# Patient Record
Sex: FEMALE | Race: BLACK OR AFRICAN AMERICAN | NOT HISPANIC OR LATINO
[De-identification: names, ages, dates, MRNs, and addresses within clinical notes are randomized per-mention and may not be internally consistent; named-entity substitution may affect disease eponyms.]

---

## 2017-03-24 ENCOUNTER — APPOINTMENT (OUTPATIENT)
Dept: CARDIOLOGY | Facility: CLINIC | Age: 67
End: 2017-03-24

## 2017-04-07 ENCOUNTER — APPOINTMENT (OUTPATIENT)
Dept: CARDIOLOGY | Facility: CLINIC | Age: 67
End: 2017-04-07

## 2017-04-07 ENCOUNTER — NON-APPOINTMENT (OUTPATIENT)
Age: 67
End: 2017-04-07

## 2017-04-07 VITALS
OXYGEN SATURATION: 98 % | HEIGHT: 64 IN | HEART RATE: 76 BPM | WEIGHT: 151 LBS | DIASTOLIC BLOOD PRESSURE: 60 MMHG | SYSTOLIC BLOOD PRESSURE: 110 MMHG | BODY MASS INDEX: 25.78 KG/M2

## 2017-04-10 ENCOUNTER — RESULT REVIEW (OUTPATIENT)
Age: 67
End: 2017-04-10

## 2017-05-18 ENCOUNTER — RX RENEWAL (OUTPATIENT)
Age: 67
End: 2017-05-18

## 2017-06-05 ENCOUNTER — APPOINTMENT (OUTPATIENT)
Dept: CARDIOLOGY | Facility: CLINIC | Age: 67
End: 2017-06-05

## 2017-06-30 ENCOUNTER — APPOINTMENT (OUTPATIENT)
Dept: CARDIOLOGY | Facility: CLINIC | Age: 67
End: 2017-06-30

## 2017-07-28 ENCOUNTER — MEDICATION RENEWAL (OUTPATIENT)
Age: 67
End: 2017-07-28

## 2017-07-28 ENCOUNTER — RX RENEWAL (OUTPATIENT)
Age: 67
End: 2017-07-28

## 2017-08-10 ENCOUNTER — NON-APPOINTMENT (OUTPATIENT)
Age: 67
End: 2017-08-10

## 2017-08-10 ENCOUNTER — APPOINTMENT (OUTPATIENT)
Dept: CARDIOLOGY | Facility: CLINIC | Age: 67
End: 2017-08-10
Payer: MEDICARE

## 2017-08-10 VITALS
HEART RATE: 73 BPM | WEIGHT: 156 LBS | OXYGEN SATURATION: 98 % | SYSTOLIC BLOOD PRESSURE: 90 MMHG | BODY MASS INDEX: 25.99 KG/M2 | HEIGHT: 65 IN | DIASTOLIC BLOOD PRESSURE: 60 MMHG

## 2017-08-10 DIAGNOSIS — K59.09 OTHER CONSTIPATION: ICD-10-CM

## 2017-08-10 DIAGNOSIS — R14.0 ABDOMINAL DISTENSION (GASEOUS): ICD-10-CM

## 2017-08-10 PROCEDURE — 93306 TTE W/DOPPLER COMPLETE: CPT

## 2017-08-10 PROCEDURE — 93978 VASCULAR STUDY: CPT | Mod: 59

## 2017-08-10 PROCEDURE — 93000 ELECTROCARDIOGRAM COMPLETE: CPT

## 2017-08-10 PROCEDURE — 99215 OFFICE O/P EST HI 40 MIN: CPT | Mod: 25

## 2017-08-30 ENCOUNTER — APPOINTMENT (OUTPATIENT)
Dept: GASTROENTEROLOGY | Facility: CLINIC | Age: 67
End: 2017-08-30

## 2017-09-05 ENCOUNTER — RX RENEWAL (OUTPATIENT)
Age: 67
End: 2017-09-05

## 2017-09-19 ENCOUNTER — MEDICATION RENEWAL (OUTPATIENT)
Age: 67
End: 2017-09-19

## 2017-10-02 ENCOUNTER — APPOINTMENT (OUTPATIENT)
Dept: CARDIOLOGY | Facility: CLINIC | Age: 67
End: 2017-10-02

## 2017-10-13 ENCOUNTER — APPOINTMENT (OUTPATIENT)
Dept: CARDIOLOGY | Facility: CLINIC | Age: 67
End: 2017-10-13
Payer: MEDICARE

## 2017-10-13 PROCEDURE — 93283 PRGRMG EVAL IMPLANTABLE DFB: CPT

## 2017-11-09 ENCOUNTER — MEDICATION RENEWAL (OUTPATIENT)
Age: 67
End: 2017-11-09

## 2017-11-27 ENCOUNTER — MEDICATION RENEWAL (OUTPATIENT)
Age: 67
End: 2017-11-27

## 2018-01-19 ENCOUNTER — APPOINTMENT (OUTPATIENT)
Dept: CARDIOLOGY | Facility: CLINIC | Age: 68
End: 2018-01-19
Payer: MEDICARE

## 2018-01-19 PROCEDURE — 93283 PRGRMG EVAL IMPLANTABLE DFB: CPT

## 2018-02-06 ENCOUNTER — APPOINTMENT (OUTPATIENT)
Dept: CARDIOLOGY | Facility: CLINIC | Age: 68
End: 2018-02-06
Payer: MEDICARE

## 2018-02-06 ENCOUNTER — NON-APPOINTMENT (OUTPATIENT)
Age: 68
End: 2018-02-06

## 2018-02-06 VITALS
BODY MASS INDEX: 24.32 KG/M2 | WEIGHT: 146 LBS | HEART RATE: 74 BPM | OXYGEN SATURATION: 98 % | SYSTOLIC BLOOD PRESSURE: 110 MMHG | DIASTOLIC BLOOD PRESSURE: 60 MMHG | HEIGHT: 65 IN

## 2018-02-06 PROCEDURE — 99215 OFFICE O/P EST HI 40 MIN: CPT

## 2018-02-06 PROCEDURE — 93000 ELECTROCARDIOGRAM COMPLETE: CPT

## 2018-02-06 RX ORDER — ASPIRIN 81 MG/1
81 TABLET ORAL
Qty: 90 | Refills: 3 | Status: DISCONTINUED | COMMUNITY
Start: 2017-11-27 | End: 2018-02-06

## 2018-02-06 NOTE — DISCUSSION/SUMMARY
[Systolic Heart Failure] : systolic heart failure [Non-Cardiac] : non-cardiac chest pain [Coronary Artery Disease] : coronary artery disease [Stable] : stable [None] : none [Chronic Combined Systolic And Diastolic Heart Failure] : chronic combined systolic and diastolic congestive heart failure [Compensated] : compensated [Responding to Treatment] : responding to treatment [Patient] : the patient [___ Month(s)] : [unfilled] month(s) [FreeTextEntry1] : She appears stable from cardiovascular standpoint at this time. Does not appear to be fluid overloaded and most of her symptoms remain pulmonary. \par Continue current medical regimen. Weight improving, patient warned of the need to comply with dietary restrictions.\par Abdominal aortic aneurysm last screened on CT of abdomen in October, 2017, grossly unchanged.\par Labs not available for review from PCP.\par ICD check every 2 months until EOL.

## 2018-02-06 NOTE — PHYSICAL EXAM
[General Appearance - Well Developed] : well developed [Normal Appearance] : normal appearance [Well Groomed] : well groomed [General Appearance - Well Nourished] : well nourished [No Deformities] : no deformities [General Appearance - In No Acute Distress] : no acute distress [Normal Conjunctiva] : the conjunctiva exhibited no abnormalities [Eyelids - No Xanthelasma] : the eyelids demonstrated no xanthelasmas [Normal Oral Mucosa] : normal oral mucosa [No Oral Pallor] : no oral pallor [No Oral Cyanosis] : no oral cyanosis [Normal Jugular Venous A Waves Present] : normal jugular venous A waves present [Normal Jugular Venous V Waves Present] : normal jugular venous V waves present [No Jugular Venous Chinchilla A Waves] : no jugular venous chinchilla A waves [Respiration, Rhythm And Depth] : normal respiratory rhythm and effort [Exaggerated Use Of Accessory Muscles For Inspiration] : no accessory muscle use [Heart Rate And Rhythm] : heart rate and rhythm were normal [Heart Sounds] : normal S1 and S2 [FreeTextEntry1] : 2/6 soft ejection murmur heard best at left upper sternal border [Abdomen Soft] : soft [Abdomen Tenderness] : non-tender [Abdomen Mass (___ Cm)] : no abdominal mass palpated [Abnormal Walk] : normal gait [Gait - Sufficient For Exercise Testing] : the gait was sufficient for exercise testing [Nail Clubbing] : no clubbing of the fingernails [Cyanosis, Localized] : no localized cyanosis [Petechial Hemorrhages (___cm)] : no petechial hemorrhages [Skin Color & Pigmentation] : normal skin color and pigmentation [] : no rash [No Venous Stasis] : no venous stasis [Skin Lesions] : no skin lesions [No Skin Ulcers] : no skin ulcer [No Xanthoma] : no  xanthoma was observed [Oriented To Time, Place, And Person] : oriented to person, place, and time [Affect] : the affect was normal [Mood] : the mood was normal [No Anxiety] : not feeling anxious

## 2018-02-06 NOTE — HISTORY OF PRESENT ILLNESS
[FreeTextEntry1] : 67-year-old female with history of ischemic cardiomyopathy (Ejection fraction 30-35% on last check in 2017), s/p AICD and placement, h/o mild AAA , GERD, COPD. h/o spontaneous pneumothoraces, s/p multiple thoracenteses.\par She states her medications have been unchanged. ICD last checked 2 weeks ago, 6-8 months to EOL.\par Patient denies any overt cardiac chest pain, no exertional symptoms. The EKG appeared unchanged.

## 2018-02-15 ENCOUNTER — RX RENEWAL (OUTPATIENT)
Age: 68
End: 2018-02-15

## 2018-03-01 ENCOUNTER — MESSAGE (OUTPATIENT)
Age: 68
End: 2018-03-01

## 2018-03-28 ENCOUNTER — APPOINTMENT (OUTPATIENT)
Dept: CARDIOLOGY | Facility: CLINIC | Age: 68
End: 2018-03-28
Payer: MEDICARE

## 2018-03-28 PROCEDURE — 93283 PRGRMG EVAL IMPLANTABLE DFB: CPT

## 2018-03-30 ENCOUNTER — APPOINTMENT (OUTPATIENT)
Dept: CARDIOLOGY | Facility: CLINIC | Age: 68
End: 2018-03-30
Payer: MEDICARE

## 2018-03-30 PROCEDURE — 93306 TTE W/DOPPLER COMPLETE: CPT

## 2018-03-30 PROCEDURE — 93978 VASCULAR STUDY: CPT

## 2018-06-07 ENCOUNTER — APPOINTMENT (OUTPATIENT)
Dept: CARDIOLOGY | Facility: CLINIC | Age: 68
End: 2018-06-07
Payer: MEDICARE

## 2018-06-07 PROCEDURE — 93283 PRGRMG EVAL IMPLANTABLE DFB: CPT

## 2018-07-03 ENCOUNTER — RX RENEWAL (OUTPATIENT)
Age: 68
End: 2018-07-03

## 2018-07-12 ENCOUNTER — APPOINTMENT (OUTPATIENT)
Dept: CARDIOLOGY | Facility: CLINIC | Age: 68
End: 2018-07-12
Payer: MEDICARE

## 2018-07-12 PROCEDURE — 93283 PRGRMG EVAL IMPLANTABLE DFB: CPT

## 2018-08-15 ENCOUNTER — RX RENEWAL (OUTPATIENT)
Age: 68
End: 2018-08-15

## 2018-08-17 ENCOUNTER — APPOINTMENT (OUTPATIENT)
Dept: CARDIOLOGY | Facility: CLINIC | Age: 68
End: 2018-08-17
Payer: MEDICARE

## 2018-08-17 PROCEDURE — 93283 PRGRMG EVAL IMPLANTABLE DFB: CPT

## 2018-08-24 ENCOUNTER — RX RENEWAL (OUTPATIENT)
Age: 68
End: 2018-08-24

## 2018-08-28 ENCOUNTER — OTHER (OUTPATIENT)
Age: 68
End: 2018-08-28

## 2018-08-28 ENCOUNTER — NON-APPOINTMENT (OUTPATIENT)
Age: 68
End: 2018-08-28

## 2018-08-28 ENCOUNTER — APPOINTMENT (OUTPATIENT)
Dept: CARDIOLOGY | Facility: CLINIC | Age: 68
End: 2018-08-28
Payer: MEDICARE

## 2018-08-28 VITALS
WEIGHT: 141 LBS | BODY MASS INDEX: 23.49 KG/M2 | DIASTOLIC BLOOD PRESSURE: 60 MMHG | HEIGHT: 65 IN | HEART RATE: 80 BPM | OXYGEN SATURATION: 98 % | SYSTOLIC BLOOD PRESSURE: 110 MMHG

## 2018-08-28 PROCEDURE — 93000 ELECTROCARDIOGRAM COMPLETE: CPT

## 2018-08-28 PROCEDURE — 99215 OFFICE O/P EST HI 40 MIN: CPT

## 2018-09-21 ENCOUNTER — APPOINTMENT (OUTPATIENT)
Dept: CARDIOLOGY | Facility: CLINIC | Age: 68
End: 2018-09-21
Payer: MEDICARE

## 2018-09-21 PROCEDURE — 93283 PRGRMG EVAL IMPLANTABLE DFB: CPT

## 2018-10-29 ENCOUNTER — MEDICATION RENEWAL (OUTPATIENT)
Age: 68
End: 2018-10-29

## 2018-10-29 ENCOUNTER — RX RENEWAL (OUTPATIENT)
Age: 68
End: 2018-10-29

## 2018-10-30 ENCOUNTER — APPOINTMENT (OUTPATIENT)
Dept: CARDIOLOGY | Facility: CLINIC | Age: 68
End: 2018-10-30
Payer: MEDICARE

## 2018-10-30 PROCEDURE — 93978 VASCULAR STUDY: CPT

## 2018-10-30 PROCEDURE — 93283 PRGRMG EVAL IMPLANTABLE DFB: CPT

## 2018-11-06 ENCOUNTER — APPOINTMENT (OUTPATIENT)
Dept: CARDIOLOGY | Facility: CLINIC | Age: 68
End: 2018-11-06
Payer: MEDICARE

## 2018-11-06 ENCOUNTER — NON-APPOINTMENT (OUTPATIENT)
Age: 68
End: 2018-11-06

## 2018-11-06 VITALS
DIASTOLIC BLOOD PRESSURE: 76 MMHG | HEART RATE: 73 BPM | BODY MASS INDEX: 23.32 KG/M2 | SYSTOLIC BLOOD PRESSURE: 130 MMHG | WEIGHT: 140 LBS | OXYGEN SATURATION: 96 % | HEIGHT: 65 IN

## 2018-11-06 DIAGNOSIS — Z01.810 ENCOUNTER FOR PREPROCEDURAL CARDIOVASCULAR EXAMINATION: ICD-10-CM

## 2018-11-06 DIAGNOSIS — R07.9 CHEST PAIN, UNSPECIFIED: ICD-10-CM

## 2018-11-06 PROCEDURE — 93000 ELECTROCARDIOGRAM COMPLETE: CPT

## 2018-11-06 PROCEDURE — 99215 OFFICE O/P EST HI 40 MIN: CPT

## 2018-11-07 NOTE — REASON FOR VISIT
[Follow-up Device Check] : follow-up device check visit [NATHALY (Elective Replacement Indication)] : elective replacement indication

## 2018-11-08 NOTE — PROCEDURE
[No] : not [NSR] : normal sinus rhythm [ICD] : Implantable cardioverter-defibrillator [DDD] : DDD [Voltage: ___ volts] : Voltage was [unfilled] volts [Charge Time: ___ sec] : charge time was [unfilled] seconds [Longevity: ___ months] : The estimated remaining battery life is [unfilled] months [Threshold Testing Performed] : Threshold testing was performed [Lead Imp:  ___ohms] : lead impedance was [unfilled] ohms [Sensing Amplitude ___mv] : sensing amplitude was [unfilled] mv [___V @] : [unfilled] V [___ ms] : [unfilled] ms [None] : none [Counters Reset] : the counters were reset [Asense-Vsense ___ %] : Asense-Vsense [unfilled]% [Asense-Vpace ___ %] : Asense-Vpace [unfilled]% [Apace-Vsense ___ %] : Apace-Vsense [unfilled]% [Apace-Vpace ___ %] : Apace-Vpace [unfilled]% [de-identified] : medtronic [de-identified] : D215 [de-identified] : [id number] [de-identified] : 10/15/10 [de-identified] : AAI<=>DDD [de-identified] : 60 [de-identified] : defibrillator lead impedance 87 ohms.

## 2018-11-08 NOTE — DISCUSSION/SUMMARY
[AICD Function Normal] : normal AICD function [Patient] : the patient [de-identified] : f/u in 1 month.

## 2018-11-19 ENCOUNTER — APPOINTMENT (OUTPATIENT)
Dept: CARDIOLOGY | Facility: CLINIC | Age: 68
End: 2018-11-19
Payer: MEDICARE

## 2018-11-19 DIAGNOSIS — Z45.018 ENCOUNTER FOR ADJUSTMENT AND MANAGEMENT OF OTHER PART OF CARDIAC PACEMAKER: ICD-10-CM

## 2018-11-19 PROCEDURE — 93284 PRGRMG EVAL IMPLANTABLE DFB: CPT

## 2018-12-04 ENCOUNTER — APPOINTMENT (OUTPATIENT)
Dept: CARDIOLOGY | Facility: CLINIC | Age: 68
End: 2018-12-04

## 2018-12-06 ENCOUNTER — APPOINTMENT (OUTPATIENT)
Dept: ELECTROPHYSIOLOGY | Facility: CLINIC | Age: 68
End: 2018-12-06
Payer: MEDICARE

## 2018-12-06 ENCOUNTER — NON-APPOINTMENT (OUTPATIENT)
Age: 68
End: 2018-12-06

## 2018-12-06 VITALS
OXYGEN SATURATION: 97 % | SYSTOLIC BLOOD PRESSURE: 107 MMHG | HEIGHT: 65 IN | WEIGHT: 134 LBS | HEART RATE: 84 BPM | DIASTOLIC BLOOD PRESSURE: 70 MMHG | BODY MASS INDEX: 22.33 KG/M2

## 2018-12-06 PROCEDURE — 99215 OFFICE O/P EST HI 40 MIN: CPT

## 2018-12-06 PROCEDURE — 93283 PRGRMG EVAL IMPLANTABLE DFB: CPT

## 2018-12-06 RX ORDER — MULTIVIT-MIN/IRON/FOLIC ACID/K 18-600-40
CAPSULE ORAL
Refills: 0 | Status: ACTIVE | COMMUNITY

## 2018-12-06 RX ORDER — PNV NO.95/FERROUS FUM/FOLIC AC 28MG-0.8MG
TABLET ORAL
Refills: 0 | Status: ACTIVE | COMMUNITY

## 2018-12-06 RX ORDER — UBIDECARENONE/VIT E ACET 100MG-5
CAPSULE ORAL
Refills: 0 | Status: ACTIVE | COMMUNITY

## 2018-12-06 NOTE — REASON FOR VISIT
[New Patient Device Check] : new patient device check visit [NATHALY (Elective Replacement Indication)] : elective replacement indication

## 2018-12-06 NOTE — DISCUSSION/SUMMARY
[FreeTextEntry1] : Normal device function with adequate safety margins.  She has reached NATHALY and we discussed the plan for generator replacement.  I cautioned her that she will likely keloid with this surgical incision as well.  All of her questions were answered.  The procedure will be arranged.

## 2018-12-06 NOTE — PROCEDURE
[NSR] : normal sinus rhythm [ICD] : Implantable cardioverter-defibrillator [Lead Imp:  ___ohms] : lead impedance was [unfilled] ohms [Sensing Amplitude ___mv] : sensing amplitude was [unfilled] mv [___V @] : [unfilled] V [___ ms] : [unfilled] ms [de-identified] : Medtronic [de-identified] : Secura [de-identified] : [id number] [de-identified] : 10/15/2010 [de-identified] : NATHALY on 11/11/2018

## 2018-12-06 NOTE — PHYSICAL EXAM
[General Appearance - Well Developed] : well developed [Normal Appearance] : normal appearance [Well Groomed] : well groomed [General Appearance - Well Nourished] : well nourished [No Deformities] : no deformities [General Appearance - In No Acute Distress] : no acute distress [Heart Rate And Rhythm] : heart rate and rhythm were normal [Heart Sounds] : normal S1 and S2 [Murmurs] : no murmurs present [Respiration, Rhythm And Depth] : normal respiratory rhythm and effort [Exaggerated Use Of Accessory Muscles For Inspiration] : no accessory muscle use [Auscultation Breath Sounds / Voice Sounds] : lungs were clear to auscultation bilaterally [Left Infraclavicular] : left infraclavicular area [Clean] : clean [Dry] : dry [Well-Healed] : well-healed [Abdomen Soft] : soft [Abdomen Tenderness] : non-tender [Abdomen Mass (___ Cm)] : no abdominal mass palpated [Nail Clubbing] : no clubbing of the fingernails [Cyanosis, Localized] : no localized cyanosis [Petechial Hemorrhages (___cm)] : no petechial hemorrhages [] : no ischemic changes [FreeTextEntry2] : there is a keloid over the incsion.  This skin is thin.

## 2018-12-06 NOTE — HISTORY OF PRESENT ILLNESS
[de-identified] : S/p primary prevention ICD in 2010.  No ICD therapy.  LV function has improved.  She exercises on a stationary bike a few times/week (very brief pedals), but her exercise tolerance is not good.

## 2018-12-12 ENCOUNTER — APPOINTMENT (OUTPATIENT)
Dept: PLASTIC SURGERY | Facility: CLINIC | Age: 68
End: 2018-12-12
Payer: MEDICARE

## 2018-12-12 VITALS
DIASTOLIC BLOOD PRESSURE: 77 MMHG | HEART RATE: 79 BPM | BODY MASS INDEX: 22.16 KG/M2 | WEIGHT: 133 LBS | SYSTOLIC BLOOD PRESSURE: 115 MMHG | HEIGHT: 65 IN | OXYGEN SATURATION: 96 % | RESPIRATION RATE: 15 BRPM

## 2018-12-12 PROCEDURE — 99203 OFFICE O/P NEW LOW 30 MIN: CPT

## 2018-12-12 NOTE — PROCEDURE
[No] : not [NSR] : normal sinus rhythm [ICD] : Implantable cardioverter-defibrillator [DDD] : DDD [Voltage: ___ volts] : Voltage was [unfilled] volts [Charge Time: ___ sec] : charge time was [unfilled] seconds [Longevity: ___ months] : The estimated remaining battery life is [unfilled] months [Lead Imp:  ___ohms] : lead impedance was [unfilled] ohms [Sensing Amplitude ___mv] : sensing amplitude was [unfilled] mv [___V @] : [unfilled] V [___ ms] : [unfilled] ms [None] : none [Asense-Vsense ___ %] : Asense-Vsense [unfilled]% [Asense-Vpace ___ %] : Asense-Vpace [unfilled]% [Apace-Vsense ___ %] : Apace-Vsense [unfilled]% [Apace-Vpace ___ %] : Apace-Vpace [unfilled]% [Threshold Testing Performed] : Threshold testing was not performed [Counters Reset] : the counters were not reset [de-identified] : medtronic [de-identified] : D285 [de-identified] : [id number] [de-identified] : 10/15/10 [de-identified] : AAI<=>DDD [de-identified] : 60 [de-identified] : defibrillator lead impedance 85 ohms.

## 2019-01-09 ENCOUNTER — TRANSCRIPTION ENCOUNTER (OUTPATIENT)
Age: 69
End: 2019-01-09

## 2019-01-10 ENCOUNTER — RESULT REVIEW (OUTPATIENT)
Age: 69
End: 2019-01-10

## 2019-01-10 ENCOUNTER — OUTPATIENT (OUTPATIENT)
Dept: OUTPATIENT SERVICES | Facility: HOSPITAL | Age: 69
LOS: 1 days | End: 2019-01-10
Payer: MEDICARE

## 2019-01-10 ENCOUNTER — MOBILE ON CALL (OUTPATIENT)
Age: 69
End: 2019-01-10

## 2019-01-10 VITALS
HEART RATE: 78 BPM | TEMPERATURE: 98 F | SYSTOLIC BLOOD PRESSURE: 124 MMHG | HEIGHT: 64 IN | OXYGEN SATURATION: 94 % | WEIGHT: 130.07 LBS | DIASTOLIC BLOOD PRESSURE: 78 MMHG | RESPIRATION RATE: 18 BRPM

## 2019-01-10 DIAGNOSIS — Z45.02 ENCOUNTER FOR ADJUSTMENT AND MANAGEMENT OF AUTOMATIC IMPLANTABLE CARDIAC DEFIBRILLATOR: ICD-10-CM

## 2019-01-10 LAB
ALBUMIN SERPL ELPH-MCNC: 4.4 G/DL — SIGNIFICANT CHANGE UP (ref 3.3–5)
ALP SERPL-CCNC: 41 U/L — SIGNIFICANT CHANGE UP (ref 40–120)
ALT FLD-CCNC: 17 U/L — SIGNIFICANT CHANGE UP (ref 10–45)
ANION GAP SERPL CALC-SCNC: 14 MMOL/L — SIGNIFICANT CHANGE UP (ref 5–17)
ANION GAP SERPL CALC-SCNC: 5 MMOL/L — SIGNIFICANT CHANGE UP (ref 5–17)
AST SERPL-CCNC: 74 U/L — HIGH (ref 10–40)
BILIRUB SERPL-MCNC: 0.3 MG/DL — SIGNIFICANT CHANGE UP (ref 0.2–1.2)
BUN SERPL-MCNC: 15 MG/DL — SIGNIFICANT CHANGE UP (ref 7–23)
BUN SERPL-MCNC: 15 MG/DL — SIGNIFICANT CHANGE UP (ref 7–23)
CALCIUM SERPL-MCNC: 9.5 MG/DL — SIGNIFICANT CHANGE UP (ref 8.4–10.5)
CALCIUM SERPL-MCNC: 9.5 MG/DL — SIGNIFICANT CHANGE UP (ref 8.4–10.5)
CHLORIDE SERPL-SCNC: 102 MMOL/L — SIGNIFICANT CHANGE UP (ref 96–108)
CHLORIDE SERPL-SCNC: 98 MMOL/L — SIGNIFICANT CHANGE UP (ref 96–108)
CO2 SERPL-SCNC: 20 MMOL/L — LOW (ref 22–31)
CO2 SERPL-SCNC: 25 MMOL/L — SIGNIFICANT CHANGE UP (ref 22–31)
CREAT SERPL-MCNC: 0.86 MG/DL — SIGNIFICANT CHANGE UP (ref 0.5–1.3)
CREAT SERPL-MCNC: 0.92 MG/DL — SIGNIFICANT CHANGE UP (ref 0.5–1.3)
GLUCOSE SERPL-MCNC: 85 MG/DL — SIGNIFICANT CHANGE UP (ref 70–99)
GLUCOSE SERPL-MCNC: 97 MG/DL — SIGNIFICANT CHANGE UP (ref 70–99)
HCT VFR BLD CALC: 40.8 % — SIGNIFICANT CHANGE UP (ref 34.5–45)
HGB BLD-MCNC: 13.9 G/DL — SIGNIFICANT CHANGE UP (ref 11.5–15.5)
MCHC RBC-ENTMCNC: 33.8 PG — SIGNIFICANT CHANGE UP (ref 27–34)
MCHC RBC-ENTMCNC: 34.1 GM/DL — SIGNIFICANT CHANGE UP (ref 32–36)
MCV RBC AUTO: 99.1 FL — SIGNIFICANT CHANGE UP (ref 80–100)
PLATELET # BLD AUTO: 273 K/UL — SIGNIFICANT CHANGE UP (ref 150–400)
POTASSIUM SERPL-MCNC: 4.6 MMOL/L — SIGNIFICANT CHANGE UP (ref 3.5–5.3)
POTASSIUM SERPL-MCNC: >9 MMOL/L — CRITICAL HIGH (ref 3.5–5.3)
POTASSIUM SERPL-SCNC: 4.6 MMOL/L — SIGNIFICANT CHANGE UP (ref 3.5–5.3)
POTASSIUM SERPL-SCNC: >9 MMOL/L — CRITICAL HIGH (ref 3.5–5.3)
PROT SERPL-MCNC: 9.5 G/DL — HIGH (ref 6–8.3)
RBC # BLD: 4.12 M/UL — SIGNIFICANT CHANGE UP (ref 3.8–5.2)
RBC # FLD: 12.6 % — SIGNIFICANT CHANGE UP (ref 10.3–14.5)
SODIUM SERPL-SCNC: 128 MMOL/L — LOW (ref 135–145)
SODIUM SERPL-SCNC: 136 MMOL/L — SIGNIFICANT CHANGE UP (ref 135–145)
WBC # BLD: 5.6 K/UL — SIGNIFICANT CHANGE UP (ref 3.8–10.5)
WBC # FLD AUTO: 5.6 K/UL — SIGNIFICANT CHANGE UP (ref 3.8–10.5)

## 2019-01-10 PROCEDURE — C1721: CPT

## 2019-01-10 PROCEDURE — 93005 ELECTROCARDIOGRAM TRACING: CPT

## 2019-01-10 PROCEDURE — 88304 TISSUE EXAM BY PATHOLOGIST: CPT

## 2019-01-10 PROCEDURE — 85027 COMPLETE CBC AUTOMATED: CPT

## 2019-01-10 PROCEDURE — 11404 EXC TR-EXT B9+MARG 3.1-4 CM: CPT

## 2019-01-10 PROCEDURE — 80053 COMPREHEN METABOLIC PANEL: CPT

## 2019-01-10 PROCEDURE — 80048 BASIC METABOLIC PNL TOTAL CA: CPT

## 2019-01-10 PROCEDURE — 93010 ELECTROCARDIOGRAM REPORT: CPT

## 2019-01-10 PROCEDURE — 88304 TISSUE EXAM BY PATHOLOGIST: CPT | Mod: 26

## 2019-01-10 PROCEDURE — 33263 RMVL & RPLCMT DFB GEN 2 LEAD: CPT

## 2019-01-10 PROCEDURE — 12032 INTMD RPR S/A/T/EXT 2.6-7.5: CPT

## 2019-01-10 PROCEDURE — 99223 1ST HOSP IP/OBS HIGH 75: CPT

## 2019-01-10 RX ORDER — CEPHALEXIN 500 MG
1 CAPSULE ORAL
Qty: 9 | Refills: 0 | OUTPATIENT
Start: 2019-01-10 | End: 2019-01-12

## 2019-01-10 RX ORDER — CEPHALEXIN 500 MG
1 CAPSULE ORAL
Qty: 9 | Refills: 0
Start: 2019-01-10 | End: 2019-01-12

## 2019-01-10 NOTE — H&P CARDIOLOGY - PMH
Anxiety    CAD (coronary artery disease)    Congestive heart failure    COPD (chronic obstructive pulmonary disease)    CVA (cerebral vascular accident) X2, right sided weakness    Hypercholesteremia    Osteoporosis    Pacemaker    Ventricular arrhythmia

## 2019-01-10 NOTE — H&P CARDIOLOGY - REVIEW OF SYSTEMS
+ chest pain  + shortness of breath  +nausea  +diarrhea  +dentures  +glasses  +walks with cane  + falls   + aphasia see HPI

## 2019-01-10 NOTE — H&P CARDIOLOGY - HISTORY OF PRESENT ILLNESS
68 year old  female with PMHX: HTN, CVA X 2 right sided weakness and aphasia, PPM/AICD, Anxiety, Osteoporosis, MI, CAD   Ischemic cardiomyopathy (Ejection fraction 30-35% on last check in 2017), s/p AICD and placement, h/o mild AAA , GERD, COPD. h/o Spontaneous pneumothoraces, s/p Multiple thoracenteses - significant dyspnea due to residual lung disease. No chest pain.    ICD last checked 1 week ago, 0-5 months to EOL.  Pt presents today for PPM generator change. 68 year old  female with PMHX: HTN, CVA X 2 right sided weakness and aphasia, PPM/AICD, Anxiety, Osteoporosis, MI, CAD   Ischemic cardiomyopathy (Ejection fraction 30-35% on last check in 2017), s/p AICD and placement, h/o mild AAA , GERD, COPD. h/o Spontaneous pneumothoraces, s/p Multiple thoracenteses - significant dyspnea due to residual lung disease. No chest pain.    ICD last checked 1 week ago, 0-5 months to EOL. Pt woke this morning with laryngitis, afebrile.   Pt presents today for PPM generator change. 68 year old  female with PMHX: HTN, CVA X 2 right sided weakness and aphasia, Anxiety, Osteoporosis, MI, CAD/Prior stents in 2009, Ischemic cardiomyopathy (Ejection fraction 30-35% on last check in 2017), s/p AICD and placement, h/o mild AAA , GERD, COPD. h/o Spontaneous pneumothorax, s/p Multiple thoracenteses - significant dyspnea due to residual lung disease. No chest pain.    ICD last checked 1 week ago, 0-5 months to EOL. Pt woke this morning with laryngitis, afebrile.   Pt presents today for PPM generator change.

## 2019-01-10 NOTE — PROCEDURE NOTE - PROCEDURE
<<-----Click on this checkbox to enter Procedure Capsulorrhaphy, breast  01/10/2019  AICD implant capsulorrhaphy  Active  DWHITEHEAD  Excision of benign skin lesion of chest  01/10/2019  excision of keloid scar left chest  Active  DWHITEHEAD

## 2019-01-16 ENCOUNTER — APPOINTMENT (OUTPATIENT)
Dept: PLASTIC SURGERY | Facility: CLINIC | Age: 69
End: 2019-01-16
Payer: MEDICARE

## 2019-01-16 VITALS
HEIGHT: 65 IN | DIASTOLIC BLOOD PRESSURE: 75 MMHG | WEIGHT: 134 LBS | BODY MASS INDEX: 22.33 KG/M2 | HEART RATE: 72 BPM | OXYGEN SATURATION: 97 % | SYSTOLIC BLOOD PRESSURE: 112 MMHG

## 2019-01-16 PROCEDURE — 99024 POSTOP FOLLOW-UP VISIT: CPT

## 2019-01-16 NOTE — HISTORY OF PRESENT ILLNESS
[FreeTextEntry1] : Pt is s/p AICD exchange and scar excision on 1/10/2019. Continues to c/o discomfort from keloids in infra-axillary region. Minimal complaints regarding excision/exchange site. No F/C/S.\par \par Pathology continues to review the keloid excision specimen.

## 2019-01-16 NOTE — PHYSICAL EXAM
[de-identified] : AICD in position, steri-strips in place, no tenderness, no drainage, no erythema.

## 2019-01-19 LAB — SURGICAL PATHOLOGY STUDY: SIGNIFICANT CHANGE UP

## 2019-01-24 ENCOUNTER — APPOINTMENT (OUTPATIENT)
Dept: ELECTROPHYSIOLOGY | Facility: CLINIC | Age: 69
End: 2019-01-24

## 2019-01-24 ENCOUNTER — APPOINTMENT (OUTPATIENT)
Dept: ELECTROPHYSIOLOGY | Facility: CLINIC | Age: 69
End: 2019-01-24
Payer: MEDICARE

## 2019-01-24 VITALS
HEIGHT: 65 IN | SYSTOLIC BLOOD PRESSURE: 97 MMHG | OXYGEN SATURATION: 97 % | WEIGHT: 125 LBS | BODY MASS INDEX: 20.83 KG/M2 | DIASTOLIC BLOOD PRESSURE: 65 MMHG | HEART RATE: 84 BPM

## 2019-01-24 PROCEDURE — 93283 PRGRMG EVAL IMPLANTABLE DFB: CPT

## 2019-02-06 ENCOUNTER — APPOINTMENT (OUTPATIENT)
Dept: PLASTIC SURGERY | Facility: CLINIC | Age: 69
End: 2019-02-06
Payer: MEDICARE

## 2019-02-06 VITALS
OXYGEN SATURATION: 98 % | HEART RATE: 79 BPM | SYSTOLIC BLOOD PRESSURE: 96 MMHG | WEIGHT: 125 LBS | HEIGHT: 65 IN | BODY MASS INDEX: 20.83 KG/M2 | DIASTOLIC BLOOD PRESSURE: 64 MMHG

## 2019-02-06 PROCEDURE — 99024 POSTOP FOLLOW-UP VISIT: CPT

## 2019-02-06 NOTE — ASSESSMENT
[FreeTextEntry1] : Healing well following AICD exchange and keloid scar excision from left anterior chest well. The patient continues to have discomfort from the keloid scars in the infra-axillary regions. We will plan to perform cortisone injection to her most uncomfortable sites at 8 weeks from AICD exchange to allow for sufficient healing time to lower risk of exposure of the device.

## 2019-02-06 NOTE — HISTORY OF PRESENT ILLNESS
[FreeTextEntry1] : Pt is s/p AICD exchange and scar excision on 1/10/2019. Continues to c/o discomfort from keloids in infra-axillary region, mostly on the left side. No discomfort from excision/exchange site. No F/C/S.\par \par Pathology demonstrated dermal keloid.

## 2019-02-06 NOTE — PHYSICAL EXAM
[de-identified] : AICD in position, incision closed, healing well, no tenderness or edema. No new keloid formation at this time. Left infraxillary region with 5 discreet, uncomfortable keloid scars. One is 8 mm x 3 cm, one 1 cm x 2 cm, and three a little less than 1 cm x 1 cm. On right side, there is one 1 cm x 2 cm keloid at the anterior axillary line just below the breast. She has some discomfort from the proximal portion of her old deltoid scar, as well.

## 2019-02-19 ENCOUNTER — NON-APPOINTMENT (OUTPATIENT)
Age: 69
End: 2019-02-19

## 2019-02-19 ENCOUNTER — APPOINTMENT (OUTPATIENT)
Dept: CARDIOLOGY | Facility: CLINIC | Age: 69
End: 2019-02-19
Payer: MEDICARE

## 2019-02-19 VITALS
OXYGEN SATURATION: 99 % | BODY MASS INDEX: 22.49 KG/M2 | HEIGHT: 65 IN | DIASTOLIC BLOOD PRESSURE: 73 MMHG | WEIGHT: 135 LBS | HEART RATE: 84 BPM | SYSTOLIC BLOOD PRESSURE: 113 MMHG

## 2019-02-19 PROCEDURE — 93000 ELECTROCARDIOGRAM COMPLETE: CPT

## 2019-02-19 PROCEDURE — 99215 OFFICE O/P EST HI 40 MIN: CPT

## 2019-02-19 RX ORDER — NEPAFENAC 3 MG/ML
0.3 SUSPENSION/ DROPS OPHTHALMIC
Qty: 2 | Refills: 0 | Status: DISCONTINUED | COMMUNITY
Start: 2018-11-20 | End: 2019-02-19

## 2019-02-19 RX ORDER — DIFLUPREDNATE 0.5 MG/ML
0.05 EMULSION OPHTHALMIC
Qty: 5 | Refills: 0 | Status: DISCONTINUED | COMMUNITY
Start: 2018-11-20 | End: 2019-02-19

## 2019-02-19 RX ORDER — BESIFLOXACIN 6 MG/ML
0.6 SUSPENSION OPHTHALMIC
Qty: 5 | Refills: 0 | Status: DISCONTINUED | COMMUNITY
Start: 2018-11-20 | End: 2019-02-19

## 2019-02-19 NOTE — PHYSICAL EXAM
[General Appearance - Well Developed] : well developed [Normal Appearance] : normal appearance [Well Groomed] : well groomed [General Appearance - Well Nourished] : well nourished [No Deformities] : no deformities [General Appearance - In No Acute Distress] : no acute distress [Normal Conjunctiva] : the conjunctiva exhibited no abnormalities [Eyelids - No Xanthelasma] : the eyelids demonstrated no xanthelasmas [Normal Oral Mucosa] : normal oral mucosa [No Oral Pallor] : no oral pallor [No Oral Cyanosis] : no oral cyanosis [Normal Jugular Venous A Waves Present] : normal jugular venous A waves present [Normal Jugular Venous V Waves Present] : normal jugular venous V waves present [No Jugular Venous Chinchilla A Waves] : no jugular venous chinchilla A waves [Heart Rate And Rhythm] : heart rate and rhythm were normal [Heart Sounds] : normal S1 and S2 [Murmurs] : no murmurs present [Respiration, Rhythm And Depth] : normal respiratory rhythm and effort [Exaggerated Use Of Accessory Muscles For Inspiration] : no accessory muscle use [Auscultation Breath Sounds / Voice Sounds] : lungs were clear to auscultation bilaterally [Abdomen Soft] : soft [Abdomen Tenderness] : non-tender [Abdomen Mass (___ Cm)] : no abdominal mass palpated [Abnormal Walk] : normal gait [Gait - Sufficient For Exercise Testing] : the gait was sufficient for exercise testing [Nail Clubbing] : no clubbing of the fingernails [Cyanosis, Localized] : no localized cyanosis [Petechial Hemorrhages (___cm)] : no petechial hemorrhages [Skin Color & Pigmentation] : normal skin color and pigmentation [] : no rash [No Venous Stasis] : no venous stasis [Skin Lesions] : no skin lesions [No Skin Ulcers] : no skin ulcer [No Xanthoma] : no  xanthoma was observed [Oriented To Time, Place, And Person] : oriented to person, place, and time [Affect] : the affect was normal [Mood] : the mood was normal [No Anxiety] : not feeling anxious

## 2019-02-19 NOTE — DISCUSSION/SUMMARY
[FreeTextEntry1] : The patient is a 68-year-old female COPD, CAD, AAA, ICD, who presents to establish care. \par #1 COPD- on advair, spiriva and ventolin\par #2 CAD- stent 2009 and 2011 [place] and [place], no angina, on plavix\par #3 CMP - Medtronic ICD generator change last month, [place]\par #4 Htn- controlled on losartan, coreg, spironolactone\par #5 Lipids- on atorvastatin\par #6 AAA- 3.5cm, f/u sono, appt with Dr. Lofton, also having claudication?\par #7 Derm- keloids, gets injections

## 2019-02-19 NOTE — DISCUSSION/SUMMARY
[FreeTextEntry1] : The patient is a 68-year-old female COPD, CAD, AAA, ICD, who presents to establish care. \par #1 COPD- on advair, spiriva and ventolin\par #2 CAD- stent 2009 and 2011 [place] and [place], no angina, on plavix\par #3 CMP - Medtronic ICD generator change last month, [place]\par #4 Htn- controlled on losartan, coreg, spironolactone\par #5 Lipids- on atorvastatin\par #6 AAA- 3.5cm, f/u sono, appt with Dr. Lofton, also having claudication?\par #7 Derm- keloids, gets injections

## 2019-02-19 NOTE — HISTORY OF PRESENT ILLNESS
[FreeTextEntry1] : Mary is a 68-year-old female COPD, CAD, ICD, DM, Htn who presents to establish care. She denies any chest pain, palpitations, dizziness or shortness of breath out of normal.

## 2019-02-26 ENCOUNTER — FORM ENCOUNTER (OUTPATIENT)
Age: 69
End: 2019-02-26

## 2019-02-27 ENCOUNTER — APPOINTMENT (OUTPATIENT)
Dept: ULTRASOUND IMAGING | Facility: HOSPITAL | Age: 69
End: 2019-02-27
Payer: MEDICARE

## 2019-02-27 ENCOUNTER — OUTPATIENT (OUTPATIENT)
Dept: OUTPATIENT SERVICES | Facility: HOSPITAL | Age: 69
LOS: 1 days | End: 2019-02-27
Payer: MEDICARE

## 2019-02-27 DIAGNOSIS — Z00.00 ENCOUNTER FOR GENERAL ADULT MEDICAL EXAMINATION WITHOUT ABNORMAL FINDINGS: ICD-10-CM

## 2019-02-27 DIAGNOSIS — I71.4 ABDOMINAL AORTIC ANEURYSM, WITHOUT RUPTURE: ICD-10-CM

## 2019-02-27 PROCEDURE — 76775 US EXAM ABDO BACK WALL LIM: CPT

## 2019-02-27 PROCEDURE — 76770 US EXAM ABDO BACK WALL COMP: CPT | Mod: 26

## 2019-03-26 ENCOUNTER — APPOINTMENT (OUTPATIENT)
Dept: CARDIOLOGY | Facility: CLINIC | Age: 69
End: 2019-03-26
Payer: MEDICARE

## 2019-03-26 VITALS — SYSTOLIC BLOOD PRESSURE: 120 MMHG | HEART RATE: 86 BPM | DIASTOLIC BLOOD PRESSURE: 70 MMHG | OXYGEN SATURATION: 93 %

## 2019-03-26 PROCEDURE — 99214 OFFICE O/P EST MOD 30 MIN: CPT

## 2019-03-26 NOTE — ASSESSMENT
[FreeTextEntry1] : Assessment:\par 1.  AAA\par Feb 2019 4.1cm \par Oct 2018:  3.65\par March 2018 3.93\par Aug 2017 - 3.61\par Oct 2017 - CT scan  AAA 3.8cm, R VIRIDIANA 1.6cm\par 2.  CHF\par 3.  ICD\par \par Plan:\par 1.  Although there is a measurement discrepancy between duplexes performed from August 2017 - Feb 2019.  CT from 2017 measured 3.8cm, and most recent duplex US aorta measures 4.1cm.  This is a slow, stable growth over  1 year and 4 months. \par 2.  Repeat US aorta in 6 months \par 3.  In the meantime - CIRSTIN/PVR and bilateral arterial duplex to evaluate/rule out popliteal artery aneurysm (there is an associate between AAA and pop aneurysm) \par 4.  Return in 6 months after US aorta\par 5.  No heavy lifting.   Continue BP and HR control\par \par Thanks\par \par Randall Lofton \par Vascular Medicine

## 2019-03-26 NOTE — PHYSICAL EXAM
[General Appearance - Well Developed] : well developed [Normal Appearance] : normal appearance [Well Groomed] : well groomed [General Appearance - Well Nourished] : well nourished [No Deformities] : no deformities [General Appearance - In No Acute Distress] : no acute distress [Normal Conjunctiva] : the conjunctiva exhibited no abnormalities [Eyelids - No Xanthelasma] : the eyelids demonstrated no xanthelasmas [Normal Oral Mucosa] : normal oral mucosa [No Oral Pallor] : no oral pallor [No Oral Cyanosis] : no oral cyanosis [Normal Jugular Venous A Waves Present] : normal jugular venous A waves present [Normal Jugular Venous V Waves Present] : normal jugular venous V waves present [No Jugular Venous Chinchilla A Waves] : no jugular venous chinchilla A waves [Heart Rate And Rhythm] : heart rate and rhythm were normal [Heart Sounds] : normal S1 and S2 [Murmurs] : no murmurs present [Respiration, Rhythm And Depth] : normal respiratory rhythm and effort [Exaggerated Use Of Accessory Muscles For Inspiration] : no accessory muscle use [Auscultation Breath Sounds / Voice Sounds] : lungs were clear to auscultation bilaterally [Abdomen Soft] : soft [Abdomen Tenderness] : non-tender [Abdomen Mass (___ Cm)] : no abdominal mass palpated [Abnormal Walk] : normal gait [Gait - Sufficient For Exercise Testing] : the gait was sufficient for exercise testing [Skin Color & Pigmentation] : normal skin color and pigmentation [] : no rash [No Venous Stasis] : no venous stasis [Skin Lesions] : no skin lesions [No Skin Ulcers] : no skin ulcer [No Xanthoma] : no  xanthoma was observed [Oriented To Time, Place, And Person] : oriented to person, place, and time [Affect] : the affect was normal [Mood] : the mood was normal [No Anxiety] : not feeling anxious [FreeTextEntry1] : No edema. Palpable PT bilaterally.  L pop prominent on exam palpable

## 2019-03-26 NOTE — REASON FOR VISIT
[Consultation] : a consultation regarding [FreeTextEntry2] : AAA [FreeTextEntry1] : 68F CHF, ICD (Dr. Parker), followed by Dr. Sanchez. History of coronary stents.   Here for evaluation of AAA and PAD.  She is able to to walk from entrance 1 to the cardiology waiting room without stopping.  She complains of right sided leg pains.  Leg pains at times.  Depends on weather.  No true claudication.  \par No abdominal pain or pulsatility.\par \par \par Has a known AAA that has been followed over the years:  \par Ultrasound:\par Feb 2019 4.1cm \par Oct 2018:  3.65\par March 2018 3.93\par Aug 2017 - 3.61\par \par CT:   Oct 2017 - CT scan  AAA 3.8cm, R VIRIDIANA 1.6cm\par \par  \par \par \par Jan 2019 - creatinine 0.86

## 2019-04-01 ENCOUNTER — APPOINTMENT (OUTPATIENT)
Dept: PLASTIC SURGERY | Facility: CLINIC | Age: 69
End: 2019-04-01

## 2019-04-02 ENCOUNTER — APPOINTMENT (OUTPATIENT)
Dept: ULTRASOUND IMAGING | Facility: HOSPITAL | Age: 69
End: 2019-04-02

## 2019-04-02 ENCOUNTER — OUTPATIENT (OUTPATIENT)
Dept: OUTPATIENT SERVICES | Facility: HOSPITAL | Age: 69
LOS: 1 days | End: 2019-04-02
Payer: MEDICARE

## 2019-04-02 DIAGNOSIS — I71.4 ABDOMINAL AORTIC ANEURYSM, WITHOUT RUPTURE: ICD-10-CM

## 2019-04-02 DIAGNOSIS — Z00.00 ENCOUNTER FOR GENERAL ADULT MEDICAL EXAMINATION WITHOUT ABNORMAL FINDINGS: ICD-10-CM

## 2019-04-02 PROCEDURE — 93923 UPR/LXTR ART STDY 3+ LVLS: CPT

## 2019-04-02 PROCEDURE — 93923 UPR/LXTR ART STDY 3+ LVLS: CPT | Mod: 26

## 2019-04-15 ENCOUNTER — APPOINTMENT (OUTPATIENT)
Dept: PLASTIC SURGERY | Facility: CLINIC | Age: 69
End: 2019-04-15
Payer: MEDICARE

## 2019-04-15 PROCEDURE — 11900 INJECT SKIN LESIONS </W 7: CPT

## 2019-04-15 NOTE — ASSESSMENT
[FreeTextEntry1] : Healing well, no evidence of infection. Continue silicone scar gel treatment. RTO 3 weeks for re-evaluation.
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
## 2019-04-15 NOTE — PROCEDURE
[Nl] : None [FreeTextEntry1] : multiple keloid scars [FreeTextEntry2] : intralesional injection 7 lesions, left lateral chest and shoulder [FreeTextEntry6] : Consent obtained reviewing R/B/A of intrelesional kenalog injection, including but not limited to infection, discoloration, pain, bleeding, recurrence, among other risks.\par \par Areas were anesthestized initially with EMLA cream. After 10 minutes and prior to each injection, lesions were carefully cleansed with alcohol swabs. Intralesional injection of a 1:4 mix of lidocaine 1% to kenalog 1mg/ml was performed with sterile technique into 7 lesions in the left lateral chest and shoulder with a 30 gauge needle. Minimal bleeding. The patient tolerated the procedure well. At total of 24mg of Kenalog was used. [FreeTextEntry3] : EMLA [FreeTextEntry7] : None

## 2019-04-15 NOTE — HISTORY OF PRESENT ILLNESS
[FreeTextEntry1] : She states she is doing well. She has minimal complaints from the AICD incision site. She is very concerned about discomfort from the keloid scars along the left chest as well as on top of the left shoulder. She also complains of an isolated keloid along the right chest. As previously discussed, plan today was for intralesional injection to the AICD incision site. However, the patient wishes to defer treatment to dislocation, and instead wishes to proceed with intralesional injection of the left lateral chest and shoulder keloids.\par \par She has been using silicone scar sheeting and gel to limited success.

## 2019-04-15 NOTE — REVIEW OF SYSTEMS
[Fever] : no fever [Chills] : no chills [Chest Pain] : no chest pain [Shortness Of Breath] : no shortness of breath [As Noted in HPI] : as noted in HPI

## 2019-04-18 ENCOUNTER — RX RENEWAL (OUTPATIENT)
Age: 69
End: 2019-04-18

## 2019-04-23 ENCOUNTER — APPOINTMENT (OUTPATIENT)
Dept: ULTRASOUND IMAGING | Facility: HOSPITAL | Age: 69
End: 2019-04-23
Payer: MEDICARE

## 2019-04-23 ENCOUNTER — APPOINTMENT (OUTPATIENT)
Dept: CARDIOLOGY | Facility: CLINIC | Age: 69
End: 2019-04-23
Payer: MEDICARE

## 2019-04-23 ENCOUNTER — OUTPATIENT (OUTPATIENT)
Dept: OUTPATIENT SERVICES | Facility: HOSPITAL | Age: 69
LOS: 1 days | End: 2019-04-23
Payer: MEDICARE

## 2019-04-23 ENCOUNTER — NON-APPOINTMENT (OUTPATIENT)
Age: 69
End: 2019-04-23

## 2019-04-23 VITALS
BODY MASS INDEX: 22.66 KG/M2 | SYSTOLIC BLOOD PRESSURE: 107 MMHG | HEART RATE: 78 BPM | WEIGHT: 136 LBS | DIASTOLIC BLOOD PRESSURE: 71 MMHG | OXYGEN SATURATION: 100 % | HEIGHT: 65 IN

## 2019-04-23 DIAGNOSIS — I71.4 ABDOMINAL AORTIC ANEURYSM, WITHOUT RUPTURE: ICD-10-CM

## 2019-04-23 PROCEDURE — 93000 ELECTROCARDIOGRAM COMPLETE: CPT

## 2019-04-23 PROCEDURE — 93925 LOWER EXTREMITY STUDY: CPT | Mod: 26

## 2019-04-23 PROCEDURE — 93925 LOWER EXTREMITY STUDY: CPT

## 2019-04-23 PROCEDURE — 99214 OFFICE O/P EST MOD 30 MIN: CPT

## 2019-04-23 RX ORDER — AZITHROMYCIN 250 MG/1
250 TABLET, FILM COATED ORAL
Qty: 6 | Refills: 0 | Status: DISCONTINUED | COMMUNITY
Start: 2018-11-08

## 2019-04-23 RX ORDER — ALPRAZOLAM 1 MG/1
1 TABLET ORAL
Qty: 90 | Refills: 0 | Status: DISCONTINUED | COMMUNITY
Start: 2018-11-08

## 2019-04-23 RX ORDER — CEFUROXIME AXETIL 500 MG/1
500 TABLET ORAL
Qty: 20 | Refills: 0 | Status: DISCONTINUED | COMMUNITY
Start: 2018-10-29

## 2019-04-23 NOTE — HISTORY OF PRESENT ILLNESS
[FreeTextEntry1] : Mary is a 69-year-old female COPD, CAD, ICD, DM, Htn who is in great spirits. Her birthday was yesterday.  She denies any chest pain, palpitations, dizziness or shortness of breath out of normal.

## 2019-04-23 NOTE — REVIEW OF SYSTEMS
[Negative] : Endocrine [Shortness Of Breath] : no shortness of breath [Chest Pain] : no chest pain [Dyspnea on exertion] : not dyspnea during exertion [Lower Ext Edema] : no extremity edema [Palpitations] : no palpitations

## 2019-04-23 NOTE — PHYSICAL EXAM
[General Appearance - Well Developed] : well developed [Well Groomed] : well groomed [Normal Appearance] : normal appearance [General Appearance - Well Nourished] : well nourished [No Deformities] : no deformities [General Appearance - In No Acute Distress] : no acute distress [Eyelids - No Xanthelasma] : the eyelids demonstrated no xanthelasmas [Normal Conjunctiva] : the conjunctiva exhibited no abnormalities [No Oral Pallor] : no oral pallor [Normal Oral Mucosa] : normal oral mucosa [No Oral Cyanosis] : no oral cyanosis [Normal Jugular Venous V Waves Present] : normal jugular venous V waves present [Normal Jugular Venous A Waves Present] : normal jugular venous A waves present [No Jugular Venous Chinchilla A Waves] : no jugular venous chinchilla A waves [Exaggerated Use Of Accessory Muscles For Inspiration] : no accessory muscle use [Auscultation Breath Sounds / Voice Sounds] : lungs were clear to auscultation bilaterally [Respiration, Rhythm And Depth] : normal respiratory rhythm and effort [Heart Sounds] : normal S1 and S2 [Heart Rate And Rhythm] : heart rate and rhythm were normal [Abdomen Soft] : soft [Murmurs] : no murmurs present [Abdomen Tenderness] : non-tender [Abdomen Mass (___ Cm)] : no abdominal mass palpated [Abnormal Walk] : normal gait [Gait - Sufficient For Exercise Testing] : the gait was sufficient for exercise testing [Nail Clubbing] : no clubbing of the fingernails [Cyanosis, Localized] : no localized cyanosis [Petechial Hemorrhages (___cm)] : no petechial hemorrhages [Skin Color & Pigmentation] : normal skin color and pigmentation [] : no rash [No Venous Stasis] : no venous stasis [Skin Lesions] : no skin lesions [No Skin Ulcers] : no skin ulcer [No Xanthoma] : no  xanthoma was observed [Oriented To Time, Place, And Person] : oriented to person, place, and time [Affect] : the affect was normal [Mood] : the mood was normal [No Anxiety] : not feeling anxious

## 2019-04-23 NOTE — DISCUSSION/SUMMARY
[___ Month(s)] : [unfilled] month(s) [FreeTextEntry1] : The patient is a 69-year-old female COPD, CAD, AAA, ICD, who is doing well.. \par #1 COPD- on advair, spiriva and ventolin\par #2 CAD- stent 2009 and 2011 [place] and [place], no angina, on plavix\par #3 CMP - Med[employer] ICD, [employer]\par #4 Htn- controlled on losartan, coreg, spironolactone\par #5 Lipids- on atorvastatin\par #6 AAA- 3.5cm, f/u mjo, appt with Dr. Lofton\par #7 Derm- keloids, gets injections

## 2019-05-06 ENCOUNTER — APPOINTMENT (OUTPATIENT)
Dept: PLASTIC SURGERY | Facility: CLINIC | Age: 69
End: 2019-05-06
Payer: MEDICARE

## 2019-05-06 PROCEDURE — 11900 INJECT SKIN LESIONS </W 7: CPT

## 2019-05-06 RX ORDER — GEL BASE NO.167
GEL (GRAM) TOPICAL
Qty: 30 | Refills: 3 | Status: ACTIVE | COMMUNITY
Start: 2019-05-06 | End: 1900-01-01

## 2019-05-06 NOTE — PHYSICAL EXAM
[de-identified] : well-appearing [de-identified] : as above [de-identified] : Left chest wall keloids stable in appearance but decreased tenderness. Small ~1mm eschar within this group of keloids. No surrounding erythema or tenderness. The AICD access site on left anterior chest appears well healed, minimal tenderness, with residual keloids present. The left shoulder keloids appear less prominent but are . The right lateral chest has a tender isolated keloid at the anterior axillary line at approximate the level of the inframammary fold. [de-identified] : as above [de-identified] : normal respiratory effort

## 2019-05-06 NOTE — ASSESSMENT
[FreeTextEntry1] : Continue scar gel/scar sheeting. Routine wound care to left chest wall keloid eschar. RTO 4 weeks. Contact office if worsened appearance or symptoms at any injection site or left chest wall keloid.

## 2019-05-06 NOTE — HISTORY OF PRESENT ILLNESS
[FreeTextEntry1] : She states she is doing well. She has had significantly improved discomfort from the left chest wall keloid sites. She requests injection of the AICD access scar as well as the left shoulder and right chest wall keloids. She has stopped using the scar gel because she ran out. No other complaints.

## 2019-05-06 NOTE — PROCEDURE
[Nl] : Local Anesthesia: (1% Lidocaine with 1:100,000 epinephrine) [FreeTextEntry1] : multiple keloids [FreeTextEntry4] : minimal [FreeTextEntry5] : none [FreeTextEntry2] : intralesional injection of corticosteroid [FreeTextEntry3] : topical anesthestic cream [FreeTextEntry6] : Procedure and consent reviewed with patient. Anesthetic cream placed at each site to be injected. Prior to injection, each site was cleansed with alcohol. The AICD access keloid and scar, left shoulder keloid, and right chest wall keloid were injected utilizing sterile technique. No significant bleeding noted.

## 2019-05-13 ENCOUNTER — RX RENEWAL (OUTPATIENT)
Age: 69
End: 2019-05-13

## 2019-05-14 ENCOUNTER — RX RENEWAL (OUTPATIENT)
Age: 69
End: 2019-05-14

## 2019-05-17 ENCOUNTER — MEDICATION RENEWAL (OUTPATIENT)
Age: 69
End: 2019-05-17

## 2019-06-10 ENCOUNTER — APPOINTMENT (OUTPATIENT)
Dept: PLASTIC SURGERY | Facility: CLINIC | Age: 69
End: 2019-06-10
Payer: MEDICARE

## 2019-06-10 PROCEDURE — 11900 INJECT SKIN LESIONS </W 7: CPT

## 2019-06-10 NOTE — ASSESSMENT
[FreeTextEntry1] : There has been limited improvement of the left chest wall keloid scars with intralesional cortical steroid injection, however, the left anterior chest scar and nearby keloids do appear improved. She may ultimately benefit from excision and intralesional injection of the left chest wall keloids, however, she is at elevated risk for any procedure to do her medical history. For now, we will take a break from intralesional injection and reassess in 2-3 months, at which time we can assess risks versus benefits of excision versus continued injections.

## 2019-06-10 NOTE — REASON FOR VISIT
[Follow-Up: _____] : a [unfilled] follow-up visit [FreeTextEntry1] : Keloid excision and wound closure of chest and left shoulder 1/10/2019. Pt is doing well; here for steroid injections. \par  \par

## 2019-06-10 NOTE — PHYSICAL EXAM
[de-identified] : normal respiratory effort [de-identified] : well-appearing [de-identified] : as above [de-identified] : Left chest wall keloids stable in appearance; they do not appear significantly larger or smaller. There are no open wounds. No surrounding erythema. There is tenderness on manipulation of the keloids. The AICD access site on left anterior chest appears well healed, with some elevation and banding of the scar, minimal tenderness, decreased appearance/tenderness of inferior and superior residual keloids. The left shoulder keloids appear a bit smaller.  [de-identified] : as above

## 2019-06-10 NOTE — PROCEDURE
[FreeTextEntry2] : intralesional corticosteroid injection of left anterior chest and left lateral chest keloid scars [FreeTextEntry1] : multiple keloids of thorax [FreeTextEntry3] : 1:4  lidocaine 1% mixed with kenalog 10mg/ml  [FreeTextEntry4] : min [FreeTextEntry5] : none [FreeTextEntry6] : Consent obtained for intralesional injection of corticosteroid. Specific risks of bleeding, infection, pigmentation changes, no improvement, open wound, pain, need for further procedures, among other risks, were discussed and all questions answered. Local anesthetic cream was placed over the lesions and allowed to sit for 15 minutes. The skin was prepped with alcohol. Intralesional injection was performed at 4 sites on the left lateral chest wall as well as the left anterior chest incision and its 2 surrounding keloids. A total of 24 mg of Kenalog was administered. Hemostasis was noted to be good, and a sterile dressing was applied.

## 2019-07-25 ENCOUNTER — APPOINTMENT (OUTPATIENT)
Dept: CARDIOLOGY | Facility: CLINIC | Age: 69
End: 2019-07-25
Payer: MEDICARE

## 2019-07-25 ENCOUNTER — APPOINTMENT (OUTPATIENT)
Dept: ELECTROPHYSIOLOGY | Facility: CLINIC | Age: 69
End: 2019-07-25
Payer: MEDICARE

## 2019-07-25 VITALS
DIASTOLIC BLOOD PRESSURE: 70 MMHG | HEART RATE: 65 BPM | SYSTOLIC BLOOD PRESSURE: 102 MMHG | OXYGEN SATURATION: 98 % | HEIGHT: 65 IN

## 2019-07-25 DIAGNOSIS — E04.1 NONTOXIC SINGLE THYROID NODULE: ICD-10-CM

## 2019-07-25 PROCEDURE — 93283 PRGRMG EVAL IMPLANTABLE DFB: CPT

## 2019-07-25 PROCEDURE — 93000 ELECTROCARDIOGRAM COMPLETE: CPT

## 2019-07-25 PROCEDURE — 99214 OFFICE O/P EST MOD 30 MIN: CPT

## 2019-07-25 NOTE — DISCUSSION/SUMMARY
[___ Month(s)] : [unfilled] month(s) [FreeTextEntry1] : The patient is a 69-year-old female COPD, CAD, AAA, ICD, who is doing well.. \par #1 COPD- on advair, spiriva and ventolin\par #2 CAD- stent 2009 and 2011 [place] and [place], no angina, on plavix\par #3 CMP - [employer] ICD, [employer]\par #4 Htn- controlled on losartan, coreg, spironolactone\par #5 Lipids- on atorvastatin\par #6 AAA- 3.5cm, f/u mjo, appt with Dr. Lofton\par #7 Derm- keloids, gets injections

## 2019-07-25 NOTE — HISTORY OF PRESENT ILLNESS
[FreeTextEntry1] : Mary is a 69-year-old female COPD, CAD, ICD, DM, Htn who is doing well.   She denies any chest pain, palpitations, dizziness or shortness of breath out of normal.

## 2019-07-25 NOTE — PHYSICAL EXAM
[General Appearance - Well Developed] : well developed [Normal Appearance] : normal appearance [Well Groomed] : well groomed [General Appearance - Well Nourished] : well nourished [No Deformities] : no deformities [General Appearance - In No Acute Distress] : no acute distress [Normal Conjunctiva] : the conjunctiva exhibited no abnormalities [Eyelids - No Xanthelasma] : the eyelids demonstrated no xanthelasmas [Normal Oral Mucosa] : normal oral mucosa [No Oral Pallor] : no oral pallor [No Oral Cyanosis] : no oral cyanosis [Normal Jugular Venous A Waves Present] : normal jugular venous A waves present [Normal Jugular Venous V Waves Present] : normal jugular venous V waves present [No Jugular Venous Cihnchilla A Waves] : no jugular venous chinchilla A waves [Respiration, Rhythm And Depth] : normal respiratory rhythm and effort [Exaggerated Use Of Accessory Muscles For Inspiration] : no accessory muscle use [Auscultation Breath Sounds / Voice Sounds] : lungs were clear to auscultation bilaterally [Heart Rate And Rhythm] : heart rate and rhythm were normal [Heart Sounds] : normal S1 and S2 [Murmurs] : no murmurs present [Abdomen Soft] : soft [Abdomen Tenderness] : non-tender [Abdomen Mass (___ Cm)] : no abdominal mass palpated [Abnormal Walk] : normal gait [Gait - Sufficient For Exercise Testing] : the gait was sufficient for exercise testing [Nail Clubbing] : no clubbing of the fingernails [Cyanosis, Localized] : no localized cyanosis [Petechial Hemorrhages (___cm)] : no petechial hemorrhages [Skin Color & Pigmentation] : normal skin color and pigmentation [] : no rash [No Venous Stasis] : no venous stasis [Skin Lesions] : no skin lesions [No Skin Ulcers] : no skin ulcer [No Xanthoma] : no  xanthoma was observed [Oriented To Time, Place, And Person] : oriented to person, place, and time [Affect] : the affect was normal [Mood] : the mood was normal [No Anxiety] : not feeling anxious

## 2019-09-09 ENCOUNTER — RX RENEWAL (OUTPATIENT)
Age: 69
End: 2019-09-09

## 2019-09-23 ENCOUNTER — APPOINTMENT (OUTPATIENT)
Dept: PLASTIC SURGERY | Facility: CLINIC | Age: 69
End: 2019-09-23
Payer: MEDICARE

## 2019-09-23 PROCEDURE — 11900 INJECT SKIN LESIONS </W 7: CPT

## 2019-09-23 NOTE — PROCEDURE
[Nl] : None [FreeTextEntry2] : intralesional corticosteroid injection of left shoulder and chest wall lesions [FreeTextEntry3] : lidocaine 1% mixed 1:4 with kenalog 10mg/ml [FreeTextEntry1] : multiple keloids left shoulder and chest wall [FreeTextEntry6] : Consent obtained for intralesional injection of corticosteroid. Specific risks of bleeding, infection, pigmentation changes, no improvement, open wound, pain, need for further procedures, among other risks, were discussed and all questions answered. Local anesthetic cream was placed over the lesions and allowed to sit for 15 minutes. The skin was prepped with alcohol. Intralesional injection was performed at 4 sites on the left lateral chest wall as well as the left anterior chest incision and left shoulder. A total of 24 mg of Kenalog was administered. Hemostasis was noted to be good, and a sterile dressing was applied.

## 2019-09-23 NOTE — REASON FOR VISIT
[FreeTextEntry1] : Keloid excision and wound closure of chest and left shoulder 1/10/2019. Here for follow up.
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
## 2019-09-23 NOTE — HISTORY OF PRESENT ILLNESS
[FreeTextEntry1] : Continues to have pain and some growth of the keloid scars at her left shoulder, chest, and infra-axillary area. Discussed possible excision and radiation therapy to left infra-axillary keloids, but the patient declines consultation with radiation oncology to review risks/benefits. Requests additional kenalog injections.

## 2019-10-01 ENCOUNTER — APPOINTMENT (OUTPATIENT)
Dept: CARDIOLOGY | Facility: CLINIC | Age: 69
End: 2019-10-01
Payer: MEDICARE

## 2019-10-01 VITALS
SYSTOLIC BLOOD PRESSURE: 109 MMHG | BODY MASS INDEX: 23.32 KG/M2 | WEIGHT: 140 LBS | HEIGHT: 65 IN | HEART RATE: 72 BPM | DIASTOLIC BLOOD PRESSURE: 73 MMHG | OXYGEN SATURATION: 100 %

## 2019-10-01 PROCEDURE — 99214 OFFICE O/P EST MOD 30 MIN: CPT

## 2019-10-01 NOTE — REASON FOR VISIT
[Consultation] : a consultation regarding [FreeTextEntry1] :  Followup visit 10/1/2019\par \par Doing ok.  No abomdinal pain.  Recent fall in the shower, doing ok.  Breathing ok.  No claudication.  \par Last US aorta February 2019 4.1.  Has not had a duplex since.  LE duplex does NOT show pop aneurysm  \par \par \par Has a known AAA that has been followed over the years:  \par Ultrasound:\par Feb 2019 4.1cm \par Oct 2018:  3.65\par March 2018 3.93\par Aug 2017 - 3.61\par \par CT:   Oct 2017 - CT scan  AAA 3.8cm, R VIRIDIANA 1.6cm\par \par  \par \par \par Jan 2019 - creatinine 0.86 [FreeTextEntry2] : AAA

## 2019-10-01 NOTE — PHYSICAL EXAM
[General Appearance - Well Developed] : well developed [Well Groomed] : well groomed [Normal Appearance] : normal appearance [No Deformities] : no deformities [General Appearance - Well Nourished] : well nourished [General Appearance - In No Acute Distress] : no acute distress [Normal Conjunctiva] : the conjunctiva exhibited no abnormalities [Eyelids - No Xanthelasma] : the eyelids demonstrated no xanthelasmas [Normal Oral Mucosa] : normal oral mucosa [No Oral Pallor] : no oral pallor [Normal Jugular Venous A Waves Present] : normal jugular venous A waves present [No Oral Cyanosis] : no oral cyanosis [Normal Jugular Venous V Waves Present] : normal jugular venous V waves present [No Jugular Venous Chinchilla A Waves] : no jugular venous chinchilla A waves [Respiration, Rhythm And Depth] : normal respiratory rhythm and effort [Auscultation Breath Sounds / Voice Sounds] : lungs were clear to auscultation bilaterally [Exaggerated Use Of Accessory Muscles For Inspiration] : no accessory muscle use [Heart Rate And Rhythm] : heart rate and rhythm were normal [Heart Sounds] : normal S1 and S2 [Abdomen Soft] : soft [Murmurs] : no murmurs present [Abdomen Tenderness] : non-tender [Abdomen Mass (___ Cm)] : no abdominal mass palpated [Abnormal Walk] : normal gait [Gait - Sufficient For Exercise Testing] : the gait was sufficient for exercise testing [] : no rash [Skin Color & Pigmentation] : normal skin color and pigmentation [No Venous Stasis] : no venous stasis [Skin Lesions] : no skin lesions [No Skin Ulcers] : no skin ulcer [No Xanthoma] : no  xanthoma was observed [Oriented To Time, Place, And Person] : oriented to person, place, and time [Affect] : the affect was normal [No Anxiety] : not feeling anxious [Mood] : the mood was normal [FreeTextEntry1] : No edema. Palpable PT bilaterally.  L pop prominent on exam palpable

## 2019-10-01 NOTE — ASSESSMENT
[FreeTextEntry1] : Assessment:\par 1.  AAA\par Feb 2019 4.1cm \par Oct 2018:  3.65\par March 2018 3.93\par Aug 2017 - 3.61\par Oct 2017 - CT scan  AAA 3.8cm, R VIRIDIANA 1.6cm\par 2.  CHF\par 3.  ICD\par \par Plan:\par 1.   Repeat US aorta \par 2.   If stable, then return in 6 months \par 3.  No heavy lifting.\par 4   Continue BP and HR control\par \par Thanks\par \par Randall Lofton \par Vascular Medicine

## 2019-10-08 ENCOUNTER — OUTPATIENT (OUTPATIENT)
Dept: OUTPATIENT SERVICES | Facility: HOSPITAL | Age: 69
LOS: 1 days | End: 2019-10-08
Payer: MEDICARE

## 2019-10-08 ENCOUNTER — APPOINTMENT (OUTPATIENT)
Dept: ULTRASOUND IMAGING | Facility: HOSPITAL | Age: 69
End: 2019-10-08
Payer: MEDICARE

## 2019-10-08 DIAGNOSIS — I71.4 ABDOMINAL AORTIC ANEURYSM, WITHOUT RUPTURE: ICD-10-CM

## 2019-10-08 PROCEDURE — 76775 US EXAM ABDO BACK WALL LIM: CPT

## 2019-10-08 PROCEDURE — 76770 US EXAM ABDO BACK WALL COMP: CPT | Mod: 26

## 2019-10-28 ENCOUNTER — APPOINTMENT (OUTPATIENT)
Dept: ELECTROPHYSIOLOGY | Facility: CLINIC | Age: 69
End: 2019-10-28
Payer: MEDICARE

## 2019-10-28 PROCEDURE — 93295 DEV INTERROG REMOTE 1/2/MLT: CPT

## 2019-10-28 PROCEDURE — 93296 REM INTERROG EVL PM/IDS: CPT

## 2019-11-04 ENCOUNTER — APPOINTMENT (OUTPATIENT)
Dept: PLASTIC SURGERY | Facility: CLINIC | Age: 69
End: 2019-11-04
Payer: MEDICARE

## 2019-11-04 PROCEDURE — 99213 OFFICE O/P EST LOW 20 MIN: CPT | Mod: 25

## 2019-11-04 PROCEDURE — 11900 INJECT SKIN LESIONS </W 7: CPT

## 2019-11-04 NOTE — ASSESSMENT
[FreeTextEntry1] : S/p intralesional corticosteroid injection. Continue use of silicone scar sheeting. F/u in 2 months.

## 2019-11-04 NOTE — PHYSICAL EXAM
[de-identified] : multiple chest wall keloids with relatively stable appearance. left anterior chest wall AICD implantation site with some prominence of the scar, decreased tenderness and erythema compared to previously. Left shoulder with superior transverse keloid more discrete than previous. No open areas.

## 2019-11-04 NOTE — HISTORY OF PRESENT ILLNESS
[FreeTextEntry1] : Pt continues to have discomfort from keloids in right chest wall left chest wall, and left shoulder. Has not noticed the keloids get significantly larger. Does not yet want to proceed with excision of the complex left chest wall keloid.

## 2019-11-04 NOTE — REASON FOR VISIT
[Follow-Up: _____] : a [unfilled] follow-up visit [FreeTextEntry1] :  Keloid excision and wound closure of chest and left shoulder 1/10/2019. Here for follow up.

## 2019-11-04 NOTE — PROCEDURE
[Nl] : None [FreeTextEntry1] : multiple chest wall keloid scars [FreeTextEntry2] : intralesional injection multiple chest wall keloids [FreeTextEntry3] : EMLA cream and lidocaine 1% [FreeTextEntry6] : Risks, benefits, and alternatives to intralesional corticosteroid injection was discussed with the patient. Specific risks of bleeding, infection, open wound, depigmentation, recurrence, need for further procedures, among other risks, were discussed and all questions answered. \par \par Keloid scars on the bilateral chest and left axilla were treated with EMLA cream. Areas for injection were then cleansed and prepped with alcohol. A 4:1 mixture of kenalog 10mg/10ml : 1% lidocaine was infiltrated into the lesions. A total of 24 mg of kenalog was used. A total of 5 sites were injected. There were no complications. The patient tolerated the procedure well. Injection sites were dressed with bandages. [FreeTextEntry7] : none

## 2019-12-05 ENCOUNTER — APPOINTMENT (OUTPATIENT)
Dept: ELECTROPHYSIOLOGY | Facility: CLINIC | Age: 69
End: 2019-12-05

## 2019-12-05 ENCOUNTER — NON-APPOINTMENT (OUTPATIENT)
Age: 69
End: 2019-12-05

## 2019-12-05 ENCOUNTER — APPOINTMENT (OUTPATIENT)
Dept: CARDIOLOGY | Facility: CLINIC | Age: 69
End: 2019-12-05
Payer: MEDICARE

## 2019-12-05 VITALS
HEIGHT: 64 IN | HEART RATE: 67 BPM | SYSTOLIC BLOOD PRESSURE: 147 MMHG | DIASTOLIC BLOOD PRESSURE: 89 MMHG | BODY MASS INDEX: 24.07 KG/M2 | OXYGEN SATURATION: 100 % | WEIGHT: 141 LBS

## 2019-12-05 VITALS — DIASTOLIC BLOOD PRESSURE: 84 MMHG | SYSTOLIC BLOOD PRESSURE: 130 MMHG

## 2019-12-05 PROCEDURE — 93000 ELECTROCARDIOGRAM COMPLETE: CPT

## 2019-12-05 PROCEDURE — 99214 OFFICE O/P EST MOD 30 MIN: CPT

## 2019-12-05 NOTE — DISCUSSION/SUMMARY
[___ Month(s)] : [unfilled] month(s) [FreeTextEntry1] : The patient is a 69-year-old female COPD, CAD, AAA, ICD, who is doing well.. \par #1 COPD- on advair, spiriva and ventolin\par #2 CAD- stent 2009 and 2011 [place], no angina, on plavix\par #3 CMP - Med[employer] ICD interrogation negative for arrhythmia, f/u Dr. Parker\par #4 Htn- controlled on losartan, coreg, spironolactone\par #5 Lipids- on atorvastatin\par #6 AAA- 3.5cm, f/u  Dr. Lofton\par #7 Derm- keloids, gets injections

## 2019-12-05 NOTE — PHYSICAL EXAM
[General Appearance - Well Developed] : well developed [Normal Appearance] : normal appearance [Well Groomed] : well groomed [General Appearance - Well Nourished] : well nourished [No Deformities] : no deformities [General Appearance - In No Acute Distress] : no acute distress [Normal Conjunctiva] : the conjunctiva exhibited no abnormalities [Normal Oral Mucosa] : normal oral mucosa [Eyelids - No Xanthelasma] : the eyelids demonstrated no xanthelasmas [No Oral Pallor] : no oral pallor [No Oral Cyanosis] : no oral cyanosis [Normal Jugular Venous A Waves Present] : normal jugular venous A waves present [Normal Jugular Venous V Waves Present] : normal jugular venous V waves present [Respiration, Rhythm And Depth] : normal respiratory rhythm and effort [No Jugular Venous Chinchilla A Waves] : no jugular venous chinchilla A waves [Auscultation Breath Sounds / Voice Sounds] : lungs were clear to auscultation bilaterally [Exaggerated Use Of Accessory Muscles For Inspiration] : no accessory muscle use [Murmurs] : no murmurs present [Heart Sounds] : normal S1 and S2 [Heart Rate And Rhythm] : heart rate and rhythm were normal [Abdomen Soft] : soft [Abdomen Tenderness] : non-tender [Abdomen Mass (___ Cm)] : no abdominal mass palpated [Gait - Sufficient For Exercise Testing] : the gait was sufficient for exercise testing [Abnormal Walk] : normal gait [Nail Clubbing] : no clubbing of the fingernails [Cyanosis, Localized] : no localized cyanosis [Skin Color & Pigmentation] : normal skin color and pigmentation [Petechial Hemorrhages (___cm)] : no petechial hemorrhages [] : no rash [No Venous Stasis] : no venous stasis [No Xanthoma] : no  xanthoma was observed [Skin Lesions] : no skin lesions [No Skin Ulcers] : no skin ulcer [Oriented To Time, Place, And Person] : oriented to person, place, and time [Affect] : the affect was normal [No Anxiety] : not feeling anxious [Mood] : the mood was normal

## 2019-12-06 ENCOUNTER — RX RENEWAL (OUTPATIENT)
Age: 69
End: 2019-12-06

## 2020-02-10 ENCOUNTER — APPOINTMENT (OUTPATIENT)
Dept: PLASTIC SURGERY | Facility: CLINIC | Age: 70
End: 2020-02-10
Payer: MEDICARE

## 2020-02-10 PROCEDURE — 99212 OFFICE O/P EST SF 10 MIN: CPT | Mod: 25

## 2020-02-10 PROCEDURE — 11900 INJECT SKIN LESIONS </W 7: CPT

## 2020-02-10 NOTE — REASON FOR VISIT
[FreeTextEntry1] : Keloid excision and wound closure of chest and left shoulder 1/10/2019. Here for follow up. \par

## 2020-02-10 NOTE — PROCEDURE
[Nl] : None [FreeTextEntry1] : multiple chest wall keloid scars [FreeTextEntry2] : intralesional injection of multiple chest wall keloid scars [FreeTextEntry3] : EMLA [FreeTextEntry6] : Risks, benefits, and alternatives to intralesional corticosteroid injection was discussed with the patient. Specific risks of bleeding, infection, open wound, depigmentation, recurrence, and need for further procedures, among other risks, were discussed, and all questions answered. \par \par Keloid scars on the bilateral lateral chest wall and left shoulder were treated with EMLA cream. Areas for injection were then cleansed and prepped with alcohol. A 4:1 mixture of kenalog 10mg/10ml : 1% lidocaine was infiltrated into the lesions. A total of 24 mg of kenalog was used. A total of 4 sites were injected. There were no complications. The patient tolerated the procedure well. Injection sites were dressed with bandages.

## 2020-02-10 NOTE — PHYSICAL EXAM
[de-identified] : softening scarring of left anterior chest, with some density laterally. Persistent left shoulder, and bilateral lateral chest wall keloids, though somewhat softer in appearance.

## 2020-02-10 NOTE — HISTORY OF PRESENT ILLNESS
[FreeTextEntry1] : Pleased with softening of left anterior chest scar. Wants to address left shoulder and bilateral lateral chest scars today.

## 2020-02-14 ENCOUNTER — RX RENEWAL (OUTPATIENT)
Age: 70
End: 2020-02-14

## 2020-03-05 ENCOUNTER — RX RENEWAL (OUTPATIENT)
Age: 70
End: 2020-03-05

## 2020-03-05 ENCOUNTER — APPOINTMENT (OUTPATIENT)
Dept: ELECTROPHYSIOLOGY | Facility: CLINIC | Age: 70
End: 2020-03-05
Payer: MEDICARE

## 2020-03-05 PROCEDURE — 93295 DEV INTERROG REMOTE 1/2/MLT: CPT

## 2020-03-05 PROCEDURE — 93296 REM INTERROG EVL PM/IDS: CPT

## 2020-06-01 ENCOUNTER — APPOINTMENT (OUTPATIENT)
Dept: PLASTIC SURGERY | Facility: CLINIC | Age: 70
End: 2020-06-01
Payer: MEDICARE

## 2020-06-01 PROCEDURE — 11900 INJECT SKIN LESIONS </W 7: CPT

## 2020-06-02 NOTE — HISTORY OF PRESENT ILLNESS
[FreeTextEntry1] : The patient has been having greater discomfort at the site of her AICD access. She also continues to have discomfort at the keloids in her right and left chest and left shoulder. She has been unable to come for injections due to COVID-19. Her last visit was 2/10/2020. She denies any recent illness. She continues to use scar sheeting.

## 2020-06-02 NOTE — PHYSICAL EXAM
[de-identified] : left chest sccess scar appears a bit more raised and tender than last appointment. Left chest wall scars appear to be flattening. Right chest was scar appearance is stable. Left anterior shoulder keloid with tenderness.

## 2020-06-02 NOTE — PROCEDURE
[Nl] : None [FreeTextEntry6] : Risks, benefits, and alternatives to intralesional corticosteroid injection was discussed with the patient. Specific risks of bleeding, infection, open wound, depigmentation, recurrence, and need for further procedures, among other risks, were discussed, and all questions answered. \par \par Keloid scars on the left anterior shoulder, left anterior and lateral chest and right lateral chest were treated with EMLA cream. Areas for injection were then cleansed and prepped with alcohol. A 4:1 mixture of kenalog 10mg/10ml : 1% lidocaine was infiltrated into the lesions. A total of 24 mg of kenalog was used. A total of 5 sites were injected. There were no complications. The patient tolerated the procedure well. Injection sites were dressed with bandages.\par  [FreeTextEntry2] : intralesional injection 5 sites [FreeTextEntry1] : multiple keloid scars

## 2020-06-05 ENCOUNTER — RX RENEWAL (OUTPATIENT)
Age: 70
End: 2020-06-05

## 2020-06-09 ENCOUNTER — NON-APPOINTMENT (OUTPATIENT)
Age: 70
End: 2020-06-09

## 2020-06-09 ENCOUNTER — APPOINTMENT (OUTPATIENT)
Dept: CARDIOLOGY | Facility: CLINIC | Age: 70
End: 2020-06-09
Payer: MEDICARE

## 2020-06-09 ENCOUNTER — APPOINTMENT (OUTPATIENT)
Dept: ELECTROPHYSIOLOGY | Facility: CLINIC | Age: 70
End: 2020-06-09
Payer: MEDICARE

## 2020-06-09 VITALS
DIASTOLIC BLOOD PRESSURE: 72 MMHG | OXYGEN SATURATION: 95 % | BODY MASS INDEX: 24.92 KG/M2 | WEIGHT: 146 LBS | HEIGHT: 64 IN | SYSTOLIC BLOOD PRESSURE: 105 MMHG

## 2020-06-09 PROCEDURE — 99213 OFFICE O/P EST LOW 20 MIN: CPT

## 2020-06-09 PROCEDURE — 99214 OFFICE O/P EST MOD 30 MIN: CPT

## 2020-06-09 PROCEDURE — 93000 ELECTROCARDIOGRAM COMPLETE: CPT

## 2020-06-09 PROCEDURE — 93283 PRGRMG EVAL IMPLANTABLE DFB: CPT

## 2020-06-09 NOTE — PHYSICAL EXAM
[General Appearance - Well Developed] : well developed [Normal Appearance] : normal appearance [Well Groomed] : well groomed [General Appearance - Well Nourished] : well nourished [No Deformities] : no deformities [General Appearance - In No Acute Distress] : no acute distress [Normal Conjunctiva] : the conjunctiva exhibited no abnormalities [Eyelids - No Xanthelasma] : the eyelids demonstrated no xanthelasmas [Normal Oral Mucosa] : normal oral mucosa [No Oral Pallor] : no oral pallor [No Oral Cyanosis] : no oral cyanosis [Normal Jugular Venous A Waves Present] : normal jugular venous A waves present [Normal Jugular Venous V Waves Present] : normal jugular venous V waves present [No Jugular Venous Chinchilla A Waves] : no jugular venous chinchilla A waves [Respiration, Rhythm And Depth] : normal respiratory rhythm and effort [Exaggerated Use Of Accessory Muscles For Inspiration] : no accessory muscle use [Auscultation Breath Sounds / Voice Sounds] : lungs were clear to auscultation bilaterally [Heart Rate And Rhythm] : heart rate and rhythm were normal [Heart Sounds] : normal S1 and S2 [Murmurs] : no murmurs present [Abdomen Soft] : soft [Abdomen Tenderness] : non-tender [Abdomen Mass (___ Cm)] : no abdominal mass palpated [Abnormal Walk] : normal gait [Gait - Sufficient For Exercise Testing] : the gait was sufficient for exercise testing [Nail Clubbing] : no clubbing of the fingernails [Cyanosis, Localized] : no localized cyanosis [Petechial Hemorrhages (___cm)] : no petechial hemorrhages [Skin Color & Pigmentation] : normal skin color and pigmentation [] : no rash [No Venous Stasis] : no venous stasis [Skin Lesions] : no skin lesions [No Skin Ulcers] : no skin ulcer [No Xanthoma] : no  xanthoma was observed [Oriented To Time, Place, And Person] : oriented to person, place, and time [Affect] : the affect was normal [Mood] : the mood was normal [No Anxiety] : not feeling anxious

## 2020-06-09 NOTE — PHYSICAL EXAM
[General Appearance - Well Developed] : well developed [Normal Appearance] : normal appearance [Well Groomed] : well groomed [General Appearance - Well Nourished] : well nourished [No Deformities] : no deformities [General Appearance - In No Acute Distress] : no acute distress [Normal Conjunctiva] : the conjunctiva exhibited no abnormalities [Eyelids - No Xanthelasma] : the eyelids demonstrated no xanthelasmas [Normal Oral Mucosa] : normal oral mucosa [No Oral Pallor] : no oral pallor [No Oral Cyanosis] : no oral cyanosis [Normal Jugular Venous A Waves Present] : normal jugular venous A waves present [Normal Jugular Venous V Waves Present] : normal jugular venous V waves present [No Jugular Venous Chinchilla A Waves] : no jugular venous chinchilla A waves [Respiration, Rhythm And Depth] : normal respiratory rhythm and effort [Exaggerated Use Of Accessory Muscles For Inspiration] : no accessory muscle use [Auscultation Breath Sounds / Voice Sounds] : lungs were clear to auscultation bilaterally [Heart Rate And Rhythm] : heart rate and rhythm were normal [Heart Sounds] : normal S1 and S2 [Murmurs] : no murmurs present [Abdomen Tenderness] : non-tender [Abdomen Soft] : soft [Abdomen Mass (___ Cm)] : no abdominal mass palpated [Abnormal Walk] : normal gait [Gait - Sufficient For Exercise Testing] : the gait was sufficient for exercise testing [Nail Clubbing] : no clubbing of the fingernails [Cyanosis, Localized] : no localized cyanosis [Petechial Hemorrhages (___cm)] : no petechial hemorrhages [Skin Color & Pigmentation] : normal skin color and pigmentation [] : no rash [No Venous Stasis] : no venous stasis [Skin Lesions] : no skin lesions [No Skin Ulcers] : no skin ulcer [No Xanthoma] : no  xanthoma was observed [Oriented To Time, Place, And Person] : oriented to person, place, and time [Affect] : the affect was normal [Mood] : the mood was normal [No Anxiety] : not feeling anxious

## 2020-06-09 NOTE — HISTORY OF PRESENT ILLNESS
[FreeTextEntry1] : 71 yo woman w/PMHx CAD s/p PCI '09 at [facility] on DAPT, ICM EF 35% s/p ICD, COPD, AAA (last US abdominal aortic aneurysm US 4.1cm done 10/19) here for routine follow up doing well. Denies abdominal fullness/bloating/pulsations, lightheadedness, nausea/vomiting, SOB, claudication, or palpitations. She reports that she had a CT done at an outside facility reportedly stable at 4.1cm.

## 2020-06-09 NOTE — REVIEW OF SYSTEMS
[Negative] : Heme/Lymph [Recent Weight Gain (___ Lbs)] : recent [unfilled] ~Ulb weight gain [Chest Pain] : no chest pain [Dyspnea on exertion] : not dyspnea during exertion [Shortness Of Breath] : no shortness of breath [Palpitations] : no palpitations [Lower Ext Edema] : no extremity edema

## 2020-06-09 NOTE — HISTORY OF PRESENT ILLNESS
[FreeTextEntry1] : Mary is a 69-year-old female COPD, CAD, ICD, DM, Htn who has been quarantined. She denies any chest pain, palpitations, dizziness or shortness of breath out of normal.

## 2020-06-09 NOTE — DISCUSSION/SUMMARY
[___ Month(s)] : [unfilled] month(s) [FreeTextEntry1] : AAA (abdominal aortic aneurysm) (441.4) (I71.4)\par \par Assessment:\par 1. AAA\par Feb 2019 4.1cm \par Oct 2018: 3.65\par March 2018 3.93\par Aug 2017 - 3.61\par Oct 2017 - CT scan AAA 3.8cm, R VIRIDIANA 1.6cm\par 2. CHF\par 3. ICD\par \par Plan:\par 1. Repeat US aorta in 6 months \par 2.  Will obtain recent CT abdomen to document size of AAA. \par 3.  No heavy lifting or straining \par 4  Continue BP and HR control\par 5.  Return in 6 months

## 2020-06-09 NOTE — DISCUSSION/SUMMARY
[___ Month(s)] : [unfilled] month(s) [FreeTextEntry1] : The patient is a 69-year-old female COPD, CAD, AAA, ICD, who is doing well.. \par #1 COPD- on advair, spiriva and ventolin\par #2 CAD- stent 2009 and 2011 [place] and [place], no angina, on plavix, appt with PCP and labs pending\par #3 CMP - Medtronic ICD interrogation negative for arrhythmia, f/u Dr. Parker\par #4 Htn- controlled on losartan, coreg, spironolactone\par #5 Lipids- on atorvastatin\par #6 AAA- 3.5cm, f/u  Dr. Lofton today\par #7 Derm- keloids, gets injections, encouraged to increase walking for exercise

## 2020-06-29 ENCOUNTER — APPOINTMENT (OUTPATIENT)
Dept: PLASTIC SURGERY | Facility: CLINIC | Age: 70
End: 2020-06-29
Payer: MEDICARE

## 2020-06-29 PROCEDURE — 11900 INJECT SKIN LESIONS </W 7: CPT

## 2020-07-01 NOTE — HISTORY OF PRESENT ILLNESS
[FreeTextEntry1] : Pt returns with persistent keloids, especially in the left chest and shoulder. She states the right side feels okay right now.\par \par She denies any recent changes in her health.

## 2020-07-01 NOTE — PROCEDURE
[Nl] : None [FreeTextEntry2] : intralesional corticosteroid injection of multiple keloids [FreeTextEntry1] : multiple keloid scars [FreeTextEntry3] : EMLA [FreeTextEntry6] : Risks, benefits, and alternatives to intralesional corticosteroid injection was discussed with the patient. Specific risks of bleeding, infection, open wound, depigmentation, recurrence, and need for further procedures, among other risks, were discussed, and all questions answered. \par \par Keloid scars on the left chest and shoulder were treated with EMLA cream. Areas for injection were then cleansed and prepped with alcohol. A 4:1 mixture of kenalog 10mg/10ml : 1% lidocaine was infiltrated into the lesions. A total of 24 mg of kenalog was used. A total of 4 sites were injected. There were no complications. The patient tolerated the procedure well. Injection sites were dressed with bandages.\par

## 2020-07-01 NOTE — PHYSICAL EXAM
[de-identified] : NAD [de-identified] : normal respiratory effort [de-identified] : NC/AT [de-identified] : tender active keloids of left shoulder, left chest AICD site, and left chest sidewall

## 2020-07-21 ENCOUNTER — RX RENEWAL (OUTPATIENT)
Age: 70
End: 2020-07-21

## 2020-08-14 ENCOUNTER — RX RENEWAL (OUTPATIENT)
Age: 70
End: 2020-08-14

## 2020-08-17 ENCOUNTER — RX RENEWAL (OUTPATIENT)
Age: 70
End: 2020-08-17

## 2020-09-08 ENCOUNTER — APPOINTMENT (OUTPATIENT)
Dept: ELECTROPHYSIOLOGY | Facility: CLINIC | Age: 70
End: 2020-09-08
Payer: MEDICARE

## 2020-09-08 PROCEDURE — 93295 DEV INTERROG REMOTE 1/2/MLT: CPT

## 2020-09-08 PROCEDURE — 93296 REM INTERROG EVL PM/IDS: CPT

## 2020-09-30 ENCOUNTER — APPOINTMENT (OUTPATIENT)
Dept: PLASTIC SURGERY | Facility: CLINIC | Age: 70
End: 2020-09-30
Payer: MEDICARE

## 2020-09-30 PROCEDURE — 11900 INJECT SKIN LESIONS </W 7: CPT

## 2020-10-03 NOTE — PROCEDURE
[Nl] : None [FreeTextEntry1] : multiple keloid scars [FreeTextEntry2] : intralesional corticosteroid injection [FreeTextEntry3] : as below [FreeTextEntry6] : Risks, benefits, and alternatives to intralesional corticosteroid injection was discussed with the patient. Specific risks of bleeding, infection, open wound, depigmentation, recurrence, and need for further procedures, among other risks, were discussed, and all questions answered. \par \par Keloid scars on the left shoulder and later chest were treated with EMLA cream. Areas for injection were then cleansed and prepped with alcohol. A 4:1 mixture of kenalog 10mg/10ml : 1% lidocaine was infiltrated into the lesions. A total of 8 mg of kenalog was used. A total of 3 sites were injected. There were no complications. The patient tolerated the procedure well. Injection sites were dressed with bandages.\par

## 2020-10-03 NOTE — HISTORY OF PRESENT ILLNESS
[FreeTextEntry1] : Left anterior chest keloid improve. Still has symptoms on left chest and shoulder.

## 2020-10-21 ENCOUNTER — APPOINTMENT (OUTPATIENT)
Dept: PLASTIC SURGERY | Facility: CLINIC | Age: 70
End: 2020-10-21

## 2020-11-05 ENCOUNTER — OUTPATIENT (OUTPATIENT)
Dept: OUTPATIENT SERVICES | Facility: HOSPITAL | Age: 70
LOS: 1 days | End: 2020-11-05
Payer: MEDICARE

## 2020-11-05 ENCOUNTER — APPOINTMENT (OUTPATIENT)
Dept: ULTRASOUND IMAGING | Facility: CLINIC | Age: 70
End: 2020-11-05
Payer: MEDICARE

## 2020-11-05 DIAGNOSIS — Z00.8 ENCOUNTER FOR OTHER GENERAL EXAMINATION: ICD-10-CM

## 2020-11-05 PROCEDURE — 76775 US EXAM ABDO BACK WALL LIM: CPT

## 2020-11-05 PROCEDURE — 76775 US EXAM ABDO BACK WALL LIM: CPT | Mod: 26

## 2020-11-25 ENCOUNTER — APPOINTMENT (OUTPATIENT)
Dept: PLASTIC SURGERY | Facility: CLINIC | Age: 70
End: 2020-11-25
Payer: MEDICARE

## 2020-11-25 PROCEDURE — 11901 INJECT SKIN LESIONS >7: CPT

## 2020-11-27 NOTE — HISTORY OF PRESENT ILLNESS
[FreeTextEntry1] : following up for multiple keloid scars, bilateral chest and left shoulder. Still has some discomfort and itching at scars.

## 2020-11-27 NOTE — PHYSICAL EXAM
[de-identified] : multiple keloid scars left lateral and anterior chest, left shoulder, and right lateral chest.

## 2020-11-27 NOTE — PROCEDURE
[Nl] : None [FreeTextEntry1] : keloid scars [FreeTextEntry2] : corticosteroid injection [FreeTextEntry6] : Risks, benefits, and alternatives to intralesional corticosteroid injection was discussed with the patient. Specific risks of bleeding, infection, open wound, depigmentation, recurrence, and need for further procedures, among other risks, were discussed, and all questions answered. \par \par Keloid scars on the chest and shoulder were treated with EMLA cream. Areas for injection were then cleansed and prepped with alcohol. A 4:1 mixture of kenalog 10mg/10ml : 1% lidocaine was infiltrated into the lesions. A total of 20 mg of kenalog was used. A total of 9 sites were injected. There were no complications. The patient tolerated the procedure well. Injection sites were dressed with bandages.

## 2020-12-08 ENCOUNTER — APPOINTMENT (OUTPATIENT)
Dept: ELECTROPHYSIOLOGY | Facility: CLINIC | Age: 70
End: 2020-12-08
Payer: MEDICARE

## 2020-12-08 PROCEDURE — 93296 REM INTERROG EVL PM/IDS: CPT

## 2020-12-08 PROCEDURE — 93295 DEV INTERROG REMOTE 1/2/MLT: CPT

## 2020-12-09 ENCOUNTER — RX RENEWAL (OUTPATIENT)
Age: 70
End: 2020-12-09

## 2020-12-15 ENCOUNTER — APPOINTMENT (OUTPATIENT)
Dept: CARDIOLOGY | Facility: CLINIC | Age: 70
End: 2020-12-15
Payer: MEDICARE

## 2020-12-15 ENCOUNTER — NON-APPOINTMENT (OUTPATIENT)
Age: 70
End: 2020-12-15

## 2020-12-15 VITALS
OXYGEN SATURATION: 98 % | SYSTOLIC BLOOD PRESSURE: 106 MMHG | HEART RATE: 64 BPM | BODY MASS INDEX: 25.75 KG/M2 | DIASTOLIC BLOOD PRESSURE: 68 MMHG | WEIGHT: 150 LBS

## 2020-12-15 PROCEDURE — 99072 ADDL SUPL MATRL&STAF TM PHE: CPT

## 2020-12-15 PROCEDURE — 99072 ADDL SUPL MATRL&STAF TM PHE: CPT | Mod: NC

## 2020-12-15 PROCEDURE — 99214 OFFICE O/P EST MOD 30 MIN: CPT

## 2020-12-15 PROCEDURE — 99214 OFFICE O/P EST MOD 30 MIN: CPT | Mod: NC

## 2020-12-15 NOTE — PHYSICAL EXAM
[General Appearance - Well Developed] : well developed [Normal Appearance] : normal appearance [General Appearance - Well Nourished] : well nourished [No Deformities] : no deformities [General Appearance - In No Acute Distress] : no acute distress [Normal Conjunctiva] : the conjunctiva exhibited no abnormalities [Eyelids - No Xanthelasma] : the eyelids demonstrated no xanthelasmas [Normal Oral Mucosa] : normal oral mucosa [No Oral Pallor] : no oral pallor [Normal Jugular Venous A Waves Present] : normal jugular venous A waves present [Normal Jugular Venous V Waves Present] : normal jugular venous V waves present [Auscultation Breath Sounds / Voice Sounds] : lungs were clear to auscultation bilaterally [Heart Rate And Rhythm] : heart rate and rhythm were normal [Heart Sounds] : normal S1 and S2 [Murmurs] : no murmurs present [Arterial Pulses Normal] : the arterial pulses were normal [Edema] : no peripheral edema present [Bowel Sounds] : normal bowel sounds [Abdomen Soft] : soft [Abdomen Tenderness] : non-tender [Abnormal Walk] : normal gait [Nail Clubbing] : no clubbing of the fingernails [Cyanosis, Localized] : no localized cyanosis [] : no ischemic changes [Skin Color & Pigmentation] : normal skin color and pigmentation [Skin Turgor] : normal skin turgor [No Venous Stasis] : no venous stasis [Oriented To Time, Place, And Person] : oriented to person, place, and time [Impaired Insight] : insight and judgment were intact [Affect] : the affect was normal

## 2020-12-16 ENCOUNTER — APPOINTMENT (OUTPATIENT)
Dept: PLASTIC SURGERY | Facility: CLINIC | Age: 70
End: 2020-12-16

## 2020-12-16 NOTE — HISTORY OF PRESENT ILLNESS
[FreeTextEntry1] : 71 yo woman w/PMHx CAD s/p PCI '09 at [facility] on DAPT, ICM EF 35% s/p ICD, COPD, AAA (last US abdominal aortic aneurysm US 4.1 cm done 11/5/2020) here for routine follow up doing well. Denies abdominal fullness/bloating/pulsations, lightheadedness, nausea/vomiting, SOB, claudication, or palpitations. LE arterial duplex 4/2019 w/ no discrete popliteal artery aneurysm, left-sided <50% stenosis of superficial femoral artery in mid thigh. \par \par \par

## 2020-12-16 NOTE — HISTORY OF PRESENT ILLNESS
[FreeTextEntry1] : Mary is a 70-year-old female COPD, CAD, ICD, DM, Htn who is very nervous about being here. She denies any chest pain, palpitations, dizziness or shortness of breath out of normal.

## 2020-12-16 NOTE — DISCUSSION/SUMMARY
[___ Month(s)] : [unfilled] month(s) [FreeTextEntry1] : The patient is a 70-year-old female COPD, CAD, AAA, ICD, who is doing well.. \par #1 COPD- on advair, spiriva and ventolin\par #2 CAD- stent 2009 and 2011 [place] and [place], no angina, on plavix\par #3 CMP - Medtronic ICD interrogation next visit, remotes negative, f/u Dr. Parker\par #4 Htn- controlled on losartan, coreg, spironolactone\par #5 Lipids- on atorvastatin\par #6 AAA- 3.5cm, f/u  Dr. Lofton today\par #7 Derm- keloids, gets injections, encouraged to increase walking for exercise

## 2020-12-16 NOTE — DISCUSSION/SUMMARY
[FreeTextEntry1] : Assessment:\par 1. AAA\par Nov 2020 4.1 cm\par Feb 2019 4.1cm \par Oct 2018: 3.65\par March 2018 3.93\par Aug 2017 - 3.61\par Oct 2017 - CT scan AAA 3.8cm, R VIRIDIANA 1.6cm\par 2. CHF\par 3. ICD\par \par Plan:\par 1. Repeat US aorta in 6 months (5/2021)\par 2. No heavy lifting or straining \par 3 Continue BP and HR control\par 4. Return in 6 months. \par

## 2020-12-16 NOTE — HISTORY OF PRESENT ILLNESS
[FreeTextEntry1] : 69 yo woman w/PMHx CAD s/p PCI '09 at [facility] on DAPT, ICM EF 35% s/p ICD, COPD, AAA (last US abdominal aortic aneurysm US 4.1 cm done 11/5/2020) here for routine follow up doing well. Denies abdominal fullness/bloating/pulsations, lightheadedness, nausea/vomiting, SOB, claudication, or palpitations. LE arterial duplex 4/2019 w/ no discrete popliteal artery aneurysm, left-sided <50% stenosis of superficial femoral artery in mid thigh. \par \par \par

## 2021-01-13 ENCOUNTER — APPOINTMENT (OUTPATIENT)
Dept: PLASTIC SURGERY | Facility: CLINIC | Age: 71
End: 2021-01-13
Payer: MEDICARE

## 2021-01-13 PROCEDURE — 11900 INJECT SKIN LESIONS </W 7: CPT

## 2021-01-13 PROCEDURE — 99072 ADDL SUPL MATRL&STAF TM PHE: CPT

## 2021-01-13 NOTE — PROCEDURE
[Nl] : None [FreeTextEntry1] : multiple keloid scars [FreeTextEntry2] : intralesional corticosteroid injection [FreeTextEntry6] : Risks, benefits, and alternatives to intralesional corticosteroid injection was discussed with the patient. Specific risks of bleeding, infection, open wound, depigmentation, recurrence, and need for further procedures, among other risks, were discussed, and all questions answered. \par \par Keloid scars on the left chest were treated with EMLA cream. Area for injection were then cleansed and prepped with alcohol. A 4:1 mixture of kenalog 10mg/10ml : 1% lidocaine was infiltrated into the lesions. A total of 6 mg of kenalog was used. A total of 1 site was injected. There were no complications. The patient tolerated the procedure well.

## 2021-01-13 NOTE — HISTORY OF PRESENT ILLNESS
[FreeTextEntry1] : Pt returns for kenalog injections, only wants to do the left lateral chest at this time.

## 2021-02-03 ENCOUNTER — APPOINTMENT (OUTPATIENT)
Dept: PLASTIC SURGERY | Facility: CLINIC | Age: 71
End: 2021-02-03

## 2021-03-09 ENCOUNTER — NON-APPOINTMENT (OUTPATIENT)
Age: 71
End: 2021-03-09

## 2021-03-09 ENCOUNTER — APPOINTMENT (OUTPATIENT)
Dept: ELECTROPHYSIOLOGY | Facility: CLINIC | Age: 71
End: 2021-03-09
Payer: MEDICARE

## 2021-03-09 PROCEDURE — 93296 REM INTERROG EVL PM/IDS: CPT

## 2021-03-09 PROCEDURE — 93295 DEV INTERROG REMOTE 1/2/MLT: CPT

## 2021-04-28 ENCOUNTER — APPOINTMENT (OUTPATIENT)
Dept: PLASTIC SURGERY | Facility: CLINIC | Age: 71
End: 2021-04-28
Payer: MEDICARE

## 2021-04-28 PROCEDURE — 11901 INJECT SKIN LESIONS >7: CPT

## 2021-04-28 PROCEDURE — 99072 ADDL SUPL MATRL&STAF TM PHE: CPT

## 2021-04-28 NOTE — PROCEDURE
[FreeTextEntry1] : Multiple keloid scars [FreeTextEntry2] : Intralesional corticosteroid injection, multiple sites [FreeTextEntry6] : Risks, benefits, and alternatives to intralesional corticosteroid injection was discussed with the patient. Specific risks of bleeding, infection, open wound, depigmentation, recurrence, and need for further procedures, among other risks, were discussed, and all questions answered. \par \par Keloid scars on the chest were treated with EMLA cream. Areas for injection were then cleansed and prepped with alcohol. A 4:1 mixture of kenalog 10mg/10ml : 1% lidocaine was infiltrated into the lesions. A total of 16 mg of kenalog was used. A total of 10 sites were injected. There were no complications. The patient tolerated the procedure well. Injection sites were dressed with bandages.

## 2021-04-28 NOTE — PHYSICAL EXAM
[de-identified] : Multiple keloid scars in left shoulder, left anterior chest, left lateral chest and right lateral chest.  No open wounds.  No significant areas of hypopigmentation or soft tissue atrophy.

## 2021-04-28 NOTE — HISTORY OF PRESENT ILLNESS
[FreeTextEntry1] : The patient returns for injection of keloid scars.  She continues to complain of discomfort at the left shoulder, left chest, left lateral chest wall and right lateral chest wall.  However, she states that she does note improvement in these areas.

## 2021-05-19 ENCOUNTER — APPOINTMENT (OUTPATIENT)
Dept: PLASTIC SURGERY | Facility: CLINIC | Age: 71
End: 2021-05-19
Payer: MEDICARE

## 2021-05-19 PROCEDURE — 11901 INJECT SKIN LESIONS >7: CPT

## 2021-05-19 PROCEDURE — 99072 ADDL SUPL MATRL&STAF TM PHE: CPT

## 2021-05-20 NOTE — HISTORY OF PRESENT ILLNESS
[FreeTextEntry1] : It has been 2 weeks since the last visit, rather than 3. Pt wishes to proceed with injection; this is reasonable. She only wants the left side addressed.

## 2021-05-20 NOTE — PHYSICAL EXAM
[de-identified] : Decreased scar prominence in left shoulder and lateral and anterior chest. No open areas.

## 2021-06-08 ENCOUNTER — APPOINTMENT (OUTPATIENT)
Dept: CARDIOLOGY | Facility: CLINIC | Age: 71
End: 2021-06-08
Payer: MEDICARE

## 2021-06-08 ENCOUNTER — NON-APPOINTMENT (OUTPATIENT)
Age: 71
End: 2021-06-08

## 2021-06-08 ENCOUNTER — OUTPATIENT (OUTPATIENT)
Dept: OUTPATIENT SERVICES | Facility: HOSPITAL | Age: 71
LOS: 1 days | End: 2021-06-08
Payer: MEDICARE

## 2021-06-08 VITALS
BODY MASS INDEX: 25.61 KG/M2 | OXYGEN SATURATION: 98 % | SYSTOLIC BLOOD PRESSURE: 109 MMHG | WEIGHT: 150 LBS | DIASTOLIC BLOOD PRESSURE: 75 MMHG | HEART RATE: 88 BPM | HEIGHT: 64 IN

## 2021-06-08 DIAGNOSIS — I71.4 ABDOMINAL AORTIC ANEURYSM, WITHOUT RUPTURE: ICD-10-CM

## 2021-06-08 PROCEDURE — 99214 OFFICE O/P EST MOD 30 MIN: CPT

## 2021-06-08 PROCEDURE — 99072 ADDL SUPL MATRL&STAF TM PHE: CPT

## 2021-06-08 PROCEDURE — 99214 OFFICE O/P EST MOD 30 MIN: CPT | Mod: NC

## 2021-06-08 PROCEDURE — 93000 ELECTROCARDIOGRAM COMPLETE: CPT

## 2021-06-08 PROCEDURE — 76770 US EXAM ABDO BACK WALL COMP: CPT | Mod: 26

## 2021-06-08 PROCEDURE — 76775 US EXAM ABDO BACK WALL LIM: CPT

## 2021-06-08 RX ORDER — ASPIRIN 81 MG/1
81 TABLET, COATED ORAL
Qty: 90 | Refills: 3 | Status: DISCONTINUED | COMMUNITY
Start: 2020-06-05 | End: 2021-06-08

## 2021-06-08 NOTE — DISCUSSION/SUMMARY
[FreeTextEntry1] : Assessment:\par 1. AAA\par Nov 2020 4.1 cm\par Feb 2019 4.1cm \par Oct 2018: 3.65\par March 2018 3.93\par Aug 2017 - 3.61\par Oct 2017 - CT scan AAA 3.8cm, R VIRIDIANA 1.6cm\par  November 2020 - 4.1 x 3.6 - fusiform \par 2. CHF\par 3. ICD\par \par Plan:\par 1. Repeat US aorta now - the lab can do the test today.  She is fasting.  \par 2. No heavy lifting or straining \par 3 Continue BP and HR control\par 4. Return in 6 months hope to continue with surveillance of AAA.  If the ultrasound shows growth, then will need to consider CT Aorta with runoff\par

## 2021-06-08 NOTE — HISTORY OF PRESENT ILLNESS
[FreeTextEntry1] : 6/8/2021\par \par She is here for followup\par She had an US aorta November 2020 - 4.1 x 3.6 - fusiform \par no ultrasound since then\par Abdomen feels ok\par No pulsating in her belly. \par Has some abdominal bloating.

## 2021-06-09 ENCOUNTER — APPOINTMENT (OUTPATIENT)
Dept: ELECTROPHYSIOLOGY | Facility: CLINIC | Age: 71
End: 2021-06-09

## 2021-06-13 ENCOUNTER — NON-APPOINTMENT (OUTPATIENT)
Age: 71
End: 2021-06-13

## 2021-06-13 NOTE — PHYSICAL EXAM
[Well Developed] : well developed [Well Nourished] : well nourished [No Acute Distress] : no acute distress [Normal Conjunctiva] : normal conjunctiva [Normal Venous Pressure] : normal venous pressure [No Carotid Bruit] : no carotid bruit [Normal S1, S2] : normal S1, S2 [No Rub] : no rub [No Murmur] : no murmur [No Gallop] : no gallop [Clear Lung Fields] : clear lung fields [Good Air Entry] : good air entry [No Respiratory Distress] : no respiratory distress  [Soft] : abdomen soft [No Masses/organomegaly] : no masses/organomegaly [Non Tender] : non-tender [Normal Bowel Sounds] : normal bowel sounds [Normal Gait] : normal gait [No Edema] : no edema [No Cyanosis] : no cyanosis [No Clubbing] : no clubbing [No Rash] : no rash [No Varicosities] : no varicosities [No Skin Lesions] : no skin lesions [Moves all extremities] : moves all extremities [No Focal Deficits] : no focal deficits [Normal Speech] : normal speech [Alert and Oriented] : alert and oriented [Normal memory] : normal memory

## 2021-06-13 NOTE — PHYSICAL EXAM
[Well Developed] : well developed [Well Nourished] : well nourished [No Acute Distress] : no acute distress [Normal Conjunctiva] : normal conjunctiva [Normal Venous Pressure] : normal venous pressure [No Carotid Bruit] : no carotid bruit [Normal S1, S2] : normal S1, S2 [No Murmur] : no murmur [No Rub] : no rub [No Gallop] : no gallop [Clear Lung Fields] : clear lung fields [Good Air Entry] : good air entry [No Respiratory Distress] : no respiratory distress  [Soft] : abdomen soft [No Masses/organomegaly] : no masses/organomegaly [Non Tender] : non-tender [Normal Bowel Sounds] : normal bowel sounds [Normal Gait] : normal gait [No Edema] : no edema [No Cyanosis] : no cyanosis [No Clubbing] : no clubbing [No Rash] : no rash [No Varicosities] : no varicosities [No Skin Lesions] : no skin lesions [Moves all extremities] : moves all extremities [No Focal Deficits] : no focal deficits [Normal Speech] : normal speech [Alert and Oriented] : alert and oriented [Normal memory] : normal memory

## 2021-06-16 NOTE — HISTORY OF PRESENT ILLNESS
[FreeTextEntry1] : Mary has been feeling well with no chest pain, palpitations or shortness of breath. Interrogration today and seeing Dr. Lofton.

## 2021-06-16 NOTE — DISCUSSION/SUMMARY
[___ Month(s)] : in [unfilled] month(s) [FreeTextEntry1] : The patient is a 71-year-old female COPD, CAD, AAA, ICD, who is doing well.. \par #1 COPD- on advair, spiriva and ventolin\par #2 CAD- stent 2009 and 2011 [place] and [place], no angina, continue plavix\par #3 CMP - Medtronic ICD interrogation negative, f/u Dr. Parker\par #4 Htn- continue losartan, coreg, spironolactone\par #5 Lipids- continue atorvastatin\par #6 AAA- 3.5cm, f/u  Dr. Lofton today\par #7 Derm- keloids, gets injections, encouraged to increase walking for exercise, received Moderna vaccines.

## 2021-06-16 NOTE — REVIEW OF SYSTEMS
[Negative] : Heme/Lymph [SOB] : no shortness of breath [Dyspnea on exertion] : not dyspnea during exertion [Chest Discomfort] : no chest discomfort [Lower Ext Edema] : no extremity edema [Leg Claudication] : no intermittent leg claudication [Palpitations] : no palpitations [Orthopnea] : no orthopnea [PND] : no PND

## 2021-06-16 NOTE — DISCUSSION/SUMMARY
[___ Month(s)] : in [unfilled] month(s) [FreeTextEntry1] : The patient is a 71-year-old female COPD, CAD, AAA, ICD, who is doing well.. \par #1 COPD- on advair, spiriva and ventolin\par #2 CAD- stent 2009 and 2011 [place] and [place], no angina, continue plavix\par #3 CMP - Medtronic ICD interrogation negative, f/u Dr. Parker\par #4 Htn- continue losartan, coreg, spironolactone\par #5 Lipids- continue atorvastatin\par #6 AAA- 3.5cm, f/u  Dr. Lofton today\par #7 Derm- keloids, gets injections, encouraged to increase walking for exercise, received Moderna vaccines.

## 2021-06-16 NOTE — DISCUSSION/SUMMARY
[___ Month(s)] : in [unfilled] month(s) [FreeTextEntry1] : The patient is a 71-year-old female COPD, CAD, AAA, ICD, who is doing well.. \par #1 COPD- on advair, spiriva and ventolin\par #2 CAD- stent 2009 and 2011 [place], no angina, continue plavix\par #3 CMP - Medtronic ICD interrogation negative, f/u Dr. Parker\par #4 Htn- continue losartan, coreg, spironolactone\par #5 Lipids- continue atorvastatin\par #6 AAA- 3.5cm, f/u  Dr. Lofton today\par #7 Derm- keloids, gets injections, encouraged to increase walking for exercise, received Moderna vaccines.

## 2021-06-26 ENCOUNTER — INPATIENT (INPATIENT)
Facility: HOSPITAL | Age: 71
LOS: 3 days | Discharge: HOME | DRG: 392 | End: 2021-06-30
Attending: INTERNAL MEDICINE | Admitting: STUDENT IN AN ORGANIZED HEALTH CARE EDUCATION/TRAINING PROGRAM
Payer: MEDICARE

## 2021-06-26 VITALS
OXYGEN SATURATION: 97 % | DIASTOLIC BLOOD PRESSURE: 67 MMHG | SYSTOLIC BLOOD PRESSURE: 97 MMHG | TEMPERATURE: 98 F | RESPIRATION RATE: 18 BRPM | WEIGHT: 145.06 LBS | HEART RATE: 71 BPM | HEIGHT: 64 IN

## 2021-06-26 LAB
ALBUMIN SERPL ELPH-MCNC: 3.8 G/DL — SIGNIFICANT CHANGE UP (ref 3.3–5)
ALP SERPL-CCNC: 55 U/L — SIGNIFICANT CHANGE UP (ref 40–120)
ALT FLD-CCNC: 17 U/L — SIGNIFICANT CHANGE UP (ref 10–45)
ANION GAP SERPL CALC-SCNC: 13 MMOL/L — SIGNIFICANT CHANGE UP (ref 5–17)
AST SERPL-CCNC: 26 U/L — SIGNIFICANT CHANGE UP (ref 10–40)
BASE EXCESS BLDV CALC-SCNC: 3.2 MMOL/L — HIGH (ref -2–2)
BASOPHILS # BLD AUTO: 0.04 K/UL — SIGNIFICANT CHANGE UP (ref 0–0.2)
BASOPHILS NFR BLD AUTO: 0.6 % — SIGNIFICANT CHANGE UP (ref 0–2)
BILIRUB SERPL-MCNC: 0.5 MG/DL — SIGNIFICANT CHANGE UP (ref 0.2–1.2)
BUN SERPL-MCNC: 14 MG/DL — SIGNIFICANT CHANGE UP (ref 7–23)
CA-I SERPL-SCNC: 1.19 MMOL/L — SIGNIFICANT CHANGE UP (ref 1.12–1.3)
CALCIUM SERPL-MCNC: 9.6 MG/DL — SIGNIFICANT CHANGE UP (ref 8.4–10.5)
CHLORIDE BLDV-SCNC: 91 MMOL/L — LOW (ref 96–108)
CHLORIDE SERPL-SCNC: 88 MMOL/L — LOW (ref 96–108)
CO2 BLDV-SCNC: 30 MMOL/L — SIGNIFICANT CHANGE UP (ref 22–30)
CO2 SERPL-SCNC: 23 MMOL/L — SIGNIFICANT CHANGE UP (ref 22–31)
CREAT SERPL-MCNC: 1.19 MG/DL — SIGNIFICANT CHANGE UP (ref 0.5–1.3)
EOSINOPHIL # BLD AUTO: 0.04 K/UL — SIGNIFICANT CHANGE UP (ref 0–0.5)
EOSINOPHIL NFR BLD AUTO: 0.6 % — SIGNIFICANT CHANGE UP (ref 0–6)
GAS PNL BLDV: 125 MMOL/L — LOW (ref 135–145)
GAS PNL BLDV: SIGNIFICANT CHANGE UP
GAS PNL BLDV: SIGNIFICANT CHANGE UP
GLUCOSE BLDV-MCNC: 96 MG/DL — SIGNIFICANT CHANGE UP (ref 70–99)
GLUCOSE SERPL-MCNC: 96 MG/DL — SIGNIFICANT CHANGE UP (ref 70–99)
HCO3 BLDV-SCNC: 28 MMOL/L — SIGNIFICANT CHANGE UP (ref 21–29)
HCT VFR BLD CALC: 36.7 % — SIGNIFICANT CHANGE UP (ref 34.5–45)
HCT VFR BLDA CALC: 29 % — LOW (ref 39–50)
HGB BLD CALC-MCNC: 9.4 G/DL — LOW (ref 11.5–15.5)
HGB BLD-MCNC: 12.2 G/DL — SIGNIFICANT CHANGE UP (ref 11.5–15.5)
IMM GRANULOCYTES NFR BLD AUTO: 0.9 % — SIGNIFICANT CHANGE UP (ref 0–1.5)
LACTATE BLDV-MCNC: 1.6 MMOL/L — SIGNIFICANT CHANGE UP (ref 0.7–2)
LYMPHOCYTES # BLD AUTO: 1.65 K/UL — SIGNIFICANT CHANGE UP (ref 1–3.3)
LYMPHOCYTES # BLD AUTO: 25.1 % — SIGNIFICANT CHANGE UP (ref 13–44)
MAGNESIUM SERPL-MCNC: 1.7 MG/DL — SIGNIFICANT CHANGE UP (ref 1.6–2.6)
MCHC RBC-ENTMCNC: 32.9 PG — SIGNIFICANT CHANGE UP (ref 27–34)
MCHC RBC-ENTMCNC: 33.2 GM/DL — SIGNIFICANT CHANGE UP (ref 32–36)
MCV RBC AUTO: 98.9 FL — SIGNIFICANT CHANGE UP (ref 80–100)
MONOCYTES # BLD AUTO: 0.87 K/UL — SIGNIFICANT CHANGE UP (ref 0–0.9)
MONOCYTES NFR BLD AUTO: 13.2 % — SIGNIFICANT CHANGE UP (ref 2–14)
NEUTROPHILS # BLD AUTO: 3.91 K/UL — SIGNIFICANT CHANGE UP (ref 1.8–7.4)
NEUTROPHILS NFR BLD AUTO: 59.6 % — SIGNIFICANT CHANGE UP (ref 43–77)
NRBC # BLD: 0 /100 WBCS — SIGNIFICANT CHANGE UP (ref 0–0)
PCO2 BLDV: 49 MMHG — SIGNIFICANT CHANGE UP (ref 35–50)
PH BLDV: 7.38 — SIGNIFICANT CHANGE UP (ref 7.35–7.45)
PHOSPHATE SERPL-MCNC: 2.6 MG/DL — SIGNIFICANT CHANGE UP (ref 2.5–4.5)
PLATELET # BLD AUTO: 289 K/UL — SIGNIFICANT CHANGE UP (ref 150–400)
PO2 BLDV: 20 MMHG — LOW (ref 25–45)
POTASSIUM BLDV-SCNC: 4.3 MMOL/L — SIGNIFICANT CHANGE UP (ref 3.5–5.3)
POTASSIUM SERPL-MCNC: 4.7 MMOL/L — SIGNIFICANT CHANGE UP (ref 3.5–5.3)
POTASSIUM SERPL-SCNC: 4.7 MMOL/L — SIGNIFICANT CHANGE UP (ref 3.5–5.3)
PROT SERPL-MCNC: 7.6 G/DL — SIGNIFICANT CHANGE UP (ref 6–8.3)
RBC # BLD: 3.71 M/UL — LOW (ref 3.8–5.2)
RBC # FLD: 13.4 % — SIGNIFICANT CHANGE UP (ref 10.3–14.5)
SAO2 % BLDV: 29 % — LOW (ref 67–88)
SODIUM SERPL-SCNC: 124 MMOL/L — LOW (ref 135–145)
WBC # BLD: 6.57 K/UL — SIGNIFICANT CHANGE UP (ref 3.8–10.5)
WBC # FLD AUTO: 6.57 K/UL — SIGNIFICANT CHANGE UP (ref 3.8–10.5)

## 2021-06-26 PROCEDURE — 74174 CTA ABD&PLVS W/CONTRAST: CPT | Mod: 26,MA

## 2021-06-26 PROCEDURE — 99285 EMERGENCY DEPT VISIT HI MDM: CPT

## 2021-06-26 RX ORDER — SODIUM CHLORIDE 9 MG/ML
1000 INJECTION, SOLUTION INTRAVENOUS ONCE
Refills: 0 | Status: DISCONTINUED | OUTPATIENT
Start: 2021-06-26 | End: 2021-06-26

## 2021-06-26 RX ORDER — ACETAMINOPHEN 500 MG
1000 TABLET ORAL ONCE
Refills: 0 | Status: COMPLETED | OUTPATIENT
Start: 2021-06-26 | End: 2021-06-26

## 2021-06-26 RX ORDER — SODIUM CHLORIDE 9 MG/ML
1000 INJECTION INTRAMUSCULAR; INTRAVENOUS; SUBCUTANEOUS ONCE
Refills: 0 | Status: COMPLETED | OUTPATIENT
Start: 2021-06-26 | End: 2021-06-26

## 2021-06-26 RX ADMIN — SODIUM CHLORIDE 1000 MILLILITER(S): 9 INJECTION INTRAMUSCULAR; INTRAVENOUS; SUBCUTANEOUS at 19:01

## 2021-06-26 RX ADMIN — Medication 400 MILLIGRAM(S): at 20:43

## 2021-06-26 NOTE — ED PROVIDER NOTE - CLINICAL SUMMARY MEDICAL DECISION MAKING FREE TEXT BOX
70 y/o F hx copd, cad, known diastolic hf, here with abd pain x1 week, seen at [facility] x[facility] with CTA showing diverticulosis, iliac artery occlusion, abdominal anyeurism. No fevers, feels cold. Denies cp, sob. Minimally tender, odd affect with perseverating on multiple topics during assessment. Concern for low flow state/ischemia, will get cta, vbg, cardiac enzymes, ekg, reassess.

## 2021-06-26 NOTE — ED ADULT NURSE NOTE - CHIEF COMPLAINT QUOTE
lower abd pain and vomiting. Multiple visits to [facility] ED where patient states iv access was not successful so she had CT with no contrast showing diverticulosis

## 2021-06-26 NOTE — ED PROVIDER NOTE - NS ED ROS FT
Constitutional: (-) fever (+) vomiting  Eyes/ENT: (-) vision changes  Cardiovascular: (-) chest pain, (-) wheezing  Respiratory: (-) cough, (-) shortness of breath  Gastrointestinal: (+) vomiting, (-) diarrhea, (+) abdominal pain  : (-) dysuria   Musculoskeletal: (-) back pain  Integumentary: (-) rash, (-) edema  Neurological: (-)loc  Allergic/Immunologic: (-) pruritus  Endocrine: No history of thyroid disease

## 2021-06-26 NOTE — ED ADULT NURSE NOTE - OBJECTIVE STATEMENT
Pt is a 72 yo F who came to the ED amb c/o abd pain, n/v x1 week. Pt seen in [facility] ED and dx with diverticulosis on 6/20. Pt seen in ED 3 times in the past 6 days with no improvement in pain. A/O x3. Pt is a 72 yo F who came to the ED amb c/o abd pain, n/v x1 week. Pain is intermittent, sharp, in lower abdomen. Last BM was this morning, soft and watery, no blood noted. Pt seen in [facility] ED and dx with diverticulosis on 6/20. Pt seen in ED 3 times in the past 6 days with no improvement in pain. A/O x3.

## 2021-06-26 NOTE — ED PROVIDER NOTE - OBJECTIVE STATEMENT
70 y/o F hx CHF, known abdominal anyeurism, copd, cad with stent x2, here with abd pain x1 week, seen at [facility] for same 3x this week, had CTA showing redemonstration of anyeurism, diverticulosis without diverticulitis, iliac artery occlusion. Was discharged with outpatient f/u (saw her cardiologist and vascular surgeon last week). States this pain is different for her, intermittent, crampy, "swirling". States she feels cold but has no fever, during assessment asking for socks repeatedly. Denies cp, sob, syncope, leg swelling. States she is taking her medications as prescribed. Tylenol helps with the pain. Decreased PO intake, reports losing weight because of this. Frequent n/v.

## 2021-06-26 NOTE — ED ADULT TRIAGE NOTE - CHIEF COMPLAINT QUOTE
lower abd pain and vomiting. Multiple visits to [facility] ED where patient states iv access was not successful so she had CT with no contrast showing diverticulosis

## 2021-06-26 NOTE — ED ADULT NURSE REASSESSMENT NOTE - NS ED NURSE REASSESS COMMENT FT1
Patient making demands at RN. RN made patient aware pending urine specimen collection. Patient resting comfortably in bed.

## 2021-06-26 NOTE — ED PROVIDER NOTE - ATTENDING CONTRIBUTION TO CARE
abdominal pain  mild diffuse ttp wo rebound or guarding  pain out of proportion. check for mesenteric ischemia  labs. re-assess.   pt w known chf. perhaps gut edema?

## 2021-06-26 NOTE — ED PROVIDER NOTE - PHYSICAL EXAMINATION
Vitals: I have reviewed the patients vital signs. borderline bp  General: well appearing, well nourished, no acute distress  HEENT: atraumatic, normocephalic, airway patent, EOMI and appropriate tracking  Neck: no JVD, no tracheal deviation, no goiter  Chest/Lungs: no trauma, symmetric chest rise, lung sounds clear bilaterally, speaking in complete sentences  Heart: Regular rate, regular rhythm, skin well perfused, 2+ pulses  Abdomen: Soft, minimnally tender to deep palpation, no rebound or guarding  Neuro: A+Ox3, ambulating without difficulty, non dysarthric speech  Eyes: PERRL, EOMI, no conjunctival injection  MSK: all limbs at baseline strength, no wasting or atrophy,   Skin: no bleeding, no cyanosis, no jaundice, no new emergent lesions  Psych: odd affect, perseverates on multiple topics throughout conversation.

## 2021-06-26 NOTE — ED ADULT NURSE REASSESSMENT NOTE - NS ED NURSE REASSESS COMMENT FT1
Report received from MELO King. Pt AAOx4, NAD, resp nonlabored, skin warm/dry, resting comfortably in bed. Pt c/o abdominal pain. Pt ambulates independently at baseline, ambulated to restroom. Pt awaiting lab results. Safety maintained. Report received from MELO King. Pt AAOx4, NAD, resp nonlabored, skin warm/dry, resting comfortably in bed. Pt c/o abdominal pain. Pt ambulates independently at baseline, ambulated to restroom. Pt awaiting lab results and to go to CT, made aware by RN. Patient with pink band on left arm, when asked by RN why she has a pink band on, patient states "they tried to get blood but the vein is striped and my cardiologist told me to get a pink band when I come in. I have CHF and a defibrillator". Safety maintained.

## 2021-06-27 DIAGNOSIS — Z29.9 ENCOUNTER FOR PROPHYLACTIC MEASURES, UNSPECIFIED: ICD-10-CM

## 2021-06-27 DIAGNOSIS — M06.9 RHEUMATOID ARTHRITIS, UNSPECIFIED: ICD-10-CM

## 2021-06-27 DIAGNOSIS — I71.4 ABDOMINAL AORTIC ANEURYSM, WITHOUT RUPTURE: ICD-10-CM

## 2021-06-27 DIAGNOSIS — E87.1 HYPO-OSMOLALITY AND HYPONATREMIA: ICD-10-CM

## 2021-06-27 DIAGNOSIS — R10.30 LOWER ABDOMINAL PAIN, UNSPECIFIED: ICD-10-CM

## 2021-06-27 DIAGNOSIS — I50.22 CHRONIC SYSTOLIC (CONGESTIVE) HEART FAILURE: ICD-10-CM

## 2021-06-27 DIAGNOSIS — I25.10 ATHEROSCLEROTIC HEART DISEASE OF NATIVE CORONARY ARTERY WITHOUT ANGINA PECTORIS: ICD-10-CM

## 2021-06-27 DIAGNOSIS — I63.9 CEREBRAL INFARCTION, UNSPECIFIED: ICD-10-CM

## 2021-06-27 LAB
ANION GAP SERPL CALC-SCNC: 12 MMOL/L — SIGNIFICANT CHANGE UP (ref 5–17)
APPEARANCE UR: CLEAR — SIGNIFICANT CHANGE UP
BILIRUB UR-MCNC: NEGATIVE — SIGNIFICANT CHANGE UP
BUN SERPL-MCNC: 11 MG/DL — SIGNIFICANT CHANGE UP (ref 7–23)
CALCIUM SERPL-MCNC: 9.4 MG/DL — SIGNIFICANT CHANGE UP (ref 8.4–10.5)
CHLORIDE SERPL-SCNC: 97 MMOL/L — SIGNIFICANT CHANGE UP (ref 96–108)
CHLORIDE UR-SCNC: <35 MMOL/L — SIGNIFICANT CHANGE UP
CO2 SERPL-SCNC: 23 MMOL/L — SIGNIFICANT CHANGE UP (ref 22–31)
COLOR SPEC: COLORLESS — SIGNIFICANT CHANGE UP
CORTIS AM PEAK SERPL-MCNC: 5.5 UG/DL — LOW (ref 6–18.4)
CREAT SERPL-MCNC: 1.16 MG/DL — SIGNIFICANT CHANGE UP (ref 0.5–1.3)
DIFF PNL FLD: NEGATIVE — SIGNIFICANT CHANGE UP
GLUCOSE SERPL-MCNC: 130 MG/DL — HIGH (ref 70–99)
GLUCOSE UR QL: NEGATIVE — SIGNIFICANT CHANGE UP
KETONES UR-MCNC: NEGATIVE — SIGNIFICANT CHANGE UP
LEUKOCYTE ESTERASE UR-ACNC: NEGATIVE — SIGNIFICANT CHANGE UP
NITRITE UR-MCNC: NEGATIVE — SIGNIFICANT CHANGE UP
OSMOLALITY SERPL: 264 MOSMOL/KG — LOW (ref 280–301)
OSMOLALITY UR: 162 MOS/KG — LOW (ref 300–900)
PH UR: 5.5 — SIGNIFICANT CHANGE UP (ref 5–8)
PHOSPHATE 24H UR-MCNC: 7.4 MG/DL — SIGNIFICANT CHANGE UP
POTASSIUM SERPL-MCNC: 3.6 MMOL/L — SIGNIFICANT CHANGE UP (ref 3.5–5.3)
POTASSIUM SERPL-SCNC: 3.6 MMOL/L — SIGNIFICANT CHANGE UP (ref 3.5–5.3)
PROT UR-MCNC: NEGATIVE — SIGNIFICANT CHANGE UP
SARS-COV-2 RNA SPEC QL NAA+PROBE: SIGNIFICANT CHANGE UP
SODIUM SERPL-SCNC: 132 MMOL/L — LOW (ref 135–145)
SODIUM UR-SCNC: 24 MMOL/L — SIGNIFICANT CHANGE UP
SP GR SPEC: 1.02 — SIGNIFICANT CHANGE UP (ref 1.01–1.02)
TSH SERPL-MCNC: 1.23 UIU/ML — SIGNIFICANT CHANGE UP (ref 0.27–4.2)
UROBILINOGEN FLD QL: NEGATIVE — SIGNIFICANT CHANGE UP

## 2021-06-27 PROCEDURE — 99223 1ST HOSP IP/OBS HIGH 75: CPT

## 2021-06-27 PROCEDURE — 12345: CPT | Mod: NC

## 2021-06-27 RX ORDER — ALPRAZOLAM 0.25 MG
1 TABLET ORAL THREE TIMES A DAY
Refills: 0 | Status: DISCONTINUED | OUTPATIENT
Start: 2021-06-27 | End: 2021-06-30

## 2021-06-27 RX ORDER — RANITIDINE HYDROCHLORIDE 150 MG/1
1 TABLET, FILM COATED ORAL
Qty: 0 | Refills: 0 | DISCHARGE

## 2021-06-27 RX ORDER — POTASSIUM CHLORIDE 20 MEQ
10 PACKET (EA) ORAL
Refills: 0 | Status: COMPLETED | OUTPATIENT
Start: 2021-06-27 | End: 2021-06-27

## 2021-06-27 RX ORDER — BUDESONIDE AND FORMOTEROL FUMARATE DIHYDRATE 160; 4.5 UG/1; UG/1
2 AEROSOL RESPIRATORY (INHALATION)
Refills: 0 | Status: DISCONTINUED | OUTPATIENT
Start: 2021-06-27 | End: 2021-06-30

## 2021-06-27 RX ORDER — ALBUTEROL 90 UG/1
2 AEROSOL, METERED ORAL EVERY 6 HOURS
Refills: 0 | Status: DISCONTINUED | OUTPATIENT
Start: 2021-06-27 | End: 2021-06-30

## 2021-06-27 RX ORDER — TIOTROPIUM BROMIDE 18 UG/1
1 CAPSULE ORAL; RESPIRATORY (INHALATION)
Qty: 0 | Refills: 0 | DISCHARGE

## 2021-06-27 RX ORDER — POLYETHYLENE GLYCOL 3350 17 G/17G
17 POWDER, FOR SOLUTION ORAL
Refills: 0 | Status: DISCONTINUED | OUTPATIENT
Start: 2021-06-27 | End: 2021-06-30

## 2021-06-27 RX ORDER — CLOPIDOGREL BISULFATE 75 MG/1
75 TABLET, FILM COATED ORAL DAILY
Refills: 0 | Status: DISCONTINUED | OUTPATIENT
Start: 2021-06-27 | End: 2021-06-30

## 2021-06-27 RX ORDER — OXYCODONE HYDROCHLORIDE 5 MG/1
20 TABLET ORAL EVERY 8 HOURS
Refills: 0 | Status: DISCONTINUED | OUTPATIENT
Start: 2021-06-27 | End: 2021-06-27

## 2021-06-27 RX ORDER — ALPRAZOLAM 0.25 MG
1 TABLET ORAL
Qty: 0 | Refills: 0 | DISCHARGE

## 2021-06-27 RX ORDER — OXYCODONE HYDROCHLORIDE 5 MG/1
1 TABLET ORAL
Qty: 0 | Refills: 0 | DISCHARGE

## 2021-06-27 RX ORDER — ACETAMINOPHEN 500 MG
650 TABLET ORAL EVERY 6 HOURS
Refills: 0 | Status: COMPLETED | OUTPATIENT
Start: 2021-06-27 | End: 2021-06-28

## 2021-06-27 RX ORDER — FLUTICASONE PROPIONATE AND SALMETEROL 50; 250 UG/1; UG/1
1 POWDER ORAL; RESPIRATORY (INHALATION)
Qty: 0 | Refills: 0 | DISCHARGE

## 2021-06-27 RX ORDER — ZOLPIDEM TARTRATE 10 MG/1
5 TABLET ORAL AT BEDTIME
Refills: 0 | Status: DISCONTINUED | OUTPATIENT
Start: 2021-06-27 | End: 2021-06-30

## 2021-06-27 RX ORDER — ASPIRIN/CALCIUM CARB/MAGNESIUM 324 MG
81 TABLET ORAL DAILY
Refills: 0 | Status: DISCONTINUED | OUTPATIENT
Start: 2021-06-27 | End: 2021-06-30

## 2021-06-27 RX ORDER — ATORVASTATIN CALCIUM 80 MG/1
80 TABLET, FILM COATED ORAL AT BEDTIME
Refills: 0 | Status: DISCONTINUED | OUTPATIENT
Start: 2021-06-27 | End: 2021-06-30

## 2021-06-27 RX ORDER — OXYCODONE HYDROCHLORIDE 5 MG/1
20 TABLET ORAL THREE TIMES A DAY
Refills: 0 | Status: DISCONTINUED | OUTPATIENT
Start: 2021-06-27 | End: 2021-06-30

## 2021-06-27 RX ORDER — SENNA PLUS 8.6 MG/1
2 TABLET ORAL AT BEDTIME
Refills: 0 | Status: DISCONTINUED | OUTPATIENT
Start: 2021-06-27 | End: 2021-06-30

## 2021-06-27 RX ORDER — ACETAMINOPHEN 500 MG
975 TABLET ORAL ONCE
Refills: 0 | Status: COMPLETED | OUTPATIENT
Start: 2021-06-27 | End: 2021-06-27

## 2021-06-27 RX ORDER — CARVEDILOL PHOSPHATE 80 MG/1
3.12 CAPSULE, EXTENDED RELEASE ORAL EVERY 12 HOURS
Refills: 0 | Status: DISCONTINUED | OUTPATIENT
Start: 2021-06-27 | End: 2021-06-30

## 2021-06-27 RX ORDER — PANTOPRAZOLE SODIUM 20 MG/1
40 TABLET, DELAYED RELEASE ORAL
Refills: 0 | Status: DISCONTINUED | OUTPATIENT
Start: 2021-06-27 | End: 2021-06-29

## 2021-06-27 RX ORDER — METOCLOPRAMIDE HCL 10 MG
10 TABLET ORAL
Refills: 0 | Status: DISCONTINUED | OUTPATIENT
Start: 2021-06-27 | End: 2021-06-30

## 2021-06-27 RX ADMIN — BUDESONIDE AND FORMOTEROL FUMARATE DIHYDRATE 2 PUFF(S): 160; 4.5 AEROSOL RESPIRATORY (INHALATION) at 17:08

## 2021-06-27 RX ADMIN — CLOPIDOGREL BISULFATE 75 MILLIGRAM(S): 75 TABLET, FILM COATED ORAL at 14:30

## 2021-06-27 RX ADMIN — PANTOPRAZOLE SODIUM 40 MILLIGRAM(S): 20 TABLET, DELAYED RELEASE ORAL at 07:39

## 2021-06-27 RX ADMIN — Medication 100 MILLIEQUIVALENT(S): at 07:39

## 2021-06-27 RX ADMIN — OXYCODONE HYDROCHLORIDE 20 MILLIGRAM(S): 5 TABLET ORAL at 07:46

## 2021-06-27 RX ADMIN — Medication 100 MILLIEQUIVALENT(S): at 08:38

## 2021-06-27 RX ADMIN — Medication 81 MILLIGRAM(S): at 14:30

## 2021-06-27 RX ADMIN — Medication 975 MILLIGRAM(S): at 03:07

## 2021-06-27 RX ADMIN — ATORVASTATIN CALCIUM 80 MILLIGRAM(S): 80 TABLET, FILM COATED ORAL at 21:40

## 2021-06-27 RX ADMIN — Medication 650 MILLIGRAM(S): at 22:09

## 2021-06-27 RX ADMIN — Medication 100 MILLIEQUIVALENT(S): at 10:02

## 2021-06-27 RX ADMIN — Medication 650 MILLIGRAM(S): at 21:39

## 2021-06-27 RX ADMIN — OXYCODONE HYDROCHLORIDE 20 MILLIGRAM(S): 5 TABLET ORAL at 06:53

## 2021-06-27 RX ADMIN — ZOLPIDEM TARTRATE 5 MILLIGRAM(S): 10 TABLET ORAL at 21:40

## 2021-06-27 RX ADMIN — CARVEDILOL PHOSPHATE 3.12 MILLIGRAM(S): 80 CAPSULE, EXTENDED RELEASE ORAL at 17:08

## 2021-06-27 RX ADMIN — Medication 1 MILLIGRAM(S): at 16:51

## 2021-06-27 RX ADMIN — Medication 10 MILLIGRAM(S): at 08:39

## 2021-06-27 NOTE — PHYSICAL THERAPY INITIAL EVALUATION ADULT - PRECAUTIONS/LIMITATIONS, REHAB EVAL
Pt states she was admitted to [facility] 3 times in the past 1 week for her abd pain. Pt states she was told she has an AAA and diverticulosis, and that her abd pain never completely went away. It is unaffected by BM or eating. Pt states carlson she takes pain medications, the pain improves for a short while but comes back. At baseline, ambulates with a walker. Pt reports constipation for the past 2 days. Pt reports an episode of vomiting yesterday. Pt comes into the hospital for persistent suprapubic and diffuse abd pain. Denies urinary problems./fall precautions

## 2021-06-27 NOTE — H&P ADULT - NSICDXPASTMEDICALHX_GEN_ALL_CORE_FT
PAST MEDICAL HISTORY:  Anxiety     CAD (coronary artery disease)     Congestive heart failure     COPD (chronic obstructive pulmonary disease)     CVA (cerebral vascular accident) X2, right sided weakness     Hypercholesteremia     Osteoporosis     Pacemaker     Ventricular arrhythmia

## 2021-06-27 NOTE — H&P ADULT - NSHPSOCIALHISTORY_GEN_ALL_CORE
Social History:    Marital Status: (  ) , ( x ) Single, (  ) , (  ) , (  )   # of Children: 2  Lives with: ( x ) alone, (  ) children, (  ) spouse, (  ) parents, (  ) siblings, (  ) friends, (  ) other:   Occupation:     Substance Use/Illicit Drugs: (  ) never used vs other:   Tobacco Usage: ( x ) never smoked, (  ) former smoker, (  ) current smoker and Total Pack-Years:   Last Alcohol Usage/Frequency/Amount/Withdrawal/Hx of Abuse:  1 year ago  Foreign travel:   Animal exposure:

## 2021-06-27 NOTE — H&P ADULT - ASSESSMENT
71F c hx CAD s/p stents, CHF (?EF 30%) s/p AICD, 4.1cm AAA, R iliac artery aneurysm, COPD, CVA c/b mild right hemiparesis, spontaneous PTX, ?RA on chronic opiates, recent hospitalization x3 @ [facility] for abd pain, pw abd pain.

## 2021-06-27 NOTE — H&P ADULT - NSHPREVIEWOFSYSTEMS_GEN_ALL_CORE
REVIEW OF SYSTEMS:  CONSTITUTIONAL: +weakness. No fevers. +chills. No rigors. No weight loss. +poor appetite.  EYES: No blurry or double vision. No eye pain.  ENT: No hearing difficulty. No vertigo. No dysphagia. +sore throat. No Sinusitis/rhinorrhea.   NECK: No pain. No stiffness/rigidity.  CARDIAC: +intermittent electric chest pain. No palpitations. No lightheadedness.   RESPIRATORY: No cough. No SOB. No hemoptysis.  GASTROINTESTINAL: +abdominal pain. +nausea. +vomiting. No hematemesis. No diarrhea. No constipation. No melena. No hematochezia.  GENITOURINARY: No dysuria. No frequency. No hesitancy. No hematuria. No oliguria.  NEUROLOGICAL: No numbness/tingling. No focal weakness. No urinary or fecal incontinence. No headache. +unsteady gait.  BACK: +cervical and lower back pain. No flank pain.  EXTREMITIES: No lower extremity edema. Full ROM. +joint pain/hip pain/back pain  SKIN: No rashes. No itching. No other lesions.  PSYCHIATRIC: No depression. No anxiety. No SI/HI.  ALLERGIC: No lip swelling. No hives.  All other review of systems is negative unless indicated above.  Unless indicated above, unable to assess ROS 2/2

## 2021-06-27 NOTE — H&P ADULT - PROBLEM SELECTOR PLAN 6
- 4.1cm AAA has been stable since 2019  - 1.9cm iliac artery aneurysm also stable  - f/u surgery and serial imaging as outpt

## 2021-06-27 NOTE — PATIENT PROFILE ADULT - NSPROGENPREVTRANSF_GEN_A_NUR
Medicare Wellness Visit  Plan for Preventive Care    A good way for you to stay healthy is to use preventive care.  Medicare covers many services that can help you stay healthy.* The goal of these services is to find any health problems as quickly as possible. Finding problems early can help make them easier to treat.  Your personal plan below lists the services you may need and when they are due.     Health Maintenance Summary     Shingles Vaccine (1 of 2)  Overdue since 3/5/1987    DTaP/Tdap/Td Vaccine (1 - Tdap)  Overdue since 1/6/2007    Medicare Wellness 65+ (Yearly)  Overdue since 3/22/2019    Depression Screening (Yearly)  Overdue since 3/22/2019    Diabetes Eye Exam (Yearly)  Overdue since 4/25/2019    Influenza Vaccine (1)  Next due on 9/1/2019    Diabetes A1C (Every 6 Months)  Next due on 9/12/2019    Diabetes Foot Exam (Yearly)  Next due on 10/29/2019    Diabetes GFR (Yearly)  Next due on 3/12/2020    Pneumococcal Vaccine 65+   Completed           Preventive Care for Women and Men    Heart Screenings (Cardiovascular):  · Blood tests are used to check your cholesterol, lipid and triglyceride levels. High levels can increase your risk for heart disease and stroke. High levels can be treated with medications, diet and exercise. Lowering your levels can help keep your heart and blood vessels healthy.  Your provider will order these tests if they are needed.    · An ultrasound is done to see if you have an abdominal aortic aneurysm (AAA).  This is an enlargement of one of the main blood vessels that delivers blood to the body.   In the [place], 9,000 deaths are caused by AAA.  You may not even know you have this problem and as many as 1 in 3 people will have a serious problem if it is not treated.  Early diagnosis allows for more effective treatment and cure.  If you have a family history of AAA or are a male age 65-75 who has smoked, you are at higher risk of an AAA.  Your provider can order this  test, if needed.    Colorectal Screening:  · There are many tests that are used to check for cancer of your colon and rectum. You and your provider should discuss what test is best for you and when to have it done.  Options include:  · Screening Colonoscopy: exam of the entire colon, seen through a flexible lighted tube.  · Flexible Sigmoidoscopy: exam of the last third (sigmoid portion) of the colon and rectum, seen through a flexible lighted tube.  · Cologuard DNA stool test: a sample of your stool is used to screen for cancer and unseen blood in your stool.  · Fecal Occult Blood Test: a sample of your stool is studied to find any unseen blood    Flu Shot:  · An immunization that helps to prevent influenza (the flu). You should get this every year. The best time to get the shot is in the fall.    Pneumococcal Shot:  • Vaccines are available that can help prevent pneumococcal disease, which is any type of infection caused by Streptococcus pneumoniae bacteria.   Their use can prevent some cases of pneumonia, meningitis, and sepsis. There are two types of pneumococcal vaccines:   o Conjugate vaccines (PCV-13 or Prevnar 13®) - helps protect against the 13 types of pneumococcal bacteria that are the most common causes of serious infections in children and adults.    o Polysaccharide vaccine (PPSV23 or Yogjlarfg36®) - helps protect against 23 types of pneumococcal bacteria for patients who are recommended to get it.  These vaccines should be given at least 12 months apart.  A booster is usually not needed.     Hepatitis B Shot:  · An immunization that helps to protect people from getting Hepatitis B. Hepatitis B is a virus that spreads through contact with infected blood or body fluids. Many people with the virus do not have symptoms.  The virus can lead to serious problems, such as liver disease. Some people are at higher risk than others. Your doctor will tell you if you need this shot.     Diabetes Screening:  · A  test to measure sugar (glucose) in your blood is called a fasting blood sugar. Fasting means you cannot have food or drink for at least 8 hours before the test. This test can detect diabetes long before you may notice symptoms.    Glaucoma Screening:  · Glaucoma screening is performed by your eye doctor. The test measures the fluid pressure inside your eyes to determine if you have glaucoma.     Hepatitis C Screening:  · A blood test to see if you have the hepatitis C virus.  Hepatitis C attacks the liver and is a major cause of chronic liver disease.  Medicare will cover a single screening for all adults born between 1945 & 1965, or high risk patients (people who have injected illegal drugs or people who have had blood transfusions).  High risk patients who continue to inject illegal drugs can be screened for Hepatitis C every year.    Smoking and Tobacco-Use Cessation Counseling:  · Tobacco is the single greatest cause of disease and early death in our country today. Medication and counseling together can increase a person’s chance of quitting for good.   · Medicare covers two quitting attempts per year, with four counseling sessions per attempt (eight sessions in a 12 month period)    Preventive Screening tests for Women    Screening Mammograms and Breast Exams:  · An x-ray of your breasts to check for breast cancer before you or your doctor may be able to feel it.  If breast cancer is found early it can usually be treated with success.    Pelvic Exams and Pap Tests:  · An exam to check for cervical and vaginal cancer. A Pap test is a lab test in which cells are taken from your cervix and sent to the lab to look for signs of cervical cancer. If cancer of the cervix is found early, chances for a cure are good. Testing can generally end at age 65, or if a woman has a hysterectomy for a benign condition. Your provider may recommend more frequent testing if certain abnormal results are found.    Bone Mass  Measurements:  · A painless x-ray of your bone density to see if you are at risk for a broken bone. Bone density refers to the thickness of bones or how tightly the bone tissue is packed.    Preventive Screening tests for Men    Prostate Screening:  · PSA - Prostate Cancer blood test.  Experts do not recommend routine screening of healthy men with no signs or symptoms of prostate disease.  However, men should not ignore urinary symptoms, and should discuss their family history with their doctor.    *Medicare pays for many preventive services to keep you healthy. For some of these services, you might have to pay a deductible, coinsurance, and / or copayment.  The amounts vary depending on the type of services you need and the kind of Medicare health plan you have.               no

## 2021-06-27 NOTE — H&P ADULT - PROBLEM SELECTOR PLAN 1
- unclear etiology. CTA showing unchanged chronic large vessel aneurysms  - consider ?GI consult for further workup with poss EGD, gastric emptying study  - will start protonix, stool softeners, reglan, maalox - unclear etiology. CTA showing unchanged chronic large vessel aneurysms, unlikely to be cause of pain  - consider ?GI consult for further workup with poss EGD, gastric emptying study  - will start protonix, stool softeners, reglan, maalox  - hold off abx

## 2021-06-27 NOTE — PHYSICAL THERAPY INITIAL EVALUATION ADULT - ACTIVE RANGE OF MOTION EXAMINATION, REHAB EVAL
LLE Active ROM was WNL (within normal limits)/bilateral lower extremity Active ROM was WNL (within normal limits)

## 2021-06-27 NOTE — PROGRESS NOTE ADULT - ASSESSMENT
71F c hx CAD s/p stents, CHF (?EF 30%) s/p AICD, 4.1cm AAA, R iliac artery aneurysm, COPD, CVA c/b mild right hemiparesis, spontaneous PTX, ?RA on chronic opiates, recent hospitalization x3 @ [facility] for abd pain, pw abd pain.

## 2021-06-27 NOTE — PHYSICAL THERAPY INITIAL EVALUATION ADULT - ADDITIONAL COMMENTS
Pt lives alone in apartment, no JASON. PTA, pt was I with all ADLs but had aide coming 7 days a week to assist with cooking/cleaning. Pt owns SC and RW and uses both for ambulation.

## 2021-06-27 NOTE — H&P ADULT - NSHPLABSRESULTS_GEN_ALL_CORE
Personally reviewed old records.  Personally reviewed labs.  Personally reviewed imaging.                          12.2   6.57  )-----------( 289      ( 26 Jun 2021 19:09 )             36.7       06-27    132<L>  |  97  |  11  ----------------------------<  130<H>  3.6   |  23  |  1.16    Ca    9.4      27 Jun 2021 03:14  Phos  2.6     06-26  Mg     1.7     06-26    TPro  7.6  /  Alb  3.8  /  TBili  0.5  /  DBili  x   /  AST  26  /  ALT  17  /  AlkPhos  55  06-26            LIVER FUNCTIONS - ( 26 Jun 2021 19:09 )  Alb: 3.8 g/dL / Pro: 7.6 g/dL / ALK PHOS: 55 U/L / ALT: 17 U/L / AST: 26 U/L / GGT: x

## 2021-06-27 NOTE — PHYSICAL THERAPY INITIAL EVALUATION ADULT - PERTINENT HX OF CURRENT PROBLEM, REHAB EVAL
71F c hx CAD s/p stents, CHF (?EF 30%) s/p AICD, 4.1cm AAA, R iliac artery aneurysm, COPD, CVA c/b mild right hemiparesis, spontaneous PTX, ?RA on chronic opiates, recent hospitalization x3 @ [facility] for abd pain, pw abd pain.

## 2021-06-27 NOTE — PHYSICAL THERAPY INITIAL EVALUATION ADULT - DIAGNOSIS, PT EVAL
Patient arrives for nasal congestion, bilateral ear pain, and neck pain since yesterday. Able to move all extremities.  Denies taking any medications prior to arrival. decreased strength, impaired balance

## 2021-06-27 NOTE — PROGRESS NOTE ADULT - SUBJECTIVE AND OBJECTIVE BOX
[facility] Division of Hospital Medicine  Henrry Smith MD  Pager ([facility], 8A-5P): [phone]  Other Times:  [phone]    Patient is a 71y old  Female who presents with a chief complaint of abd pain (2021 05:44)      SUBJECTIVE / OVERNIGHT EVENTS:    Patient was examined this morning. She is still complaining of abdominal pain, which now seems to be in suprapubic area. She had lots of questions about the blood tests and imaging findings. She is curious if diverticulosis are causing the pain. All questions were answered and further workup discussed with patient.      ADDITIONAL REVIEW OF SYSTEMS: neg    MEDICATIONS  (STANDING):  aspirin enteric coated 81 milliGRAM(s) Oral daily  atorvastatin 80 milliGRAM(s) Oral at bedtime  budesonide 160 MICROgram(s)/formoterol 4.5 MICROgram(s) Inhaler 2 Puff(s) Inhalation two times a day  carvedilol 3.125 milliGRAM(s) Oral every 12 hours  clopidogrel Tablet 75 milliGRAM(s) Oral daily  pantoprazole    Tablet 40 milliGRAM(s) Oral before breakfast  polyethylene glycol 3350 17 Gram(s) Oral two times a day    MEDICATIONS  (PRN):  ALBUTerol    90 MICROgram(s) HFA Inhaler 2 Puff(s) Inhalation every 6 hours PRN Shortness of Breath and/or Wheezing  ALPRAZolam 1 milliGRAM(s) Oral three times a day PRN anxiety  aluminum hydroxide/magnesium hydroxide/simethicone Suspension 30 milliLiter(s) Oral every 4 hours PRN Dyspepsia  metoclopramide 10 milliGRAM(s) Oral three times a day before meals PRN abdominal pain  oxyCODONE    IR 20 milliGRAM(s) Oral three times a day PRN Moderate Pain (4 - 6)  senna 2 Tablet(s) Oral at bedtime PRN Constipation  zolpidem 5 milliGRAM(s) Oral at bedtime PRN Insomnia  zolpidem 5 milliGRAM(s) Oral at bedtime PRN Insomnia      CAPILLARY BLOOD GLUCOSE        I&O's Summary    2021 07:01  -  2021 07:00  --------------------------------------------------------  IN: 240 mL / OUT: 0 mL / NET: 240 mL        PHYSICAL EXAM:  Vital Signs Last 24 Hrs  T(C): 36.6 (2021 08:11), Max: 36.6 (2021 05:53)  T(F): 97.9 (2021 08:11), Max: 97.9 (2021 08:11)  HR: 60 (2021 10:16) (60 - 71)  BP: 127/82 (2021 10:16) (97/67 - 147/77)  BP(mean): 89 (2021 01:55) (89 - 98)  RR: 16 (2021 08:11) (16 - 20)  SpO2: 98% (2021 10:16) (97% - 100%)      GENERAL: Alert. Not confused. No acute distress. Not thin. Not cachectic. Not obese.  EYES: PERRL. Normal conjunctiva/sclera.  ENT: Neck supple. No JVD. Moist oral mucosa.  CARDIAC: Not tachy, Not gricelda. Regular rhythm. Not irregularly irregular. S1. S2.   LUNG/CHEST: CTABL. BS equal bilaterally. No wheezes. No rales. No rhonchi.  ABDOMEN: Soft. mild tenderness in suprapubic area. No distension. No fluid wave. Normal bowel sounds.  BACK: No midline/vertebral tenderness. No flank tenderness.  VASCULAR: +2 b/l radial or ulnar pulses. Palpable DP pulses.  EXTREMITIES:  No clubbing. No cyanosis. No edema. Moving all 4 ext.  NEUROLOGY: A&Ox3. Non-focal exam. Cranial nerves intact. Tangential speech. Sensation intact.  PSYCH: Normal behavior. Peculiar affect.  SKIN: No jaundice. No erythema. No rash/lesion.      LABS:                        12.2   6.57  )-----------( 289      ( 2021 19:09 )             36.7     -    132<L>  |  97  |  11  ----------------------------<  130<H>  3.6   |  23  |  1.16    Ca    9.4      2021 03:14  Phos  2.6     06-  Mg     1.7         TPro  7.6  /  Alb  3.8  /  TBili  0.5  /  DBili  x   /  AST  26  /  ALT  17  /  AlkPhos  55            Urinalysis Basic - ( 2021 11:08 )    Color: Colorless / Appearance: Clear / S.023 / pH: x  Gluc: x / Ketone: Negative  / Bili: Negative / Urobili: Negative   Blood: x / Protein: Negative / Nitrite: Negative   Leuk Esterase: Negative / RBC: x / WBC x   Sq Epi: x / Non Sq Epi: x / Bacteria: x          RADIOLOGY & ADDITIONAL TESTS:  Results Reviewed:   Imaging Personally Reviewed:  Electrocardiogram Personally Reviewed:    COORDINATION OF CARE:  Care Discussed with Consultants/Other Providers [Y/N]:  Prior or Outpatient Records Reviewed [Y/N]:

## 2021-06-27 NOTE — H&P ADULT - HISTORY OF PRESENT ILLNESS
71F c hx CAD s/p stents, CHF (?EF 30%) s/p AICD, 4.1cm AAA, R iliac artery aneurysm, COPD, CVA c/b mild right hemiparesis, spontaneous PTX, ?RA on chronic opiates, recent hospitalization x3 @ [facility] for abd pain, pw abd pain.    Pt states she was admitted to [facility] 3 times in the past 1 week for her abd pain. Pt states she was told she has an AAA and diverticulosis, and that her abd pain never completely went away. It is unaffected by BM or eating. Pt states carlson she takes pain medications, the pain improves for a short while but comes back. At baseline, ambulates with a walker. Pt reports constipation for the past 2 days. Pt reports an episode of vomiting yesterday. Pt comes into the hospital for persistent suprapubic and diffuse abd pain. Denies urinary problems.    VS: Tm 97.6, P 60, BP 97/67, R 20, 97% RA  In the ED received NS 1L, tylenol.    Reference #: [id number]  Rx Written	Rx Dispensed	Drug	Quantity	Days Supply	Prescriber Name	Prescriber Merline #	Payment Method	Dispenser  05/12/2021	05/24/2021	alprazolam 1 mg tablet	90	30	Oleg Mast MD	[id number]	Insurance	[facility]  05/12/2021	05/24/2021	zolpidem tartrate 10 mg tablet	30	30	Oleg Mast MD	[id number]	Insurance	[facility]  05/12/2021	05/24/2021	oxycodone hcl 20 mg tablet	90	30	Oleg Mast MD	[id number]	Insurance	[facility] 71F c hx CAD s/p stents, CHF (?EF 30%) s/p AICD, 4.1cm AAA, R iliac artery aneurysm, COPD, CVA c/b mild right hemiparesis, spontaneous PTX, ?RA on chronic opiates, recent hospitalization x3 @ [facility] for abd pain, pw abd pain.    Pt states she was admitted to [facility] 3 times in the past 1 week for her abd pain. Pt states she was told she has an AAA and diverticulosis, and that her abd pain never completely went away. It is unaffected by BM or eating. Pt states carlson she takes pain medications, the pain improves for a short while but comes back. At baseline, ambulates with a walker. Pt reports constipation for the past 2 days. Pt reports an episode of vomiting yesterday. Pt comes into the hospital for persistent suprapubic and diffuse abd pain. Denies urinary problems.    VS: Tm 97.6, P 60, BP 97/67, R 20, 97% RA  In the ED received NS 1L, tylenol.    [facility] Reference #: [id number]  Rx Written	Rx Dispensed	Drug	Quantity	Days Supply	Prescriber Name	Prescriber Merline #	Payment Method	Dispenser  05/12/2021	05/24/2021	alprazolam 1 mg tablet	90	30	Oleg Mast MD	[id number]	Insurance	[facility]  05/12/2021	05/24/2021	zolpidem tartrate 10 mg tablet	30	30	Oleg Mast MD	[id number]	Insurance	[facility]  05/12/2021	05/24/2021	oxycodone hcl 20 mg tablet	90	30	Oleg Mast MD	[id number]	Insurance	[facility]

## 2021-06-28 ENCOUNTER — APPOINTMENT (OUTPATIENT)
Dept: ULTRASOUND IMAGING | Facility: CLINIC | Age: 71
End: 2021-06-28

## 2021-06-28 DIAGNOSIS — E78.00 PURE HYPERCHOLESTEROLEMIA, UNSPECIFIED: ICD-10-CM

## 2021-06-28 DIAGNOSIS — I50.9 HEART FAILURE, UNSPECIFIED: ICD-10-CM

## 2021-06-28 LAB
ANION GAP SERPL CALC-SCNC: 14 MMOL/L — SIGNIFICANT CHANGE UP (ref 5–17)
BUN SERPL-MCNC: 10 MG/DL — SIGNIFICANT CHANGE UP (ref 7–23)
CALCIUM SERPL-MCNC: 9.5 MG/DL — SIGNIFICANT CHANGE UP (ref 8.4–10.5)
CHLORIDE SERPL-SCNC: 99 MMOL/L — SIGNIFICANT CHANGE UP (ref 96–108)
CO2 SERPL-SCNC: 22 MMOL/L — SIGNIFICANT CHANGE UP (ref 22–31)
CORTIS AM PEAK SERPL-MCNC: 1.7 UG/DL — LOW (ref 6–18.4)
COVID-19 SPIKE DOMAIN AB INTERP: POSITIVE
COVID-19 SPIKE DOMAIN ANTIBODY RESULT: >250 U/ML — HIGH
CREAT SERPL-MCNC: 1.07 MG/DL — SIGNIFICANT CHANGE UP (ref 0.5–1.3)
GLUCOSE SERPL-MCNC: 78 MG/DL — SIGNIFICANT CHANGE UP (ref 70–99)
HCV AB S/CO SERPL IA: 0.16 S/CO — SIGNIFICANT CHANGE UP (ref 0–0.99)
HCV AB SERPL-IMP: SIGNIFICANT CHANGE UP
POTASSIUM SERPL-MCNC: 4.4 MMOL/L — SIGNIFICANT CHANGE UP (ref 3.5–5.3)
POTASSIUM SERPL-SCNC: 4.4 MMOL/L — SIGNIFICANT CHANGE UP (ref 3.5–5.3)
SARS-COV-2 IGG+IGM SERPL QL IA: >250 U/ML — HIGH
SARS-COV-2 IGG+IGM SERPL QL IA: POSITIVE
SODIUM SERPL-SCNC: 135 MMOL/L — SIGNIFICANT CHANGE UP (ref 135–145)

## 2021-06-28 PROCEDURE — 93284 PRGRMG EVAL IMPLANTABLE DFB: CPT | Mod: 26

## 2021-06-28 PROCEDURE — 99223 1ST HOSP IP/OBS HIGH 75: CPT

## 2021-06-28 RX ORDER — COSYNTROPIN 0.25 MG/ML
0.25 INJECTION, SOLUTION INTRAVENOUS ONCE
Refills: 0 | Status: COMPLETED | OUTPATIENT
Start: 2021-06-28 | End: 2021-06-28

## 2021-06-28 RX ORDER — ACETAMINOPHEN 500 MG
650 TABLET ORAL ONCE
Refills: 0 | Status: COMPLETED | OUTPATIENT
Start: 2021-06-28 | End: 2021-06-28

## 2021-06-28 RX ADMIN — PANTOPRAZOLE SODIUM 40 MILLIGRAM(S): 20 TABLET, DELAYED RELEASE ORAL at 05:22

## 2021-06-28 RX ADMIN — POLYETHYLENE GLYCOL 3350 17 GRAM(S): 17 POWDER, FOR SOLUTION ORAL at 22:44

## 2021-06-28 RX ADMIN — Medication 650 MILLIGRAM(S): at 11:15

## 2021-06-28 RX ADMIN — Medication 81 MILLIGRAM(S): at 12:43

## 2021-06-28 RX ADMIN — Medication 1 MILLIGRAM(S): at 10:33

## 2021-06-28 RX ADMIN — CARVEDILOL PHOSPHATE 3.12 MILLIGRAM(S): 80 CAPSULE, EXTENDED RELEASE ORAL at 05:23

## 2021-06-28 RX ADMIN — POLYETHYLENE GLYCOL 3350 17 GRAM(S): 17 POWDER, FOR SOLUTION ORAL at 17:11

## 2021-06-28 RX ADMIN — COSYNTROPIN 0.25 MILLIGRAM(S): 0.25 INJECTION, SOLUTION INTRAVENOUS at 17:06

## 2021-06-28 RX ADMIN — Medication 650 MILLIGRAM(S): at 10:30

## 2021-06-28 RX ADMIN — SENNA PLUS 2 TABLET(S): 8.6 TABLET ORAL at 05:22

## 2021-06-28 RX ADMIN — Medication 1 MILLIGRAM(S): at 21:36

## 2021-06-28 RX ADMIN — CARVEDILOL PHOSPHATE 3.12 MILLIGRAM(S): 80 CAPSULE, EXTENDED RELEASE ORAL at 17:11

## 2021-06-28 RX ADMIN — ALBUTEROL 2 PUFF(S): 90 AEROSOL, METERED ORAL at 12:44

## 2021-06-28 RX ADMIN — ATORVASTATIN CALCIUM 80 MILLIGRAM(S): 80 TABLET, FILM COATED ORAL at 21:36

## 2021-06-28 RX ADMIN — CLOPIDOGREL BISULFATE 75 MILLIGRAM(S): 75 TABLET, FILM COATED ORAL at 12:43

## 2021-06-28 RX ADMIN — BUDESONIDE AND FORMOTEROL FUMARATE DIHYDRATE 2 PUFF(S): 160; 4.5 AEROSOL RESPIRATORY (INHALATION) at 17:11

## 2021-06-28 RX ADMIN — Medication 650 MILLIGRAM(S): at 22:37

## 2021-06-28 RX ADMIN — BUDESONIDE AND FORMOTEROL FUMARATE DIHYDRATE 2 PUFF(S): 160; 4.5 AEROSOL RESPIRATORY (INHALATION) at 05:22

## 2021-06-28 RX ADMIN — ZOLPIDEM TARTRATE 5 MILLIGRAM(S): 10 TABLET ORAL at 22:37

## 2021-06-28 NOTE — PROGRESS NOTE ADULT - SUBJECTIVE AND OBJECTIVE BOX
Name of Patient : SHAE HANEY  MRN: [id number]  DATE OF SERVICE: 21 @ 17:55    Subjective: Patient seen and examined. No new events except as noted.   abdominal pain     REVIEW OF SYSTEMS:    CONSTITUTIONAL: No weakness, fevers or chills  EYES/ENT: No visual changes;  No vertigo or throat pain   NECK: No pain or stiffness  RESPIRATORY: No cough, wheezing, hemoptysis; No shortness of breath  CARDIOVASCULAR: No chest pain or palpitations  GASTROINTESTINAL: + abdominal pain   GENITOURINARY: No dysuria, frequency or hematuria  NEUROLOGICAL: No numbness or weakness  SKIN: No itching, burning, rashes, or lesions   All other review of systems is negative unless indicated above.    MEDICATIONS:  MEDICATIONS  (STANDING):  aspirin enteric coated 81 milliGRAM(s) Oral daily  atorvastatin 80 milliGRAM(s) Oral at bedtime  budesonide 160 MICROgram(s)/formoterol 4.5 MICROgram(s) Inhaler 2 Puff(s) Inhalation two times a day  carvedilol 3.125 milliGRAM(s) Oral every 12 hours  clopidogrel Tablet 75 milliGRAM(s) Oral daily  pantoprazole    Tablet 40 milliGRAM(s) Oral before breakfast  polyethylene glycol 3350 17 Gram(s) Oral two times a day      PHYSICAL EXAM:  T(C): 36.7 (21 @ 16:10), Max: 36.7 (21 @ 16:10)  HR: 67 (21 @ 16:10) (62 - 83)  BP: 105/68 (21 @ 16:10) (101/67 - 133/72)  RR: 18 (21 @ 16:10) (18 - 18)  SpO2: 98% (21 @ 16:10) (96% - 98%)  Wt(kg): --  I&O's Summary    2021 07:  -  2021 07:00  --------------------------------------------------------  IN: 540 mL / OUT: 0 mL / NET: 540 mL    2021 07:   17:55  --------------------------------------------------------  IN: 240 mL / OUT: 0 mL / NET: 240 mL          Appearance: Normal	  HEENT:  PERRLA   Lymphatic: No lymphadenopathy   Cardiovascular: Normal S1 S2, no JVD  Respiratory: normal effort , clear  Gastrointestinal:  Soft, Non-tender  Skin: No rashes,  warm to touch  Psychiatry:  Mood & affect appropriate  Musculuskeletal: No edema      All labs, Imaging and EKGs personally reviewed       21 @ 07:01  -  21 @ 07:00  --------------------------------------------------------  IN: 540 mL / OUT: 0 mL / NET: 540 mL    21 @ 07:  -  21 @ 17:55  --------------------------------------------------------  IN: 240 mL / OUT: 0 mL / NET: 240 mL                          12.2   6.57  )-----------( 289      ( 2021 19:09 )             36.7                   135  |  99  |  10  ----------------------------<  78  4.4   |  22  |  1.07    Ca    9.5      2021 07:19  Phos  2.6       Mg     1.7         TPro  7.6  /  Alb  3.8  /  TBili  0.5  /  DBili  x   /  AST  26  /  ALT  17  /  AlkPhos  55                         Urinalysis Basic - ( 2021 11:08 )    Color: Colorless / Appearance: Clear / S.023 / pH: x  Gluc: x / Ketone: Negative  / Bili: Negative / Urobili: Negative   Blood: x / Protein: Negative / Nitrite: Negative   Leuk Esterase: Negative / RBC: x / WBC x   Sq Epi: x / Non Sq Epi: x / Bacteria: x

## 2021-06-28 NOTE — CONSULT NOTE ADULT - SUBJECTIVE AND OBJECTIVE BOX
[facility]  [place] [address] [phone]      Chief Complaint:  Patient is a 71y old  Female who presents with a chief complaint of abd pain (2021 17:55)      HPI:71F c hx CAD s/p stents, CHF (?EF 30%) s/p AICD, 4.1cm AAA, R iliac artery aneurysm, COPD, CVA c/b mild right hemiparesis, spontaneous PTX, ?RA on chronic opiates, recent hospitalization x3 @ [facility] for abd pain, pw abd pain.    Pt states she was admitted to [facility] 3 times in the past 1 week for her abd pain. Pt states she was told she has an AAA and diverticulosis, and that her abd pain never completely went away. It is unaffected by BM or eating. Pt states carlson she takes pain medications, the pain improves for a short while but comes back. At baseline, ambulates with a walker. Pt reports constipation for the past 2 days. Pt reports an episode of vomiting yesterday. Pt comes into the hospital for persistent suprapubic and diffuse abd pain. Denies urinary problems.    Allergies:  lactose (Rash)  No Known Allergies      Medications:  ALBUTerol    90 MICROgram(s) HFA Inhaler 2 Puff(s) Inhalation every 6 hours PRN  ALPRAZolam 1 milliGRAM(s) Oral three times a day PRN  aluminum hydroxide/magnesium hydroxide/simethicone Suspension 30 milliLiter(s) Oral every 4 hours PRN  aspirin enteric coated 81 milliGRAM(s) Oral daily  atorvastatin 80 milliGRAM(s) Oral at bedtime  budesonide 160 MICROgram(s)/formoterol 4.5 MICROgram(s) Inhaler 2 Puff(s) Inhalation two times a day  carvedilol 3.125 milliGRAM(s) Oral every 12 hours  clopidogrel Tablet 75 milliGRAM(s) Oral daily  metoclopramide 10 milliGRAM(s) Oral three times a day before meals PRN  oxyCODONE    IR 20 milliGRAM(s) Oral three times a day PRN  pantoprazole    Tablet 40 milliGRAM(s) Oral before breakfast  polyethylene glycol 3350 17 Gram(s) Oral two times a day  senna 2 Tablet(s) Oral at bedtime PRN  zolpidem 5 milliGRAM(s) Oral at bedtime PRN  zolpidem 5 milliGRAM(s) Oral at bedtime PRN      PMHX/PSHX:  Anxiety    Hypercholesteremia    Osteoporosis    Pacemaker    CAD (coronary artery disease)    COPD (chronic obstructive pulmonary disease)    Congestive heart failure    Ventricular arrhythmia    CVA (cerebral vascular accident) X2, right sided weakness    Cataract Surgery    Hand Surgery        Family history:  No pertinent family history in first degree relatives        Social History:     ROS:     General:  No wt loss, fevers, chills, night sweats, fatigue,   Eyes:  Good vision, no reported pain  ENT:  No sore throat, pain, runny nose, dysphagia  CV:  No pain, palpitations, hypo/hypertension  Resp:  No dyspnea, cough, tachypnea, wheezing  GI: + pain, No nausea, No vomiting, No diarrhea, No constipation, No weight loss, No fever, No pruritis, No rectal bleeding, No tarry stools, No dysphagia,  :  No pain, bleeding, incontinence, nocturia  Muscle:  No pain, weakness  Neuro:  No weakness, tingling, memory problems  Psych:  No fatigue, insomnia, mood problems, depression  Endocrine:  No polyuria, polydipsia, cold/heat intolerance  Heme:  No petechiae, ecchymosis, easy bruisability  Skin:  No rash, tattoos, scars, edema      PHYSICAL EXAM:   Vital Signs:  Vital Signs Last 24 Hrs  T(C): 37 (2021 19:48), Max: 37 (2021 19:48)  T(F): 98.6 (2021 19:48), Max: 98.6 (2021 19:48)  HR: 67 (2021 19:48) (65 - 83)  BP: 100/66 (2021 19:48) (100/66 - 133/72)  BP(mean): --  RR: 18 (2021 19:48) (18 - 18)  SpO2: 96% (2021 19:48) (96% - 98%)  Daily     Daily     GENERAL:  Appears stated age,   HEENT:  NC/AT,    CHEST:  Full & symmetric excursion,   HEART:  Regular rhythm  ABDOMEN:  Soft, non-tender, non-distended,   EXTEREMITIES:  no cyanosis,clubbing or edema  SKIN:  No rash  NEURO:  Alert,    LABS:        135  |  99  |  10  ----------------------------<  78  4.4   |  22  |  1.07    Ca    9.5      2021 07:19          Urinalysis Basic - ( 2021 11:08 )    Color: Colorless / Appearance: Clear / S.023 / pH: x  Gluc: x / Ketone: Negative  / Bili: Negative / Urobili: Negative   Blood: x / Protein: Negative / Nitrite: Negative   Leuk Esterase: Negative / RBC: x / WBC x   Sq Epi: x / Non Sq Epi: x / Bacteria: x          Imaging:

## 2021-06-28 NOTE — PROCEDURE NOTE - ADDITIONAL PROCEDURE DETAILS
Indication: Heart Failure  -Normal sensing and pacing thresholds  -stable lead impedance  -Review of stored data did not reveal any episodes for review  -changes made: None     [id number]

## 2021-06-28 NOTE — CONSULT NOTE ADULT - ASSESSMENT
abdominal pain  h/o CHF  aortic aneurysm     CT angio noted, no signs of mesenteric ischemic  aicd interogattion noted   cont diet as tolerated  npo p mn   upper gastrointestinal endoscopy in am r/o PUD  d/w patient  d/w primary

## 2021-06-28 NOTE — CONSULT NOTE ADULT - SUBJECTIVE AND OBJECTIVE BOX
Vascular Cardiology Consult Note    SERVICE LINE [phone]             EMAIL [email or web address]       CC:  abdominal pain    HPI:           Allergies  No Known Allergies    Intolerances  lactose (Rash)  	    MEDICATIONS:  aspirin enteric coated 81 milliGRAM(s) Oral daily  carvedilol 3.125 milliGRAM(s) Oral every 12 hours  clopidogrel Tablet 75 milliGRAM(s) Oral daily  ALBUTerol    90 MICROgram(s) HFA Inhaler 2 Puff(s) Inhalation every 6 hours PRN  budesonide 160 MICROgram(s)/formoterol 4.5 MICROgram(s) Inhaler 2 Puff(s) Inhalation two times a day  ALPRAZolam 1 milliGRAM(s) Oral three times a day PRN  oxyCODONE    IR 20 milliGRAM(s) Oral three times a day PRN  zolpidem 5 milliGRAM(s) Oral at bedtime PRN  zolpidem 5 milliGRAM(s) Oral at bedtime PRN  aluminum hydroxide/magnesium hydroxide/simethicone Suspension 30 milliLiter(s) Oral every 4 hours PRN  metoclopramide 10 milliGRAM(s) Oral three times a day before meals PRN  pantoprazole    Tablet 40 milliGRAM(s) Oral before breakfast  polyethylene glycol 3350 17 Gram(s) Oral two times a day  senna 2 Tablet(s) Oral at bedtime PRN  atorvastatin 80 milliGRAM(s) Oral at bedtime        PAST MEDICAL & SURGICAL HISTORY:  Anxiety    Hypercholesteremia    Osteoporosis    Pacemaker    CAD (coronary artery disease)    COPD (chronic obstructive pulmonary disease)    Congestive heart failure    Ventricular arrhythmia    CVA (cerebral vascular accident) X2, right sided weakness    Cataract Surgery    Hand Surgery        FAMILY HISTORY:  No pertinent family history in first degree relatives        SOCIAL HISTORY:  unchanged    REVIEW OF SYSTEMS:  CONSTITUTIONAL: No fever, weight loss, or fatigue  EYES: No eye pain, visual disturbances, or discharge  ENMT:  No difficulty hearing, tinnitus, vertigo; No sinus or throat pain  NECK: No pain or stiffness  RESPIRATORY:    CARDIOVASCULAR:    GASTROINTESTINAL: No abdominal or epigastric pain. No nausea, vomiting, or hematemesis; No diarrhea or constipation. No melena or hematochezia.  GENITOURINARY: No dysuria, frequency, hematuria, or incontinence  NEUROLOGICAL: No headaches, memory loss, loss of strength, numbness, or tremors  SKIN:   LYMPH Nodes: No enlarged glands  ENDOCRINE: No heat or cold intolerance; No hair loss  MUSCULOSKELETAL: No joint pain or swelling; No muscle, back, or extremity pain  PSYCHIATRIC: No depression, anxiety, mood swings, or difficulty sleeping  HEME/LYMPH: No easy bruising, or bleeding gums  ALLERY AND IMMUNOLOGIC: No hives or eczema	    [ x] All others negative	  [ ] Unable to obtain    PHYSICAL EXAM:  T(C): 36.5 (06-28-21 @ 09:12), Max: 36.9 (06-27-21 @ 16:33)  HR: 83 (06-28-21 @ 09:12) (61 - 83)  BP: 101/67 (06-28-21 @ 09:12) (101/67 - 154/84)  RR: 18 (06-28-21 @ 09:12) (18 - 18)  SpO2: 96% (06-28-21 @ 09:12) (96% - 98%)  Wt(kg): --  I&O's Summary    27 Jun 2021 07:01 - 28 Jun 2021 07:00  --------------------------------------------------------  IN: 540 mL / OUT: 0 mL / NET: 540 mL    28 Jun 2021 07:01  -  28 Jun 2021 12:34  --------------------------------------------------------  IN: 240 mL / OUT: 0 mL / NET: 240 mL        Appearance:  	  HEENT:   Normal oral mucosa, PERRL, EOMI	  Carotid:   Right:    Left:    Lymphatic: No lymphadenopathy  Cardiovascular:    Respiratory:  	  Psychiatry:  AAO x   Gastrointestinal:  Soft, Non-tender, + BS	  Skin: No rashes, No ecchymoses, No cyanosis	  Neurologic:    Extremities:      Vascular Pulse Exam:  Right DP: []palpable []non-palpable []audible      Left DP :   []palpable []non-palpable []audible  Right PT: []palpable [] non-palpable []audible   Left PT:  [] palpable [] non-palpable []audible         Foot Exam:        LABS:	 	    CBC Full  -  ( 26 Jun 2021 19:09 )  WBC Count : 6.57 K/uL  Hemoglobin : 12.2 g/dL  Hematocrit : 36.7 %  Platelet Count - Automated : 289 K/uL  Mean Cell Volume : 98.9 fl  Mean Cell Hemoglobin : 32.9 pg  Mean Cell Hemoglobin Concentration : 33.2 gm/dL  Auto Neutrophil # : 3.91 K/uL  Auto Lymphocyte # : 1.65 K/uL  Auto Monocyte # : 0.87 K/uL  Auto Eosinophil # : 0.04 K/uL  Auto Basophil # : 0.04 K/uL  Auto Neutrophil % : 59.6 %  Auto Lymphocyte % : 25.1 %  Auto Monocyte % : 13.2 %  Auto Eosinophil % : 0.6 %  Auto Basophil % : 0.6 %    06-28    135  |  99  |  10  ----------------------------<  78  4.4   |  22  |  1.07  06-27    132<L>  |  97  |  11  ----------------------------<  130<H>  3.6   |  23  |  1.16    Ca    9.5      28 Jun 2021 07:19  Ca    9.4      27 Jun 2021 03:14  Phos  2.6     06-26  Mg     1.7     06-26    TPro  7.6  /  Alb  3.8  /  TBili  0.5  /  DBili  x   /  AST  26  /  ALT  17  /  AlkPhos  55  06-26      < from: CT Angio Abdomen and Pelvis w/ IV Cont (06.26.21 @ 21:49) >  FINDINGS:  LOWER CHEST: Partially imaged AICD leads. Bibasilar subsegmental atelectasis. Emphysema with subpleural blebs.    LIVER: Within normal limits.  BILE DUCTS: Normal caliber.  GALLBLADDER: Within normal limits.  SPLEEN: Within normal limits.  PANCREAS: Within normal limits.  ADRENALS: Within normal limits.  KIDNEYS/URETERS: Bilateral renal cortical scarring. Too small to characterize bilateral renal hypodensities. No hydronephrosis.    BLADDER: Within normal limits.  REPRODUCTIVE ORGANS: Calcified fibroid uterus. No adnexal masses.    BOWEL: No bowel obstruction, abnormal wall thickening, or pneumatosis. Appendix is not seen, although no inflammatory changes in thepericecal region. Colonic diverticulosis without evidence of diverticulitis.  PERITONEUM: No ascites.  VESSELS: Atherosclerotic changes. Partially thrombosed infrarenal abdominal aortic aneurysm is increased in size from prior, now measuring up to 4.1 cm. Stable aneurysmal dilatation of the right common iliac artery measuring 1.9 cm. Celiac, SMA, bilateral renal arteries, and TIMOTHY are patent.  RETROPERITONEUM/LYMPH NODES: No lymphadenopathy.  ABDOMINAL WALL: Small fat-containing umbilical hernia.  BONES: Degenerative changes.    IMPRESSION:  No evidence of mesenteric ischemia or acute intra-abdominal pathology.    Partially thrombosed infrarenal abdominal aortic aneurysm is increased in size from prior, now measuring up to 4.1 cm.    < end of copied text >       Vascular Cardiology Consult Note    SERVICE LINE [phone]             EMAIL [email or web address]       CC:  abdominal pain    HPI:    71 with infrarenal AAA, R common iliac artery aneurysm, CAD, COPD, CVA who presented with abdominal pain. CTA abdomen pelvis demonstrated the known partially thrombosed AAA measuring 4.1 cm. Mesenteric arteries were patent. Vascular cardiology asked to assess abdominal pain in setting of AAA.     Patient's pain is not exacerbated by eating or stooling. Improves with pain medications. She has been evaluated at OSH and was diagnosed with diverticulosis.     Allergies  No Known Allergies    Intolerances  lactose (Rash)  	    MEDICATIONS:  aspirin enteric coated 81 milliGRAM(s) Oral daily  carvedilol 3.125 milliGRAM(s) Oral every 12 hours  clopidogrel Tablet 75 milliGRAM(s) Oral daily  ALBUTerol    90 MICROgram(s) HFA Inhaler 2 Puff(s) Inhalation every 6 hours PRN  budesonide 160 MICROgram(s)/formoterol 4.5 MICROgram(s) Inhaler 2 Puff(s) Inhalation two times a day  ALPRAZolam 1 milliGRAM(s) Oral three times a day PRN  oxyCODONE    IR 20 milliGRAM(s) Oral three times a day PRN  zolpidem 5 milliGRAM(s) Oral at bedtime PRN  zolpidem 5 milliGRAM(s) Oral at bedtime PRN  aluminum hydroxide/magnesium hydroxide/simethicone Suspension 30 milliLiter(s) Oral every 4 hours PRN  metoclopramide 10 milliGRAM(s) Oral three times a day before meals PRN  pantoprazole    Tablet 40 milliGRAM(s) Oral before breakfast  polyethylene glycol 3350 17 Gram(s) Oral two times a day  senna 2 Tablet(s) Oral at bedtime PRN  atorvastatin 80 milliGRAM(s) Oral at bedtime        PAST MEDICAL & SURGICAL HISTORY:  Anxiety    Hypercholesteremia    Osteoporosis    Pacemaker    CAD (coronary artery disease)    COPD (chronic obstructive pulmonary disease)    Congestive heart failure    Ventricular arrhythmia    CVA (cerebral vascular accident) X2, right sided weakness    Cataract Surgery    Hand Surgery        FAMILY HISTORY:  No pertinent family history in first degree relatives        SOCIAL HISTORY:  unchanged    REVIEW OF SYSTEMS:  CONSTITUTIONAL: No fever, weight loss, or fatigue  EYES: No eye pain, visual disturbances, or discharge  ENMT:  No difficulty hearing, tinnitus, vertigo; No sinus or throat pain  NECK: No pain or stiffness  RESPIRATORY:  no SOB, cough  CARDIOVASCULAR:  no CP, palpitations  GASTROINTESTINAL: see HPI  GENITOURINARY: No dysuria, frequency, hematuria, or incontinence  NEUROLOGICAL: No headaches, memory loss, loss of strength, numbness, or tremors  SKIN: no bruising  LYMPH Nodes: No enlarged glands  ENDOCRINE: No heat or cold intolerance; No hair loss  MUSCULOSKELETAL: No joint pain or swelling; No muscle, back, or extremity pain  PSYCHIATRIC: No depression, anxiety, mood swings, or difficulty sleeping  HEME/LYMPH: No easy bruising, or bleeding gums  ALLERY AND IMMUNOLOGIC: No hives or eczema	    [ x] All others negative	  [ ] Unable to obtain    PHYSICAL EXAM:  T(C): 36.5 (06-28-21 @ 09:12), Max: 36.9 (06-27-21 @ 16:33)  HR: 83 (06-28-21 @ 09:12) (61 - 83)  BP: 101/67 (06-28-21 @ 09:12) (101/67 - 154/84)  RR: 18 (06-28-21 @ 09:12) (18 - 18)  SpO2: 96% (06-28-21 @ 09:12) (96% - 98%)  Wt(kg): --  I&O's Summary    27 Jun 2021 07:01  -  28 Jun 2021 07:00  --------------------------------------------------------  IN: 540 mL / OUT: 0 mL / NET: 540 mL    28 Jun 2021 07:01  -  28 Jun 2021 12:34  --------------------------------------------------------  IN: 240 mL / OUT: 0 mL / NET: 240 mL        Appearance: sitting in chair, speaking in complete senteneces 	  HEENT:   Normal oral mucosa, PERRL, EOMI	  Lymphatic: No lymphadenopathy  Cardiovascular:  RRR, nl S1/S1  Respiratory:  	CTA b/l, no crackles or wheezing  Psychiatry:  AAO x 3  Gastrointestinal:  Soft, Non-tender, + BS, no pulsating mass  Skin: No rashes, No ecchymoses, No cyanosis	  Neurologic:  non-focal  Extremities:  no edema      LABS:	 	    CBC Full  -  ( 26 Jun 2021 19:09 )  WBC Count : 6.57 K/uL  Hemoglobin : 12.2 g/dL  Hematocrit : 36.7 %  Platelet Count - Automated : 289 K/uL  Mean Cell Volume : 98.9 fl  Mean Cell Hemoglobin : 32.9 pg  Mean Cell Hemoglobin Concentration : 33.2 gm/dL  Auto Neutrophil # : 3.91 K/uL  Auto Lymphocyte # : 1.65 K/uL  Auto Monocyte # : 0.87 K/uL  Auto Eosinophil # : 0.04 K/uL  Auto Basophil # : 0.04 K/uL  Auto Neutrophil % : 59.6 %  Auto Lymphocyte % : 25.1 %  Auto Monocyte % : 13.2 %  Auto Eosinophil % : 0.6 %  Auto Basophil % : 0.6 %    06-28    135  |  99  |  10  ----------------------------<  78  4.4   |  22  |  1.07  06-27    132<L>  |  97  |  11  ----------------------------<  130<H>  3.6   |  23  |  1.16    Ca    9.5      28 Jun 2021 07:19  Ca    9.4      27 Jun 2021 03:14  Phos  2.6     06-26  Mg     1.7     06-26    TPro  7.6  /  Alb  3.8  /  TBili  0.5  /  DBili  x   /  AST  26  /  ALT  17  /  AlkPhos  55  06-26      < from: CT Angio Abdomen and Pelvis w/ IV Cont (06.26.21 @ 21:49) >  FINDINGS:  LOWER CHEST: Partially imaged AICD leads. Bibasilar subsegmental atelectasis. Emphysema with subpleural blebs.    LIVER: Within normal limits.  BILE DUCTS: Normal caliber.  GALLBLADDER: Within normal limits.  SPLEEN: Within normal limits.  PANCREAS: Within normal limits.  ADRENALS: Within normal limits.  KIDNEYS/URETERS: Bilateral renal cortical scarring. Too small to characterize bilateral renal hypodensities. No hydronephrosis.    BLADDER: Within normal limits.  REPRODUCTIVE ORGANS: Calcified fibroid uterus. No adnexal masses.    BOWEL: No bowel obstruction, abnormal wall thickening, or pneumatosis. Appendix is not seen, although no inflammatory changes in thepericecal region. Colonic diverticulosis without evidence of diverticulitis.  PERITONEUM: No ascites.  VESSELS: Atherosclerotic changes. Partially thrombosed infrarenal abdominal aortic aneurysm is increased in size from prior, now measuring up to 4.1 cm. Stable aneurysmal dilatation of the right common iliac artery measuring 1.9 cm. Celiac, SMA, bilateral renal arteries, and TIMOTHY are patent.  RETROPERITONEUM/LYMPH NODES: No lymphadenopathy.  ABDOMINAL WALL: Small fat-containing umbilical hernia.  BONES: Degenerative changes.    IMPRESSION:  No evidence of mesenteric ischemia or acute intra-abdominal pathology.    Partially thrombosed infrarenal abdominal aortic aneurysm is increased in size from prior, now measuring up to 4.1 cm.    < end of copied text >      < from: US Abdominal Aorta (06.08.21 @ 12:06) >  INTERPRETATION:  CLINICAL INFORMATION: Follow-up abdominal aortic aneurysm    COMPARISON: Abdominal aortic ultrasound dated 11/5/2020.    TECHNIQUE: Ultrasonography of the aorta was performed using grayscale, color and spectral Doppler imaging.    FINDINGS:  There are moderate atherosclerotic changes of the aorta.    The following maximal diameter measurements and velocities were obtained:    Proximal Aorta: 2.1 cm. Maximum velocity 54 cm/s.  Mid Aorta: Infrarenal aorta measures 4.2 cm. Maximum velocity 56 cm/s.  Distal Aorta: 1.9 cm.    Right iliac artery: 1.2 cm. Maximum velocity 65 cm/s.  Left iliac artery 1.1 cm. Maximum velocity 40 cm/s.    IMPRESSION:  4.2 cm infrarenal aortic aneurysm (aneurysm measured 4.1 cm on 11/5/2020).    < end of copied text >

## 2021-06-28 NOTE — CONSULT NOTE ADULT - ASSESSMENT
Assessment:  #Abdominal pain  #Hxo infrarenal AAA  - partially thrombosed, up to 4.1 cm  #R common iliac artery aneurysm 1.9 cm  #Hxo CAD    Plan: IN PROGRESS  - Consider GI evaluation  - Continue aspirin, Plavix and atorvastatin    JOLENE Roman MD  Vascular Cardiology Fellow  [phone]  All cardiology service information may be found 24/7 on [email or web address], password: [email or web address]   Assessment:  #Abdominal pain  #Hxo infrarenal AAA  - Similar in size compared to recent abdominal US imaging  - partially thrombosed, up to 4.1 cm  #R common iliac artery aneurysm 1.9 cm  #Hxo CAD    Plan:   - Consider GI evaluation  - Do no suspect AAA is causing her abdominal pain  - Continue aspirin, Plavix and atorvastatin    JOLENE Roman MD  Vascular Cardiology Fellow  [phone]  All cardiology service information may be found 24/7 on [email or web address], password: [email or web address]   Assessment:  #Abdominal pain  #Hxo infrarenal AAA  - Similar in size compared to recent abdominal US imaging  - partially thrombosed, up to 4.1 cm  #R common iliac artery aneurysm 1.9 cm  #Hxo CAD    Plan:   - Consider GI evaluation  - Do no suspect AAA is causing her abdominal pain  - Continue aspirin, Plavix and atorvastatin    JOLENE Roman MD  Vascular Cardiology Fellow  [phone]  All cardiology service information may be found 24/7 on [email or web address], password: [email or web address]

## 2021-06-28 NOTE — CONSULT NOTE ADULT - ASSESSMENT
Assessment  ?AI: 71y Female with no known history of Adrenal Insufficiency, has history of intermittent steroid use, currently on inhaled steroids, AM cortisol low, was hyponatremic, BP stable. Patient admitted with abdominal pain, N/V, reports appetite improving today, appears alert and comfortable.  N/V: GI workup in progress, on medications, stable, monitored.  CHF: On meds, stable.  HLD: On statin        Lionel Luciano MD  Cell: [phone]  Office: [phone]     Assessment  ?AI: 71y Female with no known history of Adrenal Insufficiency, has history of intermittent steroid use, currently on inhaled steroids, AM cortisol low, was hyponatremic,  BP stable. Patient admitted with abdominal pain, N/V, reports appetite improving today, appears alert and comfortable.  N/V: GI workup in progress, on medications, stable, monitored.  CHF: On meds, stable.  HLD: On statin        Lionel Luciano MD  Cell: [phone]  Office: [phone]

## 2021-06-28 NOTE — PROGRESS NOTE ADULT - ASSESSMENT
71F c hx CAD s/p stents, CHF (?EF 30%) s/p AICD, 4.1cm AAA, R iliac artery aneurysm, COPD, CVA c/b mild right hemiparesis, spontaneous PTX, ?RA on chronic opiates, recent hospitalization x3 @ [facility] for abd pain, pw abd pain.

## 2021-06-28 NOTE — CONSULT NOTE ADULT - SUBJECTIVE AND OBJECTIVE BOX
HPI:  71F c hx CAD s/p stents, CHF (?EF 30%) s/p AICD, 4.1cm AAA, R iliac artery aneurysm, COPD, CVA c/b mild right hemiparesis, spontaneous PTX, ?RA on chronic opiates, recent hospitalization x3 @ [facility] for abd pain, pw abd pain.    Pt states she was admitted to [facility] 3 times in the past 1 week for her abd pain. Pt states she was told she has an AAA and diverticulosis, and that her abd pain never completely went away. It is unaffected by BM or eating. Pt states carlson she takes pain medications, the pain improves for a short while but comes back. At baseline, ambulates with a walker. Pt reports constipation for the past 2 days. Pt reports an episode of vomiting yesterday. Pt comes into the hospital for persistent suprapubic and diffuse abd pain. Denies urinary problems.    VS: Tm 97.6, P 60, BP 97/67, R 20, 97% RA  In the ED received NS 1L, tylenol.    ISTOP Reference #: [id number]  Rx Written	Rx Dispensed	Drug	Quantity	Days Supply	Prescriber Name	Prescriber Merline #	Payment Method	Dispenser  05/12/2021	05/24/2021	alprazolam 1 mg tablet	90	30	Oleg Mast MD	[id number]	Insurance	[facility]  05/12/2021	05/24/2021	zolpidem tartrate 10 mg tablet	30	30	Oleg Mast MD	[id number]	Insurance	[facility]  05/12/2021	05/24/2021	oxycodone hcl 20 mg tablet	90	30	Oleg Mast MD	[id number]	Insurance	[facility] (27 Jun 2021 05:44)    Patient with history of intermittent steroid use (prednisone for RA and inhaled steroids) found to have low cortisol level. Endo was consulted for further workup and management.    PAST MEDICAL & SURGICAL HISTORY:  Anxiety    Hypercholesteremia    Osteoporosis    Pacemaker    CAD (coronary artery disease)    COPD (chronic obstructive pulmonary disease)    Congestive heart failure    Ventricular arrhythmia    CVA (cerebral vascular accident) X2, right sided weakness    Cataract Surgery    Hand Surgery        FAMILY HISTORY:  No pertinent family history in first degree relatives        Social History:    Outpatient Medications:    MEDICATIONS  (STANDING):  aspirin enteric coated 81 milliGRAM(s) Oral daily  atorvastatin 80 milliGRAM(s) Oral at bedtime  budesonide 160 MICROgram(s)/formoterol 4.5 MICROgram(s) Inhaler 2 Puff(s) Inhalation two times a day  carvedilol 3.125 milliGRAM(s) Oral every 12 hours  clopidogrel Tablet 75 milliGRAM(s) Oral daily  cosyntropin Injectable 0.25 milliGRAM(s) IV Push once  pantoprazole    Tablet 40 milliGRAM(s) Oral before breakfast  polyethylene glycol 3350 17 Gram(s) Oral two times a day    MEDICATIONS  (PRN):  ALBUTerol    90 MICROgram(s) HFA Inhaler 2 Puff(s) Inhalation every 6 hours PRN Shortness of Breath and/or Wheezing  ALPRAZolam 1 milliGRAM(s) Oral three times a day PRN anxiety  aluminum hydroxide/magnesium hydroxide/simethicone Suspension 30 milliLiter(s) Oral every 4 hours PRN Dyspepsia  metoclopramide 10 milliGRAM(s) Oral three times a day before meals PRN abdominal pain  oxyCODONE    IR 20 milliGRAM(s) Oral three times a day PRN Moderate Pain (4 - 6)  senna 2 Tablet(s) Oral at bedtime PRN Constipation  zolpidem 5 milliGRAM(s) Oral at bedtime PRN Insomnia  zolpidem 5 milliGRAM(s) Oral at bedtime PRN Insomnia      Allergies    No Known Allergies    Intolerances    lactose (Rash)    Review of Systems:  Constitutional: No fever, no chills  Eyes: No blurry vision  Neuro: No tremors  HEENT: No pain, no neck swelling  Cardiovascular: No chest pain, no palpitations  Respiratory: Has SOB, no cough  GI: No nausea, vomiting, abdominal pain  : No dysuria  Skin: no rash  MSK: Has leg swelling.  Psych: no depression  Endocrine: no polyuria, polydipsia    ALL OTHER SYSTEMS REVIEWED AND NEGATIVE    UNABLE TO OBTAIN    PHYSICAL EXAM:  VITALS: T(C): 36.5 (06-28-21 @ 09:12)  T(F): 97.7 (06-28-21 @ 09:12), Max: 98.4 (06-27-21 @ 16:33)  HR: 83 (06-28-21 @ 09:12) (61 - 83)  BP: 101/67 (06-28-21 @ 09:12) (101/67 - 154/84)  RR:  (18 - 18)  SpO2:  (96% - 98%)  Wt(kg): --  GENERAL: NAD, well-groomed, well-developed  EYES: No proptosis, no lid lag  HEENT:  Atraumatic, Normocephalic  THYROID: Normal size, no palpable nodules  RESPIRATORY: Clear to auscultation bilaterally; No rales, rhonchi, wheezing  CARDIOVASCULAR: Si S2, No murmurs;  GI: Soft, non distended, normal bowel sounds  SKIN: Dry, intact, No rashes or lesions  MUSCULOSKELETAL: Has BL lower extremity edema.  NEURO:  no tremor, sensation decreased in feet BL,                              12.2   6.57  )-----------( 289      ( 26 Jun 2021 19:09 )             36.7       06-28    135  |  99  |  10  ----------------------------<  78  4.4   |  22  |  1.07    EGFR if : 60  EGFR if non : 51<L>    Ca    9.5      06-28  Mg     1.7     06-26  Phos  2.6     06-26    TPro  7.6  /  Alb  3.8  /  TBili  0.5  /  DBili  x   /  AST  26  /  ALT  17  /  AlkPhos  55  06-26      Thyroid Function Tests:  06-27 @ 07:42 TSH 1.23 FreeT4 -- T3 -- Anti TPO -- Anti Thyroglobulin Ab -- TSI --              Radiology:              HPI:  71F c hx CAD s/p stents, CHF (?EF 30%) s/p AICD, 4.1cm AAA, R iliac artery aneurysm, COPD, CVA c/b mild right hemiparesis, spontaneous PTX, ?RA on chronic opiates, recent hospitalization x3 @ [facility] for abd pain, pw abd pain.    Pt states she was admitted to [facility] 3 times in the past 1 week for her abd pain. Pt states she was told she has an AAA and diverticulosis, and that her abd pain never completely went away. It is unaffected by BM or eating. Pt states carlson she takes pain medications, the pain improves for a short while but comes back. At baseline, ambulates with a walker. Pt reports constipation for the past 2 days. Pt reports an episode of vomiting yesterday. Pt comes into the hospital for persistent suprapubic and diffuse abd pain. Denies urinary problems.    VS: Tm 97.6, P 60, BP 97/67, R 20, 97% RA  In the ED received NS 1L, tylenol.    ISTOP Reference #: [id number]  Rx Written	Rx Dispensed	Drug	Quantity	Days Supply	Prescriber Name	Prescriber Merline #	Payment Method	Dispenser  05/12/2021	05/24/2021	alprazolam 1 mg tablet	90	30	Oleg Mast MD	[id number]	Insurance	[facility]  05/12/2021	05/24/2021	zolpidem tartrate 10 mg tablet	30	30	Oleg Mast MD	[id number]	Insurance	[facility]  05/12/2021	05/24/2021	oxycodone hcl 20 mg tablet	90	30	Oleg Mast MD	[id number]	Insurance	[facility] (27 Jun 2021 05:44)    Patient with history of intermittent steroid use (prednisone for RA and inhaled steroids) found to have low cortisol level. Endo was consulted for further workup and management.    PAST MEDICAL & SURGICAL HISTORY:  Anxiety    Hypercholesteremia    Osteoporosis    Pacemaker    CAD (coronary artery disease)    COPD (chronic obstructive pulmonary disease)    Congestive heart failure    Ventricular arrhythmia    CVA (cerebral vascular accident) X2, right sided weakness    Cataract Surgery    Hand Surgery        FAMILY HISTORY:  No pertinent family history in first degree relatives        Social History:    Outpatient Medications:    MEDICATIONS  (STANDING):  aspirin enteric coated 81 milliGRAM(s) Oral daily  atorvastatin 80 milliGRAM(s) Oral at bedtime  budesonide 160 MICROgram(s)/formoterol 4.5 MICROgram(s) Inhaler 2 Puff(s) Inhalation two times a day  carvedilol 3.125 milliGRAM(s) Oral every 12 hours  clopidogrel Tablet 75 milliGRAM(s) Oral daily  cosyntropin Injectable 0.25 milliGRAM(s) IV Push once  pantoprazole    Tablet 40 milliGRAM(s) Oral before breakfast  polyethylene glycol 3350 17 Gram(s) Oral two times a day    MEDICATIONS  (PRN):  ALBUTerol    90 MICROgram(s) HFA Inhaler 2 Puff(s) Inhalation every 6 hours PRN Shortness of Breath and/or Wheezing  ALPRAZolam 1 milliGRAM(s) Oral three times a day PRN anxiety  aluminum hydroxide/magnesium hydroxide/simethicone Suspension 30 milliLiter(s) Oral every 4 hours PRN Dyspepsia  metoclopramide 10 milliGRAM(s) Oral three times a day before meals PRN abdominal pain  oxyCODONE    IR 20 milliGRAM(s) Oral three times a day PRN Moderate Pain (4 - 6)  senna 2 Tablet(s) Oral at bedtime PRN Constipation  zolpidem 5 milliGRAM(s) Oral at bedtime PRN Insomnia  zolpidem 5 milliGRAM(s) Oral at bedtime PRN Insomnia      Allergies    No Known Allergies    Intolerances    lactose (Rash)    Review of Systems:  Constitutional: No fever, no chills  Eyes: No blurry vision  Neuro: No tremors  HEENT: No pain, no neck swelling  Cardiovascular: No chest pain, no palpitations  Respiratory: Has SOB, no cough  GI: No nausea, vomiting, abdominal pain  : No dysuria  Skin: no rash  MSK: Has leg swelling.  Psych: no depression  Endocrine: no polyuria, polydipsia    ALL OTHER SYSTEMS REVIEWED AND NEGATIVE    UNABLE TO OBTAIN    PHYSICAL EXAM:  VITALS: T(C): 36.5 (06-28-21 @ 09:12)  T(F): 97.7 (06-28-21 @ 09:12), Max: 98.4 (06-27-21 @ 16:33)  HR: 83 (06-28-21 @ 09:12) (61 - 83)  BP: 101/67 (06-28-21 @ 09:12) (101/67 - 154/84)  RR:  (18 - 18)  SpO2:  (96% - 98%)  Wt(kg): --  GENERAL: NAD, well-groomed, well-developed  EYES: No proptosis, no lid lag  HEENT:  Atraumatic, Normocephalic  THYROID: Normal size, no palpable nodules  RESPIRATORY: Clear to auscultation bilaterally; No rales, rhonchi, wheezing  CARDIOVASCULAR: Si S2, No murmurs;  GI: Soft, non distended, normal bowel sounds  SKIN: Dry, intact, No rashes or lesions  MUSCULOSKELETAL: Has BL lower extremity edema.  NEURO:  no tremor, sensation decreased in feet BL,                              12.2   6.57  )-----------( 289      ( 26 Jun 2021 19:09 )             36.7       06-28    135  |  99  |  10  ----------------------------<  78  4.4   |  22  |  1.07    EGFR if : 60  EGFR if non : 51<L>    Ca    9.5      06-28  Mg     1.7     06-26  Phos  2.6     06-26    TPro  7.6  /  Alb  3.8  /  TBili  0.5  /  DBili  x   /  AST  26  /  ALT  17  /  AlkPhos  55  06-26      Thyroid Function Tests:  06-27 @ 07:42 TSH 1.23 FreeT4 -- T3 -- Anti TPO -- Anti Thyroglobulin Ab -- TSI --              Radiology:

## 2021-06-28 NOTE — CONSULT NOTE ADULT - ATTENDING COMMENTS
Would ask GI to evaluate for her abdominal pains  AAA has been stable for many years, doubt this is causing her pains      Kirsty [phone]

## 2021-06-28 NOTE — CONSULT NOTE ADULT - PROBLEM SELECTOR RECOMMENDATION 9
Will do cosyntropin stim test to R/O AI.  Will continue monitoring and FU results.  Discussed plan with patient.

## 2021-06-29 ENCOUNTER — RESULT REVIEW (OUTPATIENT)
Age: 71
End: 2021-06-29

## 2021-06-29 LAB
ANION GAP SERPL CALC-SCNC: 13 MMOL/L — SIGNIFICANT CHANGE UP (ref 5–17)
BUN SERPL-MCNC: 14 MG/DL — SIGNIFICANT CHANGE UP (ref 7–23)
CALCIUM SERPL-MCNC: 9.9 MG/DL — SIGNIFICANT CHANGE UP (ref 8.4–10.5)
CHLORIDE SERPL-SCNC: 98 MMOL/L — SIGNIFICANT CHANGE UP (ref 96–108)
CO2 SERPL-SCNC: 23 MMOL/L — SIGNIFICANT CHANGE UP (ref 22–31)
CORTIS F PM SERPL-MCNC: 14.7 UG/DL — HIGH (ref 2.7–10.5)
CREAT SERPL-MCNC: 1.14 MG/DL — SIGNIFICANT CHANGE UP (ref 0.5–1.3)
GLUCOSE SERPL-MCNC: 94 MG/DL — SIGNIFICANT CHANGE UP (ref 70–99)
HCT VFR BLD CALC: 37.7 % — SIGNIFICANT CHANGE UP (ref 34.5–45)
HGB BLD-MCNC: 12.5 G/DL — SIGNIFICANT CHANGE UP (ref 11.5–15.5)
MAGNESIUM SERPL-MCNC: 2 MG/DL — SIGNIFICANT CHANGE UP (ref 1.6–2.6)
MCHC RBC-ENTMCNC: 33 PG — SIGNIFICANT CHANGE UP (ref 27–34)
MCHC RBC-ENTMCNC: 33.2 GM/DL — SIGNIFICANT CHANGE UP (ref 32–36)
MCV RBC AUTO: 99.5 FL — SIGNIFICANT CHANGE UP (ref 80–100)
NRBC # BLD: 0 /100 WBCS — SIGNIFICANT CHANGE UP (ref 0–0)
PHOSPHATE SERPL-MCNC: 3.5 MG/DL — SIGNIFICANT CHANGE UP (ref 2.5–4.5)
PLATELET # BLD AUTO: 324 K/UL — SIGNIFICANT CHANGE UP (ref 150–400)
POTASSIUM SERPL-MCNC: 4.2 MMOL/L — SIGNIFICANT CHANGE UP (ref 3.5–5.3)
POTASSIUM SERPL-SCNC: 4.2 MMOL/L — SIGNIFICANT CHANGE UP (ref 3.5–5.3)
RBC # BLD: 3.79 M/UL — LOW (ref 3.8–5.2)
RBC # FLD: 13.8 % — SIGNIFICANT CHANGE UP (ref 10.3–14.5)
SODIUM SERPL-SCNC: 134 MMOL/L — LOW (ref 135–145)
WBC # BLD: 6.16 K/UL — SIGNIFICANT CHANGE UP (ref 3.8–10.5)
WBC # FLD AUTO: 6.16 K/UL — SIGNIFICANT CHANGE UP (ref 3.8–10.5)

## 2021-06-29 PROCEDURE — 88342 IMHCHEM/IMCYTCHM 1ST ANTB: CPT | Mod: 26

## 2021-06-29 PROCEDURE — 88305 TISSUE EXAM BY PATHOLOGIST: CPT | Mod: 26

## 2021-06-29 PROCEDURE — 99232 SBSQ HOSP IP/OBS MODERATE 35: CPT

## 2021-06-29 PROCEDURE — 88341 IMHCHEM/IMCYTCHM EA ADD ANTB: CPT | Mod: 26

## 2021-06-29 RX ORDER — PANTOPRAZOLE SODIUM 20 MG/1
40 TABLET, DELAYED RELEASE ORAL
Refills: 0 | Status: DISCONTINUED | OUTPATIENT
Start: 2021-06-29 | End: 2021-06-30

## 2021-06-29 RX ORDER — HYDROCORTISONE 20 MG
10 TABLET ORAL DAILY
Refills: 0 | Status: DISCONTINUED | OUTPATIENT
Start: 2021-06-29 | End: 2021-06-30

## 2021-06-29 RX ORDER — SUCRALFATE 1 G
1 TABLET ORAL
Refills: 0 | Status: DISCONTINUED | OUTPATIENT
Start: 2021-06-29 | End: 2021-06-30

## 2021-06-29 RX ORDER — HYDROCORTISONE 20 MG
20 TABLET ORAL DAILY
Refills: 0 | Status: DISCONTINUED | OUTPATIENT
Start: 2021-06-29 | End: 2021-06-30

## 2021-06-29 RX ADMIN — ZOLPIDEM TARTRATE 5 MILLIGRAM(S): 10 TABLET ORAL at 21:15

## 2021-06-29 RX ADMIN — Medication 1 GRAM(S): at 21:15

## 2021-06-29 RX ADMIN — BUDESONIDE AND FORMOTEROL FUMARATE DIHYDRATE 2 PUFF(S): 160; 4.5 AEROSOL RESPIRATORY (INHALATION) at 17:59

## 2021-06-29 RX ADMIN — PANTOPRAZOLE SODIUM 40 MILLIGRAM(S): 20 TABLET, DELAYED RELEASE ORAL at 15:59

## 2021-06-29 RX ADMIN — Medication 1 MILLIGRAM(S): at 16:03

## 2021-06-29 RX ADMIN — CARVEDILOL PHOSPHATE 3.12 MILLIGRAM(S): 80 CAPSULE, EXTENDED RELEASE ORAL at 06:32

## 2021-06-29 RX ADMIN — CARVEDILOL PHOSPHATE 3.12 MILLIGRAM(S): 80 CAPSULE, EXTENDED RELEASE ORAL at 17:58

## 2021-06-29 RX ADMIN — CLOPIDOGREL BISULFATE 75 MILLIGRAM(S): 75 TABLET, FILM COATED ORAL at 17:58

## 2021-06-29 RX ADMIN — BUDESONIDE AND FORMOTEROL FUMARATE DIHYDRATE 2 PUFF(S): 160; 4.5 AEROSOL RESPIRATORY (INHALATION) at 06:32

## 2021-06-29 RX ADMIN — PANTOPRAZOLE SODIUM 40 MILLIGRAM(S): 20 TABLET, DELAYED RELEASE ORAL at 06:31

## 2021-06-29 RX ADMIN — Medication 1 GRAM(S): at 17:58

## 2021-06-29 RX ADMIN — POLYETHYLENE GLYCOL 3350 17 GRAM(S): 17 POWDER, FOR SOLUTION ORAL at 17:58

## 2021-06-29 RX ADMIN — Medication 10 MILLIGRAM(S): at 17:58

## 2021-06-29 RX ADMIN — Medication 650 MILLIGRAM(S): at 00:44

## 2021-06-29 RX ADMIN — Medication 81 MILLIGRAM(S): at 17:58

## 2021-06-29 RX ADMIN — ATORVASTATIN CALCIUM 80 MILLIGRAM(S): 80 TABLET, FILM COATED ORAL at 21:15

## 2021-06-29 NOTE — PROGRESS NOTE ADULT - ASSESSMENT
71F c hx CAD s/p stents, CHF (?EF 30%) s/p AICD, 4.1cm AAA, R iliac artery aneurysm, COPD, CVA c/b mild right hemiparesis, spontaneous PTX, ?RA on chronic opiates, recent hospitalization x3 @ [facility] for abd pain, pw abd pain.

## 2021-06-29 NOTE — PROGRESS NOTE ADULT - SUBJECTIVE AND OBJECTIVE BOX
Vascular Cardiology  Progress note    SERVICE LINE [phone]     EMAIL [email or web address]     CC:  abdominal pain    INTERVAL HISTORY:     In progress      Allergies  No Known Allergies    Intolerances  lactose (Rash)  	    MEDICATIONS:  aspirin enteric coated 81 milliGRAM(s) Oral daily  carvedilol 3.125 milliGRAM(s) Oral every 12 hours  clopidogrel Tablet 75 milliGRAM(s) Oral daily  ALBUTerol    90 MICROgram(s) HFA Inhaler 2 Puff(s) Inhalation every 6 hours PRN  budesonide 160 MICROgram(s)/formoterol 4.5 MICROgram(s) Inhaler 2 Puff(s) Inhalation two times a day  ALPRAZolam 1 milliGRAM(s) Oral three times a day PRN  oxyCODONE    IR 20 milliGRAM(s) Oral three times a day PRN  zolpidem 5 milliGRAM(s) Oral at bedtime PRN  zolpidem 5 milliGRAM(s) Oral at bedtime PRN  aluminum hydroxide/magnesium hydroxide/simethicone Suspension 30 milliLiter(s) Oral every 4 hours PRN  metoclopramide 10 milliGRAM(s) Oral three times a day before meals PRN  pantoprazole    Tablet 40 milliGRAM(s) Oral before breakfast  polyethylene glycol 3350 17 Gram(s) Oral two times a day  senna 2 Tablet(s) Oral at bedtime PRN  atorvastatin 80 milliGRAM(s) Oral at bedtime  hydrocortisone 20 milliGRAM(s) Oral daily  hydrocortisone 10 milliGRAM(s) Oral daily        PAST MEDICAL & SURGICAL HISTORY:  Anxiety    Hypercholesteremia    Osteoporosis    Pacemaker    CAD (coronary artery disease)    COPD (chronic obstructive pulmonary disease)    Congestive heart failure    Ventricular arrhythmia    CVA (cerebral vascular accident) X2, right sided weakness    Cataract Surgery    Hand Surgery        FAMILY HISTORY:  No pertinent family history in first degree relatives        SOCIAL HISTORY:  unchanged    REVIEW OF SYSTEMS:  CONSTITUTIONAL: No fever, weight loss, or fatigue  EYES: No eye pain, visual disturbances, or discharge  ENMT:  No difficulty hearing, tinnitus, vertigo; No sinus or throat pain  NECK: No pain or stiffness  RESPIRATORY: No cough, wheezing, chills or hemoptysis; No Shortness of Breath  CARDIOVASCULAR: No chest pain, palpitations, passing out, dizziness, or leg swelling  GASTROINTESTINAL: No abdominal or epigastric pain. No nausea, vomiting, or hematemesis; No diarrhea or constipation. No melena or hematochezia.  GENITOURINARY: No dysuria, frequency, hematuria, or incontinence  NEUROLOGICAL: No headaches, memory loss, loss of strength, numbness, or tremors  SKIN: No itching, burning, rashes, or lesions   LYMPH Nodes: No enlarged glands  ENDOCRINE: No heat or cold intolerance; No hair loss  MUSCULOSKELETAL: No joint pain or swelling; No muscle, back, or extremity pain  PSYCHIATRIC: No depression, anxiety, mood swings, or difficulty sleeping  HEME/LYMPH: No easy bruising, or bleeding gums  ALLERY AND IMMUNOLOGIC: No hives or eczema	    [ x] All others negative	  [ ] Unable to obtain    PHYSICAL EXAM:  T(C): 36.6 (06-29-21 @ 05:53), Max: 37 (06-28-21 @ 19:48)  HR: 69 (06-29-21 @ 05:53) (67 - 83)  BP: 126/72 (06-29-21 @ 05:53) (100/66 - 126/72)  RR: 18 (06-29-21 @ 05:53) (18 - 18)  SpO2: 96% (06-29-21 @ 05:53) (96% - 98%)  Wt(kg): --  I&O's Summary    28 Jun 2021 07:01  -  29 Jun 2021 07:00  --------------------------------------------------------  IN: 720 mL / OUT: 0 mL / NET: 720 mL        Appearance: Normal	  HEENT:   Normal oral mucosa, PERRL, EOMI	  Carotid:  Right: No bruit    Left:  No bruit  Lymphatic: No lymphadenopathy  Cardiovascular: Normal S1 S2, No JVD, No murmurs, No edema  Respiratory: Lungs clear to auscultation	  Psychiatry: A & O x 3, Mood & affect appropriate  Gastrointestinal:  Soft, Non-tender, + BS	  Skin: No rashes, No ecchymoses, No cyanosis	  Neurologic: Non-focal  Extremities: Normal range of motion, No clubbing, cyanosis.  Vascular:   Right Femoral:  Palpable   Left Femoral:  Palpable  Right Popliteal: Palpable   Left Popliteal:  palpable  Right DP:  Palpable             Left DP:  Palpable  Right PT:  Palpable              Left PT:  Palpable      LABS:	 	    CBC Full  -  ( 29 Jun 2021 07:11 )  WBC Count : 6.16 K/uL  Hemoglobin : 12.5 g/dL  Hematocrit : 37.7 %  Platelet Count - Automated : 324 K/uL  Mean Cell Volume : 99.5 fl  Mean Cell Hemoglobin : 33.0 pg  Mean Cell Hemoglobin Concentration : 33.2 gm/dL    06-29    134<L>  |  98  |  14  ----------------------------<  94  4.2   |  23  |  1.14  06-28    135  |  99  |  10  ----------------------------<  78  4.4   |  22  |  1.07    Ca    9.9      29 Jun 2021 07:11  Ca    9.5      28 Jun 2021 07:19  Phos  3.5     06-29  Mg     2.0     06-29                 Vascular Cardiology  Progress note    SERVICE LINE [phone]     EMAIL [email or web address]     CC:  abdominal pain    INTERVAL HISTORY:    Continues to have abdominal pain and constipation. No chest pain, SOB, leg swelling.       Allergies  No Known Allergies    Intolerances  lactose (Rash)  	    MEDICATIONS:  aspirin enteric coated 81 milliGRAM(s) Oral daily  carvedilol 3.125 milliGRAM(s) Oral every 12 hours  clopidogrel Tablet 75 milliGRAM(s) Oral daily  ALBUTerol    90 MICROgram(s) HFA Inhaler 2 Puff(s) Inhalation every 6 hours PRN  budesonide 160 MICROgram(s)/formoterol 4.5 MICROgram(s) Inhaler 2 Puff(s) Inhalation two times a day  ALPRAZolam 1 milliGRAM(s) Oral three times a day PRN  oxyCODONE    IR 20 milliGRAM(s) Oral three times a day PRN  zolpidem 5 milliGRAM(s) Oral at bedtime PRN  zolpidem 5 milliGRAM(s) Oral at bedtime PRN  aluminum hydroxide/magnesium hydroxide/simethicone Suspension 30 milliLiter(s) Oral every 4 hours PRN  metoclopramide 10 milliGRAM(s) Oral three times a day before meals PRN  pantoprazole    Tablet 40 milliGRAM(s) Oral before breakfast  polyethylene glycol 3350 17 Gram(s) Oral two times a day  senna 2 Tablet(s) Oral at bedtime PRN  atorvastatin 80 milliGRAM(s) Oral at bedtime  hydrocortisone 20 milliGRAM(s) Oral daily  hydrocortisone 10 milliGRAM(s) Oral daily        PAST MEDICAL & SURGICAL HISTORY:  Anxiety    Hypercholesteremia    Osteoporosis    Pacemaker    CAD (coronary artery disease)    COPD (chronic obstructive pulmonary disease)    Congestive heart failure    Ventricular arrhythmia    CVA (cerebral vascular accident) X2, right sided weakness    Cataract Surgery    Hand Surgery        FAMILY HISTORY:  No pertinent family history in first degree relatives        SOCIAL HISTORY:  unchanged    REVIEW OF SYSTEMS:  CONSTITUTIONAL: No fever, weight loss, or fatigue  EYES: No eye pain, visual disturbances, or discharge  ENMT:  No difficulty hearing, tinnitus, vertigo; No sinus or throat pain  NECK: No pain or stiffness  RESPIRATORY: No cough, wheezing, chills or hemoptysis; No Shortness of Breath  CARDIOVASCULAR: No chest pain, palpitations, passing out, dizziness, or leg swelling  GASTROINTESTINAL: +abd pain  GENITOURINARY: No dysuria, frequency, hematuria, or incontinence  NEUROLOGICAL: No headaches, memory loss, loss of strength, numbness, or tremors  SKIN: No itching, burning, rashes, or lesions   LYMPH Nodes: No enlarged glands  ENDOCRINE: No heat or cold intolerance; No hair loss  MUSCULOSKELETAL: No joint pain or swelling; No muscle, back, or extremity pain  PSYCHIATRIC: No depression, anxiety, mood swings, or difficulty sleeping  HEME/LYMPH: No easy bruising, or bleeding gums  ALLERY AND IMMUNOLOGIC: No hives or eczema	    [ x] All others negative	  [ ] Unable to obtain    PHYSICAL EXAM:  T(C): 36.6 (06-29-21 @ 05:53), Max: 37 (06-28-21 @ 19:48)  HR: 69 (06-29-21 @ 05:53) (67 - 83)  BP: 126/72 (06-29-21 @ 05:53) (100/66 - 126/72)  RR: 18 (06-29-21 @ 05:53) (18 - 18)  SpO2: 96% (06-29-21 @ 05:53) (96% - 98%)  Wt(kg): --  I&O's Summary    28 Jun 2021 07:01  -  29 Jun 2021 07:00  --------------------------------------------------------  IN: 720 mL / OUT: 0 mL / NET: 720 mL        Appearance: Normal, sitting in chair  HEENT:   Normal oral mucosa, PERRL, EOMI	  Lymphatic: No lymphadenopathy  Cardiovascular: Normal S1 S2, No JVD, No murmurs, No edema  Respiratory: Lungs clear to auscultation	  Psychiatry: A & O x 3, Mood & affect appropriate  Gastrointestinal:  Soft, Non-tender, + BS	no abd bruits  Skin: No rashes, No ecchymoses, No cyanosis	  Neurologic: Non-focal  Extremities: Normal range of motion, No clubbing, cyanosis.      LABS:	 	    CBC Full  -  ( 29 Jun 2021 07:11 )  WBC Count : 6.16 K/uL  Hemoglobin : 12.5 g/dL  Hematocrit : 37.7 %  Platelet Count - Automated : 324 K/uL  Mean Cell Volume : 99.5 fl  Mean Cell Hemoglobin : 33.0 pg  Mean Cell Hemoglobin Concentration : 33.2 gm/dL    06-29    134<L>  |  98  |  14  ----------------------------<  94  4.2   |  23  |  1.14  06-28    135  |  99  |  10  ----------------------------<  78  4.4   |  22  |  1.07    Ca    9.9      29 Jun 2021 07:11  Ca    9.5      28 Jun 2021 07:19  Phos  3.5     06-29  Mg     2.0     06-29

## 2021-06-29 NOTE — PROGRESS NOTE ADULT - ASSESSMENT
Assessment  ?AI: 71y Female with no known history of Adrenal Insufficiency, has history of intermittent steroid use, currently on inhaled steroids, AM cortisol low with hyponatremia, cosyntropin stim test completed and positive for AI, BP stable. Patient with abdominal pain, NPO overnight for egd today.  N/V: GI workup in progress, on medications, stable, monitored.  CHF: On meds, stable.  HLD: On statin        Lionel Luciano MD  Cell: [phone]  Office: [phone]

## 2021-06-29 NOTE — PROGRESS NOTE ADULT - SUBJECTIVE AND OBJECTIVE BOX
Name of Patient : SHAE HANEY  MRN: [id number]  DATE OF SERVICE: 21 @ 10:48    Subjective: Patient seen and examined. No new events except as noted.   Doing okay   EGD today     REVIEW OF SYSTEMS:    CONSTITUTIONAL: No weakness, fevers or chills  EYES/ENT: No visual changes;  No vertigo or throat pain   NECK: No pain or stiffness  RESPIRATORY: No cough, wheezing, hemoptysis; No shortness of breath  CARDIOVASCULAR: No chest pain or palpitations  GASTROINTESTINAL: No abdominal or epigastric pain.  GENITOURINARY: No dysuria, frequency or hematuria  NEUROLOGICAL: No numbness or weakness  SKIN: No itching, burning, rashes, or lesions   All other review of systems is negative unless indicated above.    MEDICATIONS:  MEDICATIONS  (STANDING):  aspirin enteric coated 81 milliGRAM(s) Oral daily  atorvastatin 80 milliGRAM(s) Oral at bedtime  budesonide 160 MICROgram(s)/formoterol 4.5 MICROgram(s) Inhaler 2 Puff(s) Inhalation two times a day  carvedilol 3.125 milliGRAM(s) Oral every 12 hours  clopidogrel Tablet 75 milliGRAM(s) Oral daily  hydrocortisone 20 milliGRAM(s) Oral daily  hydrocortisone 10 milliGRAM(s) Oral daily  pantoprazole    Tablet 40 milliGRAM(s) Oral before breakfast  polyethylene glycol 3350 17 Gram(s) Oral two times a day      PHYSICAL EXAM:  T(C): 36.6 (21 @ 05:53), Max: 37 (21 @ 19:48)  HR: 69 (21 @ 05:53) (67 - 69)  BP: 126/72 (21 @ 05:53) (100/66 - 126/72)  RR: 18 (21 @ 05:53) (18 - 18)  SpO2: 96% (21 @ 05:53) (96% - 98%)  Wt(kg): --  I&O's Summary    2021 07:01  -  2021 07:00  --------------------------------------------------------  IN: 720 mL / OUT: 0 mL / NET: 720 mL      Height (cm): 162.6 ( @ 09:28)  Weight (kg): 68.8 ( @ 09:28)  BMI (kg/m2): 26 ( @ 09:28)  BSA (m2): 1.74 ( @ 09:28)    Appearance: Normal	  HEENT:  PERRLA   Lymphatic: No lymphadenopathy   Cardiovascular: Normal S1 S2, no JVD  Respiratory: normal effort , clear  Gastrointestinal:  Soft, Non-tender  Skin: No rashes,  warm to touch  Psychiatry:  Mood & affect appropriate  Musculuskeletal: No edema      All labs, Imaging and EKGs personally reviewed     21 @ 07:01  -  21 @ 07:00  --------------------------------------------------------  IN: 720 mL / OUT: 0 mL / NET: 720 mL                          12.5   6.16  )-----------( 324      ( 2021 07:11 )             37.7               -    134<L>  |  98  |  14  ----------------------------<  94  4.2   |  23  |  1.14    Ca    9.9      2021 07:11  Phos  3.5       Mg     2.0                              Urinalysis Basic - ( 2021 11:08 )    Color: Colorless / Appearance: Clear / S.023 / pH: x  Gluc: x / Ketone: Negative  / Bili: Negative / Urobili: Negative   Blood: x / Protein: Negative / Nitrite: Negative   Leuk Esterase: Negative / RBC: x / WBC x   Sq Epi: x / Non Sq Epi: x / Bacteria: x

## 2021-06-29 NOTE — PROGRESS NOTE ADULT - ASSESSMENT
Assessment:  #Abdominal pain  #Hxo infrarenal AAA  - Similar in size compared to recent abdominal US imaging  - partially thrombosed, up to 4.1 cm  #R common iliac artery aneurysm 1.9 cm  #Hxo CAD    Plan:   - Appreciate GI evaluation  - Do no suspect AAA is causing her abdominal pain  - Continue aspirin, Plavix and atorvastatin    JOLENE Roman MD  Vascular Cardiology Fellow  [phone]  All cardiology service information may be found 24/7 on [email or web address], password: [email or web address]   Assessment:  #Abdominal pain  #Hxo infrarenal AAA  - Similar in size compared to recent abdominal US imaging  - partially thrombosed, up to 4.1 cm  #R common iliac artery aneurysm 1.9 cm  #Hxo CAD    Plan:   - Appreciate GI evaluation  - She has no active CP, significant valvular disease, arrhythmia or signs/symptoms of heart failure. No additional cardiac work-up at this time. She may proceed with endoscopy.   - Do no suspect AAA is causing her abdominal pain  - Continue aspirin, Plavix and atorvastatin    JOLENE Roman MD  Vascular Cardiology Fellow  [phone]  All cardiology service information may be found 24/7 on [email or web address], password: [email or web address]

## 2021-06-29 NOTE — PROGRESS NOTE ADULT - SUBJECTIVE AND OBJECTIVE BOX
Chief complaint  Patient is a 71y old  Female who presents with a chief complaint of abd pain (29 Jun 2021 10:47)   Review of systems  Patient in bed, looks comfortable.      Medications  MEDICATIONS  (STANDING):  aspirin enteric coated 81 milliGRAM(s) Oral daily  atorvastatin 80 milliGRAM(s) Oral at bedtime  budesonide 160 MICROgram(s)/formoterol 4.5 MICROgram(s) Inhaler 2 Puff(s) Inhalation two times a day  carvedilol 3.125 milliGRAM(s) Oral every 12 hours  clopidogrel Tablet 75 milliGRAM(s) Oral daily  hydrocortisone 20 milliGRAM(s) Oral daily  hydrocortisone 10 milliGRAM(s) Oral daily  pantoprazole    Tablet 40 milliGRAM(s) Oral before breakfast  polyethylene glycol 3350 17 Gram(s) Oral two times a day      Physical Exam  General: Patient comfortable in bed  Vital Signs Last 12 Hrs  T(F): 97.4 (06-29-21 @ 11:19), Max: 97.9 (06-29-21 @ 05:53)  HR: 70 (06-29-21 @ 11:19) (69 - 70)  BP: 110/76 (06-29-21 @ 11:19) (110/76 - 126/72)  BP(mean): --  RR: 17 (06-29-21 @ 11:19) (17 - 18)  SpO2: 98% (06-29-21 @ 11:19) (96% - 98%)    Diagnostics    Adrenocorticotropic Hormone, Serum: STAT  Addl Info: Must draw lab PRIOR to cosyntropin injection, thank you (06-28 @ 14:50)  Cortisol PM, Serum: Routine  Addl Info: Please draw cortisol lab 45 minutes after cosyntropin injection. Thank you! (06-28 @ 14:50)

## 2021-06-30 ENCOUNTER — TRANSCRIPTION ENCOUNTER (OUTPATIENT)
Age: 71
End: 2021-06-30

## 2021-06-30 ENCOUNTER — APPOINTMENT (OUTPATIENT)
Dept: PLASTIC SURGERY | Facility: CLINIC | Age: 71
End: 2021-06-30

## 2021-06-30 VITALS
TEMPERATURE: 98 F | HEART RATE: 72 BPM | OXYGEN SATURATION: 95 % | DIASTOLIC BLOOD PRESSURE: 62 MMHG | RESPIRATION RATE: 18 BRPM | SYSTOLIC BLOOD PRESSURE: 100 MMHG

## 2021-06-30 LAB
HCT VFR BLD CALC: 37.3 % — SIGNIFICANT CHANGE UP (ref 34.5–45)
HGB BLD-MCNC: 12.1 G/DL — SIGNIFICANT CHANGE UP (ref 11.5–15.5)
MCHC RBC-ENTMCNC: 32.4 GM/DL — SIGNIFICANT CHANGE UP (ref 32–36)
MCHC RBC-ENTMCNC: 32.6 PG — SIGNIFICANT CHANGE UP (ref 27–34)
MCV RBC AUTO: 100.5 FL — HIGH (ref 80–100)
NRBC # BLD: 0 /100 WBCS — SIGNIFICANT CHANGE UP (ref 0–0)
PLATELET # BLD AUTO: 340 K/UL — SIGNIFICANT CHANGE UP (ref 150–400)
RBC # BLD: 3.71 M/UL — LOW (ref 3.8–5.2)
RBC # FLD: 14.1 % — SIGNIFICANT CHANGE UP (ref 10.3–14.5)
WBC # BLD: 5.91 K/UL — SIGNIFICANT CHANGE UP (ref 3.8–10.5)
WBC # FLD AUTO: 5.91 K/UL — SIGNIFICANT CHANGE UP (ref 3.8–10.5)

## 2021-06-30 PROCEDURE — 84295 ASSAY OF SERUM SODIUM: CPT

## 2021-06-30 PROCEDURE — 84132 ASSAY OF SERUM POTASSIUM: CPT

## 2021-06-30 PROCEDURE — 82803 BLOOD GASES ANY COMBINATION: CPT

## 2021-06-30 PROCEDURE — 85018 HEMOGLOBIN: CPT

## 2021-06-30 PROCEDURE — 83735 ASSAY OF MAGNESIUM: CPT

## 2021-06-30 PROCEDURE — 84443 ASSAY THYROID STIM HORMONE: CPT

## 2021-06-30 PROCEDURE — 84484 ASSAY OF TROPONIN QUANT: CPT

## 2021-06-30 PROCEDURE — 96374 THER/PROPH/DIAG INJ IV PUSH: CPT | Mod: XU

## 2021-06-30 PROCEDURE — 82436 ASSAY OF URINE CHLORIDE: CPT

## 2021-06-30 PROCEDURE — 80053 COMPREHEN METABOLIC PANEL: CPT

## 2021-06-30 PROCEDURE — 86769 SARS-COV-2 COVID-19 ANTIBODY: CPT

## 2021-06-30 PROCEDURE — 94640 AIRWAY INHALATION TREATMENT: CPT

## 2021-06-30 PROCEDURE — 88305 TISSUE EXAM BY PATHOLOGIST: CPT

## 2021-06-30 PROCEDURE — 82947 ASSAY GLUCOSE BLOOD QUANT: CPT

## 2021-06-30 PROCEDURE — 80048 BASIC METABOLIC PNL TOTAL CA: CPT

## 2021-06-30 PROCEDURE — 84100 ASSAY OF PHOSPHORUS: CPT

## 2021-06-30 PROCEDURE — 81003 URINALYSIS AUTO W/O SCOPE: CPT

## 2021-06-30 PROCEDURE — 83935 ASSAY OF URINE OSMOLALITY: CPT

## 2021-06-30 PROCEDURE — 83605 ASSAY OF LACTIC ACID: CPT

## 2021-06-30 PROCEDURE — 85014 HEMATOCRIT: CPT

## 2021-06-30 PROCEDURE — 82024 ASSAY OF ACTH: CPT

## 2021-06-30 PROCEDURE — 87635 SARS-COV-2 COVID-19 AMP PRB: CPT

## 2021-06-30 PROCEDURE — 82533 TOTAL CORTISOL: CPT

## 2021-06-30 PROCEDURE — 88341 IMHCHEM/IMCYTCHM EA ADD ANTB: CPT

## 2021-06-30 PROCEDURE — 85027 COMPLETE CBC AUTOMATED: CPT

## 2021-06-30 PROCEDURE — 99232 SBSQ HOSP IP/OBS MODERATE 35: CPT

## 2021-06-30 PROCEDURE — 86803 HEPATITIS C AB TEST: CPT

## 2021-06-30 PROCEDURE — 99285 EMERGENCY DEPT VISIT HI MDM: CPT | Mod: 25

## 2021-06-30 PROCEDURE — 97161 PT EVAL LOW COMPLEX 20 MIN: CPT

## 2021-06-30 PROCEDURE — 82330 ASSAY OF CALCIUM: CPT

## 2021-06-30 PROCEDURE — 74174 CTA ABD&PLVS W/CONTRAST: CPT

## 2021-06-30 PROCEDURE — 85025 COMPLETE CBC W/AUTO DIFF WBC: CPT

## 2021-06-30 PROCEDURE — 82435 ASSAY OF BLOOD CHLORIDE: CPT

## 2021-06-30 PROCEDURE — 83930 ASSAY OF BLOOD OSMOLALITY: CPT

## 2021-06-30 PROCEDURE — 84300 ASSAY OF URINE SODIUM: CPT

## 2021-06-30 PROCEDURE — 84105 ASSAY OF URINE PHOSPHORUS: CPT

## 2021-06-30 PROCEDURE — 83880 ASSAY OF NATRIURETIC PEPTIDE: CPT

## 2021-06-30 RX ORDER — SPIRONOLACTONE 25 MG/1
0.5 TABLET, FILM COATED ORAL
Qty: 0 | Refills: 0 | DISCHARGE

## 2021-06-30 RX ORDER — SUCRALFATE 1 G
10 TABLET ORAL
Qty: 0 | Refills: 0 | DISCHARGE
Start: 2021-06-30 | End: 2021-07-29

## 2021-06-30 RX ORDER — PANTOPRAZOLE SODIUM 20 MG/1
1 TABLET, DELAYED RELEASE ORAL
Qty: 0 | Refills: 0 | DISCHARGE
Start: 2021-06-30

## 2021-06-30 RX ORDER — POLYETHYLENE GLYCOL 3350 17 G/17G
17 POWDER, FOR SOLUTION ORAL
Qty: 0 | Refills: 0 | DISCHARGE
Start: 2021-06-30

## 2021-06-30 RX ORDER — HYDROCORTISONE 20 MG
1 TABLET ORAL
Qty: 0 | Refills: 0 | DISCHARGE
Start: 2021-06-30

## 2021-06-30 RX ORDER — SUCRALFATE 1 G
10 TABLET ORAL
Qty: 0 | Refills: 0 | DISCHARGE
Start: 2021-06-30

## 2021-06-30 RX ORDER — LINACLOTIDE 145 UG/1
1 CAPSULE, GELATIN COATED ORAL
Qty: 0 | Refills: 0 | DISCHARGE

## 2021-06-30 RX ORDER — PANTOPRAZOLE SODIUM 20 MG/1
1 TABLET, DELAYED RELEASE ORAL
Qty: 60 | Refills: 0
Start: 2021-06-30 | End: 2021-07-29

## 2021-06-30 RX ORDER — SUCRALFATE 1 G
10 TABLET ORAL
Qty: 1200 | Refills: 0
Start: 2021-06-30 | End: 2021-07-29

## 2021-06-30 RX ORDER — POLYETHYLENE GLYCOL 3350 17 G/17G
17 POWDER, FOR SOLUTION ORAL
Qty: 1020 | Refills: 0
Start: 2021-06-30 | End: 2021-07-29

## 2021-06-30 RX ORDER — SENNA PLUS 8.6 MG/1
2 TABLET ORAL
Qty: 0 | Refills: 0 | DISCHARGE
Start: 2021-06-30

## 2021-06-30 RX ORDER — HYDROCORTISONE 20 MG
1 TABLET ORAL
Qty: 30 | Refills: 0
Start: 2021-06-30 | End: 2021-07-29

## 2021-06-30 RX ORDER — METOCLOPRAMIDE HCL 10 MG
1 TABLET ORAL
Qty: 21 | Refills: 0
Start: 2021-06-30 | End: 2021-07-06

## 2021-06-30 RX ORDER — LOSARTAN POTASSIUM 100 MG/1
1 TABLET, FILM COATED ORAL
Qty: 0 | Refills: 0 | DISCHARGE

## 2021-06-30 RX ORDER — METOCLOPRAMIDE HCL 10 MG
1 TABLET ORAL
Qty: 0 | Refills: 0 | DISCHARGE
Start: 2021-06-30

## 2021-06-30 RX ADMIN — BUDESONIDE AND FORMOTEROL FUMARATE DIHYDRATE 2 PUFF(S): 160; 4.5 AEROSOL RESPIRATORY (INHALATION) at 06:29

## 2021-06-30 RX ADMIN — PANTOPRAZOLE SODIUM 40 MILLIGRAM(S): 20 TABLET, DELAYED RELEASE ORAL at 06:29

## 2021-06-30 RX ADMIN — Medication 1 GRAM(S): at 12:48

## 2021-06-30 RX ADMIN — Medication 20 MILLIGRAM(S): at 06:29

## 2021-06-30 RX ADMIN — CLOPIDOGREL BISULFATE 75 MILLIGRAM(S): 75 TABLET, FILM COATED ORAL at 12:48

## 2021-06-30 RX ADMIN — Medication 81 MILLIGRAM(S): at 12:48

## 2021-06-30 RX ADMIN — Medication 1 GRAM(S): at 06:30

## 2021-06-30 RX ADMIN — Medication 10 MILLIGRAM(S): at 16:17

## 2021-06-30 RX ADMIN — CARVEDILOL PHOSPHATE 3.12 MILLIGRAM(S): 80 CAPSULE, EXTENDED RELEASE ORAL at 06:30

## 2021-06-30 RX ADMIN — POLYETHYLENE GLYCOL 3350 17 GRAM(S): 17 POWDER, FOR SOLUTION ORAL at 06:30

## 2021-06-30 NOTE — PROGRESS NOTE ADULT - PROBLEM SELECTOR PROBLEM 2
Chronic systolic congestive heart failure
Abdominal aneurysm
Abdominal aneurysm
Chronic systolic congestive heart failure

## 2021-06-30 NOTE — PROGRESS NOTE ADULT - PROBLEM SELECTOR PLAN 3
- cont asa, plavix, crestor
- Continue home medications ASA, plavix, crestor
Suggest to continue medications, monitoring, FU primary team recommendations.
Suggest to continue medications, monitoring, FU primary team recommendations.
- cont asa, plavix, Crestor
- cont asa, plavix, Crestor

## 2021-06-30 NOTE — PROGRESS NOTE ADULT - PROBLEM SELECTOR PROBLEM 3
Congestive heart failure
Coronary artery disease involving native coronary artery of native heart, unspecified whether angina present
Congestive heart failure

## 2021-06-30 NOTE — PROGRESS NOTE ADULT - ASSESSMENT
Assessment:  #Abdominal pain  - 6/29 endoscopy with diffuse severe inflammation in gastric body  #Hxo infrarenal AAA  - Similar in size compared to recent abdominal US imaging  - partially thrombosed, up to 4.1 cm  #R common iliac artery aneurysm 1.9 cm  #Hxo CAD    Plan:   - Appreciate GI evaluation  - Do no suspect AAA is causing her abdominal pain  - Continue aspirin, Plavix and atorvastatin    JOLENE Roman MD  Vascular Cardiology Fellow  [phone]  All cardiology service information may be found 24/7 on [email or web address], password: [email or web address]   Assessment:  #Abdominal pain due to gastritis   - 6/29 endoscopy with diffuse severe inflammation in gastric body  #Hxo infrarenal AAA  - Similar in size compared to recent abdominal US imaging  - partially thrombosed, up to 4.1 cm  #R common iliac artery aneurysm 1.9 cm  #Hxo CAD    Plan:   - Appreciate GI evaluation  - Do no suspect AAA is causing her abdominal pain  - Continue aspirin, Plavix and atorvastatin    JOLENE Roman MD  Vascular Cardiology Fellow  [phone]  All cardiology service information may be found 24/7 on [email or web address], password: [email or web address]

## 2021-06-30 NOTE — DISCHARGE NOTE PROVIDER - NSDCMRMEDTOKEN_GEN_ALL_CORE_FT
ALPRAZolam 1 mg oral tablet: 1 tab(s) orally 3 times a day, As Needed  aspirin 81 mg oral tablet: 1 tab(s) orally once a day  Coreg 3.125 mg oral tablet: 1 tab(s) orally 2 times a day  Crestor 20 mg oral tablet: 1 tab(s) orally once a day (at bedtime)  Dulcolax Laxative 10 mg rectal suppository: 1 suppository(ies) rectal once a day, As Needed for constipation  hydrocortisone 10 mg oral tablet: 1 tab(s) orally once a day at 4pm  hydrocortisone 20 mg oral tablet: 1 tab(s) orally once a day at 7am  Incruse Ellipta 62.5 mcg/inh inhalation powder: 1 puff(s) inhaled every 24 hours  metoclopramide 10 mg oral tablet: 1 tab(s) orally 3 times a day (before meals), As needed, abdominal pain  oxyCODONE 20 mg oral tablet: 1 tab(s) orally 3 times a day, As Needed  pantoprazole 40 mg oral delayed release tablet: 1 tab(s) orally 2 times a day  Physical Therapy: 1. Evaluate  2. Treat  Plavix 75 mg oral tablet: 1 tab(s) orally once a day  polyethylene glycol 3350 oral powder for reconstitution: 17 gram(s) orally 2 times a day  ProAir HFA 90 mcg/inh inhalation aerosol: 2 puff(s) inhaled 4 times a day, As Needed  senna oral tablet: 2 tab(s) orally once a day (at bedtime), As needed, Constipation  sucralfate 1 g/10 mL oral suspension: 10 milliliter(s) orally 4 times a day  zolpidem 10 mg oral tablet: 1 tab(s) orally once a day (at bedtime)

## 2021-06-30 NOTE — PROGRESS NOTE ADULT - PROBLEM SELECTOR PLAN 1
- unclear etiology. CTA showing unchanged chronic large vessel aneurysms, unlikely to be cause of pain  - consider ?GI consult for further workup with poss EGD, gastric emptying study  - cont protonix, stool softeners, reglan, maalox  - hold off abx  - GI eval called for further eval
If patient becomes unstable or hypotensive, suggest stress-dose IV steroids.  Will continue Hydrocortisone 20mg 7 AM, 10mg 4 PM.  Patient with new diagnosis of AI, must continue maintenance-dose steroids as outpatient, KIERRA wheeler.  Discussed plan with patient. All questions were answered.
- Unclear etiology.   - CTA showing unchanged chronic large vessel aneurysms, unlikely to be cause of pain. Diverticulosis without inflammation  - This morning patient continues to complain of suprapubic pain. She is tolerating her meals.   - Will consult GI for further workup with possible EGD/colonoscopy/gastric emptying study  - Continue protonix, stool softeners, reglan, maalox  - Hold off abx      #Hyponatremia, with poor po intake and N/V at home  Improving after receiving IVF in the ED  Monitor BMP  Recheck am cortisol
- unclear etiology. CTA showing unchanged chronic large vessel aneurysms, unlikely to be cause of pain  - consider ?GI consult for further workup with poss EGD, gastric emptying study  - cont protonix, stool softeners, reglan, maalox  - hold off abx  - GI eval APPREICATED, scoped  - D/C Planing
Will start patient on maintenance-dose po steroids: Hydrocortisone 20mg AM, 10mg PM.  If patient becomes unstable or hypotensive, suggest stress-dose IV steroids.  Will continue monitoring and FU.  Discussed plan with patient.
- unclear etiology. CTA showing unchanged chronic large vessel aneurysms, unlikely to be cause of pain  - consider ?GI consult for further workup with poss EGD, gastric emptying study  - cont protonix, stool softeners, reglan, maalox  - hold off abx  - GI eval APPREICATED, egd TODAY

## 2021-06-30 NOTE — PROGRESS NOTE ADULT - PROBLEM SELECTOR PLAN 6
- 4.1cm AAA has been stable since 2019  - 1.9cm iliac artery aneurysm also stable  - f/u surgery and serial imaging as outpt
- 4.1cm AAA has been stable since 2019  - 1.9cm iliac artery aneurysm also stable  - F/U vascular surgery and serial imaging as outpt. Patient follows Dr. Lofton outpatient
- 4.1cm AAA has been stable since 2019  - 1.9cm iliac artery aneurysm also stable  - f/u surgery and serial imaging as outpt
- 4.1cm AAA has been stable since 2019  - 1.9cm iliac artery aneurysm also stable  - f/u surgery and serial imaging as outpt

## 2021-06-30 NOTE — DISCHARGE NOTE PROVIDER - CARE PROVIDER_API CALL
JEFF TALAMANTES  [id number]  [address] [place] [address]  Phone: [phone]  Fax: [phone]  Follow Up Time:     Lionel Luciano)  EndocrinologyMetabDiabetes; Internal Medicine  [address] [id number]  Phone: [phone]  Fax: [phone]  Follow Up Time:

## 2021-06-30 NOTE — PROGRESS NOTE ADULT - PROVIDER SPECIALTY LIST ADULT
Vascular Cardiology
Vascular Cardiology
Internal Medicine
Internal Medicine
Endocrinology
Endocrinology
Hospitalist
Internal Medicine

## 2021-06-30 NOTE — PROGRESS NOTE ADULT - PROBLEM SELECTOR PROBLEM 1
R/O Adrenal insufficiency
Lower abdominal pain
R/O Adrenal insufficiency
Lower abdominal pain

## 2021-06-30 NOTE — PROGRESS NOTE ADULT - PROBLEM SELECTOR PLAN 5
- cont asa, plavix, statin  - PT eval
- cont asa, plavix, statin  - PT eval
- Continue ASA, plavix, statin  - PT eval
- cont asa, plavix, statin  - PT eval

## 2021-06-30 NOTE — PROGRESS NOTE ADULT - ASSESSMENT
Assessment  ?AI: 71y Female with no known history of Adrenal Insufficiency, has history of intermittent steroid use, currently on inhaled steroids, AM cortisol low with hyponatremia, cosyntropin stim test completed and positive for AI, started on maintenance-dose steroids. Patient is s/p egd, reports feeling somewhat better, appears alert and comfortable, BP stable, planning DC home today.  N/V: GI workup in progress, on medications, stable, monitored.  CHF: On meds, stable.  HLD: On statin        Lionel Luciano MD  Cell: [phone]  Office: [phone]     Assessment  ?AI: 71y Female with no known history of Adrenal Insufficiency, has history of intermittent steroid use, currently on inhaled steroids, AM cortisol low with hyponatremia, cosyntropin stim test  completed and positive for AI, started on maintenance-dose steroids. Patient is s/p egd, reports feeling somewhat better, appears alert and comfortable, BP stable, planning DC home today.  N/V: GI workup in progress, on medications, stable, monitored.  CHF: On meds, stable.  HLD: On statin        Lionel Luciano MD  Cell: [phone]  Office: [phone]

## 2021-06-30 NOTE — GOALS OF CARE CONVERSATION - ADVANCED CARE PLANNING - CONVERSATION DETAILS
Goals of care discussed with the patient. Conversation starter guide given to her to discuss with her daughter. Patient being discharged home today.

## 2021-06-30 NOTE — PROGRESS NOTE ADULT - ASSESSMENT
71F c hx CAD s/p stents, CHF (?EF 30%) s/p AICD, 4.1cm AAA, R iliac artery aneurysm, COPD, CVA c/b mild right hemiparesis, spontaneous PTX, ?RA on chronic opiates, recent hospitalization x3 @ [facility] for abd pain, pw abd pain.

## 2021-06-30 NOTE — PROGRESS NOTE ADULT - SUBJECTIVE AND OBJECTIVE BOX
Name of Patient : SHAE HANEY  MRN: [id number]  DATE OF SERVICE: 06-30-21     Subjective: Patient seen and examined. No new events except as noted.   Doing okay       REVIEW OF SYSTEMS:    CONSTITUTIONAL: No weakness, fevers or chills  EYES/ENT: No visual changes;  No vertigo or throat pain   NECK: No pain or stiffness  RESPIRATORY: No cough, wheezing, hemoptysis; No shortness of breath  CARDIOVASCULAR: No chest pain or palpitations  GASTROINTESTINAL: No abdominal or epigastric pain. No nausea, vomiting, or hematemesis; No diarrhea or constipation. No melena or hematochezia.  GENITOURINARY: No dysuria, frequency or hematuria  NEUROLOGICAL: No numbness or weakness  SKIN: No itching, burning, rashes, or lesions   All other review of systems is negative unless indicated above.    MEDICATIONS:  MEDICATIONS  (STANDING):  aspirin enteric coated 81 milliGRAM(s) Oral daily  atorvastatin 80 milliGRAM(s) Oral at bedtime  budesonide 160 MICROgram(s)/formoterol 4.5 MICROgram(s) Inhaler 2 Puff(s) Inhalation two times a day  carvedilol 3.125 milliGRAM(s) Oral every 12 hours  clopidogrel Tablet 75 milliGRAM(s) Oral daily  hydrocortisone 20 milliGRAM(s) Oral daily  hydrocortisone 10 milliGRAM(s) Oral daily  pantoprazole    Tablet 40 milliGRAM(s) Oral two times a day  polyethylene glycol 3350 17 Gram(s) Oral two times a day  sucralfate suspension 1 Gram(s) Oral four times a day      PHYSICAL EXAM:  T(C): 36.5 (06-30-21 @ 12:20), Max: 36.9 (06-29-21 @ 20:00)  HR: 72 (06-30-21 @ 12:20) (63 - 83)  BP: 100/62 (06-30-21 @ 12:20) (100/62 - 122/77)  RR: 18 (06-30-21 @ 12:20) (18 - 20)  SpO2: 95% (06-30-21 @ 12:20) (95% - 97%)  Wt(kg): --  I&O's Summary    29 Jun 2021 07:01  -  30 Jun 2021 07:00  --------------------------------------------------------  IN: 480 mL / OUT: 0 mL / NET: 480 mL    30 Jun 2021 07:01  -  30 Jun 2021 15:22  --------------------------------------------------------  IN: 480 mL / OUT: 0 mL / NET: 480 mL          Appearance: Normal	  HEENT:  PERRLA   Lymphatic: No lymphadenopathy   Cardiovascular: Normal S1 S2, no JVD  Respiratory: normal effort , clear  Gastrointestinal:  Soft, Non-tender  Skin: No rashes,  warm to touch  Psychiatry:  Mood & affect appropriate  Musculuskeletal: No edema      All labs, Imaging and EKGs personally reviewed       06-29-21 @ 07:01  -  06-30-21 @ 07:00  --------------------------------------------------------  IN: 480 mL / OUT: 0 mL / NET: 480 mL    06-30-21 @ 07:01  -  06-30-21 @ 15:22  --------------------------------------------------------  IN: 480 mL / OUT: 0 mL / NET: 480 mL                            12.1   5.91  )-----------( 340      ( 30 Jun 2021 09:44 )             37.3               06-29    134<L>  |  98  |  14  ----------------------------<  94  4.2   |  23  |  1.14    Ca    9.9      29 Jun 2021 07:11  Phos  3.5     06-29  Mg     2.0     06-29       < from: Upper Endoscopy (06.29.21 @ 10:54) >  Findings:       Theesophageal anastomosis was normal.       Diffuse severe inflammation characterized by erosions, friability and linear erosions was        found in the gastric body. Biopsies were taken with a cold forceps for histology. Estimated        blood loss: none.       The examined duodenum was normal.                                                                                                        Impression:          - Normal esophageal anastomosis.                       - Gastritis. Biopsied.                   - Normal examined duodenum.  Recommendation:      - Return patient to [facility] for [facility] care.                       - Resume regular diet.                       - Await pathology results.                       - Use Protonix (pantoprazole) 40 mg PO BID.                       - Use sucralfate suspension 1 gram PO QID.                       - The findings and recommendations were discussed with the patient.

## 2021-06-30 NOTE — DISCHARGE NOTE NURSING/CASE MANAGEMENT/SOCIAL WORK - PATIENT PORTAL LINK FT
You can access the FollowMyHealth Patient Portal offered by [facility] by registering at the following website: [email or web address]. By joining [employer]’s FollowMyHealth portal, you will also be able to view your health information using other applications (apps) compatible with our system.

## 2021-06-30 NOTE — DISCHARGE NOTE PROVIDER - HOSPITAL COURSE
71F c hx CAD s/p stents, CHF (?EF 30%) s/p AICD, 4.1cm AAA, R iliac artery aneurysm, COPD, CVA c/b mild right hemiparesis, spontaneous PTX, ?RA on chronic opiates, recent hospitalization x3 @ [facility] for abd pain, pw abd pain.    Pt states she was admitted to [facility] 3 times in the past 1 week for her abd pain. Pt states she was told she has an AAA and diverticulosis, and that her abd pain never completely went away. It is unaffected by BM or eating. Pt states carlson she takes pain medications, the pain improves for a short while but comes back. At baseline, ambulates with a walker. Pt reports constipation for the past 2 days. Pt reports an episode of vomiting yesterday. Pt comes into the hospital for persistent suprapubic and diffuse abd pain. Denies urinary problems.

## 2021-06-30 NOTE — PROGRESS NOTE ADULT - PROBLEM SELECTOR PLAN 2
- holding aldactone  - euvolemic  - cont coreg  - AICD interrogation    D/C off BP meds and resume if needed after PCP visit
- Holding aldactone  - Euvolemic on exam  - Continue Coreg  - Will call EP for AICD interrogation - F/U
Continue management, monitoring, FU Primary team/Vascular/GI recommendations.
- holding aldactone  - euvolemic  - cont coreg  - AICD interrogation
Continue management, monitoring, FU Primary team/Vascular/GI recommendations.
- holding aldactone  - euvolemic  - cont coreg  - AICD interrogation

## 2021-06-30 NOTE — PROGRESS NOTE ADULT - PROBLEM SELECTOR PLAN 4
Will continue statin, primary team FU.
Will continue statin, primary team FU.
- pt has no rheumatologist, sees only pain specialist  - cont home pain regiment, oxycontin 20 TID
- pt has no rheumatologist, sees only pain specialist  - cont home pain regiment, oxycontin 20 TID
- Patient has no rheumatologist, sees only pain specialist outpatient  - Continue home pain regimen Oxycontin 20 mg po TID
- pt has no rheumatologist, sees only pain specialist  - cont home pain regiment, oxycontin 20 TID
[As Noted in HPI] : as noted in HPI
[Negative] : Heme/Lymph

## 2021-06-30 NOTE — PROGRESS NOTE ADULT - SUBJECTIVE AND OBJECTIVE BOX
Vascular Cardiology  Progress note    SERVICE LINE [phone]     EMAIL [email or web address]     CC:  abdominal pain    INTERVAL HISTORY:    S/p endoscopy yesterday.     Allergies    No Known Allergies    Intolerances    lactose (Rash)  	    MEDICATIONS:  aspirin enteric coated 81 milliGRAM(s) Oral daily  carvedilol 3.125 milliGRAM(s) Oral every 12 hours  clopidogrel Tablet 75 milliGRAM(s) Oral daily  ALBUTerol    90 MICROgram(s) HFA Inhaler 2 Puff(s) Inhalation every 6 hours PRN  budesonide 160 MICROgram(s)/formoterol 4.5 MICROgram(s) Inhaler 2 Puff(s) Inhalation two times a day  ALPRAZolam 1 milliGRAM(s) Oral three times a day PRN  oxyCODONE    IR 20 milliGRAM(s) Oral three times a day PRN  zolpidem 5 milliGRAM(s) Oral at bedtime PRN  zolpidem 5 milliGRAM(s) Oral at bedtime PRN  aluminum hydroxide/magnesium hydroxide/simethicone Suspension 30 milliLiter(s) Oral every 4 hours PRN  metoclopramide 10 milliGRAM(s) Oral three times a day before meals PRN  pantoprazole    Tablet 40 milliGRAM(s) Oral two times a day  polyethylene glycol 3350 17 Gram(s) Oral two times a day  senna 2 Tablet(s) Oral at bedtime PRN  sucralfate suspension 1 Gram(s) Oral four times a day  atorvastatin 80 milliGRAM(s) Oral at bedtime  hydrocortisone 20 milliGRAM(s) Oral daily  hydrocortisone 10 milliGRAM(s) Oral daily        PAST MEDICAL & SURGICAL HISTORY:  Anxiety    Hypercholesteremia    Osteoporosis    Pacemaker    CAD (coronary artery disease)    COPD (chronic obstructive pulmonary disease)    Congestive heart failure    Ventricular arrhythmia    CVA (cerebral vascular accident) X2, right sided weakness    Cataract Surgery    Hand Surgery        FAMILY HISTORY:  No pertinent family history in first degree relatives        SOCIAL HISTORY:  unchanged    REVIEW OF SYSTEMS:  CONSTITUTIONAL: No fever, weight loss, or fatigue  EYES: No eye pain, visual disturbances, or discharge  ENMT:  No difficulty hearing, tinnitus, vertigo; No sinus or throat pain  NECK: No pain or stiffness  RESPIRATORY: No cough, wheezing, chills or hemoptysis; No Shortness of Breath  CARDIOVASCULAR: No chest pain, palpitations, passing out, dizziness, or leg swelling  GASTROINTESTINAL: No abdominal or epigastric pain. No nausea, vomiting, or hematemesis; No diarrhea or constipation. No melena or hematochezia.  GENITOURINARY: No dysuria, frequency, hematuria, or incontinence  NEUROLOGICAL: No headaches, memory loss, loss of strength, numbness, or tremors  SKIN: No itching, burning, rashes, or lesions   LYMPH Nodes: No enlarged glands  ENDOCRINE: No heat or cold intolerance; No hair loss  MUSCULOSKELETAL: No joint pain or swelling; No muscle, back, or extremity pain  PSYCHIATRIC: No depression, anxiety, mood swings, or difficulty sleeping  HEME/LYMPH: No easy bruising, or bleeding gums  ALLERY AND IMMUNOLOGIC: No hives or eczema	    [ x] All others negative	  [ ] Unable to obtain    PHYSICAL EXAM:  T(C): 36.6 (06-30-21 @ 04:43), Max: 36.9 (06-29-21 @ 20:00)  HR: 63 (06-30-21 @ 04:43) (63 - 83)  BP: 112/75 (06-30-21 @ 04:43) (105/60 - 122/77)  RR: 18 (06-30-21 @ 04:43) (14 - 20)  SpO2: 97% (06-30-21 @ 04:43) (95% - 98%)  Wt(kg): --  I&O's Summary    29 Jun 2021 07:01  -  30 Jun 2021 07:00  --------------------------------------------------------  IN: 480 mL / OUT: 0 mL / NET: 480 mL        Appearance: Normal	  HEENT:   Normal oral mucosa, PERRL, EOMI	  Carotid:  Right: No bruit    Left:  No bruit  Lymphatic: No lymphadenopathy  Cardiovascular: Normal S1 S2, No JVD, No murmurs, No edema  Respiratory: Lungs clear to auscultation	  Psychiatry: A & O x 3, Mood & affect appropriate  Gastrointestinal:  Soft, Non-tender, + BS	  Skin: No rashes, No ecchymoses, No cyanosis	  Neurologic: Non-focal  Extremities: Normal range of motion, No clubbing, cyanosis.  Vascular:   Right Femoral:  Palpable   Left Femoral:  Palpable  Right Popliteal: Palpable   Left Popliteal:  palpable  Right DP:  Palpable             Left DP:  Palpable  Right PT:  Palpable              Left PT:  Palpable      LABS:	 	    CBC Full  -  ( 29 Jun 2021 07:11 )  WBC Count : 6.16 K/uL  Hemoglobin : 12.5 g/dL  Hematocrit : 37.7 %  Platelet Count - Automated : 324 K/uL  Mean Cell Volume : 99.5 fl  Mean Cell Hemoglobin : 33.0 pg  Mean Cell Hemoglobin Concentration : 33.2 gm/dL      06-29    134<L>  |  98  |  14  ----------------------------<  94  4.2   |  23  |  1.14    Ca    9.9      29 Jun 2021 07:11  Phos  3.5     06-29  Mg     2.0     06-29        < from: Upper Endoscopy (06.29.21 @ 10:54) >  Findings:       Theesophageal anastomosis was normal.       Diffuse severe inflammation characterized by erosions, friability and linear erosions was        found in the gastric body. Biopsies were taken with a cold forceps for histology. Estimated        blood loss: none.       The examined duodenum was normal.                                                                                                        Impression:          - Normal esophageal anastomosis.                       - Gastritis. Biopsied.                   - Normal examined duodenum.  Recommendation:      - [facility].                       - Resume regular diet.                       - Await pathology results.                       - Use Protonix (pantoprazole) 40 mg PO BID.                       - Use sucralfate suspension 1 gram PO QID.                       - The findings and recommendations were discussed with the patient.    < end of copied text >         Vascular Cardiology  Progress note    SERVICE LINE [phone]     EMAIL [email or web address]     CC:  abdominal pain    INTERVAL HISTORY:    S/p endoscopy yesterday. Continues to have abd pain and constipation. No CP, SOB or LE edema. She is wondering if any of her medications could cause hyperpigmentation of her skin.     Allergies    No Known Allergies    Intolerances    lactose (Rash)  	    MEDICATIONS:  aspirin enteric coated 81 milliGRAM(s) Oral daily  carvedilol 3.125 milliGRAM(s) Oral every 12 hours  clopidogrel Tablet 75 milliGRAM(s) Oral daily  ALBUTerol    90 MICROgram(s) HFA Inhaler 2 Puff(s) Inhalation every 6 hours PRN  budesonide 160 MICROgram(s)/formoterol 4.5 MICROgram(s) Inhaler 2 Puff(s) Inhalation two times a day  ALPRAZolam 1 milliGRAM(s) Oral three times a day PRN  oxyCODONE    IR 20 milliGRAM(s) Oral three times a day PRN  zolpidem 5 milliGRAM(s) Oral at bedtime PRN  zolpidem 5 milliGRAM(s) Oral at bedtime PRN  aluminum hydroxide/magnesium hydroxide/simethicone Suspension 30 milliLiter(s) Oral every 4 hours PRN  metoclopramide 10 milliGRAM(s) Oral three times a day before meals PRN  pantoprazole    Tablet 40 milliGRAM(s) Oral two times a day  polyethylene glycol 3350 17 Gram(s) Oral two times a day  senna 2 Tablet(s) Oral at bedtime PRN  sucralfate suspension 1 Gram(s) Oral four times a day  atorvastatin 80 milliGRAM(s) Oral at bedtime  hydrocortisone 20 milliGRAM(s) Oral daily  hydrocortisone 10 milliGRAM(s) Oral daily        PAST MEDICAL & SURGICAL HISTORY:  Anxiety    Hypercholesteremia    Osteoporosis    Pacemaker    CAD (coronary artery disease)    COPD (chronic obstructive pulmonary disease)    Congestive heart failure    Ventricular arrhythmia    CVA (cerebral vascular accident) X2, right sided weakness    Cataract Surgery    Hand Surgery        FAMILY HISTORY:  No pertinent family history in first degree relatives        SOCIAL HISTORY:  unchanged    REVIEW OF SYSTEMS:  CONSTITUTIONAL: No fever, weight loss, or fatigue  EYES: No eye pain, visual disturbances, or discharge  ENMT:  No difficulty hearing, tinnitus, vertigo; No sinus or throat pain  NECK: No pain or stiffness  RESPIRATORY: No cough, wheezing, chills or hemoptysis; No Shortness of Breath  CARDIOVASCULAR: No chest pain, palpitations, passing out, dizziness, or leg swelling  GASTROINTESTINAL: see HPI, +flatus  GENITOURINARY: No dysuria, frequency, hematuria, or incontinence  NEUROLOGICAL: No headaches, memory loss, loss of strength, numbness, or tremors  SKIN: No itching, burning, rashes, or lesions   LYMPH Nodes: No enlarged glands  ENDOCRINE: No heat or cold intolerance; No hair loss  MUSCULOSKELETAL: No joint pain or swelling; No muscle, back, or extremity pain  PSYCHIATRIC: No depression, anxiety, mood swings, or difficulty sleeping  HEME/LYMPH: No easy bruising, or bleeding gums  ALLERY AND IMMUNOLOGIC: No hives or eczema	    [ x] All others negative	  [ ] Unable to obtain    PHYSICAL EXAM:  T(C): 36.6 (06-30-21 @ 04:43), Max: 36.9 (06-29-21 @ 20:00)  HR: 63 (06-30-21 @ 04:43) (63 - 83)  BP: 112/75 (06-30-21 @ 04:43) (105/60 - 122/77)  RR: 18 (06-30-21 @ 04:43) (14 - 20)  SpO2: 97% (06-30-21 @ 04:43) (95% - 98%)  Wt(kg): --  I&O's Summary    29 Jun 2021 07:01  -  30 Jun 2021 07:00  --------------------------------------------------------  IN: 480 mL / OUT: 0 mL / NET: 480 mL        Appearance: Normal, sleeping in chair  HEENT:   Normal oral mucosa, PERRL, EOMI	  Lymphatic: No lymphadenopathy  Cardiovascular: Normal S1 S2, No JVD, No murmurs, No edema  Respiratory: Lungs clear to auscultation	  Psychiatry: A & O x 3, Mood & affect appropriate  Gastrointestinal:  Soft, Non-tender, + BS	no abd bruits  Skin: No rashes, No ecchymoses, No cyanosis	  Neurologic: Non-focal  Extremities: Normal range of motion, No clubbing, cyanosis.        LABS:	 	    CBC Full  -  ( 29 Jun 2021 07:11 )  WBC Count : 6.16 K/uL  Hemoglobin : 12.5 g/dL  Hematocrit : 37.7 %  Platelet Count - Automated : 324 K/uL  Mean Cell Volume : 99.5 fl  Mean Cell Hemoglobin : 33.0 pg  Mean Cell Hemoglobin Concentration : 33.2 gm/dL      06-29    134<L>  |  98  |  14  ----------------------------<  94  4.2   |  23  |  1.14    Ca    9.9      29 Jun 2021 07:11  Phos  3.5     06-29  Mg     2.0     06-29        < from: Upper Endoscopy (06.29.21 @ 10:54) >  Findings:       Theesophageal anastomosis was normal.       Diffuse severe inflammation characterized by erosions, friability and linear erosions was        found in the gastric body. Biopsies were taken with a cold forceps for histology. Estimated        blood loss: none.       The examined duodenum was normal.                                                                                                        Impression:          - Normal esophageal anastomosis.                       - Gastritis. Biopsied.                   - Normal examined duodenum.  Recommendation:      - [facility] to [facility] for [facility].                       - Resume regular diet.                       - Await pathology results.                       - Use Protonix (pantoprazole) 40 mg PO BID.                       - Use sucralfate suspension 1 gram PO QID.                       - The findings and recommendations were discussed with the patient.    < end of copied text >

## 2021-06-30 NOTE — DISCHARGE NOTE PROVIDER - NSDCCPCAREPLAN_GEN_ALL_CORE_FT
PRINCIPAL DISCHARGE DIAGNOSIS  Diagnosis: Hyponatremia  Assessment and Plan of Treatment: Hyponatremia means blood sodium level is below the normal range.  Follow up with your healthcare provider in 1 week for repeat blood work. Call for an appointment.  You should have your sodium level checked at your next appointment.  Your last sodium level is 134 (normal 135-145)  Signs of hyponatremia includes headache, nausea, vomiting, lethargy, and confusion.  If dehydration is associated with hyponatremia you may have generalized weakness, muscle aches, and cramps.      SECONDARY DISCHARGE DIAGNOSES  Diagnosis: Chronic systolic congestive heart failure  Assessment and Plan of Treatment: Aldactone was held during admission, please inform your primary doctor at your follow-up appointment.  Weigh yourself daily.  If you gain 3lbs in 3 days, or 5lbs in a week call your Health Care Provider.  Do not eat or drink foods containing more than 2000mg of salt (sodium) in your diet every day.  Call your Health Care Provider if you have any swelling or increased swelling in your feet, ankles, and/or stomach.  Take all of your medication as directed.  If you become dizzy call your Health Care Provider.    Diagnosis: Lower abdominal pain  Assessment and Plan of Treatment: Condition improved.    Diagnosis: Gastritis  Assessment and Plan of Treatment: Condition found on Upper Endoscopy done 6/29.  Continue with medication and treatment as prescribed.  Follow up with your primary doctor in 1-2 weeks.    Diagnosis: Acute adrenal insufficiency  Assessment and Plan of Treatment: This is new diagnosis please discuss with your primary doctor.  Continue with hydrocortisone as prescribed.  Follw up with Endocrine in 1-2 weeks. Call for appointment.    Diagnosis: HTN (hypertension)  Assessment and Plan of Treatment: [place] was held because your blood pressure has been low to normal; please discuss with your primary doctor at your next appoinment.  Low salt diet  Activity as tolerated.  Take all medication as prescribed.  Follow up with your medical doctor for routine blood pressure monitoring at your next visit.  Notify your doctor if you have any of the following symptoms:   Dizziness, Lightheadedness, Blurry vision, Headache, Chest pain, Shortness of breath

## 2021-06-30 NOTE — PROGRESS NOTE ADULT - PROBLEM SELECTOR PROBLEM 4
Hypercholesteremia
Rheumatoid arthritis of both hips, unspecified whether rheumatoid factor present
Hypercholesteremia

## 2021-06-30 NOTE — PROGRESS NOTE ADULT - SUBJECTIVE AND OBJECTIVE BOX
Chief complaint  Patient is a 71y old  Female who presents with a chief complaint of abd pain (30 Jun 2021 08:53)   Review of systems  Patient awake in chair, looks comfortable, planning DC.    Medications  MEDICATIONS  (STANDING):  aspirin enteric coated 81 milliGRAM(s) Oral daily  atorvastatin 80 milliGRAM(s) Oral at bedtime  budesonide 160 MICROgram(s)/formoterol 4.5 MICROgram(s) Inhaler 2 Puff(s) Inhalation two times a day  carvedilol 3.125 milliGRAM(s) Oral every 12 hours  clopidogrel Tablet 75 milliGRAM(s) Oral daily  hydrocortisone 20 milliGRAM(s) Oral daily  hydrocortisone 10 milliGRAM(s) Oral daily  pantoprazole    Tablet 40 milliGRAM(s) Oral two times a day  polyethylene glycol 3350 17 Gram(s) Oral two times a day  sucralfate suspension 1 Gram(s) Oral four times a day      Physical Exam  General: Patient comfortable in chair  Vital Signs Last 12 Hrs  T(F): 97.7 (06-30-21 @ 12:20), Max: 97.9 (06-30-21 @ 04:43)  HR: 72 (06-30-21 @ 12:20) (63 - 72)  BP: 100/62 (06-30-21 @ 12:20) (100/62 - 112/75)  BP(mean): --  RR: 18 (06-30-21 @ 12:20) (18 - 18)  SpO2: 95% (06-30-21 @ 12:20) (95% - 97%)      Diagnostics    Adrenocorticotropic Hormone, Serum: STAT  Addl Info: Must draw lab PRIOR to cosyntropin injection, thank you  Cancel Reason: Unsuitable for Testing (06-28 @ 14:50)  Cortisol PM, Serum: Routine  Addl Info: Please draw cortisol lab 45 minutes after cosyntropin injection. Thank you! (06-28 @ 14:50)           Chief complaint  Patient is a 71y old  Female who presents with a chief complaint of abd pain (30 Jun 2021 08:53)   Review of systems  Patient awake in chair, looks comfortable, planning DC.    Medications  MEDICATIONS  (STANDING):  aspirin enteric coated 81 milliGRAM(s) Oral daily  atorvastatin 80 milliGRAM(s) Oral at bedtime  budesonide 160 MICROgram(s)/formoterol 4.5 MICROgram(s) Inhaler 2 Puff(s) Inhalation two times a day  carvedilol 3.125 milliGRAM(s) Oral every 12 hours  clopidogrel Tablet 75 milliGRAM(s) Oral daily  hydrocortisone 20 milliGRAM(s) Oral daily  hydrocortisone 10 milliGRAM(s) Oral daily  pantoprazole    Tablet 40 milliGRAM(s) Oral two times a day  polyethylene glycol 3350 17 Gram(s) Oral two times a day  sucralfate suspension 1 Gram(s) Oral four times a day      Physical Exam  General: Patient comfortable in chair  Vital Signs Last 12 Hrs  T(F): 97.7 (06-30-21 @ 12:20), Max: 97.9 (06-30-21 @ 04:43)  HR: 72 (06-30-21 @ 12:20) (63 - 72)  BP: 100/62 (06-30-21 @ 12:20) (100/62 - 112/75)  BP(mean): --  RR: 18 (06-30-21 @ 12:20) (18 - 18)  SpO2: 95% (06-30-21 @ 12:20) (95% - 97%)      Diagnostics    Adrenocorticotropic Hormone, Serum: STAT  Addl Info: Must draw lab PRIOR to cosyntropin injection, thank you  Cancel Reason: Unsuitable for Testing (06-28 @ 14:50)  Cortisol PM, Serum: Routine  Addl Info: Please draw cortisol lab 45 minutes after cosyntropin injection. Thank you! (06-28 @ 14:50)

## 2021-07-07 ENCOUNTER — APPOINTMENT (OUTPATIENT)
Dept: PLASTIC SURGERY | Facility: CLINIC | Age: 71
End: 2021-07-07

## 2021-07-09 LAB — SURGICAL PATHOLOGY STUDY: SIGNIFICANT CHANGE UP

## 2021-07-20 DIAGNOSIS — E27.40 UNSPECIFIED ADRENOCORTICAL INSUFFICIENCY: ICD-10-CM

## 2021-07-23 ENCOUNTER — TRANSCRIPTION ENCOUNTER (OUTPATIENT)
Age: 71
End: 2021-07-23

## 2021-07-23 ENCOUNTER — APPOINTMENT (OUTPATIENT)
Dept: CARDIOLOGY | Facility: CLINIC | Age: 71
End: 2021-07-23
Payer: MEDICARE

## 2021-07-23 VITALS
HEIGHT: 64 IN | HEART RATE: 80 BPM | SYSTOLIC BLOOD PRESSURE: 126 MMHG | BODY MASS INDEX: 25.44 KG/M2 | DIASTOLIC BLOOD PRESSURE: 81 MMHG | WEIGHT: 149 LBS | OXYGEN SATURATION: 98 %

## 2021-07-23 PROCEDURE — 99214 OFFICE O/P EST MOD 30 MIN: CPT

## 2021-07-23 PROCEDURE — 99072 ADDL SUPL MATRL&STAF TM PHE: CPT

## 2021-07-23 NOTE — HISTORY OF PRESENT ILLNESS
[FreeTextEntry1] : 7/23/2021\par Was admitted to [facility] with abdominal pain\par Gastritis was found\par Had endoscopy. \par \par \par Was seen by an endocrinogist Dr. Alejandrina Luciano\par Hydrocortisone was reduced to 10 for her sodium\par Abdomen is feeliing GREAT\par no abdominal pains\par Was admitted to [facility] recently for abdominal pain\par A CT aorta was done at that time with a stable AAA\par No pulsatile feeling in the belly. \par Legs feel ok.  No claudication.  \par

## 2021-07-23 NOTE — DISCUSSION/SUMMARY
[FreeTextEntry1] : Assessment:\par 1. AAA\par Nov 2020 4.1 cm\par Feb 2019 4.1cm \par Oct 2018: 3.65\par March 2018 3.93\par Aug 2017 - 3.61\par Oct 2017 - CT scan AAA 3.8cm, R VIRIDIANA 1.6cm\par  November 2020 - 4.1 x 3.6 - fusiform \par 2. CHF\par 3. ICD\par \par Plan:\par 1.  Repeat US aorta in 6 months for surveillance of aorta  (4.1cm) and iliac aneurysm (1.9)\par 2.  BP and HR well controlled\par 3.  Endocrine followup to manage hydrocortisone dosing.  \par 4.  GI followup for followup of gastricis\par 5.  She will see Dr. Sanchez next week. \par \par

## 2021-07-23 NOTE — PHYSICAL EXAM
[General Appearance - Well Developed] : well developed [Normal Appearance] : normal appearance [General Appearance - Well Nourished] : well nourished [No Deformities] : no deformities [General Appearance - In No Acute Distress] : no acute distress [Normal Conjunctiva] : the conjunctiva exhibited no abnormalities [Eyelids - No Xanthelasma] : the eyelids demonstrated no xanthelasmas [Normal Oral Mucosa] : normal oral mucosa [No Oral Pallor] : no oral pallor [Normal Jugular Venous A Waves Present] : normal jugular venous A waves present [Normal Jugular Venous V Waves Present] : normal jugular venous V waves present [Auscultation Breath Sounds / Voice Sounds] : lungs were clear to auscultation bilaterally [Heart Rate And Rhythm] : heart rate and rhythm were normal [Heart Sounds] : normal S1 and S2 [Murmurs] : no murmurs present [Arterial Pulses Normal] : the arterial pulses were normal [Edema] : no peripheral edema present [Bowel Sounds] : normal bowel sounds [Abdomen Soft] : soft [Abdomen Tenderness] : non-tender [Abnormal Walk] : normal gait [Nail Clubbing] : no clubbing of the fingernails [Cyanosis, Localized] : no localized cyanosis [] : no ischemic changes [Skin Turgor] : normal skin turgor [Skin Color & Pigmentation] : normal skin color and pigmentation [No Venous Stasis] : no venous stasis [Oriented To Time, Place, And Person] : oriented to person, place, and time [Impaired Insight] : insight and judgment were intact [Affect] : the affect was normal

## 2021-07-27 ENCOUNTER — APPOINTMENT (OUTPATIENT)
Dept: CARDIOLOGY | Facility: CLINIC | Age: 71
End: 2021-07-27
Payer: MEDICARE

## 2021-07-27 ENCOUNTER — NON-APPOINTMENT (OUTPATIENT)
Age: 71
End: 2021-07-27

## 2021-07-27 VITALS — HEIGHT: 64 IN | WEIGHT: 146 LBS | BODY MASS INDEX: 24.92 KG/M2 | HEART RATE: 69 BPM | OXYGEN SATURATION: 94 %

## 2021-07-27 VITALS — DIASTOLIC BLOOD PRESSURE: 88 MMHG | SYSTOLIC BLOOD PRESSURE: 158 MMHG

## 2021-07-27 VITALS — DIASTOLIC BLOOD PRESSURE: 82 MMHG | SYSTOLIC BLOOD PRESSURE: 138 MMHG

## 2021-07-27 PROCEDURE — 99214 OFFICE O/P EST MOD 30 MIN: CPT

## 2021-07-27 PROCEDURE — 99072 ADDL SUPL MATRL&STAF TM PHE: CPT

## 2021-07-27 PROCEDURE — 93000 ELECTROCARDIOGRAM COMPLETE: CPT

## 2021-07-27 NOTE — HISTORY OF PRESENT ILLNESS
[FreeTextEntry1] : Mary was diagnosed with CKD cr 2.8. ? bleeding in intestine. Sister  and  tomorrow. Very stressed today. Could not get ride here for hours today.

## 2021-07-27 NOTE — DISCUSSION/SUMMARY
[FreeTextEntry1] : The patient is a 71-year-old female COPD, CAD, AAA, ICD, CKD who is on hydrocortisone and stressed about her ride and sister  tomorrow. \par #1 COPD- on advair, spiriva and ventolin\par #2 CAD- stent  and  [place] and [place], no angina, continue plavix\par #3 CMP - [employer] ICD interrogation December, f/u Dr. Parker\par #4 Htn- continue coreg, consider increase\par #5 Lipids- continue atorvastatin\par #6 AAA- 3.5cm, f/u  Dr. Lofton \par #7 Renal- CKD, off losartan and spironolactone\par #8 GI- ? Intestinal bleed, f/u GI\par #9 Derm- keloids, gets injections, encouraged to increase walking for exercise, received Moderna vaccines.

## 2021-08-03 ENCOUNTER — FORM ENCOUNTER (OUTPATIENT)
Age: 71
End: 2021-08-03

## 2021-08-13 NOTE — DISCUSSION/SUMMARY
1010 to 1020    Pt seen briefly with wife prior to discharge today. Pt and wife in good spirits looking forward to pt's return home today. Discussed pt's plans for his recovery time at home. Discussed his thoughts about returning to work. Pt acknowledged that he could not be back at work right now due to his ongoing issues with endurance. Pt without oxygen during today's visit, but he mentioned that he needs it with exertion. Pt generally feeling good otherwise. He is looking forward to getting back into his own routine. Pt's wife feeling comfortable with pt's return home. Both were pleased that pt was able to go sooner than initially expected. Pt with markedly improved mood since initial evaluation. Pt far less anxious and down. He participated well with rehab and has made a steady recovery. His overall demeanor over the last few visits was more positive and at ease. Pt with no SI/HI. He and his wife were wished well on pt's return home. Pt does not appear in need of ongoing psychological treatment. He will no longer be followed by the rehab psychologist.     Amee Marks PsyD  Rehabilitation Psychologist    [AICD Function Normal] : normal AICD function [Patient] : the patient [Pulse Generator Replacement] : replace pulse generator

## 2021-08-29 ENCOUNTER — EMERGENCY (EMERGENCY)
Facility: HOSPITAL | Age: 71
LOS: 1 days | Discharge: HOME | End: 2021-08-29
Attending: EMERGENCY MEDICINE
Payer: MEDICARE

## 2021-08-29 VITALS
RESPIRATION RATE: 20 BRPM | DIASTOLIC BLOOD PRESSURE: 83 MMHG | TEMPERATURE: 98 F | OXYGEN SATURATION: 97 % | HEART RATE: 61 BPM | SYSTOLIC BLOOD PRESSURE: 147 MMHG

## 2021-08-29 VITALS
HEART RATE: 96 BPM | DIASTOLIC BLOOD PRESSURE: 99 MMHG | TEMPERATURE: 99 F | HEIGHT: 64 IN | RESPIRATION RATE: 22 BRPM | OXYGEN SATURATION: 97 % | SYSTOLIC BLOOD PRESSURE: 161 MMHG | WEIGHT: 145.06 LBS

## 2021-08-29 LAB
ALBUMIN SERPL ELPH-MCNC: 3.5 G/DL — SIGNIFICANT CHANGE UP (ref 3.3–5)
ALBUMIN SERPL ELPH-MCNC: 4.2 G/DL — SIGNIFICANT CHANGE UP (ref 3.3–5)
ALP SERPL-CCNC: 37 U/L — LOW (ref 40–120)
ALP SERPL-CCNC: 54 U/L — SIGNIFICANT CHANGE UP (ref 40–120)
ALT FLD-CCNC: 14 U/L — SIGNIFICANT CHANGE UP (ref 10–45)
ALT FLD-CCNC: 15 U/L — SIGNIFICANT CHANGE UP (ref 10–45)
ANION GAP SERPL CALC-SCNC: 12 MMOL/L — SIGNIFICANT CHANGE UP (ref 5–17)
ANION GAP SERPL CALC-SCNC: 12 MMOL/L — SIGNIFICANT CHANGE UP (ref 5–17)
AST SERPL-CCNC: 128 U/L — HIGH (ref 10–40)
AST SERPL-CCNC: 14 U/L — SIGNIFICANT CHANGE UP (ref 10–40)
BASE EXCESS BLDV CALC-SCNC: 0 MMOL/L — SIGNIFICANT CHANGE UP (ref -2–2)
BASE EXCESS BLDV CALC-SCNC: 0.1 MMOL/L — SIGNIFICANT CHANGE UP (ref -2–2)
BASOPHILS # BLD AUTO: 0.04 K/UL — SIGNIFICANT CHANGE UP (ref 0–0.2)
BASOPHILS # BLD AUTO: 0.06 K/UL — SIGNIFICANT CHANGE UP (ref 0–0.2)
BASOPHILS NFR BLD AUTO: 0.5 % — SIGNIFICANT CHANGE UP (ref 0–2)
BASOPHILS NFR BLD AUTO: 0.7 % — SIGNIFICANT CHANGE UP (ref 0–2)
BILIRUB SERPL-MCNC: 0.4 MG/DL — SIGNIFICANT CHANGE UP (ref 0.2–1.2)
BILIRUB SERPL-MCNC: 0.4 MG/DL — SIGNIFICANT CHANGE UP (ref 0.2–1.2)
BUN SERPL-MCNC: 13 MG/DL — SIGNIFICANT CHANGE UP (ref 7–23)
BUN SERPL-MCNC: 14 MG/DL — SIGNIFICANT CHANGE UP (ref 7–23)
CA-I SERPL-SCNC: 1.13 MMOL/L — LOW (ref 1.15–1.33)
CA-I SERPL-SCNC: 1.16 MMOL/L — SIGNIFICANT CHANGE UP (ref 1.15–1.33)
CALCIUM SERPL-MCNC: 8.5 MG/DL — SIGNIFICANT CHANGE UP (ref 8.4–10.5)
CALCIUM SERPL-MCNC: 9.6 MG/DL — SIGNIFICANT CHANGE UP (ref 8.4–10.5)
CHLORIDE BLDV-SCNC: 100 MMOL/L — SIGNIFICANT CHANGE UP (ref 96–108)
CHLORIDE BLDV-SCNC: 100 MMOL/L — SIGNIFICANT CHANGE UP (ref 96–108)
CHLORIDE SERPL-SCNC: 100 MMOL/L — SIGNIFICANT CHANGE UP (ref 96–108)
CHLORIDE SERPL-SCNC: 95 MMOL/L — LOW (ref 96–108)
CO2 BLDV-SCNC: 26 MMOL/L — SIGNIFICANT CHANGE UP (ref 22–26)
CO2 BLDV-SCNC: 29 MMOL/L — HIGH (ref 22–26)
CO2 SERPL-SCNC: 19 MMOL/L — LOW (ref 22–31)
CO2 SERPL-SCNC: 21 MMOL/L — LOW (ref 22–31)
CREAT SERPL-MCNC: 0.87 MG/DL — SIGNIFICANT CHANGE UP (ref 0.5–1.3)
CREAT SERPL-MCNC: 1.09 MG/DL — SIGNIFICANT CHANGE UP (ref 0.5–1.3)
EOSINOPHIL # BLD AUTO: 0.06 K/UL — SIGNIFICANT CHANGE UP (ref 0–0.5)
EOSINOPHIL # BLD AUTO: 0.07 K/UL — SIGNIFICANT CHANGE UP (ref 0–0.5)
EOSINOPHIL NFR BLD AUTO: 0.7 % — SIGNIFICANT CHANGE UP (ref 0–6)
EOSINOPHIL NFR BLD AUTO: 0.9 % — SIGNIFICANT CHANGE UP (ref 0–6)
GAS PNL BLDV: 129 MMOL/L — LOW (ref 136–145)
GAS PNL BLDV: 132 MMOL/L — LOW (ref 136–145)
GAS PNL BLDV: SIGNIFICANT CHANGE UP
GLUCOSE BLDV-MCNC: 114 MG/DL — HIGH (ref 70–99)
GLUCOSE BLDV-MCNC: 81 MG/DL — SIGNIFICANT CHANGE UP (ref 70–99)
GLUCOSE SERPL-MCNC: 107 MG/DL — HIGH (ref 70–99)
GLUCOSE SERPL-MCNC: 82 MG/DL — SIGNIFICANT CHANGE UP (ref 70–99)
HCO3 BLDV-SCNC: 25 MMOL/L — SIGNIFICANT CHANGE UP (ref 22–29)
HCO3 BLDV-SCNC: 27 MMOL/L — SIGNIFICANT CHANGE UP (ref 22–29)
HCT VFR BLD CALC: 37.2 % — SIGNIFICANT CHANGE UP (ref 34.5–45)
HCT VFR BLD CALC: 42.3 % — SIGNIFICANT CHANGE UP (ref 34.5–45)
HCT VFR BLDA CALC: 37 % — SIGNIFICANT CHANGE UP (ref 34.5–46.5)
HCT VFR BLDA CALC: 42 % — SIGNIFICANT CHANGE UP (ref 34.5–46.5)
HGB BLD CALC-MCNC: 12.2 G/DL — SIGNIFICANT CHANGE UP (ref 11.7–16.1)
HGB BLD CALC-MCNC: 14 G/DL — SIGNIFICANT CHANGE UP (ref 11.7–16.1)
HGB BLD-MCNC: 11.7 G/DL — SIGNIFICANT CHANGE UP (ref 11.5–15.5)
HGB BLD-MCNC: 13.9 G/DL — SIGNIFICANT CHANGE UP (ref 11.5–15.5)
IMM GRANULOCYTES NFR BLD AUTO: 0.4 % — SIGNIFICANT CHANGE UP (ref 0–1.5)
IMM GRANULOCYTES NFR BLD AUTO: 0.4 % — SIGNIFICANT CHANGE UP (ref 0–1.5)
LACTATE BLDV-MCNC: 1.1 MMOL/L — SIGNIFICANT CHANGE UP (ref 0.7–2)
LACTATE BLDV-MCNC: 1.6 MMOL/L — SIGNIFICANT CHANGE UP (ref 0.7–2)
LIDOCAIN IGE QN: 25 U/L — SIGNIFICANT CHANGE UP (ref 7–60)
LYMPHOCYTES # BLD AUTO: 1.36 K/UL — SIGNIFICANT CHANGE UP (ref 1–3.3)
LYMPHOCYTES # BLD AUTO: 1.82 K/UL — SIGNIFICANT CHANGE UP (ref 1–3.3)
LYMPHOCYTES # BLD AUTO: 18.2 % — SIGNIFICANT CHANGE UP (ref 13–44)
LYMPHOCYTES # BLD AUTO: 21.6 % — SIGNIFICANT CHANGE UP (ref 13–44)
MAGNESIUM SERPL-MCNC: 1.7 MG/DL — SIGNIFICANT CHANGE UP (ref 1.6–2.6)
MCHC RBC-ENTMCNC: 31.5 GM/DL — LOW (ref 32–36)
MCHC RBC-ENTMCNC: 31.9 PG — SIGNIFICANT CHANGE UP (ref 27–34)
MCHC RBC-ENTMCNC: 32.7 PG — SIGNIFICANT CHANGE UP (ref 27–34)
MCHC RBC-ENTMCNC: 32.9 GM/DL — SIGNIFICANT CHANGE UP (ref 32–36)
MCV RBC AUTO: 101.4 FL — HIGH (ref 80–100)
MCV RBC AUTO: 99.5 FL — SIGNIFICANT CHANGE UP (ref 80–100)
MONOCYTES # BLD AUTO: 0.8 K/UL — SIGNIFICANT CHANGE UP (ref 0–0.9)
MONOCYTES # BLD AUTO: 0.93 K/UL — HIGH (ref 0–0.9)
MONOCYTES NFR BLD AUTO: 10.7 % — SIGNIFICANT CHANGE UP (ref 2–14)
MONOCYTES NFR BLD AUTO: 11 % — SIGNIFICANT CHANGE UP (ref 2–14)
NEUTROPHILS # BLD AUTO: 5.17 K/UL — SIGNIFICANT CHANGE UP (ref 1.8–7.4)
NEUTROPHILS # BLD AUTO: 5.53 K/UL — SIGNIFICANT CHANGE UP (ref 1.8–7.4)
NEUTROPHILS NFR BLD AUTO: 65.6 % — SIGNIFICANT CHANGE UP (ref 43–77)
NEUTROPHILS NFR BLD AUTO: 69.3 % — SIGNIFICANT CHANGE UP (ref 43–77)
NRBC # BLD: 0 /100 WBCS — SIGNIFICANT CHANGE UP (ref 0–0)
NRBC # BLD: 0 /100 WBCS — SIGNIFICANT CHANGE UP (ref 0–0)
PCO2 BLDV: 40 MMHG — SIGNIFICANT CHANGE UP (ref 39–42)
PCO2 BLDV: 54 MMHG — HIGH (ref 39–42)
PH BLDV: 7.31 — LOW (ref 7.32–7.43)
PH BLDV: 7.4 — SIGNIFICANT CHANGE UP (ref 7.32–7.43)
PHOSPHATE SERPL-MCNC: 2.8 MG/DL — SIGNIFICANT CHANGE UP (ref 2.5–4.5)
PLATELET # BLD AUTO: 221 K/UL — SIGNIFICANT CHANGE UP (ref 150–400)
PLATELET # BLD AUTO: 266 K/UL — SIGNIFICANT CHANGE UP (ref 150–400)
PO2 BLDV: 24 MMHG — LOW (ref 25–45)
PO2 BLDV: 51 MMHG — HIGH (ref 25–45)
POTASSIUM BLDV-SCNC: 4.1 MMOL/L — SIGNIFICANT CHANGE UP (ref 3.5–5.1)
POTASSIUM BLDV-SCNC: 7 MMOL/L — CRITICAL HIGH (ref 3.5–5.1)
POTASSIUM SERPL-MCNC: 3.9 MMOL/L — SIGNIFICANT CHANGE UP (ref 3.5–5.3)
POTASSIUM SERPL-MCNC: >9 MMOL/L — CRITICAL HIGH (ref 3.5–5.3)
POTASSIUM SERPL-SCNC: 3.9 MMOL/L — SIGNIFICANT CHANGE UP (ref 3.5–5.3)
POTASSIUM SERPL-SCNC: >9 MMOL/L — CRITICAL HIGH (ref 3.5–5.3)
PROT SERPL-MCNC: 6.6 G/DL — SIGNIFICANT CHANGE UP (ref 6–8.3)
PROT SERPL-MCNC: 9.4 G/DL — HIGH (ref 6–8.3)
RBC # BLD: 3.67 M/UL — LOW (ref 3.8–5.2)
RBC # BLD: 4.25 M/UL — SIGNIFICANT CHANGE UP (ref 3.8–5.2)
RBC # FLD: 13.3 % — SIGNIFICANT CHANGE UP (ref 10.3–14.5)
RBC # FLD: 13.5 % — SIGNIFICANT CHANGE UP (ref 10.3–14.5)
SAO2 % BLDV: 33.2 % — LOW (ref 67–88)
SAO2 % BLDV: 84.3 % — SIGNIFICANT CHANGE UP (ref 67–88)
SARS-COV-2 RNA SPEC QL NAA+PROBE: SIGNIFICANT CHANGE UP
SODIUM SERPL-SCNC: 126 MMOL/L — LOW (ref 135–145)
SODIUM SERPL-SCNC: 133 MMOL/L — LOW (ref 135–145)
TROPONIN T, HIGH SENSITIVITY RESULT: <6 NG/L — SIGNIFICANT CHANGE UP (ref 0–51)
WBC # BLD: 7.47 K/UL — SIGNIFICANT CHANGE UP (ref 3.8–10.5)
WBC # BLD: 8.43 K/UL — SIGNIFICANT CHANGE UP (ref 3.8–10.5)
WBC # FLD AUTO: 7.47 K/UL — SIGNIFICANT CHANGE UP (ref 3.8–10.5)
WBC # FLD AUTO: 8.43 K/UL — SIGNIFICANT CHANGE UP (ref 3.8–10.5)

## 2021-08-29 PROCEDURE — 82803 BLOOD GASES ANY COMBINATION: CPT

## 2021-08-29 PROCEDURE — 82435 ASSAY OF BLOOD CHLORIDE: CPT

## 2021-08-29 PROCEDURE — G1004: CPT

## 2021-08-29 PROCEDURE — 85025 COMPLETE CBC W/AUTO DIFF WBC: CPT

## 2021-08-29 PROCEDURE — 99284 EMERGENCY DEPT VISIT MOD MDM: CPT | Mod: 25

## 2021-08-29 PROCEDURE — 96375 TX/PRO/DX INJ NEW DRUG ADDON: CPT

## 2021-08-29 PROCEDURE — 93005 ELECTROCARDIOGRAM TRACING: CPT

## 2021-08-29 PROCEDURE — 82565 ASSAY OF CREATININE: CPT

## 2021-08-29 PROCEDURE — 85014 HEMATOCRIT: CPT

## 2021-08-29 PROCEDURE — 96374 THER/PROPH/DIAG INJ IV PUSH: CPT | Mod: XU

## 2021-08-29 PROCEDURE — 99284 EMERGENCY DEPT VISIT MOD MDM: CPT

## 2021-08-29 PROCEDURE — U0003: CPT

## 2021-08-29 PROCEDURE — 85018 HEMOGLOBIN: CPT

## 2021-08-29 PROCEDURE — 84484 ASSAY OF TROPONIN QUANT: CPT

## 2021-08-29 PROCEDURE — 84132 ASSAY OF SERUM POTASSIUM: CPT

## 2021-08-29 PROCEDURE — 83605 ASSAY OF LACTIC ACID: CPT

## 2021-08-29 PROCEDURE — 84295 ASSAY OF SERUM SODIUM: CPT

## 2021-08-29 PROCEDURE — 83735 ASSAY OF MAGNESIUM: CPT

## 2021-08-29 PROCEDURE — 82947 ASSAY GLUCOSE BLOOD QUANT: CPT

## 2021-08-29 PROCEDURE — 80053 COMPREHEN METABOLIC PANEL: CPT

## 2021-08-29 PROCEDURE — U0005: CPT

## 2021-08-29 PROCEDURE — 74174 CTA ABD&PLVS W/CONTRAST: CPT | Mod: MG

## 2021-08-29 PROCEDURE — 82330 ASSAY OF CALCIUM: CPT

## 2021-08-29 PROCEDURE — 74174 CTA ABD&PLVS W/CONTRAST: CPT | Mod: 26,MG

## 2021-08-29 PROCEDURE — 84100 ASSAY OF PHOSPHORUS: CPT

## 2021-08-29 PROCEDURE — 83690 ASSAY OF LIPASE: CPT

## 2021-08-29 RX ORDER — MORPHINE SULFATE 50 MG/1
4 CAPSULE, EXTENDED RELEASE ORAL ONCE
Refills: 0 | Status: DISCONTINUED | OUTPATIENT
Start: 2021-08-29 | End: 2021-08-29

## 2021-08-29 RX ORDER — SODIUM CHLORIDE 9 MG/ML
1000 INJECTION INTRAMUSCULAR; INTRAVENOUS; SUBCUTANEOUS ONCE
Refills: 0 | Status: COMPLETED | OUTPATIENT
Start: 2021-08-29 | End: 2021-08-29

## 2021-08-29 RX ORDER — ONDANSETRON 8 MG/1
4 TABLET, FILM COATED ORAL ONCE
Refills: 0 | Status: COMPLETED | OUTPATIENT
Start: 2021-08-29 | End: 2021-08-29

## 2021-08-29 RX ADMIN — ONDANSETRON 4 MILLIGRAM(S): 8 TABLET, FILM COATED ORAL at 16:55

## 2021-08-29 RX ADMIN — MORPHINE SULFATE 4 MILLIGRAM(S): 50 CAPSULE, EXTENDED RELEASE ORAL at 16:52

## 2021-08-29 RX ADMIN — SODIUM CHLORIDE 1000 MILLILITER(S): 9 INJECTION INTRAMUSCULAR; INTRAVENOUS; SUBCUTANEOUS at 16:54

## 2021-08-29 NOTE — ED ADULT NURSE NOTE - ED CARDIAC HEART SOUNDS
X Size Of Lesion In Cm (Optional): 0 normal S1, S2 heard Administered By (Optional): Amy IGLESIAS Consent: The risks of atrophy were reviewed with the patient. Detail Level: Detailed Medical Necessity Clause: This procedure was medically necessary because the lesions that were treated were: Ndc# For Kenalog Only: 7119-2839-68 Include Z78.9 (Other Specified Conditions Influencing Health Status) As An Associated Diagnosis?: No Kenalog Preparation: Kenalog Concentration Of Solution Injected (Mg/Ml): 5.0 Total Volume Injected (Ccs- Only Use Numbers And Decimals): 0.5

## 2021-08-29 NOTE — ED PROVIDER NOTE - NSFOLLOWUPINSTRUCTIONS_ED_ALL_ED_FT
Please see attached information on abdominal pain.     Follow up with your GI doctor or our clinic - number provided.     Return to the ER for new or worsening abdominal pain, fevers, vomiting, or any other concerning symptoms.     Continue your home medications as prescribed.

## 2021-08-29 NOTE — ED ADULT NURSE NOTE - OBJECTIVE STATEMENT
71 year old female presented to ED with c/o of sharp constant diffuse abdominal pain starting Friday. pt reports intermittent mid sternal chest pain since Friday.  hx abdominal aneurysm, IBS, emphysema, defibrillator. pt denies current chest pain, denies SOB, vomiting, numbness/tingling, fever, cough, chills, dizziness, headache, blurred vision, neuro intact. pt reports constant nausea with no vomiting. pt a&ox3, lung sounds clear, heart rate regular, abdomen soft nontender distended to palp. skin intact. IV in right hand 20G and patent.  Will continue to monitor and assess while offering support and reassurance.

## 2021-08-29 NOTE — ED ADULT NURSE NOTE - CHPI ED NUR SYMPTOMS NEG
no blood in stool/no burning urination/no chills/no diarrhea/no dysuria/no fever/no hematuria/no vomiting

## 2021-08-29 NOTE — ED PROVIDER NOTE - CLINICAL SUMMARY MEDICAL DECISION MAKING FREE TEXT BOX
70 y/o F w/ PMH as above p/w progressively worsening abdominal pain over the last 3 days w/ nausea and vomiting. Moderate distress w/ guarding on exam. Concern for potential SBO vs. perforation. Cannot r/o vascular etiology. Cannot r/o dissection, although lower suspicion given limitation to abdominal complaints. Will CTA to r/o mesenteric ischemia, send lab work w/ lipase, blood gas, pain management and reassess.

## 2021-08-29 NOTE — ED PROVIDER NOTE - NSFOLLOWUPCLINICS_GEN_ALL_ED_FT
Gastroenterology at [facility]  Gastroenterology  [address] [place] [address]  Phone: [phone]  Fax:     [facility]  Gastroenterology  [address] [place] [address]  Phone: [phone]  Fax:

## 2021-08-29 NOTE — ED PROVIDER NOTE - PATIENT PORTAL LINK FT
You can access the FollowMyHealth Patient Portal offered by [facility] by registering at the following website: [email or web address]. By joining "[employer]’s FollowMyHealth portal, you will also be able to view your health information using other applications (apps) compatible with our system.

## 2021-08-29 NOTE — ED ADULT NURSE NOTE - NSIMPLEMENTINTERV_GEN_ALL_ED
Implemented All Universal Safety Interventions:  [place] to call system. Call bell, personal items and telephone within reach. Instruct patient to call for assistance. Room bathroom lighting operational. Non-slip footwear when patient is off stretcher. Physically safe environment: no spills, clutter or unnecessary equipment. Stretcher in lowest position, wheels locked, appropriate side rails in place.

## 2021-08-29 NOTE — ED PROVIDER NOTE - OBJECTIVE STATEMENT
70 y/o M w/ PMH CVA w/ mild residual R sided weakness, ventricular arrhythmia s/p pacemaker, CHF, COPD, IBD on prednisone and AAA p/w abdominal pain, nausea and vomiting. Pt reports symptoms began on Friday and have progressively worsened. Pt is vomiting w/ inability to tolerate PO. Pt w/ diffuse exquisitely tender abdominal pain w/ associated nausea. Denies fever or chills. Denies any changes in stooling or urination. Pt states it feels similar to prior irritable bowel exacerbation. Pt has been tapered down to 10mg of Prednisone daily. Denies any palpitations, chest pain or numbness to extremities.

## 2021-08-29 NOTE — ED ADULT NURSE NOTE - ABDOMEN
Pt was told she had a UTI (E. Coli) and was given Macrobid 100mg bid for 7 days because it was the only thing resistance to the E. Coli. She took her first dose yesterday morning and her second dose at 8pm she starting having chills last night with a fever along with a headache. She took a shower and it brung her fever down. She took a dose today at 7am and now is feeling pretty good. No fever no other symptoms. Pt wants to know can she continue taking the macrobid. soft/nontender/rounded

## 2021-08-29 NOTE — ED PROVIDER NOTE - CHPI ED SYMPTOMS NEG
no palpitations, no chest pain, no numbness to extremities/no fever/no chills Mix with rice cereal/oatmeal

## 2021-09-08 ENCOUNTER — APPOINTMENT (OUTPATIENT)
Dept: PLASTIC SURGERY | Facility: CLINIC | Age: 71
End: 2021-09-08
Payer: MEDICARE

## 2021-09-08 PROCEDURE — 11901 INJECT SKIN LESIONS >7: CPT

## 2021-09-08 PROCEDURE — 99212 OFFICE O/P EST SF 10 MIN: CPT | Mod: 25

## 2021-09-11 NOTE — REASON FOR VISIT
[Follow-Up: _____] : a [unfilled] follow-up visit [FreeTextEntry1] : Interlesional corticosteroid injection multiple sites.

## 2021-09-11 NOTE — HISTORY OF PRESENT ILLNESS
[FreeTextEntry1] : It has been a few months since the patient's last injection.  She states she has been in and out of the hospital.  She is interested in reinjection of some of the left sided keloid scars.

## 2021-09-11 NOTE — PROCEDURE
[FreeTextEntry1] : Multiple keloid scars [FreeTextEntry2] : Intralesional corticosteroid injection multiple sites [FreeTextEntry6] : Risks, benefits, and alternatives to intralesional corticosteroid injection was discussed with the patient. Specific risks of bleeding, infection, open wound, depigmentation, recurrence, and need for further procedures, among other risks, were discussed, and all questions answered. \par \par Keloid scars on the left shoulder and chest were treated with EMLA cream. Areas for injection were then cleansed and prepped with alcohol. A 4:1 mixture of kenalog 10mg/10ml : 1% lidocaine was infiltrated into the lesions. A total of 16 mg of kenalog was used. A total of 8 sites were injected. There were no complications. The patient tolerated the procedure well. Injection sites were dressed with bandages.

## 2021-09-11 NOTE — PHYSICAL EXAM
[de-identified] : Left anterior chest scars with mild hypertrophy, primarily at scars that predated the patient's most recent scar revision in this location.  Small hypertrophic scar at left shoulder burn site.  Left lateral chest wall scar is mostly soft, some hypertrophy.

## 2021-09-22 ENCOUNTER — APPOINTMENT (OUTPATIENT)
Dept: PLASTIC SURGERY | Facility: CLINIC | Age: 71
End: 2021-09-22
Payer: MEDICARE

## 2021-09-22 DIAGNOSIS — L91.0 HYPERTROPHIC SCAR: ICD-10-CM

## 2021-09-22 PROCEDURE — 11901 INJECT SKIN LESIONS >7: CPT

## 2021-09-22 NOTE — PROCEDURE
[FreeTextEntry1] : Keloid scars multiple sites [FreeTextEntry2] : Intralesional Kenalog injections multiple sites [FreeTextEntry6] : Risks, benefits, and alternatives to intralesional corticosteroid injection was discussed with the patient. Specific risks of bleeding, infection, open wound, depigmentation, recurrence, and need for further procedures, among other risks, were discussed, and all questions answered. \par \par Keloid scars on the left shoulder and chest were treated with EMLA cream. Areas for injection were then cleansed and prepped with alcohol. A 4:1 mixture of kenalog 10mg/10ml : 1% lidocaine was infiltrated into the lesions. A total of 14 mg of kenalog was used. A total of 9 sites were injected. There were no complications. The patient tolerated the procedure well. Injection sites were dressed with bandages.

## 2021-09-22 NOTE — PHYSICAL EXAM
[de-identified] : Multiple keloid scars over left shoulder, left anterior chest, and left lateral chest.

## 2021-09-22 NOTE — HISTORY OF PRESENT ILLNESS
[FreeTextEntry1] : The patient is here early for her second round of corticosteroid injections.  She states she was not going to be able to make her appointment next week.  She wishes to have the left shoulder and chest injected again.  No issues at prior injection sites.

## 2021-09-27 ENCOUNTER — NON-APPOINTMENT (OUTPATIENT)
Age: 71
End: 2021-09-27

## 2021-09-27 ENCOUNTER — APPOINTMENT (OUTPATIENT)
Dept: ELECTROPHYSIOLOGY | Facility: CLINIC | Age: 71
End: 2021-09-27
Payer: MEDICARE

## 2021-09-27 PROCEDURE — 93296 REM INTERROG EVL PM/IDS: CPT

## 2021-09-27 PROCEDURE — 93295 DEV INTERROG REMOTE 1/2/MLT: CPT

## 2021-11-02 ENCOUNTER — APPOINTMENT (OUTPATIENT)
Dept: CARDIOLOGY | Facility: CLINIC | Age: 71
End: 2021-11-02
Payer: MEDICARE

## 2021-11-02 ENCOUNTER — NON-APPOINTMENT (OUTPATIENT)
Age: 71
End: 2021-11-02

## 2021-11-02 VITALS
HEART RATE: 68 BPM | OXYGEN SATURATION: 98 % | SYSTOLIC BLOOD PRESSURE: 134 MMHG | WEIGHT: 146 LBS | RESPIRATION RATE: 16 BRPM | HEIGHT: 64 IN | BODY MASS INDEX: 24.92 KG/M2 | DIASTOLIC BLOOD PRESSURE: 84 MMHG

## 2021-11-02 DIAGNOSIS — I71.4 ABDOMINAL AORTIC ANEURYSM, W/OUT RUPTURE: ICD-10-CM

## 2021-11-02 PROCEDURE — 99214 OFFICE O/P EST MOD 30 MIN: CPT

## 2021-11-02 PROCEDURE — 93000 ELECTROCARDIOGRAM COMPLETE: CPT

## 2021-11-03 NOTE — HISTORY OF PRESENT ILLNESS
[FreeTextEntry1] : Mary had CXR at [facility] and found to have enlarged heart. Nephrologist back on losartan. She is worried. No CP, palpitations, dizziness or SOB>

## 2021-11-03 NOTE — DISCUSSION/SUMMARY
[FreeTextEntry1] : The patient is a 71-year-old female COPD, CAD, AAA, ICD, CKD who is asymptomatic.\par #1 COPD- on advair, spiriva and ventolin\par #2 CAD- stent 2009 and 2011 [facility] and [place], no angina, continue plavix, ECHO ordered\par #3 CMP - [employer] ICD interrogation today, f/u Dr. Parker\par #4 Htn- continue coreg, consider increase\par #5 Lipids- continue atorvastatin\par #6 AAA- 3.5cm, f/u  Dr. Lofton , Doppler of aorta ordered\par #7 Renal- CKD, off losartan and spironolactone\par #8 GI- ? Intestinal bleed, f/u GI\par #9 Derm- keloids, gets injections, encouraged to increase walking for exercise, received Moderna vaccines.

## 2021-11-04 ENCOUNTER — RESULT CHARGE (OUTPATIENT)
Age: 71
End: 2021-11-04

## 2021-11-05 ENCOUNTER — OUTPATIENT (OUTPATIENT)
Dept: OUTPATIENT SERVICES | Facility: HOSPITAL | Age: 71
LOS: 1 days | End: 2021-11-05
Payer: MEDICARE

## 2021-11-05 ENCOUNTER — APPOINTMENT (OUTPATIENT)
Dept: ELECTROPHYSIOLOGY | Facility: CLINIC | Age: 71
End: 2021-11-05
Payer: MEDICARE

## 2021-11-05 ENCOUNTER — APPOINTMENT (OUTPATIENT)
Dept: ULTRASOUND IMAGING | Facility: HOSPITAL | Age: 71
End: 2021-11-05

## 2021-11-05 ENCOUNTER — NON-APPOINTMENT (OUTPATIENT)
Age: 71
End: 2021-11-05

## 2021-11-05 VITALS
HEIGHT: 64 IN | DIASTOLIC BLOOD PRESSURE: 77 MMHG | BODY MASS INDEX: 24.59 KG/M2 | SYSTOLIC BLOOD PRESSURE: 138 MMHG | WEIGHT: 144 LBS | OXYGEN SATURATION: 98 %

## 2021-11-05 DIAGNOSIS — I71.4 ABDOMINAL AORTIC ANEURYSM, WITHOUT RUPTURE: ICD-10-CM

## 2021-11-05 PROCEDURE — 93283 PRGRMG EVAL IMPLANTABLE DFB: CPT

## 2021-11-05 PROCEDURE — 93000 ELECTROCARDIOGRAM COMPLETE: CPT | Mod: 59

## 2021-11-05 PROCEDURE — 76775 US EXAM ABDO BACK WALL LIM: CPT

## 2021-11-05 PROCEDURE — 76770 US EXAM ABDO BACK WALL COMP: CPT | Mod: 26

## 2021-11-26 ENCOUNTER — APPOINTMENT (OUTPATIENT)
Dept: CV DIAGNOSITCS | Facility: HOSPITAL | Age: 71
End: 2021-11-26

## 2021-11-26 ENCOUNTER — OUTPATIENT (OUTPATIENT)
Dept: OUTPATIENT SERVICES | Facility: HOSPITAL | Age: 71
LOS: 1 days | End: 2021-11-26
Payer: MEDICARE

## 2021-11-26 DIAGNOSIS — I42.9 CARDIOMYOPATHY, UNSPECIFIED: ICD-10-CM

## 2021-11-26 PROCEDURE — 93306 TTE W/DOPPLER COMPLETE: CPT | Mod: 26

## 2021-11-26 PROCEDURE — C8929: CPT

## 2021-11-29 RX ORDER — HYDRALAZINE HYDROCHLORIDE 10 MG/1
10 TABLET ORAL
Refills: 2 | Status: DISCONTINUED | COMMUNITY
End: 2021-11-29

## 2021-12-21 ENCOUNTER — APPOINTMENT (OUTPATIENT)
Dept: CARDIOLOGY | Facility: CLINIC | Age: 71
End: 2021-12-21
Payer: MEDICARE

## 2021-12-21 ENCOUNTER — APPOINTMENT (OUTPATIENT)
Dept: ELECTROPHYSIOLOGY | Facility: CLINIC | Age: 71
End: 2021-12-21
Payer: MEDICARE

## 2021-12-21 ENCOUNTER — NON-APPOINTMENT (OUTPATIENT)
Age: 71
End: 2021-12-21

## 2021-12-21 VITALS
BODY MASS INDEX: 24.75 KG/M2 | WEIGHT: 145 LBS | HEART RATE: 83 BPM | HEIGHT: 64 IN | DIASTOLIC BLOOD PRESSURE: 74 MMHG | SYSTOLIC BLOOD PRESSURE: 123 MMHG | OXYGEN SATURATION: 96 %

## 2021-12-21 PROCEDURE — 93283 PRGRMG EVAL IMPLANTABLE DFB: CPT

## 2021-12-21 PROCEDURE — 93000 ELECTROCARDIOGRAM COMPLETE: CPT

## 2021-12-21 PROCEDURE — 99214 OFFICE O/P EST MOD 30 MIN: CPT

## 2021-12-21 PROCEDURE — 99214 OFFICE O/P EST MOD 30 MIN: CPT | Mod: NC

## 2021-12-21 NOTE — DISCUSSION/SUMMARY
[FreeTextEntry1] : The patient is a 71-year-old female COPD, CAD, AAA, ICD, CKD who is asymptomatic.\par #1 COPD- on advair, spiriva and ventolin\par #2 CAD- stent 2009 and 2011 [facility] and [place], no angina, continue plavix and stop ASA, ECHO EF=20%\par #3 CMP - [employer] ICD interrogation today, f/u Dr. Parker\par #4 Htn- continue coreg, hydralazine\par #5 Lipids- continue atorvastatin\par #6 AAA- 4.0cm, f/u  Dr. Lofton , Doppler of aorta 6 months\par #7 Renal- normal renal evaluation, continue low dose losartan and off spironolactone\par #8 GI- ? Intestinal bleed, f/u GI\par #9 Derm- keloids, gets injections, encouraged to increase walking for exercise, received Moderna vaccines.

## 2021-12-21 NOTE — DISCUSSION/SUMMARY
[FreeTextEntry1] : Assessment:\par 1. AAA\par Nov 2021:  4.2cm\par Nov 2020 4.1 cm\par Feb 2019 4.1cm \par Oct 2018: 3.65\par March 2018 3.93\par Aug 2017 - 3.61\par Oct 2017 - CT scan AAA 3.8cm, R VIRIDIANA 1.6cm\par  November 2020 - 4.1 x 3.6 - fusiform \par 2. CHF\par 3. ICD\par \par Plan:\par 1.  Repeat US aorta in 6 months for surveillance of aorta  (4.2cm) and iliac aneurysm (1.9)\par 2.  BP and HR well controlled\par 3.  No heavy lifting\par 4.  Return in 6 months. \par \par

## 2021-12-21 NOTE — HISTORY OF PRESENT ILLNESS
[FreeTextEntry1] : 12/21/2021\par \par Doing well\par some bruising, mild at times\par Seen by Dr. Sanchez today\par No abdominal pains or pulsatility\par no claudication\par Last US aorta in November showing stability.

## 2022-01-03 NOTE — PROCEDURE
[FreeTextEntry2] : intralesional corticosteroid injection [FreeTextEntry1] : multiple keloid scars [FreeTextEntry6] : Risks, benefits, and alternatives to intralesional corticosteroid injection was discussed with the patient. Specific risks of bleeding, infection, open wound, depigmentation, recurrence, and need for further procedures, among other risks, were discussed, and all questions answered. \par \par Keloid scars on the left shoulder, anterior chest, and lateral chest were treated with EMLA cream. Areas for injection were then cleansed and prepped with alcohol. A 4:1 mixture of kenalog 10mg/10ml : 1% lidocaine was infiltrated into the lesions. A total of 18 mg of kenalog was used. A total of 8 sites were injected. There were no complications. The patient tolerated the procedure well. Injection sites were dressed with scar tape. none

## 2022-01-06 NOTE — ED ADULT TRIAGE NOTE - DATE OF LAST VACCINATION
29 y/o male with PMHx of Autism, asthma, hyperthyroidism, obesity, GERD, Enuresis, Impulse Control Disorder, Factitious Disorder, Myopia, Moderate Intellectual Disability, and Mitral Valve presented to the ED for chest pain.  Patient was discharged from [facility] today where he was admitted for COVID pneumonia.  Pt had chest pain during his visit and had normal troponin and EKG as per discharge papers.  Pt says the chest pain returned since being home upon discharge.  Reports pain as sharp and left sided. Denies fever.
15-Mar-2021

## 2022-01-10 ENCOUNTER — INPATIENT (INPATIENT)
Facility: HOSPITAL | Age: 72
LOS: 3 days | Discharge: HOME HEALTH | DRG: 303 | End: 2022-01-14
Attending: INTERNAL MEDICINE | Admitting: INTERNAL MEDICINE
Payer: MEDICARE

## 2022-01-10 VITALS
HEIGHT: 64 IN | OXYGEN SATURATION: 98 % | HEART RATE: 74 BPM | TEMPERATURE: 98 F | RESPIRATION RATE: 20 BRPM | SYSTOLIC BLOOD PRESSURE: 176 MMHG | DIASTOLIC BLOOD PRESSURE: 107 MMHG | WEIGHT: 141.98 LBS

## 2022-01-10 DIAGNOSIS — E27.40 UNSPECIFIED ADRENOCORTICAL INSUFFICIENCY: ICD-10-CM

## 2022-01-10 DIAGNOSIS — F41.9 ANXIETY DISORDER, UNSPECIFIED: ICD-10-CM

## 2022-01-10 DIAGNOSIS — I20.0 UNSTABLE ANGINA: ICD-10-CM

## 2022-01-10 DIAGNOSIS — K21.9 GASTRO-ESOPHAGEAL REFLUX DISEASE WITHOUT ESOPHAGITIS: ICD-10-CM

## 2022-01-10 DIAGNOSIS — R09.89 OTHER SPECIFIED SYMPTOMS AND SIGNS INVOLVING THE CIRCULATORY AND RESPIRATORY SYSTEMS: ICD-10-CM

## 2022-01-10 DIAGNOSIS — R07.9 CHEST PAIN, UNSPECIFIED: ICD-10-CM

## 2022-01-10 DIAGNOSIS — I50.22 CHRONIC SYSTOLIC (CONGESTIVE) HEART FAILURE: ICD-10-CM

## 2022-01-10 DIAGNOSIS — J44.9 CHRONIC OBSTRUCTIVE PULMONARY DISEASE, UNSPECIFIED: ICD-10-CM

## 2022-01-10 DIAGNOSIS — R10.9 UNSPECIFIED ABDOMINAL PAIN: ICD-10-CM

## 2022-01-10 DIAGNOSIS — Z29.9 ENCOUNTER FOR PROPHYLACTIC MEASURES, UNSPECIFIED: ICD-10-CM

## 2022-01-10 LAB
ALBUMIN SERPL ELPH-MCNC: 3.9 G/DL — SIGNIFICANT CHANGE UP (ref 3.3–5)
ALP SERPL-CCNC: 44 U/L — SIGNIFICANT CHANGE UP (ref 40–120)
ALT FLD-CCNC: 24 U/L — SIGNIFICANT CHANGE UP (ref 10–45)
ANION GAP SERPL CALC-SCNC: 12 MMOL/L — SIGNIFICANT CHANGE UP (ref 5–17)
ANION GAP SERPL CALC-SCNC: 14 MMOL/L — SIGNIFICANT CHANGE UP (ref 5–17)
AST SERPL-CCNC: 53 U/L — HIGH (ref 10–40)
BASOPHILS # BLD AUTO: 0.04 K/UL — SIGNIFICANT CHANGE UP (ref 0–0.2)
BASOPHILS NFR BLD AUTO: 0.6 % — SIGNIFICANT CHANGE UP (ref 0–2)
BILIRUB SERPL-MCNC: 0.3 MG/DL — SIGNIFICANT CHANGE UP (ref 0.2–1.2)
BUN SERPL-MCNC: 7 MG/DL — SIGNIFICANT CHANGE UP (ref 7–23)
BUN SERPL-MCNC: 7 MG/DL — SIGNIFICANT CHANGE UP (ref 7–23)
CALCIUM SERPL-MCNC: 9.4 MG/DL — SIGNIFICANT CHANGE UP (ref 8.4–10.5)
CALCIUM SERPL-MCNC: 9.6 MG/DL — SIGNIFICANT CHANGE UP (ref 8.4–10.5)
CHLORIDE SERPL-SCNC: 102 MMOL/L — SIGNIFICANT CHANGE UP (ref 96–108)
CHLORIDE SERPL-SCNC: 105 MMOL/L — SIGNIFICANT CHANGE UP (ref 96–108)
CO2 SERPL-SCNC: 20 MMOL/L — LOW (ref 22–31)
CO2 SERPL-SCNC: 21 MMOL/L — LOW (ref 22–31)
CREAT SERPL-MCNC: 1.29 MG/DL — SIGNIFICANT CHANGE UP (ref 0.5–1.3)
CREAT SERPL-MCNC: 1.34 MG/DL — HIGH (ref 0.5–1.3)
EOSINOPHIL # BLD AUTO: 0.06 K/UL — SIGNIFICANT CHANGE UP (ref 0–0.5)
EOSINOPHIL NFR BLD AUTO: 0.9 % — SIGNIFICANT CHANGE UP (ref 0–6)
GLUCOSE SERPL-MCNC: 102 MG/DL — HIGH (ref 70–99)
GLUCOSE SERPL-MCNC: 88 MG/DL — SIGNIFICANT CHANGE UP (ref 70–99)
HCT VFR BLD CALC: 37.4 % — SIGNIFICANT CHANGE UP (ref 34.5–45)
HGB BLD-MCNC: 12.2 G/DL — SIGNIFICANT CHANGE UP (ref 11.5–15.5)
IMM GRANULOCYTES NFR BLD AUTO: 0.6 % — SIGNIFICANT CHANGE UP (ref 0–1.5)
LIDOCAIN IGE QN: 24 U/L — SIGNIFICANT CHANGE UP (ref 7–60)
LYMPHOCYTES # BLD AUTO: 1.46 K/UL — SIGNIFICANT CHANGE UP (ref 1–3.3)
LYMPHOCYTES # BLD AUTO: 22.2 % — SIGNIFICANT CHANGE UP (ref 13–44)
MCHC RBC-ENTMCNC: 32.2 PG — SIGNIFICANT CHANGE UP (ref 27–34)
MCHC RBC-ENTMCNC: 32.6 GM/DL — SIGNIFICANT CHANGE UP (ref 32–36)
MCV RBC AUTO: 98.7 FL — SIGNIFICANT CHANGE UP (ref 80–100)
MONOCYTES # BLD AUTO: 0.57 K/UL — SIGNIFICANT CHANGE UP (ref 0–0.9)
MONOCYTES NFR BLD AUTO: 8.6 % — SIGNIFICANT CHANGE UP (ref 2–14)
NEUTROPHILS # BLD AUTO: 4.42 K/UL — SIGNIFICANT CHANGE UP (ref 1.8–7.4)
NEUTROPHILS NFR BLD AUTO: 67.1 % — SIGNIFICANT CHANGE UP (ref 43–77)
NRBC # BLD: 0 /100 WBCS — SIGNIFICANT CHANGE UP (ref 0–0)
PLATELET # BLD AUTO: 251 K/UL — SIGNIFICANT CHANGE UP (ref 150–400)
POTASSIUM SERPL-MCNC: 3.7 MMOL/L — SIGNIFICANT CHANGE UP (ref 3.5–5.3)
POTASSIUM SERPL-MCNC: 5.8 MMOL/L — HIGH (ref 3.5–5.3)
POTASSIUM SERPL-SCNC: 3.7 MMOL/L — SIGNIFICANT CHANGE UP (ref 3.5–5.3)
POTASSIUM SERPL-SCNC: 5.8 MMOL/L — HIGH (ref 3.5–5.3)
PROT SERPL-MCNC: 7.7 G/DL — SIGNIFICANT CHANGE UP (ref 6–8.3)
RBC # BLD: 3.79 M/UL — LOW (ref 3.8–5.2)
RBC # FLD: 15.2 % — HIGH (ref 10.3–14.5)
SARS-COV-2 RNA SPEC QL NAA+PROBE: SIGNIFICANT CHANGE UP
SODIUM SERPL-SCNC: 135 MMOL/L — SIGNIFICANT CHANGE UP (ref 135–145)
SODIUM SERPL-SCNC: 139 MMOL/L — SIGNIFICANT CHANGE UP (ref 135–145)
TROPONIN T, HIGH SENSITIVITY RESULT: 6 NG/L — SIGNIFICANT CHANGE UP (ref 0–51)
TROPONIN T, HIGH SENSITIVITY RESULT: 8 NG/L — SIGNIFICANT CHANGE UP (ref 0–51)
WBC # BLD: 6.59 K/UL — SIGNIFICANT CHANGE UP (ref 3.8–10.5)
WBC # FLD AUTO: 6.59 K/UL — SIGNIFICANT CHANGE UP (ref 3.8–10.5)

## 2022-01-10 PROCEDURE — 71045 X-RAY EXAM CHEST 1 VIEW: CPT | Mod: 26

## 2022-01-10 PROCEDURE — 99285 EMERGENCY DEPT VISIT HI MDM: CPT

## 2022-01-10 PROCEDURE — 99223 1ST HOSP IP/OBS HIGH 75: CPT

## 2022-01-10 PROCEDURE — 93010 ELECTROCARDIOGRAM REPORT: CPT

## 2022-01-10 RX ORDER — CLOPIDOGREL BISULFATE 75 MG/1
75 TABLET, FILM COATED ORAL DAILY
Refills: 0 | Status: DISCONTINUED | OUTPATIENT
Start: 2022-01-10 | End: 2022-01-14

## 2022-01-10 RX ORDER — ZOLPIDEM TARTRATE 10 MG/1
5 TABLET ORAL AT BEDTIME
Refills: 0 | Status: DISCONTINUED | OUTPATIENT
Start: 2022-01-10 | End: 2022-01-14

## 2022-01-10 RX ORDER — MORPHINE SULFATE 50 MG/1
2 CAPSULE, EXTENDED RELEASE ORAL EVERY 6 HOURS
Refills: 0 | Status: DISCONTINUED | OUTPATIENT
Start: 2022-01-10 | End: 2022-01-14

## 2022-01-10 RX ORDER — FAMOTIDINE 10 MG/ML
20 INJECTION INTRAVENOUS DAILY
Refills: 0 | Status: DISCONTINUED | OUTPATIENT
Start: 2022-01-10 | End: 2022-01-14

## 2022-01-10 RX ORDER — ATORVASTATIN CALCIUM 80 MG/1
80 TABLET, FILM COATED ORAL AT BEDTIME
Refills: 0 | Status: DISCONTINUED | OUTPATIENT
Start: 2022-01-10 | End: 2022-01-14

## 2022-01-10 RX ORDER — ENOXAPARIN SODIUM 100 MG/ML
40 INJECTION SUBCUTANEOUS DAILY
Refills: 0 | Status: DISCONTINUED | OUTPATIENT
Start: 2022-01-10 | End: 2022-01-14

## 2022-01-10 RX ORDER — KETOROLAC TROMETHAMINE 30 MG/ML
15 SYRINGE (ML) INJECTION ONCE
Refills: 0 | Status: DISCONTINUED | OUTPATIENT
Start: 2022-01-10 | End: 2022-01-10

## 2022-01-10 RX ORDER — FAMOTIDINE 10 MG/ML
20 INJECTION INTRAVENOUS ONCE
Refills: 0 | Status: COMPLETED | OUTPATIENT
Start: 2022-01-10 | End: 2022-01-10

## 2022-01-10 RX ORDER — ASPIRIN/CALCIUM CARB/MAGNESIUM 324 MG
1 TABLET ORAL
Qty: 0 | Refills: 0 | DISCHARGE

## 2022-01-10 RX ORDER — HYDROCORTISONE 20 MG
10 TABLET ORAL
Refills: 0 | Status: DISCONTINUED | OUTPATIENT
Start: 2022-01-10 | End: 2022-01-14

## 2022-01-10 RX ORDER — BUDESONIDE AND FORMOTEROL FUMARATE DIHYDRATE 160; 4.5 UG/1; UG/1
2 AEROSOL RESPIRATORY (INHALATION)
Refills: 0 | Status: DISCONTINUED | OUTPATIENT
Start: 2022-01-10 | End: 2022-01-14

## 2022-01-10 RX ORDER — LANOLIN ALCOHOL/MO/W.PET/CERES
3 CREAM (GRAM) TOPICAL AT BEDTIME
Refills: 0 | Status: DISCONTINUED | OUTPATIENT
Start: 2022-01-10 | End: 2022-01-14

## 2022-01-10 RX ORDER — MORPHINE SULFATE 50 MG/1
4 CAPSULE, EXTENDED RELEASE ORAL ONCE
Refills: 0 | Status: DISCONTINUED | OUTPATIENT
Start: 2022-01-10 | End: 2022-01-10

## 2022-01-10 RX ORDER — LIDOCAINE 4 G/100G
10 CREAM TOPICAL ONCE
Refills: 0 | Status: COMPLETED | OUTPATIENT
Start: 2022-01-10 | End: 2022-01-10

## 2022-01-10 RX ORDER — ACETAMINOPHEN 500 MG
975 TABLET ORAL ONCE
Refills: 0 | Status: COMPLETED | OUTPATIENT
Start: 2022-01-10 | End: 2022-01-10

## 2022-01-10 RX ORDER — ALBUTEROL 90 UG/1
2 AEROSOL, METERED ORAL EVERY 6 HOURS
Refills: 0 | Status: DISCONTINUED | OUTPATIENT
Start: 2022-01-10 | End: 2022-01-14

## 2022-01-10 RX ORDER — HYDROCORTISONE 20 MG
5 TABLET ORAL
Refills: 0 | Status: DISCONTINUED | OUTPATIENT
Start: 2022-01-10 | End: 2022-01-14

## 2022-01-10 RX ORDER — SODIUM CHLORIDE 9 MG/ML
1000 INJECTION INTRAMUSCULAR; INTRAVENOUS; SUBCUTANEOUS ONCE
Refills: 0 | Status: COMPLETED | OUTPATIENT
Start: 2022-01-10 | End: 2022-01-10

## 2022-01-10 RX ORDER — ONDANSETRON 8 MG/1
4 TABLET, FILM COATED ORAL EVERY 8 HOURS
Refills: 0 | Status: DISCONTINUED | OUTPATIENT
Start: 2022-01-10 | End: 2022-01-14

## 2022-01-10 RX ORDER — FLUTICASONE FUROATE AND VILANTEROL TRIFENATATE 100; 25 UG/1; UG/1
1 POWDER RESPIRATORY (INHALATION)
Qty: 0 | Refills: 0 | DISCHARGE

## 2022-01-10 RX ORDER — CARVEDILOL PHOSPHATE 80 MG/1
3.12 CAPSULE, EXTENDED RELEASE ORAL EVERY 12 HOURS
Refills: 0 | Status: DISCONTINUED | OUTPATIENT
Start: 2022-01-10 | End: 2022-01-14

## 2022-01-10 RX ORDER — OXYCODONE HYDROCHLORIDE 5 MG/1
5 TABLET ORAL EVERY 6 HOURS
Refills: 0 | Status: DISCONTINUED | OUTPATIENT
Start: 2022-01-10 | End: 2022-01-14

## 2022-01-10 RX ORDER — SUCRALFATE 1 G
1 TABLET ORAL
Refills: 0 | Status: DISCONTINUED | OUTPATIENT
Start: 2022-01-10 | End: 2022-01-14

## 2022-01-10 RX ORDER — LOSARTAN POTASSIUM 100 MG/1
25 TABLET, FILM COATED ORAL DAILY
Refills: 0 | Status: DISCONTINUED | OUTPATIENT
Start: 2022-01-10 | End: 2022-01-14

## 2022-01-10 RX ORDER — ONDANSETRON 8 MG/1
4 TABLET, FILM COATED ORAL ONCE
Refills: 0 | Status: COMPLETED | OUTPATIENT
Start: 2022-01-10 | End: 2022-01-10

## 2022-01-10 RX ORDER — MONTELUKAST 4 MG/1
10 TABLET, CHEWABLE ORAL DAILY
Refills: 0 | Status: DISCONTINUED | OUTPATIENT
Start: 2022-01-10 | End: 2022-01-14

## 2022-01-10 RX ORDER — ALPRAZOLAM 0.25 MG
1 TABLET ORAL THREE TIMES A DAY
Refills: 0 | Status: DISCONTINUED | OUTPATIENT
Start: 2022-01-10 | End: 2022-01-14

## 2022-01-10 RX ORDER — ACETAMINOPHEN 500 MG
650 TABLET ORAL EVERY 6 HOURS
Refills: 0 | Status: DISCONTINUED | OUTPATIENT
Start: 2022-01-10 | End: 2022-01-14

## 2022-01-10 RX ADMIN — ZOLPIDEM TARTRATE 5 MILLIGRAM(S): 10 TABLET ORAL at 23:02

## 2022-01-10 RX ADMIN — LIDOCAINE 10 MILLILITER(S): 4 CREAM TOPICAL at 15:56

## 2022-01-10 RX ADMIN — Medication 15 MILLIGRAM(S): at 19:00

## 2022-01-10 RX ADMIN — MORPHINE SULFATE 4 MILLIGRAM(S): 50 CAPSULE, EXTENDED RELEASE ORAL at 21:37

## 2022-01-10 RX ADMIN — ONDANSETRON 4 MILLIGRAM(S): 8 TABLET, FILM COATED ORAL at 15:37

## 2022-01-10 RX ADMIN — ATORVASTATIN CALCIUM 80 MILLIGRAM(S): 80 TABLET, FILM COATED ORAL at 23:02

## 2022-01-10 RX ADMIN — MONTELUKAST 10 MILLIGRAM(S): 4 TABLET, CHEWABLE ORAL at 23:02

## 2022-01-10 RX ADMIN — SODIUM CHLORIDE 1000 MILLILITER(S): 9 INJECTION INTRAMUSCULAR; INTRAVENOUS; SUBCUTANEOUS at 21:37

## 2022-01-10 RX ADMIN — SODIUM CHLORIDE 1000 MILLILITER(S): 9 INJECTION INTRAMUSCULAR; INTRAVENOUS; SUBCUTANEOUS at 17:13

## 2022-01-10 RX ADMIN — Medication 15 MILLIGRAM(S): at 17:15

## 2022-01-10 RX ADMIN — FAMOTIDINE 20 MILLIGRAM(S): 10 INJECTION INTRAVENOUS at 16:00

## 2022-01-10 RX ADMIN — Medication 30 MILLILITER(S): at 15:56

## 2022-01-10 RX ADMIN — Medication 1 MILLIGRAM(S): at 23:54

## 2022-01-10 RX ADMIN — FAMOTIDINE 20 MILLIGRAM(S): 10 INJECTION INTRAVENOUS at 23:02

## 2022-01-10 RX ADMIN — Medication 1 GRAM(S): at 23:54

## 2022-01-10 RX ADMIN — Medication 1 GRAM(S): at 23:02

## 2022-01-10 RX ADMIN — MORPHINE SULFATE 4 MILLIGRAM(S): 50 CAPSULE, EXTENDED RELEASE ORAL at 18:51

## 2022-01-10 NOTE — H&P ADULT - PROBLEM SELECTOR PLAN 1
pt with CP, intermittent for past 2 days, worse today, in high risk patient with h/o CAD and CHF with EF 20%   CP resolved after morphine in ED. Trop 6-->8, EKG not ischemic; c/f possible unstable angina vs GERD vs costochondritis   - given high risk, will check TTE and will get cardiology evaluation in AM for possible stress vs cath   - c/w pain control with tylenol, oxycodone and morphine IVP prn - monitor for toxicity/sedation   - c/w plavix, high intensity statin, BB and ARB (off ASA 2/2 easy bruising per pt report)   - GERD treatment as below

## 2022-01-10 NOTE — H&P ADULT - HISTORY OF PRESENT ILLNESS
71 year-old-female with history of AAA, CVA, CAD s/p stent, ventricular arrhythmia s/p ICD/PPM, CHF, COPD, IBD presents with 4-day history of abdominal pain, chest pain and nausea/vomiting. Pt states she initially started having abdominal pain, in the lower quadrants worse on the R side, crampy, severe on 1/6/22 - she was seen at OSH for this. At OSH, she had a CT showing slightly increased size if known infrarenal AAA to 4.6cm, no rupture, no other acute pathology to explain pts pain; pt had UA show mod LE and 9 WBC and pt given keflex for possible UTI. She had CP on 1/8/22 for which she was evaluated for at OSH ED, with negative troponin and discharged home. States she has been having worsening nausea/vomiting in the past 24hrs, and then today started having severe CP after a bout of NBNB emesis. Pain is midsternum radiating to the R side, severe 10/10, sharp, +assoc SOB, no associated diaphoresis, lightheadedness palpitations. States morphine has helped significantly with the pain, currently CP free.     Follows with Dr Sanchez for cardiology.

## 2022-01-10 NOTE — ED ADULT NURSE NOTE - OBJECTIVE STATEMENT
70 yo F arrived from triage. c/o of abd pain and CP radiating to epigastric area. PMH CVA, triple A. Denies N/V. Breathing spontaneous and labored at times with pulse ox >95% on room air. EKG completed. Upon assessment, abdomen soft tender slightly on left side. +strong peripheral pulses, moving all extremities without difficulty, lungs clear, No pitting edema to b/l LE- Skin warm, dry and pink. Pt placed in position of comfort. Pt educated on call bell system and provided call bell. Bed in lowest position, wheels locked, appropriate side rails raised. Cardiac monitor applied. IV line placed x1 attempt. Pt tolerated well, labs drawn and sent. Provider at bedside evaluating.

## 2022-01-10 NOTE — ED PROVIDER NOTE - PHYSICAL EXAMINATION
General: non-toxic appearing, in no respiratory distress  HEENT: atraumatic, normocephalic; pupils are equal, round and react to light, extraocular movements intact bilaterally without deficits, no conjunctival pallor, mucous membranes moist  Neck: no jugular venous distension, full range of motion  Chest/Lung: clear to auscultation bilaterally, no wheezes/rhonchi/rales  Heart: regular rate and rhythm, no murmur/gallops/rubs  Abdomen: normal bowel sounds, soft, non-distended, tenderness in LUQ/LLQ   Extremities: no lower extremity edema, +2 radial pulses bilaterally, +2 dorsalis pedis pulses bilaterally  Musculoskeletal: full range of motion of all 4 extremities  Nervous System: alert and oriented, no motor deficits or sensory deficits; CNII-XII grossly intact; no focal neurologic deficits  Skin: no rashes/lacerations noted General: non-toxic appearing, in no respiratory distress  HEENT: atraumatic, normocephalic; pupils are equal, round and react to light, extraocular movements intact bilaterally without deficits, no conjunctival pallor, mucous membranes moist  Neck: no jugular venous distension, full range of motion  Chest/Lung: clear to auscultation bilaterally, no wheezes/rhonchi/rales  Heart: regular rate and rhythm, no murmur/gallops/rubs  Abdomen: normal bowel sounds, soft, non-distended, tenderness in LUQ/LLQ   Extremities: no lower extremity edema, +2 radial pulses bilaterally  Musculoskeletal: full range of motion of all 4 extremities  Nervous System: alert and oriented, no motor deficits or sensory deficits; CNII-XII grossly intact; no focal neurologic deficits  Skin: no rashes/lacerations noted

## 2022-01-10 NOTE — PATIENT PROFILE ADULT - NSPROGENBLOODRESTRICT_GEN_A_NUR
Progress Note - General Surgery   Iker Padilla 69 y o  female MRN: [id number]  Unit/Bed#: -01 Encounter: [id number]      Assessment:   66-year-old female presenting with abdominal pain and radiological findings suspicious for abdominal wall abscess  - additional CT revealed gallbladder distention with cholelithiasis, and a possible small amount of pericholecystic fluid  - patient reports she is feeling better today and her abdominal pain has almost resolved  - patient is afebrile WBC is within normal limits  - patient is tolerating diet without reports of nausea or vomiting    Plan:  - patient is scheduled for a HIDA scan this afternoon - further recommendations after results are back  - continue with local wound care of mid abdominal wound - like packing with iodoform quarter-inch packing and covered with 4 x 4 gauze  - continue to monitor wound for signs of infection  - wound culture results pending - preliminary results show gram-positive the cocci in pairs; patient is receiving vancomycin    Subjective/Objective   Chief Complaint:  Feeling better today    Subjective:  No acute events overnight  Patient reports her abdominal pain has greatly improved, and has not had any similar symptoms with p o  Intake  Patient reports flatus  Patient has been tolerating diet without reported nausea or vomiting  Patient is ambulating urinating without difficulty  Patient denies any chest pain, shortness of breath, palpitations, fevers, or chills  Objective:     Blood pressure 111/57, pulse 68, temperature 98 5 °F (36 9 °C), resp  rate 17, height 5' 4" (1 626 m), weight 68 9 kg (151 lb 14 4 oz), SpO2 96 %  Body mass index is 26 07 kg/m²  I/O       10/15 0701 - 10/16 0700 10/16 0701 - 10/17 0700 10/17 0701 - 10/18 0700    P  O  0 240     I V  (mL/kg) 1000 (14 5)  1000 (14 5)    Total Intake(mL/kg) 1000 (14 5) 240 (3 5) 1000 (14 5)    Urine (mL/kg/hr) 450 200 (0 1)     Total Output 450 200     Net +550 +40 +1000           Unmeasured Urine Occurrence  2 x           Invasive Devices     Central Venous Catheter Line            Port A Cath 11/29/18 Right Chest 322 days          Drain            Colostomy Descending/sigmoid LUQ 37 days    Closed/Suction Drain Right RLQ Bulb 36 days                Physical Exam: /57   Pulse 68   Temp 98 5 °F (36 9 °C)   Resp 17   Ht 5' 4" (1 626 m)   Wt 68 9 kg (151 lb 14 4 oz)   SpO2 96%   BMI 26 07 kg/m²   General appearance: alert and oriented, in no acute distress  Lungs: clear to auscultation bilaterally  Heart: regular rate and rhythm, S1, S2 normal, no murmur, click, rub or gallop  Abdomen: soft, non-tender; bowel sounds normal; no masses,  no organomegaly  Extremities: extremities normal, warm and well-perfused; no cyanosis, clubbing, or edema    Labs   Recent Results (from the past 24 hour(s))   Fingerstick Glucose (POCT)    Collection Time: 10/16/19 10:44 AM   Result Value Ref Range    POC Glucose 188 (H) 65 - 140 mg/dl   Fingerstick Glucose (POCT)    Collection Time: 10/16/19  4:21 PM   Result Value Ref Range    POC Glucose 159 (H) 65 - 140 mg/dl   Fingerstick Glucose (POCT)    Collection Time: 10/16/19 10:44 PM   Result Value Ref Range    POC Glucose 113 65 - 140 mg/dl   CBC    Collection Time: 10/17/19  6:29 AM   Result Value Ref Range    WBC 8 22 4 31 - 10 16 Thousand/uL    RBC 2 82 (L) 3 81 - 5 12 Million/uL    Hemoglobin 8 2 (L) 11 5 - 15 4 g/dL    Hematocrit 26 9 (L) 34 8 - 46 1 %    MCV 95 82 - 98 fL    MCH 29 1 26 8 - 34 3 pg    MCHC 30 5 (L) 31 4 - 37 4 g/dL    RDW 13 8 11 6 - 15 1 %    Platelets 227 228 - 516 Thousands/uL    MPV 11 4 8 9 - 12 7 fL   Fingerstick Glucose (POCT)    Collection Time: 10/17/19  6:35 AM   Result Value Ref Range    POC Glucose 98 65 - 140 mg/dl        Imaging and other studies:  Ct Abdomen Pelvis W Contrast    Result Date: 10/15/2019  Impression: Midline incision with adjacent thickening of the abdominal wall musculature and an adjacent 36 x 18 mm intramuscular collection #2/48 suspicious for a small abscess  The study was marked in [facility] for immediate notification   Workstation performed: [id number]       VTE Pharmacologic Prophylaxis:  Eliquis  VTE Mechanical Prophylaxis: sequential compression device    Rick Moreno PA-C  10/17/2019 [employer]

## 2022-01-10 NOTE — H&P ADULT - NSHPREVIEWOFSYSTEMS_GEN_ALL_CORE
REVIEW OF SYSTEMS:    CONSTITUTIONAL: No weakness, fevers, chills  EYES/ENT: No visual changes;  nothroat pain   NECK: No pain or stiffness  RESPIRATORY: No cough, + shortness of breath  CARDIOVASCULAR: +chest pain no palpitations  GASTROINTESTINAL: +nausea, +vomiting, +abdominal pain, no BRBPR  GENITOURINARY: no polyuria, no dysuria  NEUROLOGICAL: no numbness, no headaches, no confusion   MUSCULOSKELETAL: no back pain, no weakness   SKIN: No itching, burning, rashes, or lesions   PSYCH: no anxiety, depression  HEME: no gum bleeding, no bruising

## 2022-01-10 NOTE — ED PROVIDER NOTE - OBJECTIVE STATEMENT
Patient is a 71 year-old-female with history of AAA, CVA, ventricular arrhythmia s/p pacemaker, CHF, COPD, IBD presents with 4-day history of abdominal pain, chest pain and nausea/vomiting. Abdominal pain started first, then chest pain started, worsening over the last 4 days. +SOB. Had an episode of emesis yesterday, decreased oral intake. Was seen at [facility] on 1/6/21 where CTA abd/pel showed mild interval increase in size of infrarenal abdominal aortic aneurysm (4.6cm), stable right common iliac artery aneurysm. Was then evaluated again on 1/8/21 and had negative delta trop. Patient is a 71 year-old-female with history of AAA, CVA, ventricular arrhythmia s/p pacemaker, CHF, COPD, IBD presents with 4-day history of abdominal pain, chest pain and nausea/vomiting. Abdominal pain started first, then chest pain started, worsening over the last 4 days. +SOB. Had an episode of emesis yesterday, decreased oral intake. Was seen at [facility] on 1/6/21 where CTA abd/pel showed mild interval increase in size of infrarenal abdominal aortic aneurysm (4.6cm), stable right common iliac artery aneurysm. Was then evaluated again on 1/8/21 and had negative delta trop. Vaccinated for COVID.

## 2022-01-10 NOTE — H&P ADULT - PROBLEM SELECTOR PLAN 2
pt with acute abdominal pain, initially worse in RLQ now periumbilical   pain improved now s/p morphine   etiology includes ?UTI vs IBD pain; had CT at OSH showing no acute pathology, non ruptured AAA  - defer repeat imaging for now   - pain control as above   - check UA to evaluate for infxn   - consider GI consult if pain not improving

## 2022-01-10 NOTE — ED PROVIDER NOTE - CLINICAL SUMMARY MEDICAL DECISION MAKING FREE TEXT BOX
Patient is a 71 year-old-female with history of AAA, CVA, ventricular arrhythmia s/p pacemaker, CHF, COPD, IBD presents with 4-day history of abdominal pain, chest pain and nausea/vomiting. In the setting of recent CTA abdomen, low suspicion for rupture AAA. Will r/o ACS. CBC, CMP, trop, ECG, CXR. Symptomatic treatment with tylenol, maalox, pepcid, zofran and viscous lidocaine.

## 2022-01-10 NOTE — PATIENT PROFILE ADULT - FALL HARM RISK - HARM RISK INTERVENTIONS

## 2022-01-10 NOTE — ED ADULT TRIAGE NOTE - PAIN RATING/NUMBER SCALE (0-10): REST
Mom said at time of visit 9/4/18 a cream was to be called in to treat scrotum.    Mom stated [facility] on [address] and [place] have not yet received the RX.   Can we call this in again ?  then let Mom know.    8

## 2022-01-10 NOTE — H&P ADULT - NSHPPHYSICALEXAM_GEN_ALL_CORE
Vital Signs Last 24 Hrs  T(C): 36.8 (10 Zeke 2022 20:18), Max: 36.8 (10 Zeke 2022 14:31)  T(F): 98.2 (10 Zeke 2022 20:18), Max: 98.2 (10 Zeke 2022 14:31)  HR: 68 (10 Zeke 2022 20:18) (68 - 74)  BP: 119/61 (10 Zeke 2022 20:18) (119/61 - 176/107)  BP(mean): --  RR: 20 (10 Zeke 2022 20:18) (20 - 20)  SpO2: 95% (10 Zeke 2022 20:18) (95% - 98%)    PHYSICAL EXAM:  GENERAL: NAD, well-developed  HEAD:  Atraumatic, normocephalic  EYES: EOMI, conjunctiva and sclera clear  NECK: Supple, no JVD  CHEST/LUNG: Clear to auscultation bilaterally; no wheezing or rales  HEART: Regular rate and rhythm; no murmurs  ABDOMEN: Soft, nontender, nondistended; bowel sounds present  EXTREMITIES:  2+ Peripheral Pulses, no edema  PSYCH: calm affect, not anxious  NEUROLOGY: non-focal, AAOx3  SKIN: No rashes or lesions  MUSCULOSKELETAL: no back pain, moving all extremities

## 2022-01-10 NOTE — H&P ADULT - NSHPLABSRESULTS_GEN_ALL_CORE
Labs, imaging and EKG personally reviewed and interpreted by me.   labs notable for normal WBC, HB, lytes and Cr. Trop 6, 7.   Imaging personally reviewed and interpreted by me - CXR: no obvious consolidation or effusion  EKG personally reviewed and interpreted by me -                           12.2   6.59  )-----------( 251      ( 10 Zeke 2022 15:45 )             37.4     01-10    139  |  105  |  7   ----------------------------<  88  3.7   |  20<L>  |  1.29    Ca    9.4      10 Zeke 2022 17:53    TPro  7.7  /  Alb  3.9  /  TBili  0.3  /  DBili  x   /  AST  53<H>  /  ALT  24  /  AlkPhos  44  01-10      < from: Xray Chest 1 View- PORTABLE-Urgent (01.10.22 @ 15:53) >    INTERPRETATION:    Heart size and the mediastinum cannot be accurately evaluated on this   projection. Coronary artery stent is seen.  A left chest wall ICD with intact leadswith tips in the expected region   of the right atrium and right ventricle is again seen. The generator   obscures a small part of the left lung.  The visualized lungs are clear.  No pleural effusion or pneumothorax is seen.  There is osteoarthritic degenerative change of the spine.          IMPRESSION:  ICD generator obscures a small portion of the left lung. The   visualized lungs are clear.    < end of copied text > Labs, imaging and EKG personally reviewed and interpreted by me.   labs notable for normal WBC, HB, lytes and Cr. Trop 6, 7.   Imaging personally reviewed and interpreted by me - CXR: no obvious consolidation or effusion  EKG personally reviewed and interpreted by me - A paced, no acute ischemic changes                           12.2   6.59  )-----------( 251      ( 10 Zeke 2022 15:45 )             37.4     01-10    139  |  105  |  7   ----------------------------<  88  3.7   |  20<L>  |  1.29    Ca    9.4      10 Zeke 2022 17:53    TPro  7.7  /  Alb  3.9  /  TBili  0.3  /  DBili  x   /  AST  53<H>  /  ALT  24  /  AlkPhos  44  01-10      < from: Xray Chest 1 View- PORTABLE-Urgent (01.10.22 @ 15:53) >    INTERPRETATION:    Heart size and the mediastinum cannot be accurately evaluated on this   projection. Coronary artery stent is seen.  A left chest wall ICD with intact leadswith tips in the expected region   of the right atrium and right ventricle is again seen. The generator   obscures a small part of the left lung.  The visualized lungs are clear.  No pleural effusion or pneumothorax is seen.  There is osteoarthritic degenerative change of the spine.          IMPRESSION:  ICD generator obscures a small portion of the left lung. The   visualized lungs are clear.    < end of copied text >

## 2022-01-10 NOTE — ED PROVIDER NOTE - ATTENDING CONTRIBUTION TO CARE
70 yo female hx of infrarenal AAA p/w chest/abdominal pain; seen at OSH for same x 2 in the last 4d, had CT angio chest/abdomen/pelvis showing slight increase in infrarenal AAA but no thoracic component and otherwise grossly stable.  afebrile here.  will check labs, CXR, trend enzymes, likely admit for further management.

## 2022-01-10 NOTE — H&P ADULT - PROBLEM SELECTOR PLAN 7
COPD - breathing comfortably on RA, no wheezing; low suspicion for exacerbation   c/w home BD  proair prn

## 2022-01-10 NOTE — H&P ADULT - PROBLEM SELECTOR PLAN 3
appears euvolemic on my exam   c/w all GDMT - pt reports being off ASA 2/2 easy bruising  cardiology consult in AM

## 2022-01-11 ENCOUNTER — NON-APPOINTMENT (OUTPATIENT)
Age: 72
End: 2022-01-11

## 2022-01-11 LAB

## 2022-01-11 PROCEDURE — 93306 TTE W/DOPPLER COMPLETE: CPT | Mod: 26

## 2022-01-11 PROCEDURE — 99223 1ST HOSP IP/OBS HIGH 75: CPT | Mod: GC

## 2022-01-11 RX ORDER — POLYETHYLENE GLYCOL 3350 17 G/17G
17 POWDER, FOR SOLUTION ORAL DAILY
Refills: 0 | Status: DISCONTINUED | OUTPATIENT
Start: 2022-01-11 | End: 2022-01-13

## 2022-01-11 RX ADMIN — POLYETHYLENE GLYCOL 3350 17 GRAM(S): 17 POWDER, FOR SOLUTION ORAL at 23:26

## 2022-01-11 RX ADMIN — Medication 650 MILLIGRAM(S): at 21:10

## 2022-01-11 RX ADMIN — Medication 1 GRAM(S): at 11:03

## 2022-01-11 RX ADMIN — FAMOTIDINE 20 MILLIGRAM(S): 10 INJECTION INTRAVENOUS at 11:03

## 2022-01-11 RX ADMIN — CARVEDILOL PHOSPHATE 3.12 MILLIGRAM(S): 80 CAPSULE, EXTENDED RELEASE ORAL at 06:12

## 2022-01-11 RX ADMIN — MORPHINE SULFATE 2 MILLIGRAM(S): 50 CAPSULE, EXTENDED RELEASE ORAL at 23:20

## 2022-01-11 RX ADMIN — LOSARTAN POTASSIUM 25 MILLIGRAM(S): 100 TABLET, FILM COATED ORAL at 06:12

## 2022-01-11 RX ADMIN — ZOLPIDEM TARTRATE 5 MILLIGRAM(S): 10 TABLET ORAL at 21:29

## 2022-01-11 RX ADMIN — Medication 650 MILLIGRAM(S): at 20:03

## 2022-01-11 RX ADMIN — Medication 5 MILLIGRAM(S): at 18:08

## 2022-01-11 RX ADMIN — MONTELUKAST 10 MILLIGRAM(S): 4 TABLET, CHEWABLE ORAL at 11:03

## 2022-01-11 RX ADMIN — Medication 1 GRAM(S): at 06:12

## 2022-01-11 RX ADMIN — BUDESONIDE AND FORMOTEROL FUMARATE DIHYDRATE 2 PUFF(S): 160; 4.5 AEROSOL RESPIRATORY (INHALATION) at 18:08

## 2022-01-11 RX ADMIN — CARVEDILOL PHOSPHATE 3.12 MILLIGRAM(S): 80 CAPSULE, EXTENDED RELEASE ORAL at 18:08

## 2022-01-11 RX ADMIN — BUDESONIDE AND FORMOTEROL FUMARATE DIHYDRATE 2 PUFF(S): 160; 4.5 AEROSOL RESPIRATORY (INHALATION) at 06:19

## 2022-01-11 RX ADMIN — Medication 1 MILLIGRAM(S): at 21:30

## 2022-01-11 RX ADMIN — Medication 650 MILLIGRAM(S): at 11:03

## 2022-01-11 RX ADMIN — Medication 650 MILLIGRAM(S): at 11:54

## 2022-01-11 RX ADMIN — Medication 1 GRAM(S): at 18:08

## 2022-01-11 RX ADMIN — CLOPIDOGREL BISULFATE 75 MILLIGRAM(S): 75 TABLET, FILM COATED ORAL at 11:03

## 2022-01-11 RX ADMIN — Medication 1 GRAM(S): at 23:10

## 2022-01-11 RX ADMIN — Medication 10 MILLIGRAM(S): at 06:13

## 2022-01-11 RX ADMIN — ATORVASTATIN CALCIUM 80 MILLIGRAM(S): 80 TABLET, FILM COATED ORAL at 21:17

## 2022-01-11 NOTE — CONSULT NOTE ADULT - SUBJECTIVE AND OBJECTIVE BOX
[facility] Consult Note Nephrology - CONSULTATION NOTE    71 year-old-female with history of AAA, CVA, CAD s/p stent, ventricular arrhythmia s/p ICD/PPM, CHF, COPD, IBD presents with 4-day history of abdominal pain, chest pain and nausea/vomiting. Pt states she initially started having abdominal pain, in the lower quadrants worse on the R side, crampy, severe on 22 - she was seen at OSH for this. At OSH, she had a CT showing slightly increased size if known infrarenal AAA to 4.6cm, no rupture, no other acute pathology to explain pts pain; pt had UA show mod LE and 9 WBC and pt given keflex for possible UTI. She had CP on 22 for which she was evaluated for at OSH ED, with negative troponin and discharged home. States she has been having worsening nausea/vomiting in the past 24hrs, and then today started having severe CP after a bout of NBNB emesis. Pain is midsternum radiating to the R side, severe 10/10, sharp, +assoc SOB, no associated diaphoresis, lightheadedness palpitations. States morphine has helped significantly with the pain, currently CP free.     Follows with Dr Sanchez for cardiology.    Pt sees my colleguea Dr Zuniga as on outpt for hyponatremia, ckd stage 3  Pt called office to be seen  denies any f./c/n/v    PAST MEDICAL & SURGICAL HISTORY:  Anxiety    Hypercholesteremia    Osteoporosis    Pacemaker    CAD (coronary artery disease)    COPD (chronic obstructive pulmonary disease)    Congestive heart failure    Ventricular arrhythmia    CVA (cerebral vascular accident) X2, right sided weakness    Cataract Surgery    Hand Surgery      lactose (Rash)  No Known Allergies    Home Medications Reviewed  Hospital Medications:   MEDICATIONS  (STANDING):  atorvastatin 80 milliGRAM(s) Oral at bedtime  budesonide 160 MICROgram(s)/formoterol 4.5 MICROgram(s) Inhaler 2 Puff(s) Inhalation two times a day  carvedilol 3.125 milliGRAM(s) Oral every 12 hours  clopidogrel Tablet 75 milliGRAM(s) Oral daily  enoxaparin Injectable 40 milliGRAM(s) SubCutaneous daily  famotidine    Tablet 20 milliGRAM(s) Oral daily  hydrocortisone 10 milliGRAM(s) Oral <User Schedule>  hydrocortisone 5 milliGRAM(s) Oral <User Schedule>  losartan 25 milliGRAM(s) Oral daily  montelukast 10 milliGRAM(s) Oral daily  sucralfate suspension 1 Gram(s) Oral four times a day    SOCIAL HISTORY:  Denies ETOh,Smoking,   FAMILY HISTORY:  No pertinent family history in first degree relatives      REVIEW OF SYSTEMS:  CONSTITUTIONAL: No weakness, fevers or chills  EYES/ENT: No visual changes;  No vertigo or throat pain   NECK: No pain or stiffness  RESPIRATORY: No cough, wheezing, hemoptysis; No shortness of breath  CARDIOVASCULAR: No chest pain or palpitations.  GASTROINTESTINAL: No abdominal or epigastric pain. No nausea, vomiting, or hematemesis; No diarrhea or constipation. No melena or hematochezia.  GENITOURINARY: No dysuria, frequency, foamy urine, urinary urgency, incontinence or hematuria  NEUROLOGICAL: No numbness or weakness  SKIN: No itching, burning, rashes, or lesions   VASCULAR: No bilateral lower extremity edema.   All other review of systems is negative unless indicated above.    VITALS:  T(F): 100.3 (22 @ 10:43), Max: 100.3 (22 @ 10:43)  HR: 60 (22 @ 10:43)  BP: 125/74 (22 @ 10:43)  RR: 18 (22 @ 10:43)  SpO2: 98% (22 @ 10:43)  Wt(kg): --    01-10 @ 07: @ 07:00  --------------------------------------------------------  IN: 150 mL / OUT: 0 mL / NET: 150 mL     @ 07: @ 14:54  --------------------------------------------------------  IN: 440 mL / OUT: 0 mL / NET: 440 mL        PHYSICAL EXAM:  Constitutional: NAD  HEENT: anicteric sclera, oropharynx clear, MMM  Neck: No JVD  Respiratory: CTAB, no wheezes, rales or rhonchi  Cardiovascular: S1, S2, RRR  Gastrointestinal: BS+, soft, NT/ND  Extremities: No cyanosis or clubbing. No peripheral edema  Neurological: A/O x 3, no focal deficits  Psychiatric: Normal mood, normal affect  : No CVA tenderness. No brunson.   Skin: No rashes      LABS:  01-10    139  |  105  |  7   ----------------------------<  88  3.7   |  20<L>  |  1.29    Ca    9.4      10 Zeke 2022 17:53    TPro  7.7  /  Alb  3.9  /  TBili  0.3  /  DBili      /  AST  53<H>  /  ALT  24  /  AlkPhos  44  01-10    Creatinine Trend: 1.29 <--, 1.34 <--                        12.2   6.59  )-----------( 251      ( 10 Zeke 2022 15:45 )             37.4     Urine Studies:  Urinalysis Basic - ( 2022 08:10 )    Color: Light Yellow / Appearance: Clear / S.009 / pH:   Gluc:  / Ketone: Negative  / Bili: Negative / Urobili: Negative   Blood:  / Protein: Negative / Nitrite: Negative   Leuk Esterase: Negative / RBC:  / WBC    Sq Epi:  / Non Sq Epi:  / Bacteria:         RADIOLOGY & ADDITIONAL STUDIES:

## 2022-01-11 NOTE — CONSULT NOTE ADULT - ASSESSMENT
71 year-old-female with history of AAA, CVA, CAD s/p stent, ventricular arrhythmia s/p ICD/PPM, CHF, COPD, IBD presents with 4-day history of abdominal pain, chest pain and nausea/vomiting    1) ESTEVAN on CKD stage 3  Sees my colleague as an output  b/l cr around 1 to 1.1mg/dl  Cr now improving  ? pre renal from vomiting and dec po intake  will monitor cr for now  can cont with losartan    2) Hyponatremia- Na stable currently  has been seen in the past for low na  will trend na     Dr Luciano  [phone]

## 2022-01-11 NOTE — CONSULT NOTE ADULT - ATTENDING COMMENTS
71 year old woman with coronary stents, ischemic cardiomyopathy, ICD, and AAA, has had 4 days of vomiting, abdominal pain and also chest pain. EKG without evidence for ischemia, troponin negative. Pain has now subsided. No role for further cardiac testing or change in cardiac management.    To contact call Cardiology Fellow or Attending as listed on [email or web address] password: [email or web address].

## 2022-01-11 NOTE — PROGRESS NOTE ADULT - SUBJECTIVE AND OBJECTIVE BOX
Name of Patient : SHAE HANEY  MRN: [id number]  Date of visit: 22 @ 15:34      Subjective: Patient seen and examined. No new events except as noted.   Patient reports lower abdominal pain and discomfort. At time of visit patient denies chest pain, palpitations, shortness of breath or difficulty breathing.  Patient is asking for Miralax as she reports not having a BM. Patient states that she has not vomited since yesterday 01/10 (1 episode, of clear color).     REVIEW OF SYSTEMS:    CONSTITUTIONAL: No weakness, fevers or chills  EYES/ENT: No visual changes;  No vertigo or throat pain   NECK: No pain or stiffness  RESPIRATORY: No cough, wheezing, hemoptysis; No shortness of breath or difficulty breathing   CARDIOVASCULAR: Denies chest pain or palpitations  GASTROINTESTINAL: + LB/L lower abdominal pain; + No BM; +last episode of vomiting 01/10; denies N/V/Diarrhea   GENITOURINARY: No dysuria, frequency or hematuria  NEUROLOGICAL: No numbness or weakness  SKIN: No itching, burning, rashes, or lesions   All other review of systems is negative unless indicated above.    MEDICATIONS:  MEDICATIONS  (STANDING):  atorvastatin 80 milliGRAM(s) Oral at bedtime  budesonide 160 MICROgram(s)/formoterol 4.5 MICROgram(s) Inhaler 2 Puff(s) Inhalation two times a day  carvedilol 3.125 milliGRAM(s) Oral every 12 hours  clopidogrel Tablet 75 milliGRAM(s) Oral daily  enoxaparin Injectable 40 milliGRAM(s) SubCutaneous daily  famotidine    Tablet 20 milliGRAM(s) Oral daily  hydrocortisone 10 milliGRAM(s) Oral <User Schedule>  hydrocortisone 5 milliGRAM(s) Oral <User Schedule>  losartan 25 milliGRAM(s) Oral daily  montelukast 10 milliGRAM(s) Oral daily  polyethylene glycol 3350 17 Gram(s) Oral daily  sucralfate suspension 1 Gram(s) Oral four times a day      PHYSICAL EXAM:  T(C): 37.9 (22 @ 10:43), Max: 37.9 (22 @ 10:43)  HR: 60 (22 @ 10:43) (60 - 68)  BP: 125/74 (22 @ 10:43) (119/61 - 134/76)  RR: 18 (22 @ 10:43) (18 - 20)  SpO2: 98% (22 @ 10:43) (95% - 100%)  Wt(kg): --  I&O's Summary    10 Zeke 2022 07:  -  2022 07:00  --------------------------------------------------------  IN: 150 mL / OUT: 0 mL / NET: 150 mL    2022 07:  -  2022 15:34  --------------------------------------------------------  IN: 440 mL / OUT: 0 mL / NET: 440 mL          Appearance: Normal; calm; lying in bed; in no distress 	  HEENT:  PERRLA   Lymphatic: No lymphadenopathy   Cardiovascular: Normal S1 S2, no JVD  Respiratory: normal effort , clear  Gastrointestinal:  Soft, Non-tender  Skin: No rashes,  warm to touch  Psychiatry:  Mood & affect appropriate  Musculoskeletal: No edema        01-10-22 @ 07:  -  22 @ 07:00  --------------------------------------------------------  IN: 150 mL / OUT: 0 mL / NET: 150 mL    22 @ 07:  -  22 @ 15:34  --------------------------------------------------------  IN: 440 mL / OUT: 0 mL / NET: 440 mL                              12.2   6.59  )-----------( 251      ( 10 Zeke 2022 15:45 )             37.4               01-10    139  |  105  |  7   ----------------------------<  88  3.7   |  20<L>  |  1.29    Ca    9.4      10 Zeke 2022 17:53    TPro  7.7  /  Alb  3.9  /  TBili  0.3  /  DBili  x   /  AST  53<H>  /  ALT  24  /  AlkPhos  44  -10                       Urinalysis Basic - ( 2022 08:10 )    Color: Light Yellow / Appearance: Clear / S.009 / pH: x  Gluc: x / Ketone: Negative  / Bili: Negative / Urobili: Negative   Blood: x / Protein: Negative / Nitrite: Negative   Leuk Esterase: Negative / RBC: x / WBC x   Sq Epi: x / Non Sq Epi: x / Bacteria: x        < from: TTE with Doppler (w/Cont) (22 @ 07:40) >    Patient name: SHAE HANEY  YOB: 1950   Age: 71 (F)   MR#: [id number]  Study Date: 2022  [facility] RDCS  Study quality: Technically difficult  Referring Physician: Surjit Pena MD  BloodPressure: 128/72 mmHg  Height: 163 cm  Weight: 64 kg  BSA: 1.7 m2  ------------------------------------------------------------------------  PROCEDURE: Transthoracic echocardiogram with 2-D, M-Mode  and complete spectral and color flow Doppler. Verbal  consent was obtained for injection of  Ultrasonic Enhancing  Agent following a discussion of risks and benefits.  Following intravenous injection of Ultrasonic Enhancing  Agent , harmonic imaging was performed.  INDICATION: Chest pain, unspecified (R07.9)  ------------------------------------------------------------------------  Dimensions:    Normal Values:  LA:     2.9    2.0 - 4.0 cm  Ao:     3.4    2.0 - 3.8 cm  SEPTUM: 1.0    0.6 - 1.2 cm  PWT:    0.8    0.6 - 1.1 cm  LVIDd:  5.5    3.0 - 5.6 cm  LVIDs:  4.3    1.8 - 4.0 cm  Derived variables:  LVMI: 110 g/m2  RWT: 0.29  Fractional short: 22 %  EF (Visual Estimate): 40-45 %  Doppler Peak Velocity (m/sec): AoV=1.6  ------------------------------------------------------------------------  Observations:  Mitral Valve: Tethered mitral valve leaflets with normal  opening. Minimal mitral regurgitation.  Aortic Valve/Aorta: Calcified trileaflet aortic valve with  normal opening. Peak transaortic valve gradient equals 10  mm Hg, mean transaortic valve gradient equals 6 mm Hg,  aortic valve velocity time integral equals 35 cm. No aortic  valve regurgitation seen. Peak left ventricular outflow  tract gradient equals 3 mm Hg, LVOT velocity time integral  equals 18 cm.  Aortic Root: 3.4 cm.  LVOT diameter: 2.1 cm.  Left Atrium: Left atrium not well visualized, probably  normal.  Left Ventricle: Endocardial visualization enhanced with  intravenous injection of Ultrasonic Enhancing Agent  (Definity). Left ventricle suboptimally visualized despite  intravenous injeciton of ultrasonic enhancing agent; mild  segmental left ventricular systolic dysfunction. The  inferolateral wall is akinetic. The anterolateral wall is  hypokinetic. No left ventricular thrombus. Moderate left  ventricular enlargement. Moderate diastolic dysfunction  (Stage II).  Right Heart: Right atrium not well visualized, probably  normal. The right ventricle is not well visualized; grossly  normal right ventricular size and systolic function. A  device wire is noted in the right heart. Tricuspid valve  not well visualized, probably normal. Moderate tricuspid  regurgitation. Pulmonic valve not well visualized, probably  normal. Minimal pulmonic regurgitation.  Pericardium/Pleura: Trace pericardial effusion.  Hemodynamic: Estimated right atrial pressure equals 3 mmHg.  Estimated right ventricular systolic pressure equals 48 mm  Hg, assuming right atrial pressure equals 3 mm Hg,  consistent with mild pulmonary hypertension.  ------------------------------------------------------------------------  Conclusions:  1. Tethered mitral valve leaflets with normal opening.  Minimal mitral regurgitation.  2. Calcified trileaflet aortic valve with normal opening.  No aortic valve regurgitation seen.  3. Endocardial visualization enhanced with intravenous  injection of Ultrasonic Enhancing Agent (Definity). Left  ventricle suboptimally visualized despite intravenous  injeciton of ultrasonic enhancing agent; mild segmental  left ventricular systolic dysfunction.The inferolateral  wall is akinetic. The anterolateral wall is hypokinetic. No  left ventricular thrombus.  4. The right ventricle is not well visualized; grossly  normal right ventricular size and systolic function. A  device wire is noted in the right heart.  5. Trace pericardial effusion.  *** Compared with echocardiogram of 2021, overall EF  has improved.  ------------------------------------------------------------------------  Confirmed on  2022 - 11:16:11 by Dejah Franz M.D.  ----------------------    < end of copied text >      < from: Xray Chest 1 View- PORTABLE-Urgent (01.10.22 @ 15:53) >    ACC: [id number] EXAM:  XR CHEST PORTABLE URGENT 1V                          PROCEDURE DATE:  01/10/2022          INTERPRETATION:  TIME OF EXAM: January 10, 2022 at 3:47 PM.    CLINICAL INFORMATION: Chest pain.    COMPARISON:  2014.    TECHNIQUE:   AP Portable chest x-ray. Jewelry projects over the upper   image.    INTERPRETATION:    Heart size and the mediastinum cannot be accurately evaluated on this   projection. Coronary artery stent is seen.  A left chest wall ICD with intact leadswith tips in the expected region   of the right atrium and right ventricle is again seen. The generator   obscures a small part of the left lung.  The visualized lungs are clear.  No pleural effusion or pneumothorax is seen.  There is osteoarthritic degenerative change of the spine.          IMPRESSION:  ICD generator obscures a small portion of the left lung. The   visualized lungs are clear.    --- End of Report ---        < end of copied text >

## 2022-01-11 NOTE — CONSULT NOTE ADULT - SUBJECTIVE AND OBJECTIVE BOX
Full note to follow  Chief Complaint:  Patient is a 71y old  Female who presents with a chief complaint of abdominal and chest pain (2022 15:33)      Date of service: 22 @ 16:10    HPI:    The patient is a 71 year old female with history of AAA, CVD, CAD s/p stent, ventricular arrythmia s/p ICD/PPM, CHF, COPD, IBD, RA presenting with abdominal pain, chest pain, nausea and vomiting. Patient states abdominal pain, nausea and vomiting started on 22. Seen at [facility] on 22 for her symptoms with CT showing slightly increased size if known infrarenal AAA to 4.6cm, no rupture, no other acute pathology to explain patient's pain. Also told she had a possible UTI and prescribed PO Keflex. On 22, seen for CP at [facility] where she stayed for observation; discharged after negative cardiac workup. Chest pain was midsternal radiating to right side that patient believes was caused by the force of her vomiting. Over past 2 days patient states nausea, vomiting and abdominal pain became worse. Endorses nbnb emesis, last BM was light brown and formed. Endorses abdominal pain that patient describes as "cramping" to lower abdomen, states pain is worse to LLQ. Tolerated 2 full meals today without any nausea or vomiting. States morphine has helped with pain.     History of colonoscopy in  and endoscopy in , states both were normal. On Plavix and ASA for CAD with stent. Occasionally takes ibuprofen and oxycodone for RA flares. States weight fluctuates based on if she is not feeling well and is not eating a lot. Denies dysphagia, diarrhea, melena, brbpr, changes in bowel habits.         Allergies:  lactose (Rash)  No Known Allergies      Home Medications:    Hospital Medications:  acetaminophen     Tablet .. 650 milliGRAM(s) Oral every 6 hours PRN  ALBUTerol    90 MICROgram(s) HFA Inhaler 2 Puff(s) Inhalation every 6 hours PRN  ALPRAZolam 1 milliGRAM(s) Oral three times a day PRN  aluminum hydroxide/magnesium hydroxide/simethicone Suspension 30 milliLiter(s) Oral every 4 hours PRN  atorvastatin 80 milliGRAM(s) Oral at bedtime  budesonide 160 MICROgram(s)/formoterol 4.5 MICROgram(s) Inhaler 2 Puff(s) Inhalation two times a day  carvedilol 3.125 milliGRAM(s) Oral every 12 hours  clopidogrel Tablet 75 milliGRAM(s) Oral daily  enoxaparin Injectable 40 milliGRAM(s) SubCutaneous daily  famotidine    Tablet 20 milliGRAM(s) Oral daily  hydrocortisone 10 milliGRAM(s) Oral <User Schedule>  hydrocortisone 5 milliGRAM(s) Oral <User Schedule>  losartan 25 milliGRAM(s) Oral daily  melatonin 3 milliGRAM(s) Oral at bedtime PRN  montelukast 10 milliGRAM(s) Oral daily  morphine  - Injectable 2 milliGRAM(s) IV Push every 6 hours PRN  ondansetron Injectable 4 milliGRAM(s) IV Push every 8 hours PRN  oxyCODONE    IR 5 milliGRAM(s) Oral every 6 hours PRN  polyethylene glycol 3350 17 Gram(s) Oral daily  sucralfate suspension 1 Gram(s) Oral four times a day  zolpidem 5 milliGRAM(s) Oral at bedtime PRN  zolpidem 5 milliGRAM(s) Oral at bedtime PRN      PMHX/PSHX:  Anxiety    Hypercholesteremia    Osteoporosis    Pacemaker    CAD (coronary artery disease)    COPD (chronic obstructive pulmonary disease)    Congestive heart failure    Ventricular arrhythmia    CVA (cerebral vascular accident) X2, right sided weakness    Cataract Surgery    Hand Surgery        Family history:  No pertinent family history in first degree relatives    No pertinent family history in first degree relatives        Social History:   Denies ethanol use.  Denies illicit drug use.    ROS:     General:  No wt loss, fevers, chills, night sweats, fatigue,   Eyes:  Good vision, no reported pain  ENT:  No sore throat, pain, runny nose, dysphagia  CV:  No pain, palpitations, hypo/hypertension  Resp:  No dyspnea, cough, tachypnea, wheezing  GI:  See HPI  :  No pain, bleeding, incontinence, nocturia  Muscle:  No pain, weakness  Neuro:  No weakness, tingling, memory problems  Psych:  No fatigue, insomnia, mood problems, depression  Endocrine:  No polyuria, polydipsia, cold/heat intolerance  Heme:  No petechiae, ecchymosis, easy bruisability  Integumentary:  No rash, edema      PHYSICAL EXAM:     GENERAL:  Appears stated age, well-groomed, well-nourished, no distress  HEENT:  NC/AT,  conjunctivae anicteric, clear and pink,   NECK: supple, trachea midline  CHEST:  Full & symmetric excursion, no increased effort, breath sounds clear  HEART:  Regular rhythm, no JVD  ABDOMEN:  Soft, tender to diffuse lower abdomen more so to llq, non-distended, normoactive bowel sounds,  no masses , no hepatosplenomegaly  EXTREMITIES:  no cyanosis,clubbing or edema  SKIN:  No rash, erythema, or, ecchymoses, no jaundice  NEURO:  Alert, non-focal, no asterixis  PSYCH: Appropriate affect, oriented to place and time  RECTAL: Deferred      Vital Signs:  Vital Signs Last 24 Hrs  T(C): 37.9 (2022 10:43), Max: 37.9 (2022 10:43)  T(F): 100.3 (2022 10:43), Max: 100.3 (2022 10:43)  HR: 60 (2022 10:43) (60 - 68)  BP: 125/74 (2022 10:43) (119/61 - 134/76)  BP(mean): --  RR: 18 (2022 10:43) (18 - 20)  SpO2: 98% (2022 10:43) (95% - 100%)  Daily     Daily     LABS: Labs personally reviewed by me:                        12.2   6.59  )-----------( 251      ( 10 Zeke 2022 15:45 )             37.4     01-10    139  |  105  |  7   ----------------------------<  88  3.7   |  20<L>  |  1.29    Ca    9.4      10 Zeke 2022 17:53    TPro  7.7  /  Alb  3.9  /  TBili  0.3  /  DBili  x   /  AST  53<H>  /  ALT  24  /  AlkPhos  44  01-10    LIVER FUNCTIONS - ( 10 Zeke 2022 15:45 )  Alb: 3.9 g/dL / Pro: 7.7 g/dL / ALK PHOS: 44 U/L / ALT: 24 U/L / AST: 53 U/L / GGT: x             Urinalysis Basic - ( 2022 08:10 )    Color: Light Yellow / Appearance: Clear / S.009 / pH: x  Gluc: x / Ketone: Negative  / Bili: Negative / Urobili: Negative   Blood: x / Protein: Negative / Nitrite: Negative   Leuk Esterase: Negative / RBC: x / WBC x   Sq Epi: x / Non Sq Epi: x / Bacteria: x          Imaging personally reviewed by me:           Chief Complaint:  Patient is a 71y old  Female who presents with a chief complaint of abdominal and chest pain (2022 15:33)      Date of service: 22 @ 16:10    HPI:    The patient is a 71 year old female with history of AAA, CVD, CAD s/p stent, ventricular arrythmia s/p ICD/PPM, CHF, COPD, IBD, RA presenting with abdominal pain, chest pain, nausea and vomiting. Patient states abdominal pain, nausea and vomiting started on 22. Seen at [facility] on 22 for her symptoms with CT showing slightly increased size of known infrarenal AAA to 4.6cm, no rupture, no other acute pathology to explain patient's pain. Also told she had a possible UTI and prescribed PO Keflex. On 22, seen for CP at [facility] where she stayed for observation; discharged after negative cardiac workup. Chest pain was midsternal radiating to right side that patient believes was caused by the force of her vomiting. Over past 2 days patient states nausea, vomiting and abdominal pain became worse. Endorses nbnb emesis, last BM was light brown and formed. Endorses abdominal pain that patient describes as "cramping" to lower abdomen, states pain is worse to LLQ. Tolerated 2 full meals today without any nausea or vomiting. States morphine has helped with pain.     History of colonoscopy in  and endoscopy in , states both were normal. On Plavix and ASA for CAD with stent. Occasionally takes ibuprofen and oxycodone for RA flares. States weight fluctuates based on if she is not feeling well and is not eating a lot. Denies dysphagia, diarrhea, melena, brbpr, changes in bowel habits.         Allergies:  lactose (Rash)  No Known Allergies      Home Medications:    Hospital Medications:  acetaminophen     Tablet .. 650 milliGRAM(s) Oral every 6 hours PRN  ALBUTerol    90 MICROgram(s) HFA Inhaler 2 Puff(s) Inhalation every 6 hours PRN  ALPRAZolam 1 milliGRAM(s) Oral three times a day PRN  aluminum hydroxide/magnesium hydroxide/simethicone Suspension 30 milliLiter(s) Oral every 4 hours PRN  atorvastatin 80 milliGRAM(s) Oral at bedtime  budesonide 160 MICROgram(s)/formoterol 4.5 MICROgram(s) Inhaler 2 Puff(s) Inhalation two times a day  carvedilol 3.125 milliGRAM(s) Oral every 12 hours  clopidogrel Tablet 75 milliGRAM(s) Oral daily  enoxaparin Injectable 40 milliGRAM(s) SubCutaneous daily  famotidine    Tablet 20 milliGRAM(s) Oral daily  hydrocortisone 10 milliGRAM(s) Oral <User Schedule>  hydrocortisone 5 milliGRAM(s) Oral <User Schedule>  losartan 25 milliGRAM(s) Oral daily  melatonin 3 milliGRAM(s) Oral at bedtime PRN  montelukast 10 milliGRAM(s) Oral daily  morphine  - Injectable 2 milliGRAM(s) IV Push every 6 hours PRN  ondansetron Injectable 4 milliGRAM(s) IV Push every 8 hours PRN  oxyCODONE    IR 5 milliGRAM(s) Oral every 6 hours PRN  polyethylene glycol 3350 17 Gram(s) Oral daily  sucralfate suspension 1 Gram(s) Oral four times a day  zolpidem 5 milliGRAM(s) Oral at bedtime PRN  zolpidem 5 milliGRAM(s) Oral at bedtime PRN      PMHX/PSHX:  Anxiety    Hypercholesteremia    Osteoporosis    Pacemaker    CAD (coronary artery disease)    COPD (chronic obstructive pulmonary disease)    Congestive heart failure    Ventricular arrhythmia    CVA (cerebral vascular accident) X2, right sided weakness    Cataract Surgery    Hand Surgery        Family history:  No pertinent family history in first degree relatives    No pertinent family history in first degree relatives        Social History:   Denies ethanol use.  Denies illicit drug use.    ROS:     General:  No wt loss, fevers, chills, night sweats, fatigue,   Eyes:  Good vision, no reported pain  ENT:  No sore throat, pain, runny nose, dysphagia  CV:  No pain, palpitations, hypo/hypertension  Resp:  No dyspnea, cough, tachypnea, wheezing  GI:  See HPI  :  No pain, bleeding, incontinence, nocturia  Muscle:  No pain, weakness  Neuro:  No weakness, tingling, memory problems  Psych:  No fatigue, insomnia, mood problems, depression  Endocrine:  No polyuria, polydipsia, cold/heat intolerance  Heme:  No petechiae, ecchymosis, easy bruisability  Integumentary:  No rash, edema      PHYSICAL EXAM:     GENERAL:  Appears stated age, well-groomed, well-nourished, no distress  HEENT:  NC/AT,  conjunctivae anicteric, clear and pink,   NECK: supple, trachea midline  CHEST:  Full & symmetric excursion, no increased effort, breath sounds clear  HEART:  Regular rhythm, no JVD  ABDOMEN:  Soft, tender to diffuse lower abdomen more so to llq, non-distended, normoactive bowel sounds,  no masses , no hepatosplenomegaly  EXTREMITIES:  no cyanosis,clubbing or edema  SKIN:  No rash, erythema, or, ecchymoses, no jaundice  NEURO:  Alert, non-focal, no asterixis  PSYCH: Appropriate affect, oriented to place and time  RECTAL: Deferred      Vital Signs:  Vital Signs Last 24 Hrs  T(C): 37.9 (2022 10:43), Max: 37.9 (2022 10:43)  T(F): 100.3 (2022 10:43), Max: 100.3 (2022 10:43)  HR: 60 (2022 10:43) (60 - 68)  BP: 125/74 (2022 10:43) (119/61 - 134/76)  BP(mean): --  RR: 18 (2022 10:43) (18 - 20)  SpO2: 98% (2022 10:43) (95% - 100%)  Daily     Daily     LABS: Labs personally reviewed by me:                        12.2   6.59  )-----------( 251      ( 10 Zeke 2022 15:45 )             37.4     01-10    139  |  105  |  7   ----------------------------<  88  3.7   |  20<L>  |  1.29    Ca    9.4      10 Zeke 2022 17:53    TPro  7.7  /  Alb  3.9  /  TBili  0.3  /  DBili  x   /  AST  53<H>  /  ALT  24  /  AlkPhos  44  01-10    LIVER FUNCTIONS - ( 10 Zeke 2022 15:45 )  Alb: 3.9 g/dL / Pro: 7.7 g/dL / ALK PHOS: 44 U/L / ALT: 24 U/L / AST: 53 U/L / GGT: x             Urinalysis Basic - ( 2022 08:10 )    Color: Light Yellow / Appearance: Clear / S.009 / pH: x  Gluc: x / Ketone: Negative  / Bili: Negative / Urobili: Negative   Blood: x / Protein: Negative / Nitrite: Negative   Leuk Esterase: Negative / RBC: x / WBC x   Sq Epi: x / Non Sq Epi: x / Bacteria: x          Imaging personally reviewed by me:

## 2022-01-11 NOTE — PROGRESS NOTE ADULT - ASSESSMENT
Patient is a 71 year-old-female with a PMHx of AAA, CVA, CAD s/p stent, ventricular arrhythmia s/p ICD/PPM, CHF, COPD, IBD presents with 4-day history of abdominal pain, chest pain and nausea/vomiting, admitted for further cardiac work-up.      #Unstable angina.   --CP resolved after morphine in ED. Trop 6-->8, EKG not ischemic; c/f possible unstable angina vs GERD vs costochondritis   --TTE from 01/10 with EF of 40-45% (improved from TTE from 11/2021 with EF of 20%) and will get cardiology eval for possible stress vs cath   --c/w pain control with tylenol, oxycodone and morphine IVP prn - monitor for toxicity/sedation   --c/w plavix, high intensity statin, BB and ARB (off ASA 2/2 easy bruising per pt report)   --GERD treatment as below  --Patient denies CP, palpitations, SOB or dyspnea at time of visit 01/11. In no distress   --TTE with--The inferolateral wall is akinetic. The anterolateral wall is hypokinetic. F/U cardio recs  --Cardiology eval called; F/U recommendations       #Abdominal pain.   --Pain improves with morphine   --Etiology includes ?UTI vs IBD pain; had CT at OSH showing no acute pathology, non ruptured AAA  -- pain control as above   -- check UA to evaluate for infxn   --GI eval called; F/U recommendations      #Chronic systolic heart failure.   --c/w all GDMT - pt reports being off ASA 2/2 easy bruising  --Cardio consulted; F/U recommendations  --EF on TTE noted to be 40-45%   --C/W losartan and coreg     #ESTEVAN on CKD  --Cr of 1.34 on admission, now 1.29; continue to monitor   --Nephrology consulted; F/U recommendations       #AAA  --Recent us from 11/2021 with --A 4.2 cm distal infrarenal abdominal aortic aneurysm, unchanged from prior ultrasound dated 6/8/2021  --Outpatient F/U       #Adrenal insufficiency.   --c/w hydrocortisone 10mg in 0700 and 5mg at 1600.     #Anxiety.   --c/w xanax 1mg TID prn   --c/w ambien at nighttime.    #GERD   --c/w sucralfate and pepcid.      #COPD  - breathing comfortably on RA, no wheezing; low suspicion for exacerbation   -c/w home BD  -proair prn.      PPX with lovenox for VTE ppx   fall, aspiration precautions. Patient is a 71 year-old-female with a PMHx of AAA, CVA, CAD s/p stent, ventricular arrhythmia s/p ICD/PPM, CHF, COPD, IBD presents with 4-day history of abdominal pain, chest pain and nausea/vomiting, admitted for further cardiac work-up.      #Unstable angina.   --CP resolved after morphine in ED. Trop 6-->8, EKG not ischemic; c/f possible unstable angina vs GERD vs costochondritis   --TTE from 01/10 with EF of 40-45% (improved from TTE from 11/2021 with EF of 20%) and will get cardiology eval for possible stress vs cath   --c/w pain control with tylenol, oxycodone and morphine IVP prn - monitor for toxicity/sedation   --c/w plavix, high intensity statin, BB and ARB (off ASA 2/2 easy bruising per pt report)   --GERD treatment as below  --Patient denies CP, palpitations, SOB or dyspnea at time of visit 01/11. In no distress   --TTE with--The inferolateral wall is akinetic. The anterolateral wall is hypokinetic. F/U cardio recs  --Cardiology eval called; F/U recommendations       #Abdominal pain.   --Pain improves with morphine   --Etiology includes ?UTI vs IBD pain; had CT at OSH showing no acute pathology, non ruptured AAA  -- pain control as above   -- check UA to evaluate for infxn   --GI eval called; F/U recommendations      #Chronic systolic heart failure.   --c/w all GDMT - pt reports being off ASA 2/2 easy bruising  --Cardio consulted; F/U recommendations  --EF on TTE noted to be 40-45%   --C/W losartan and coreg     #ESTEVAN on CKD  --Cr of 1.34 on admission, now 1.29; continue to monitor   --Nephrology consulted; F/U recommendations     #Low grade Fever  --Tmax of 100.2 noted 01/11  --continue to monitor patient closely, monitor CBC and temperature curve  --if fever occurs, check BCx2, UC, CBC, CMP    #Hyperkalemia  --Patient hyperkalemic to 5.8 on arrival, K noted to be 3.7  --Continue to monitor BMP and all electrolytes  closely      #AAA  --Recent us from 11/2021 with --A 4.2 cm distal infrarenal abdominal aortic aneurysm, unchanged from prior ultrasound dated 6/8/2021  --Outpatient F/U       #Adrenal insufficiency.   --c/w hydrocortisone 10mg in 0700 and 5mg at 1600.      #GERD   --c/w sucralfate and pepcid.      #COPD  - breathing comfortably on RA, no wheezing; low suspicion for exacerbation   -c/w home BD  -proair prn.      #Anxiety.   --c/w xanax 1mg TID prn   --c/w ambien at nighttime.      PPX with lovenox for VTE ppx   fall, aspiration precautions.

## 2022-01-11 NOTE — CONSULT NOTE ADULT - ASSESSMENT
72 yo F w/ PMH AAA, CAD s/p stents, ischemic cardiomyopathy s/p ICD/PPM, COPD who presents w/ several days of lower abd pain, NV, and 2x chest pain of unclear etiology.     #Chest pain   Could be 2/2 her emesis which can cause sternal pressure. Trop negative, EKG unrevealing.   CXRAY unrevealing, echo shows improved EF to 40-45%   -No further cardiac eval for now     #Lower abd pain   Unclear etiology, intermittent, had recent abd US which showed stable infrarenal aneursym, less likely her cause   -No need for futher imaging at this time     #CAD   -Clopidogrel 75   -Atorvastatin 80     #HFrEF   -Losartan 25   -Carvedilol 3.125 q12

## 2022-01-11 NOTE — CONSULT NOTE ADULT - SUBJECTIVE AND OBJECTIVE BOX
Patient seen and evaluated at bedside    Chief Complaint:    HPI:  71 year-old-female with history of AAA, CVA, CAD s/p stent, ventricular arrhythmia s/p ICD/PPM, CHF, COPD, IBD presents with 4-day history of abdominal pain, chest pain and nausea/vomiting. Pt states she initially started having abdominal pain, in the lower quadrants worse on the R side, crampy, severe on 1/6/22 - she was seen at OSH for this. At OSH, she had a CT showing slightly increased size if known infrarenal AAA to 4.6cm, no rupture, no other acute pathology to explain pts pain; pt had UA show mod LE and 9 WBC and pt given keflex for possible UTI. She had CP on 1/8/22 for which she was evaluated for at OSH ED, with negative troponin and discharged home. States she has been having worsening nausea/vomiting in the past 24hrs, and then today started having severe CP after a bout of NBNB emesis. Pain is midsternum radiating to the R side, severe 10/10, sharp, +assoc SOB, no associated diaphoresis, lightheadedness palpitations. States morphine has helped significantly with the pain, currently CP free.     Follows with Dr Sanchez for cardiology. (10 Zeke 2022 22:14)    72 yo F w/ PMH AAA, CAD s/p stents, ischemic cardiomyopathy s/p ICD/PPM, COPD who presents w/ several days of lower abd pain, NV, and 2x chest pain. Patient reports since Thursday having intermittent abd pain. Comes and goes at random times. Not associated with eating but notes when she has the pain, she cannot tolerate PO and gets nauseous and has emesis. Emesis often clear, NBNB. She was concerned about her aneurysm and went to [facility] twice; was diagnosed with UTI and started abx but still had pain and was discharged the second time and thought to possibly have diverticulitis although they called her back and said she never really had it. She had an episode of chest pain yesterday which started when she had the abd pain and emesis. Midsternal and started after vomiting. The pain was severe enough that she came into the ED for evaluation. Denies being shocked from device. Denies smoking, drinking, drug use.     In the ED, VSS, given maalox, lidocaine, famotidine, ketorlac, morphine, and 1L NS. Patient notes pain went away after morphine and has not had it since then.     EKG shows a paced rhythm, non-ischemic     ROS:   CONSTITUTIONAL: No fever, weight loss, or fatigue  EYES: No eye pain, visual disturbances, or discharge  ENMT:  No difficulty hearing, tinnitus, vertigo; No sinus or throat pain  RESPIRATORY: Occasional cough at times; denies wheezing, chills or hemoptysis; No shortness of breath  CARDIOVASCULAR: No chest pain, palpitations, dizziness, or leg swelling  GASTROINTESTINAL: No abdominal or epigastric pain. No nausea, vomiting, or hematemesis; No diarrhea or constipation. No melena or hematochezia.  GENITOURINARY: No dysuria, frequency, hematuria, or incontinence  NEUROLOGICAL: No headaches, loss of strength, numbness, or tremors  SKIN: No itching, burning, rashes, or lesions   ENDOCRINE: No heat or cold intolerance; No polydipsia or polyuria  MUSCULOSKELETAL: No joint pain or swelling;   PSYCHIATRIC: Denies depression, anxiety  HEME/LYMPH: No easy bruising, or bleeding gums  ALLERGY AND IMMUNOLOGIC: No hives or eczema      PMHx:   Anxiety    Hypercholesteremia    Osteoporosis    Pacemaker    CAD (coronary artery disease)    COPD (chronic obstructive pulmonary disease)    Congestive heart failure    Ventricular arrhythmia    CVA (cerebral vascular accident) X2, right sided weakness        PSHx:   Cataract Surgery    Hand Surgery        Allergies:  lactose (Rash)  No Known Allergies      Home Meds:    Current Medications:   acetaminophen     Tablet .. 650 milliGRAM(s) Oral every 6 hours PRN  ALBUTerol    90 MICROgram(s) HFA Inhaler 2 Puff(s) Inhalation every 6 hours PRN  ALPRAZolam 1 milliGRAM(s) Oral three times a day PRN  aluminum hydroxide/magnesium hydroxide/simethicone Suspension 30 milliLiter(s) Oral every 4 hours PRN  atorvastatin 80 milliGRAM(s) Oral at bedtime  budesonide 160 MICROgram(s)/formoterol 4.5 MICROgram(s) Inhaler 2 Puff(s) Inhalation two times a day  carvedilol 3.125 milliGRAM(s) Oral every 12 hours  clopidogrel Tablet 75 milliGRAM(s) Oral daily  enoxaparin Injectable 40 milliGRAM(s) SubCutaneous daily  famotidine    Tablet 20 milliGRAM(s) Oral daily  hydrocortisone 10 milliGRAM(s) Oral <User Schedule>  hydrocortisone 5 milliGRAM(s) Oral <User Schedule>  losartan 25 milliGRAM(s) Oral daily  melatonin 3 milliGRAM(s) Oral at bedtime PRN  montelukast 10 milliGRAM(s) Oral daily  morphine  - Injectable 2 milliGRAM(s) IV Push every 6 hours PRN  ondansetron Injectable 4 milliGRAM(s) IV Push every 8 hours PRN  oxyCODONE    IR 5 milliGRAM(s) Oral every 6 hours PRN  sucralfate suspension 1 Gram(s) Oral four times a day  zolpidem 5 milliGRAM(s) Oral at bedtime PRN  zolpidem 5 milliGRAM(s) Oral at bedtime PRN      FAMILY HISTORY:  No pertinent family history in first degree relatives        Social History:  Smoking History:  Alcohol Use:  Drug Use:    REVIEW OF SYSTEMS:  Constitutional:     [x ] negative [ ] fevers [ ] chills [ ] weight loss [ ] weight gain  HEENT:                  [x ] negative [ ] dry eyes [ ] eye irritation [ ] postnasal drip [ ] nasal congestion  CV:                         [ x] negative  [ ] chest pain [ ] orthopnea [ ] palpitations [ ] murmur  Resp:                     [x ] negative [ ] cough [ ] shortness of breath [ ] dyspnea [ ] wheezing [ ] sputum [ ]hemoptysis  GI:                          [ x] negative [ ] nausea [ ] vomiting [ ] diarrhea [ ] constipation [ ] abd pain [ ] dysphagia   :                        [ x] negative [ ] dysuria [ ] nocturia [ ] hematuria [ ] increased urinary frequency  Musculoskeletal: [x ] negative [ ] back pain [ ] myalgias [ ] arthralgias [ ] fracture  Skin:                       [ x] negative [ ] rash [ ] itch  Neurological:        [ x] negative [ ] headache [ ] dizziness [ ] syncope [ ] weakness [ ] numbness  Psychiatric:           [ x] negative [ ] anxiety [ ] depression  Endocrine:            [ x] negative [ ] diabetes [ ] thyroid problem  Heme/Lymph:      [ x] negative [ ] anemia [ ] bleeding problem  Allergic/Immune: [ x] negative [ ] itchy eyes [ ] nasal discharge [ ] hives [ ] angioedema    [ x] All other systems negative  [ ] Unable to assess ROS due to      Physical Exam:  T(F): 100.3 (01-11), Max: 100.3 (01-11)  HR: 60 (01-11) (60 - 74)  BP: 125/74 (01-11) (119/61 - 176/107)  RR: 18 (01-11)  SpO2: 98% (01-11)  GENERAL: No acute distress   HEAD:  Atraumatic, Normocephalic  ENT: EOMI, conjunctiva and sclera clear, Neck supple, No JVD, moist mucosa  CHEST/LUNG: Clear to auscultation bilaterally; No wheeze, equal breath sounds bilaterally   HEART: Regular rate and rhythm; No murmurs, rubs, or gallops  ABDOMEN: Soft, Nontender, Nondistended   EXTREMITIES:  No clubbing, cyanosis, or edema  PSYCH: Nl behavior, nl affect  NEUROLOGY: AAOx3, non-focal   SKIN: Normal color, No rashes or lesions  LINES:    Cardiovascular Diagnostic Testing:    ECG: Personally reviewed:    Echo: Personally reviewed:    Stress Testing:    Cath:    Imaging:    CXR: Personally reviewed    Labs: Personally reviewed                        12.2   6.59  )-----------( 251      ( 10 Zeke 2022 15:45 )             37.4     01-10    139  |  105  |  7   ----------------------------<  88  3.7   |  20<L>  |  1.29    Ca    9.4      10 Zeke 2022 17:53    TPro  7.7  /  Alb  3.9  /  TBili  0.3  /  DBili  x   /  AST  53<H>  /  ALT  24  /  AlkPhos  44  01-10             Patient seen and evaluated at bedside    Chief Complaint:    HPI:  71 year-old-female with history of AAA, CVA, CAD s/p stent, ventricular arrhythmia s/p ICD/PPM, CHF, COPD, IBD presents with 4-day history of abdominal pain, chest pain and nausea/vomiting. Pt states she initially started having abdominal pain, in the lower quadrants worse on the R side, crampy, severe on 1/6/22 - she was seen at OSH for this. At OSH, she had a CT showing slightly increased size if known infrarenal AAA to 4.6cm, no rupture, no other acute pathology to explain pts pain; pt had UA show mod LE and 9 WBC and pt given keflex for possible UTI. She had CP on 1/8/22 for which she was evaluated for at OSH ED, with negative troponin and discharged home. States she has been having worsening nausea/vomiting in the past 24hrs, and then today started having severe CP after a bout of NBNB emesis. Pain is midsternum radiating to the R side, severe 10/10, sharp, +assoc SOB, no associated diaphoresis, lightheadedness palpitations. States morphine has helped significantly with the pain, currently CP free.     Follows with Dr Sanchez for cardiology. (10 Zeke 2022 22:14)    72 yo F w/ PMH AAA, CAD s/p stents, ischemic cardiomyopathy s/p ICD/PPM, COPD who presents w/ several days of lower abd pain, NV, and 2x chest pain. Patient reports since Thursday having intermittent abd pain. Comes and goes at random times. Not associated with eating but notes when she has the pain, she cannot tolerate PO and gets nauseous and has emesis. Emesis often clear, NBNB. She was concerned about her aneurysm and went to [facility] twice; was diagnosed with UTI and started abx but still had pain and was discharged the second time and thought to possibly have diverticulitis although they called her back and said she never really had it. She had an episode of chest pain yesterday which started when she had the abd pain and emesis. Midsternal and started after vomiting. The pain was severe enough that she came into the ED for evaluation. Denies being shocked from device. Denies smoking, drinking, drug use.     In the ED, VSS, given maalox, lidocaine, famotidine, ketorlac, morphine, and 1L NS. Patient notes pain went away after morphine and has not had it since then.     EKG shows a paced rhythm, non-specific T wave inversions in lateral and septal leads     ROS:   CONSTITUTIONAL: No fever, weight loss, or fatigue  EYES: No eye pain, visual disturbances, or discharge  ENMT:  No difficulty hearing, tinnitus, vertigo; No sinus or throat pain  RESPIRATORY: Occasional cough at times; denies wheezing, chills or hemoptysis; No shortness of breath  CARDIOVASCULAR: No chest pain, palpitations, dizziness, or leg swelling  GASTROINTESTINAL: No abdominal or epigastric pain. No nausea, vomiting, or hematemesis; No diarrhea or constipation. No melena or hematochezia.  GENITOURINARY: No dysuria, frequency, hematuria, or incontinence  NEUROLOGICAL: No headaches, loss of strength, numbness, or tremors  SKIN: No itching, burning, rashes, or lesions   ENDOCRINE: No heat or cold intolerance; No polydipsia or polyuria  MUSCULOSKELETAL: No joint pain or swelling;   PSYCHIATRIC: Denies depression, anxiety  HEME/LYMPH: No easy bruising, or bleeding gums  ALLERGY AND IMMUNOLOGIC: No hives or eczema      PMHx:   Anxiety    Hypercholesteremia    Osteoporosis    Pacemaker    CAD (coronary artery disease)    COPD (chronic obstructive pulmonary disease)    Congestive heart failure    Ventricular arrhythmia    CVA (cerebral vascular accident) X2, right sided weakness        PSHx:   Cataract Surgery    Hand Surgery        Allergies:  lactose (Rash)  No Known Allergies      Home Meds:    Current Medications:   acetaminophen     Tablet .. 650 milliGRAM(s) Oral every 6 hours PRN  ALBUTerol    90 MICROgram(s) HFA Inhaler 2 Puff(s) Inhalation every 6 hours PRN  ALPRAZolam 1 milliGRAM(s) Oral three times a day PRN  aluminum hydroxide/magnesium hydroxide/simethicone Suspension 30 milliLiter(s) Oral every 4 hours PRN  atorvastatin 80 milliGRAM(s) Oral at bedtime  budesonide 160 MICROgram(s)/formoterol 4.5 MICROgram(s) Inhaler 2 Puff(s) Inhalation two times a day  carvedilol 3.125 milliGRAM(s) Oral every 12 hours  clopidogrel Tablet 75 milliGRAM(s) Oral daily  enoxaparin Injectable 40 milliGRAM(s) SubCutaneous daily  famotidine    Tablet 20 milliGRAM(s) Oral daily  hydrocortisone 10 milliGRAM(s) Oral <User Schedule>  hydrocortisone 5 milliGRAM(s) Oral <User Schedule>  losartan 25 milliGRAM(s) Oral daily  melatonin 3 milliGRAM(s) Oral at bedtime PRN  montelukast 10 milliGRAM(s) Oral daily  morphine  - Injectable 2 milliGRAM(s) IV Push every 6 hours PRN  ondansetron Injectable 4 milliGRAM(s) IV Push every 8 hours PRN  oxyCODONE    IR 5 milliGRAM(s) Oral every 6 hours PRN  sucralfate suspension 1 Gram(s) Oral four times a day  zolpidem 5 milliGRAM(s) Oral at bedtime PRN  zolpidem 5 milliGRAM(s) Oral at bedtime PRN      FAMILY HISTORY:  No pertinent family history in first degree relatives        Social History:  Smoking History:  Alcohol Use:  Drug Use:    REVIEW OF SYSTEMS:  Constitutional:     [x ] negative [ ] fevers [ ] chills [ ] weight loss [ ] weight gain  HEENT:                  [x ] negative [ ] dry eyes [ ] eye irritation [ ] postnasal drip [ ] nasal congestion  CV:                         [ x] negative  [ ] chest pain [ ] orthopnea [ ] palpitations [ ] murmur  Resp:                     [x ] negative [ ] cough [ ] shortness of breath [ ] dyspnea [ ] wheezing [ ] sputum [ ]hemoptysis  GI:                          [ x] negative [ ] nausea [ ] vomiting [ ] diarrhea [ ] constipation [ ] abd pain [ ] dysphagia   :                        [ x] negative [ ] dysuria [ ] nocturia [ ] hematuria [ ] increased urinary frequency  Musculoskeletal: [x ] negative [ ] back pain [ ] myalgias [ ] arthralgias [ ] fracture  Skin:                       [ x] negative [ ] rash [ ] itch  Neurological:        [ x] negative [ ] headache [ ] dizziness [ ] syncope [ ] weakness [ ] numbness  Psychiatric:           [ x] negative [ ] anxiety [ ] depression  Endocrine:            [ x] negative [ ] diabetes [ ] thyroid problem  Heme/Lymph:      [ x] negative [ ] anemia [ ] bleeding problem  Allergic/Immune: [ x] negative [ ] itchy eyes [ ] nasal discharge [ ] hives [ ] angioedema    [ x] All other systems negative  [ ] Unable to assess ROS due to      Physical Exam:  T(F): 100.3 (01-11), Max: 100.3 (01-11)  HR: 60 (01-11) (60 - 74)  BP: 125/74 (01-11) (119/61 - 176/107)  RR: 18 (01-11)  SpO2: 98% (01-11)  GENERAL: No acute distress   HEAD:  Atraumatic, Normocephalic  ENT: EOMI, conjunctiva and sclera clear, Neck supple, No JVD, moist mucosa  CHEST/LUNG: Clear to auscultation bilaterally; No wheeze, equal breath sounds bilaterally   HEART: Regular rate and rhythm; No murmurs, rubs, or gallops  ABDOMEN: Soft, Nontender, Nondistended   EXTREMITIES:  No clubbing, cyanosis, or edema  PSYCH: Nl behavior, nl affect  NEUROLOGY: AAOx3, non-focal   SKIN: Normal color, No rashes or lesions  LINES:    Cardiovascular Diagnostic Testing:    ECG: Personally reviewed:    Echo: Personally reviewed:    Stress Testing:    Cath:    Imaging:    CXR: Personally reviewed    Labs: Personally reviewed                        12.2   6.59  )-----------( 251      ( 10 Zeke 2022 15:45 )             37.4     01-10    139  |  105  |  7   ----------------------------<  88  3.7   |  20<L>  |  1.29    Ca    9.4      10 Zeke 2022 17:53    TPro  7.7  /  Alb  3.9  /  TBili  0.3  /  DBili  x   /  AST  53<H>  /  ALT  24  /  AlkPhos  44  01-10             Patient seen and evaluated at bedside    Chief Complaint:    HPI:  71 year-old-female with history of AAA, CVA, CAD s/p stent, ventricular arrhythmia s/p ICD/PPM, CHF, COPD, IBD presents with 4-day history of abdominal pain, chest pain and nausea/vomiting. Pt states she initially started having abdominal pain, in the lower quadrants worse on the R side, crampy, severe on 1/6/22 - she was seen at OSH for this. At OSH, she had a CT showing slightly increased size if known infrarenal AAA to 4.6cm, no rupture, no other acute pathology to explain pts pain; pt had UA show mod LE and 9 WBC and pt given keflex for possible UTI. She had CP on 1/8/22 for which she was evaluated for at OSH ED, with negative troponin and discharged home. States she has been having worsening nausea/vomiting in the past 24hrs, and then today started having severe CP after a bout of NBNB emesis. Pain is midsternum radiating to the R side, severe 10/10, sharp, +assoc SOB, no associated diaphoresis, lightheadedness palpitations. States morphine has helped significantly with the pain, currently CP free.     Follows with Dr Sanchez for cardiology. (10 Zeke 2022 22:14)    72 yo F w/ PMH AAA, CAD s/p stents, ischemic cardiomyopathy s/p ICD/PPM, COPD who presents w/ several days of lower abd pain, NV, and 2x chest pain. Patient reports since Thursday having intermittent abd pain. Comes and goes at random times. Not associated with eating but notes when she has the pain, she cannot tolerate PO and gets nauseous and has emesis. Emesis often clear, NBNB. She was concerned about her aneurysm and went to [facility] twice; was diagnosed with UTI and started abx but still had pain and was discharged the second time and thought to possibly have diverticulitis although they called her back and said she never really had it. She had an episode of chest pain yesterday which started when she had the abd pain and emesis. Midsternal and started after vomiting. The pain was severe enough that she came into the ED for evaluation. Denies being shocked from device. Denies smoking, drinking, drug use.     In the ED, VSS, given maalox, lidocaine, famotidine, ketorlac, morphine, and 1L NS. Patient notes pain went away after morphine and has not had it since then.     EKG shows a paced rhythm, non-specific T wave inversions in lateral and septal leads     ROS:   CONSTITUTIONAL: No fever, weight loss, or fatigue  EYES: No eye pain, visual disturbances, or discharge  ENMT:  No difficulty hearing, tinnitus, vertigo; No sinus or throat pain  RESPIRATORY: Occasional cough at times; denies wheezing, chills or hemoptysis; No shortness of breath  CARDIOVASCULAR: No chest pain, palpitations, dizziness, or leg swelling  GASTROINTESTINAL: No abdominal or epigastric pain. No nausea, vomiting, or hematemesis; No diarrhea or constipation. No melena or hematochezia.  GENITOURINARY: No dysuria, frequency, hematuria, or incontinence  NEUROLOGICAL: No headaches, loss of strength, numbness, or tremors  SKIN: No itching, burning, rashes, or lesions   ENDOCRINE: No heat or cold intolerance; No polydipsia or polyuria  MUSCULOSKELETAL: No joint pain or swelling;   PSYCHIATRIC: Denies depression, anxiety  HEME/LYMPH: No easy bruising, or bleeding gums  ALLERGY AND IMMUNOLOGIC: No hives or eczema      PMHx:   Anxiety    Hypercholesteremia    Osteoporosis    Pacemaker    CAD (coronary artery disease)    COPD (chronic obstructive pulmonary disease)    Congestive heart failure    Ventricular arrhythmia    CVA (cerebral vascular accident) X2, right sided weakness        PSHx:   Cataract Surgery    Hand Surgery        Allergies:  lactose (Rash)  No Known Allergies      Home Meds:    Current Medications:   acetaminophen     Tablet .. 650 milliGRAM(s) Oral every 6 hours PRN  ALBUTerol    90 MICROgram(s) HFA Inhaler 2 Puff(s) Inhalation every 6 hours PRN  ALPRAZolam 1 milliGRAM(s) Oral three times a day PRN  aluminum hydroxide/magnesium hydroxide/simethicone Suspension 30 milliLiter(s) Oral every 4 hours PRN  atorvastatin 80 milliGRAM(s) Oral at bedtime  budesonide 160 MICROgram(s)/formoterol 4.5 MICROgram(s) Inhaler 2 Puff(s) Inhalation two times a day  carvedilol 3.125 milliGRAM(s) Oral every 12 hours  clopidogrel Tablet 75 milliGRAM(s) Oral daily  enoxaparin Injectable 40 milliGRAM(s) SubCutaneous daily  famotidine    Tablet 20 milliGRAM(s) Oral daily  hydrocortisone 10 milliGRAM(s) Oral <User Schedule>  hydrocortisone 5 milliGRAM(s) Oral <User Schedule>  losartan 25 milliGRAM(s) Oral daily  melatonin 3 milliGRAM(s) Oral at bedtime PRN  montelukast 10 milliGRAM(s) Oral daily  morphine  - Injectable 2 milliGRAM(s) IV Push every 6 hours PRN  ondansetron Injectable 4 milliGRAM(s) IV Push every 8 hours PRN  oxyCODONE    IR 5 milliGRAM(s) Oral every 6 hours PRN  sucralfate suspension 1 Gram(s) Oral four times a day  zolpidem 5 milliGRAM(s) Oral at bedtime PRN  zolpidem 5 milliGRAM(s) Oral at bedtime PRN      FAMILY HISTORY:  No pertinent family history in first degree relatives        Social History:  Smoking History:  Alcohol Use:  Drug Use:    REVIEW OF SYSTEMS:  Constitutional:     [x ] negative [ ] fevers [ ] chills [ ] weight loss [ ] weight gain  HEENT:                  [x ] negative [ ] dry eyes [ ] eye irritation [ ] postnasal drip [ ] nasal congestion  CV:                         [ x] negative  [ ] chest pain [ ] orthopnea [ ] palpitations [ ] murmur  Resp:                     [x ] negative [ ] cough [ ] shortness of breath [ ] dyspnea [ ] wheezing [ ] sputum [ ]hemoptysis  GI:                          [ x] negative [ ] nausea [ ] vomiting [ ] diarrhea [ ] constipation [ ] abd pain [ ] dysphagia   :                        [ x] negative [ ] dysuria [ ] nocturia [ ] hematuria [ ] increased urinary frequency  Musculoskeletal: [x ] negative [ ] back pain [ ] myalgias [ ] arthralgias [ ] fracture  Skin:                       [ x] negative [ ] rash [ ] itch  Neurological:        [ x] negative [ ] headache [ ] dizziness [ ] syncope [ ] weakness [ ] numbness  Psychiatric:           [ x] negative [ ] anxiety [ ] depression  Endocrine:            [ x] negative [ ] diabetes [ ] thyroid problem  Heme/Lymph:      [ x] negative [ ] anemia [ ] bleeding problem  Allergic/Immune: [ x] negative [ ] itchy eyes [ ] nasal discharge [ ] hives [ ] angioedema    [ x] All other systems negative  [ ] Unable to assess ROS due to      Physical Exam:  T(F): 100.3 (01-11), Max: 100.3 (01-11)  HR: 60 (01-11) (60 - 74)  BP: 125/74 (01-11) (119/61 - 176/107)  RR: 18 (01-11)  SpO2: 98% (01-11)  GENERAL: No acute distress   HEAD:  Atraumatic, Normocephalic  ENT: EOMI, conjunctiva and sclera clear, Neck supple, No JVD, moist mucosa  CHEST/LUNG: Clear to auscultation bilaterally; No wheeze, equal breath sounds bilaterally   HEART: Regular rate and rhythm; No murmurs, rubs, or gallops  ABDOMEN: Soft, Nontender, Nondistended   EXTREMITIES:  No clubbing, cyanosis, or edema  PSYCH: Nl behavior, nl affect  NEUROLOGY: AAOx3, non-focal   SKIN: Normal color, No rashes or lesions  LINES:    Cardiovascular Diagnostic Testing:    ECG: atrial paced, ST-T wave abnormality precordial leads.    Echo: improved LV function overall, but unchanged inferolateral akinesis.    Stress Testing:    Cath:    Imaging:    CXR: Personally reviewed    Labs: Personally reviewed                        12.2   6.59  )-----------( 251      ( 10 Zeke 2022 15:45 )             37.4     01-10    139  |  105  |  7   ----------------------------<  88  3.7   |  20<L>  |  1.29    Ca    9.4      10 Zeke 2022 17:53    TPro  7.7  /  Alb  3.9  /  TBili  0.3  /  DBili  x   /  AST  53<H>  /  ALT  24  /  AlkPhos  44  01-10

## 2022-01-12 LAB
ANION GAP SERPL CALC-SCNC: 9 MMOL/L — SIGNIFICANT CHANGE UP (ref 5–17)
BUN SERPL-MCNC: 9 MG/DL — SIGNIFICANT CHANGE UP (ref 7–23)
CALCIUM SERPL-MCNC: 9.2 MG/DL — SIGNIFICANT CHANGE UP (ref 8.4–10.5)
CHLORIDE SERPL-SCNC: 105 MMOL/L — SIGNIFICANT CHANGE UP (ref 96–108)
CO2 SERPL-SCNC: 27 MMOL/L — SIGNIFICANT CHANGE UP (ref 22–31)
CREAT SERPL-MCNC: 1.1 MG/DL — SIGNIFICANT CHANGE UP (ref 0.5–1.3)
GLUCOSE SERPL-MCNC: 89 MG/DL — SIGNIFICANT CHANGE UP (ref 70–99)
HCT VFR BLD CALC: 34.2 % — LOW (ref 34.5–45)
HGB BLD-MCNC: 11 G/DL — LOW (ref 11.5–15.5)
MCHC RBC-ENTMCNC: 32.2 GM/DL — SIGNIFICANT CHANGE UP (ref 32–36)
MCHC RBC-ENTMCNC: 32.8 PG — SIGNIFICANT CHANGE UP (ref 27–34)
MCV RBC AUTO: 102.1 FL — HIGH (ref 80–100)
NRBC # BLD: 0 /100 WBCS — SIGNIFICANT CHANGE UP (ref 0–0)
PLATELET # BLD AUTO: 218 K/UL — SIGNIFICANT CHANGE UP (ref 150–400)
POTASSIUM SERPL-MCNC: 3.9 MMOL/L — SIGNIFICANT CHANGE UP (ref 3.5–5.3)
POTASSIUM SERPL-SCNC: 3.9 MMOL/L — SIGNIFICANT CHANGE UP (ref 3.5–5.3)
RBC # BLD: 3.35 M/UL — LOW (ref 3.8–5.2)
RBC # FLD: 15.1 % — HIGH (ref 10.3–14.5)
SODIUM SERPL-SCNC: 141 MMOL/L — SIGNIFICANT CHANGE UP (ref 135–145)
WBC # BLD: 6.05 K/UL — SIGNIFICANT CHANGE UP (ref 3.8–10.5)
WBC # FLD AUTO: 6.05 K/UL — SIGNIFICANT CHANGE UP (ref 3.8–10.5)

## 2022-01-12 PROCEDURE — 99233 SBSQ HOSP IP/OBS HIGH 50: CPT | Mod: GC

## 2022-01-12 PROCEDURE — 93975 VASCULAR STUDY: CPT | Mod: 26

## 2022-01-12 RX ADMIN — CLOPIDOGREL BISULFATE 75 MILLIGRAM(S): 75 TABLET, FILM COATED ORAL at 11:03

## 2022-01-12 RX ADMIN — POLYETHYLENE GLYCOL 3350 17 GRAM(S): 17 POWDER, FOR SOLUTION ORAL at 11:03

## 2022-01-12 RX ADMIN — Medication 1 GRAM(S): at 18:36

## 2022-01-12 RX ADMIN — CARVEDILOL PHOSPHATE 3.12 MILLIGRAM(S): 80 CAPSULE, EXTENDED RELEASE ORAL at 17:18

## 2022-01-12 RX ADMIN — BUDESONIDE AND FORMOTEROL FUMARATE DIHYDRATE 2 PUFF(S): 160; 4.5 AEROSOL RESPIRATORY (INHALATION) at 05:27

## 2022-01-12 RX ADMIN — MORPHINE SULFATE 2 MILLIGRAM(S): 50 CAPSULE, EXTENDED RELEASE ORAL at 00:00

## 2022-01-12 RX ADMIN — LOSARTAN POTASSIUM 25 MILLIGRAM(S): 100 TABLET, FILM COATED ORAL at 05:27

## 2022-01-12 RX ADMIN — Medication 1 GRAM(S): at 05:27

## 2022-01-12 RX ADMIN — Medication 10 MILLIGRAM(S): at 06:05

## 2022-01-12 RX ADMIN — ZOLPIDEM TARTRATE 5 MILLIGRAM(S): 10 TABLET ORAL at 21:01

## 2022-01-12 RX ADMIN — MONTELUKAST 10 MILLIGRAM(S): 4 TABLET, CHEWABLE ORAL at 11:03

## 2022-01-12 RX ADMIN — Medication 5 MILLIGRAM(S): at 18:36

## 2022-01-12 RX ADMIN — MORPHINE SULFATE 2 MILLIGRAM(S): 50 CAPSULE, EXTENDED RELEASE ORAL at 20:31

## 2022-01-12 RX ADMIN — MORPHINE SULFATE 2 MILLIGRAM(S): 50 CAPSULE, EXTENDED RELEASE ORAL at 21:01

## 2022-01-12 RX ADMIN — Medication 1 GRAM(S): at 21:01

## 2022-01-12 RX ADMIN — MORPHINE SULFATE 2 MILLIGRAM(S): 50 CAPSULE, EXTENDED RELEASE ORAL at 11:28

## 2022-01-12 RX ADMIN — Medication 1 GRAM(S): at 11:03

## 2022-01-12 RX ADMIN — MORPHINE SULFATE 2 MILLIGRAM(S): 50 CAPSULE, EXTENDED RELEASE ORAL at 11:03

## 2022-01-12 RX ADMIN — CARVEDILOL PHOSPHATE 3.12 MILLIGRAM(S): 80 CAPSULE, EXTENDED RELEASE ORAL at 05:27

## 2022-01-12 RX ADMIN — Medication 1 MILLIGRAM(S): at 21:00

## 2022-01-12 RX ADMIN — FAMOTIDINE 20 MILLIGRAM(S): 10 INJECTION INTRAVENOUS at 11:03

## 2022-01-12 RX ADMIN — ATORVASTATIN CALCIUM 80 MILLIGRAM(S): 80 TABLET, FILM COATED ORAL at 21:01

## 2022-01-12 NOTE — PROGRESS NOTE ADULT - SUBJECTIVE AND OBJECTIVE BOX
Patient seen and examined  no complaints    lactose (Rash)  No Known Allergies    Hospital Medications:   MEDICATIONS  (STANDING):  atorvastatin 80 milliGRAM(s) Oral at bedtime  budesonide 160 MICROgram(s)/formoterol 4.5 MICROgram(s) Inhaler 2 Puff(s) Inhalation two times a day  carvedilol 3.125 milliGRAM(s) Oral every 12 hours  clopidogrel Tablet 75 milliGRAM(s) Oral daily  enoxaparin Injectable 40 milliGRAM(s) SubCutaneous daily  famotidine    Tablet 20 milliGRAM(s) Oral daily  hydrocortisone 10 milliGRAM(s) Oral <User Schedule>  hydrocortisone 5 milliGRAM(s) Oral <User Schedule>  losartan 25 milliGRAM(s) Oral daily  montelukast 10 milliGRAM(s) Oral daily  polyethylene glycol 3350 17 Gram(s) Oral daily  sucralfate suspension 1 Gram(s) Oral four times a day        VITALS:  T(F): 98 (22 @ 11:36), Max: 98.1 (22 @ 20:16)  HR: 82 (22 @ 11:36)  BP: 127/80 (22 @ 11:36)  RR: 18 (22 @ 11:36)  SpO2: 95% (22 @ 11:36)  Wt(kg): --     @ 07: @ 07:00  --------------------------------------------------------  IN: 790 mL / OUT: 0 mL / NET: 790 mL     @ 07:01  -   @ 14:53  --------------------------------------------------------  IN: 0 mL / OUT: 0 mL / NET: 0 mL        HYSICAL EXAM:  Constitutional: NAD  HEENT: anicteric sclera, oropharynx clear, MMM  Neck: No JVD  Respiratory: CTAB, no wheezes, rales or rhonchi  Cardiovascular: S1, S2, RRR  Gastrointestinal: BS+, soft, NT/ND  Extremities: No cyanosis or clubbing. No peripheral edema  Neurological: A/O x 3, no focal deficits  Psychiatric: Normal mood, normal affect  : No CVA tenderness. No brunson.   Skin: No rashes    LABS:      141  |  105  |  9   ----------------------------<  89  3.9   |  27  |  1.10    Ca    9.2      2022 06:55    TPro  7.7  /  Alb  3.9  /  TBili  0.3  /  DBili      /  AST  53<H>  /  ALT  24  /  AlkPhos  44  10    Creatinine Trend: 1.10 <--, 1.29 <--, 1.34 <--                        11.0   6.05  )-----------( 218      ( 2022 06:55 )             34.2     Urine Studies:  Urinalysis Basic - ( 2022 08:10 )    Color: Light Yellow / Appearance: Clear / S.009 / pH:   Gluc:  / Ketone: Negative  / Bili: Negative / Urobili: Negative   Blood:  / Protein: Negative / Nitrite: Negative   Leuk Esterase: Negative / RBC:  / WBC    Sq Epi:  / Non Sq Epi:  / Bacteria:         RADIOLOGY & ADDITIONAL STUDIES:

## 2022-01-12 NOTE — PROGRESS NOTE ADULT - ASSESSMENT
71 year-old-female with history of AAA, CVA, CAD s/p stent, ventricular arrhythmia s/p ICD/PPM, CHF, COPD, IBD presents with 4-day history of abdominal pain, chest pain and nausea/vomiting    1) ESTEVAN on CKD stage 3  Sees my colleague as an output  b/l cr around 1 to 1.1mg/dl  Cr now at baseline  will monitor cr for now  can cont with losartan  trend bmp    2) Hyponatremia- Na stable currently  has been seen in the past for low na  will trend na     Dr Luciano  [phone]

## 2022-01-12 NOTE — PROGRESS NOTE ADULT - ASSESSMENT
The patient is a 71 year old female with history of AAA, CVD, CAD s/p stent, ventricular arrythmia s/p ICD/PPM, CHF, COPD, IBD, RA presenting with abdominal pain, chest pain, nausea and vomiting.    1. abdominal pain, known stable infrarenal aneurysm. Ddx includes resolving viral gastroenteritis, less likely chronic mesenteric ischemia  -pending results of US completed today   -prn pain meds  -pending CT abd and pelvis for right hip pain; ordered by primary team     2. chest pain  -as per cardiology    3. nausea/vomiting; resolved  -prn antiemetics     4. CAD s/p stent on Plavix    5. ESTEVAN on CKD stage 3  - as per nephro      I had a prolonged conversation with the patient regarding the hospital course, differential diagnosis, results of diagnostic tests this far, and therapeutic modalities available. Plan of care discussed with the patient after the evaluation. Patient expresses a clear understanding of the plan of care.  125 minutes spent on the total encounter, of which more than fifty percent of the encounter was spent on counseling and/or coordinating care by the attending physician.  Advanced care planning forms were discussed. Code status including forceful chest compressions, defibrillation and intubation were discussed. The risks benefits and alternatives to pertinent gastrointestinal procedures and interventions were discussed in detail and all questions were answered. Duration: 15 Minutes. The patient is a 71 year old female with history of AAA, CVD, CAD s/p stent, ventricular arrythmia s/p ICD/PPM, CHF, COPD, IBD, RA presenting with abdominal pain, chest pain, nausea and vomiting.    1. abdominal pain, known stable infrarenal aneurysm. Ddx includes resolving viral gastroenteritis, less likely chronic mesenteric ischemia  -pending results of US completed today   -prn pain meds  -pending CT abd and pelvis for right hip pain; ordered by primary team   -no BM today, continue Miralax     2. chest pain  -as per cardiology    3. nausea/vomiting; resolved  -prn antiemetics     4. CAD s/p stent on Plavix    5. ESTEVAN on CKD stage 3  - as per nephro      I had a prolonged conversation with the patient regarding the hospital course, differential diagnosis, results of diagnostic tests this far, and therapeutic modalities available. Plan of care discussed with the patient after the evaluation. Patient expresses a clear understanding of the plan of care.  125 minutes spent on the total encounter, of which more than fifty percent of the encounter was spent on counseling and/or coordinating care by the attending physician.  Advanced care planning forms were discussed. Code status including forceful chest compressions, defibrillation and intubation were discussed. The risks benefits and alternatives to pertinent gastrointestinal procedures and interventions were discussed in detail and all questions were answered. Duration: 15 Minutes. The patient is a 71 year old female with history of AAA, CVD, CAD s/p stent, ventricular arrythmia s/p ICD/PPM, CHF, COPD, IBD, RA presenting with abdominal pain, chest pain, nausea and vomiting.    1. abdominal pain, known stable infrarenal aneurysm. Ddx includes resolving viral gastroenteritis, less likely chronic mesenteric ischemia  -pending results of US completed today   -prn pain meds  -no BM today, continue Miralax     2. chest pain  -as per cardiology    3. nausea/vomiting; resolved  -prn antiemetics     4. CAD s/p stent on Plavix    5. ESTEVAN on CKD stage 3  - as per nephro    6. new onset right hip/groin pain; possibly related to RA   -pending CT abd and pelvis for right hip pain; ordered by primary team       I had a prolonged conversation with the patient regarding the hospital course, differential diagnosis, results of diagnostic tests this far, and therapeutic modalities available. Plan of care discussed with the patient after the evaluation. Patient expresses a clear understanding of the plan of care.  125 minutes spent on the total encounter, of which more than fifty percent of the encounter was spent on counseling and/or coordinating care by the attending physician.  Advanced care planning forms were discussed. Code status including forceful chest compressions, defibrillation and intubation were discussed. The risks benefits and alternatives to pertinent gastrointestinal procedures and interventions were discussed in detail and all questions were answered. Duration: 15 Minutes. The patient is a 71 year old female with history of AAA, CVD, CAD s/p stent, ventricular arrythmia s/p ICD/PPM, CHF, COPD, IBD, RA presenting with abdominal pain, chest pain, nausea and vomiting.    1. abdominal pain, known stable infrarenal aneurysm. Ddx includes resolving viral gastroenteritis, less likely chronic mesenteric ischemia  -pending results of US completed today   -prn pain meds  -no BM today, continue Miralax     2. chest pain  -as per cardiology    3. nausea/vomiting; resolved  -prn antiemetics     4. CAD s/p stent on Plavix    5. ESTEVAN on CKD stage 3  - as per nephro    6. new onset right hip/groin pain; possibly related to RA   -pending CT abd and pelvis for right hip pain; ordered by NP on floor       I had a prolonged conversation with the patient regarding the hospital course, differential diagnosis, results of diagnostic tests this far, and therapeutic modalities available. Plan of care discussed with the patient after the evaluation. Patient expresses a clear understanding of the plan of care.  125 minutes spent on the total encounter, of which more than fifty percent of the encounter was spent on counseling and/or coordinating care by the attending physician.  Advanced care planning forms were discussed. Code status including forceful chest compressions, defibrillation and intubation were discussed. The risks benefits and alternatives to pertinent gastrointestinal procedures and interventions were discussed in detail and all questions were answered. Duration: 15 Minutes.

## 2022-01-12 NOTE — PROGRESS NOTE ADULT - SUBJECTIVE AND OBJECTIVE BOX
Chief Complaint:  Patient is a 71y old  Female who presents with a chief complaint of abdominal and chest pain (2022 07:41)      Date of service 22 @ 12:48      Interval Events:   Patient seen and examined. Endorses new onset of pain to right hip/groin. Eating well. No BM today.     Hospital Medications:  acetaminophen     Tablet .. 650 milliGRAM(s) Oral every 6 hours PRN  ALBUTerol    90 MICROgram(s) HFA Inhaler 2 Puff(s) Inhalation every 6 hours PRN  ALPRAZolam 1 milliGRAM(s) Oral three times a day PRN  aluminum hydroxide/magnesium hydroxide/simethicone Suspension 30 milliLiter(s) Oral every 4 hours PRN  atorvastatin 80 milliGRAM(s) Oral at bedtime  budesonide 160 MICROgram(s)/formoterol 4.5 MICROgram(s) Inhaler 2 Puff(s) Inhalation two times a day  carvedilol 3.125 milliGRAM(s) Oral every 12 hours  clopidogrel Tablet 75 milliGRAM(s) Oral daily  enoxaparin Injectable 40 milliGRAM(s) SubCutaneous daily  famotidine    Tablet 20 milliGRAM(s) Oral daily  hydrocortisone 10 milliGRAM(s) Oral <User Schedule>  hydrocortisone 5 milliGRAM(s) Oral <User Schedule>  losartan 25 milliGRAM(s) Oral daily  melatonin 3 milliGRAM(s) Oral at bedtime PRN  montelukast 10 milliGRAM(s) Oral daily  morphine  - Injectable 2 milliGRAM(s) IV Push every 6 hours PRN  ondansetron Injectable 4 milliGRAM(s) IV Push every 8 hours PRN  oxyCODONE    IR 5 milliGRAM(s) Oral every 6 hours PRN  polyethylene glycol 3350 17 Gram(s) Oral daily  sucralfate suspension 1 Gram(s) Oral four times a day  zolpidem 5 milliGRAM(s) Oral at bedtime PRN  zolpidem 5 milliGRAM(s) Oral at bedtime PRN        Review of Systems:  General:  No wt loss, fevers, chills, night sweats, fatigue,   Eyes:  Good vision, no reported pain  ENT:  No sore throat, pain, runny nose, dysphagia  CV:  No pain, palpitations, hypo/hypertension  Resp:  No dyspnea, cough, tachypnea, wheezing  GI:  See HPI  :  No pain, bleeding, incontinence, nocturia  Muscle:  right hip/groin pain, no weakness  Neuro:  No weakness, tingling, memory problems  Psych:  No fatigue, insomnia, mood problems, depression  Endocrine:  No polyuria, polydipsia, cold/heat intolerance  Heme:  No petechiae, ecchymosis, easy bruisability  Integumentary:  No rash, edema    PHYSICAL EXAM:   Vital Signs:  Vital Signs Last 24 Hrs  T(C): 36.7 (2022 11:36), Max: 36.7 (2022 20:16)  T(F): 98 (2022 11:36), Max: 98.1 (2022 20:16)  HR: 82 (2022 11:36) (60 - 82)  BP: 127/80 (2022 11:36) (125/72 - 134/76)  BP(mean): --  RR: 18 (2022 11:36) (18 - 18)  SpO2: 95% (2022 11:36) (95% - 96%)  Daily     Daily       PHYSICAL EXAM:     GENERAL:  Appears stated age, well-groomed, well-nourished, no distress  HEENT:  NC/AT,  conjunctivae anicteric, clear and pink,   NECK: supple, trachea midline  CHEST:  Full & symmetric excursion, no increased effort, breath sounds clear  HEART:  Regular rhythm, no JVD  ABDOMEN:  Soft, non-tender, non-distended, normoactive bowel sounds,  no masses , no hepatosplenomegaly  EXTREMITIES:  no cyanosis,clubbing or edema  SKIN:  No rash, erythema, or, ecchymoses, no jaundice  NEURO:  Alert, non-focal, no asterixis  PSYCH: Appropriate affect, oriented to place and time  RECTAL: Deferred      LABS Personally reviewed by me:                        11.0   6.05  )-----------( 218      ( 2022 06:55 )             34.2     Mean Cell Volume: 102.1 fl (22 @ 06:55)        141  |  105  |  9   ----------------------------<  89  3.9   |  27  |  1.10    Ca    9.2      2022 06:55    TPro  7.7  /  Alb  3.9  /  TBili  0.3  /  DBili  x   /  AST  53<H>  /  ALT  24  /  AlkPhos  44  01-10    LIVER FUNCTIONS - ( 10 Zeke 2022 15:45 )  Alb: 3.9 g/dL / Pro: 7.7 g/dL / ALK PHOS: 44 U/L / ALT: 24 U/L / AST: 53 U/L / GGT: x             Urinalysis Basic - ( 2022 08:10 )    Color: Light Yellow / Appearance: Clear / S.009 / pH: x  Gluc: x / Ketone: Negative  / Bili: Negative / Urobili: Negative   Blood: x / Protein: Negative / Nitrite: Negative   Leuk Esterase: Negative / RBC: x / WBC x   Sq Epi: x / Non Sq Epi: x / Bacteria: x                              11.0   6.05  )-----------( 218      ( 2022 06:55 )             34.2                         12.2   6.59  )-----------( 251      ( 10 Zeke 2022 15:45 )             37.4       Imaging personally reviewed by me:

## 2022-01-12 NOTE — PROGRESS NOTE ADULT - SUBJECTIVE AND OBJECTIVE BOX
Name of Patient : SHAE HANEY  MRN: [id number]  Date of visit: 22 @ 07:41      Subjective: Patient seen and examined. No new events except as noted.     REVIEW OF SYSTEMS:    CONSTITUTIONAL: No weakness, fevers or chills  EYES/ENT: No visual changes;  No vertigo or throat pain   NECK: No pain or stiffness  RESPIRATORY: No cough, wheezing, hemoptysis; No shortness of breath  CARDIOVASCULAR: No chest pain or palpitations  GASTROINTESTINAL: No abdominal or epigastric pain. No nausea, vomiting, or hematemesis; No diarrhea or constipation. No melena or hematochezia.  GENITOURINARY: No dysuria, frequency or hematuria  NEUROLOGICAL: No numbness or weakness  SKIN: No itching, burning, rashes, or lesions   All other review of systems is negative unless indicated above.    MEDICATIONS:  MEDICATIONS  (STANDING):  atorvastatin 80 milliGRAM(s) Oral at bedtime  budesonide 160 MICROgram(s)/formoterol 4.5 MICROgram(s) Inhaler 2 Puff(s) Inhalation two times a day  carvedilol 3.125 milliGRAM(s) Oral every 12 hours  clopidogrel Tablet 75 milliGRAM(s) Oral daily  enoxaparin Injectable 40 milliGRAM(s) SubCutaneous daily  famotidine    Tablet 20 milliGRAM(s) Oral daily  hydrocortisone 10 milliGRAM(s) Oral <User Schedule>  hydrocortisone 5 milliGRAM(s) Oral <User Schedule>  losartan 25 milliGRAM(s) Oral daily  montelukast 10 milliGRAM(s) Oral daily  polyethylene glycol 3350 17 Gram(s) Oral daily  sucralfate suspension 1 Gram(s) Oral four times a day      PHYSICAL EXAM:  T(C): 36.6 (22 @ 04:18), Max: 37.9 (22 @ 10:43)  HR: 60 (22 @ 04:18) (60 - 60)  BP: 125/72 (22 @ 04:18) (125/72 - 134/76)  RR: 18 (22 @ 04:18) (18 - 18)  SpO2: 96% (22 @ 04:18) (96% - 98%)  Wt(kg): --  I&O's Summary    2022 07:01  -  2022 07:00  --------------------------------------------------------  IN: 790 mL / OUT: 0 mL / NET: 790 mL          Appearance: Normal	  HEENT:  PERRLA   Lymphatic: No lymphadenopathy   Cardiovascular: Normal S1 S2, no JVD  Respiratory: normal effort , clear  Gastrointestinal:  Soft, Non-tender  Skin: No rashes,  warm to touch  Psychiatry:  Mood & affect appropriate  Musculuskeletal: No edema          22 @ 07:01  -  22 @ 07:00  --------------------------------------------------------  IN: 790 mL / OUT: 0 mL / NET: 790 mL                            11.0   6.05  )-----------( 218      ( 2022 06:55 )             34.2               01-10    139  |  105  |  7   ----------------------------<  88  3.7   |  20<L>  |  1.29    Ca    9.4      10 Zeke 2022 17:53    TPro  7.7  /  Alb  3.9  /  TBili  0.3  /  DBili  x   /  AST  53<H>  /  ALT  24  /  AlkPhos  44  01-10                       Urinalysis Basic - ( 2022 08:10 )    Color: Light Yellow / Appearance: Clear / S.009 / pH: x  Gluc: x / Ketone: Negative  / Bili: Negative / Urobili: Negative   Blood: x / Protein: Negative / Nitrite: Negative   Leuk Esterase: Negative / RBC: x / WBC x   Sq Epi: x / Non Sq Epi: x / Bacteria: x             Name of Patient : SHAE HANEY  MRN: [id number]  Date of visit: 22 @ 07:41      Subjective: Patient seen and examined. No new events except as noted.   Patient reports new onset of Right hip pain.     REVIEW OF SYSTEMS:    CONSTITUTIONAL: No weakness, fevers or chills  EYES/ENT: No visual changes;  No vertigo or throat pain   NECK: No pain or stiffness  RESPIRATORY: No cough, wheezing, hemoptysis; No shortness of breath  CARDIOVASCULAR: No chest pain or palpitations  GASTROINTESTINAL: + No BM   GENITOURINARY: No dysuria, frequency or hematuria  NEUROLOGICAL: No numbness or weakness  SKIN: No itching, burning, rashes, or lesions   All other review of systems is negative unless indicated above.    MEDICATIONS:  MEDICATIONS  (STANDING):  atorvastatin 80 milliGRAM(s) Oral at bedtime  budesonide 160 MICROgram(s)/formoterol 4.5 MICROgram(s) Inhaler 2 Puff(s) Inhalation two times a day  carvedilol 3.125 milliGRAM(s) Oral every 12 hours  clopidogrel Tablet 75 milliGRAM(s) Oral daily  enoxaparin Injectable 40 milliGRAM(s) SubCutaneous daily  famotidine    Tablet 20 milliGRAM(s) Oral daily  hydrocortisone 10 milliGRAM(s) Oral <User Schedule>  hydrocortisone 5 milliGRAM(s) Oral <User Schedule>  losartan 25 milliGRAM(s) Oral daily  montelukast 10 milliGRAM(s) Oral daily  polyethylene glycol 3350 17 Gram(s) Oral daily  sucralfate suspension 1 Gram(s) Oral four times a day      PHYSICAL EXAM:  T(C): 36.6 (22 @ 04:18), Max: 37.9 (22 @ 10:43)  HR: 60 (22 @ 04:18) (60 - 60)  BP: 125/72 (22 @ 04:18) (125/72 - 134/76)  RR: 18 (22 @ 04:18) (18 - 18)  SpO2: 96% (22 @ 04:18) (96% - 98%)  Wt(kg): --  I&O's Summary    2022 07:01  -  2022 07:00  --------------------------------------------------------  IN: 790 mL / OUT: 0 mL / NET: 790 mL          Appearance: Normal	  HEENT:  PERRLA   Lymphatic: No lymphadenopathy   Cardiovascular: Normal S1 S2, no JVD  Respiratory: normal effort , clear  Gastrointestinal:  Soft, Non-tender  Skin: No rashes,  warm to touch  Psychiatry:  Mood & affect appropriate  Musculoskeletal: No edema          22 @ 07:01  -  22 @ 07:00  --------------------------------------------------------  IN: 790 mL / OUT: 0 mL / NET: 790 mL                            11.0   6.05  )-----------( 218      ( 2022 06:55 )             34.2               01-10    139  |  105  |  7   ----------------------------<  88  3.7   |  20<L>  |  1.29    Ca    9.4      10 Zeke 2022 17:53    TPro  7.7  /  Alb  3.9  /  TBili  0.3  /  DBili  x   /  AST  53<H>  /  ALT  24  /  AlkPhos  44  01-10                       Urinalysis Basic - ( 2022 08:10 )    Color: Light Yellow / Appearance: Clear / S.009 / pH: x  Gluc: x / Ketone: Negative  / Bili: Negative / Urobili: Negative   Blood: x / Protein: Negative / Nitrite: Negative   Leuk Esterase: Negative / RBC: x / WBC x   Sq Epi: x / Non Sq Epi: x / Bacteria: x

## 2022-01-12 NOTE — PROGRESS NOTE ADULT - SUBJECTIVE AND OBJECTIVE BOX
Patient seen and examined at bedside.    Overnight Events:     No AEON     Denies any chest pain, SOB, palpitations     Review Of Systems:   CONSTITUTIONAL: No fever, weight loss, or fatigue  EYES: No eye pain, visual disturbances, or discharge  ENMT:  No difficulty hearing, tinnitus, vertigo; No sinus or throat pain  RESPIRATORY: No cough, wheezing, chills or hemoptysis; No shortness of breath  CARDIOVASCULAR: No chest pain, palpitations, dizziness, or leg swelling  GASTROINTESTINAL: No abdominal or epigastric pain. No nausea, vomiting, or hematemesis; No diarrhea or constipation. No melena or hematochezia.  GENITOURINARY: No dysuria, frequency, hematuria, or incontinence  NEUROLOGICAL: No headaches, loss of strength, numbness, or tremors  SKIN: No itching, burning, rashes, or lesions   ENDOCRINE: No heat or cold intolerance; No polydipsia or polyuria  MUSCULOSKELETAL: R hip joint pain   PSYCHIATRIC: Denies depression, anxiety  HEME/LYMPH: No easy bruising, or bleeding gums  ALLERGY AND IMMUNOLOGIC: No hives or eczema           Current Meds:  acetaminophen     Tablet .. 650 milliGRAM(s) Oral every 6 hours PRN  ALBUTerol    90 MICROgram(s) HFA Inhaler 2 Puff(s) Inhalation every 6 hours PRN  ALPRAZolam 1 milliGRAM(s) Oral three times a day PRN  aluminum hydroxide/magnesium hydroxide/simethicone Suspension 30 milliLiter(s) Oral every 4 hours PRN  atorvastatin 80 milliGRAM(s) Oral at bedtime  budesonide 160 MICROgram(s)/formoterol 4.5 MICROgram(s) Inhaler 2 Puff(s) Inhalation two times a day  carvedilol 3.125 milliGRAM(s) Oral every 12 hours  clopidogrel Tablet 75 milliGRAM(s) Oral daily  enoxaparin Injectable 40 milliGRAM(s) SubCutaneous daily  famotidine    Tablet 20 milliGRAM(s) Oral daily  hydrocortisone 10 milliGRAM(s) Oral <User Schedule>  hydrocortisone 5 milliGRAM(s) Oral <User Schedule>  losartan 25 milliGRAM(s) Oral daily  melatonin 3 milliGRAM(s) Oral at bedtime PRN  montelukast 10 milliGRAM(s) Oral daily  morphine  - Injectable 2 milliGRAM(s) IV Push every 6 hours PRN  ondansetron Injectable 4 milliGRAM(s) IV Push every 8 hours PRN  oxyCODONE    IR 5 milliGRAM(s) Oral every 6 hours PRN  polyethylene glycol 3350 17 Gram(s) Oral daily  sucralfate suspension 1 Gram(s) Oral four times a day  zolpidem 5 milliGRAM(s) Oral at bedtime PRN  zolpidem 5 milliGRAM(s) Oral at bedtime PRN      Vitals:  T(F): 97.9 (01-12), Max: 100.3 (01-11)  HR: 60 (01-12) (60 - 60)  BP: 125/72 (01-12) (125/72 - 134/76)  RR: 18 (01-12)  SpO2: 96% (01-12)  I&O's Summary    11 Jan 2022 07:01  -  12 Jan 2022 07:00  --------------------------------------------------------  IN: 790 mL / OUT: 0 mL / NET: 790 mL        Physical Exam:  GENERAL: No acute distress   HEAD:  Atraumatic, Normocephalic  ENT: EOMI, conjunctiva and sclera clear, Neck supple, No JVD, moist mucosa  CHEST/LUNG: Clear to auscultation bilaterally; No wheeze, equal breath sounds bilaterally   HEART: Regular rate and rhythm; No murmurs, rubs, or gallops  ABDOMEN: Soft, Nontender, Nondistended   EXTREMITIES:  No clubbing, cyanosis, or edema  PSYCH: Nl behavior, nl affect  NEUROLOGY: AAOx3, non-focal   SKIN: Normal color, No rashes or lesions                            12.2   6.59  )-----------( 251      ( 10 Zeke 2022 15:45 )             37.4     01-10    139  |  105  |  7   ----------------------------<  88  3.7   |  20<L>  |  1.29    Ca    9.4      10 Zeke 2022 17:53    TPro  7.7  /  Alb  3.9  /  TBili  0.3  /  DBili  x   /  AST  53<H>  /  ALT  24  /  AlkPhos  44  01-10      CARDIAC MARKERS ( 10 Zeke 2022 17:53 )  8 ng/L / x     / x     / x     / x     / x      CARDIAC MARKERS ( 10 Zeke 2022 15:45 )  6 ng/L / x     / x     / x     / x     / x                  New ECG(s): Personally reviewed    Echo:    Stress Testing:     Cath:    Imaging:    Interpretation of Telemetry:   Patient seen and examined at bedside.    Overnight Events:     No AEON     Denies any chest pain, SOB, palpitations     Review Of Systems:   CONSTITUTIONAL: No fever, weight loss, or fatigue  EYES: No eye pain, visual disturbances, or discharge  ENMT:  No difficulty hearing, tinnitus, vertigo; No sinus or throat pain  RESPIRATORY: No cough, wheezing, chills or hemoptysis; No shortness of breath  CARDIOVASCULAR: No chest pain, palpitations, dizziness, or leg swelling  GASTROINTESTINAL: No abdominal or epigastric pain. No nausea, vomiting, or hematemesis; No diarrhea or constipation. No melena or hematochezia.  GENITOURINARY: No dysuria, frequency, hematuria, or incontinence  NEUROLOGICAL: No headaches, loss of strength, numbness, or tremors  SKIN: No itching, burning, rashes, or lesions   ENDOCRINE: No heat or cold intolerance; No polydipsia or polyuria  MUSCULOSKELETAL: R hip joint pain   PSYCHIATRIC: Denies depression, anxiety  HEME/LYMPH: No easy bruising, or bleeding gums  ALLERGY AND IMMUNOLOGIC: No hives or eczema           Current Meds:  acetaminophen     Tablet .. 650 milliGRAM(s) Oral every 6 hours PRN  ALBUTerol    90 MICROgram(s) HFA Inhaler 2 Puff(s) Inhalation every 6 hours PRN  ALPRAZolam 1 milliGRAM(s) Oral three times a day PRN  aluminum hydroxide/magnesium hydroxide/simethicone Suspension 30 milliLiter(s) Oral every 4 hours PRN  atorvastatin 80 milliGRAM(s) Oral at bedtime  budesonide 160 MICROgram(s)/formoterol 4.5 MICROgram(s) Inhaler 2 Puff(s) Inhalation two times a day  carvedilol 3.125 milliGRAM(s) Oral every 12 hours  clopidogrel Tablet 75 milliGRAM(s) Oral daily  enoxaparin Injectable 40 milliGRAM(s) SubCutaneous daily  famotidine    Tablet 20 milliGRAM(s) Oral daily  hydrocortisone 10 milliGRAM(s) Oral <User Schedule>  hydrocortisone 5 milliGRAM(s) Oral <User Schedule>  losartan 25 milliGRAM(s) Oral daily  melatonin 3 milliGRAM(s) Oral at bedtime PRN  montelukast 10 milliGRAM(s) Oral daily  morphine  - Injectable 2 milliGRAM(s) IV Push every 6 hours PRN  ondansetron Injectable 4 milliGRAM(s) IV Push every 8 hours PRN  oxyCODONE    IR 5 milliGRAM(s) Oral every 6 hours PRN  polyethylene glycol 3350 17 Gram(s) Oral daily  sucralfate suspension 1 Gram(s) Oral four times a day  zolpidem 5 milliGRAM(s) Oral at bedtime PRN  zolpidem 5 milliGRAM(s) Oral at bedtime PRN      Vitals:  T(F): 97.9 (01-12), Max: 100.3 (01-11)  HR: 60 (01-12) (60 - 60)  BP: 125/72 (01-12) (125/72 - 134/76)  RR: 18 (01-12)  SpO2: 96% (01-12)  I&O's Summary    11 Jan 2022 07:01  -  12 Jan 2022 07:00  --------------------------------------------------------  IN: 790 mL / OUT: 0 mL / NET: 790 mL        Physical Exam:  GENERAL: No acute distress   HEAD:  Atraumatic, Normocephalic  ENT: EOMI, conjunctiva and sclera clear, Neck supple, No JVD, moist mucosa  CHEST/LUNG: Clear to auscultation bilaterally; No wheeze, equal breath sounds bilaterally   HEART: Regular rate and rhythm; No murmurs, rubs, or gallops  ABDOMEN: Soft, Nontender, Nondistended   EXTREMITIES:  No clubbing, cyanosis, or edema  PSYCH: Nl behavior, nl affect  NEUROLOGY: AAOx3, non-focal   SKIN: Normal color, No rashes or lesions                            12.2   6.59  )-----------( 251      ( 10 Zeke 2022 15:45 )             37.4     01-10    139  |  105  |  7   ----------------------------<  88  3.7   |  20<L>  |  1.29    Ca    9.4      10 Zeke 2022 17:53    TPro  7.7  /  Alb  3.9  /  TBili  0.3  /  DBili  x   /  AST  53<H>  /  ALT  24  /  AlkPhos  44  01-10      CARDIAC MARKERS ( 10 Zeke 2022 17:53 )  8 ng/L / x     / x     / x     / x     / x      CARDIAC MARKERS ( 10 Zeke 2022 15:45 )  6 ng/L / x     / x     / x     / x     / x                  New ECG(s): Personally reviewed    Echo:    Stress Testing:     Cath:    Imaging:    Interpretation of Telemetry: sinus rhythm, no ectopy

## 2022-01-12 NOTE — PROGRESS NOTE ADULT - ASSESSMENT
Patient is a 71 year-old-female with a PMHx of AAA, CVA, CAD s/p stent, ventricular arrhythmia s/p ICD/PPM, CHF, COPD, IBD presents with 4-day history of abdominal pain, chest pain and nausea/vomiting, admitted for further cardiac work-up.      #Unstable angina.   --CP resolved after morphine in ED. Trop 6-->8, EKG not ischemic; c/f possible unstable angina vs GERD vs costochondritis   --TTE from 01/10 with EF of 40-45% (improved from TTE from 11/2021 with EF of 20%) and will get cardiology eval for possible stress vs cath   --c/w pain control with tylenol, oxycodone and morphine IVP prn - monitor for toxicity/sedation   --c/w plavix, high intensity statin, BB and ARB (off ASA 2/2 easy bruising per pt report)   --GERD treatment as below  --Patient denies CP, palpitations, SOB or dyspnea at time of visit 01/12. In no distress   --TTE with--The inferolateral wall is akinetic. The anterolateral wall is hypokinetic. F/U cardio recs  --Cardiology eval called; F/U recommendations -- No further cardiac w/u for now      #Abdominal pain.   --Pain improves with morphine   --Etiology includes ?UTI vs IBD pain; had CT at OSH showing no acute pathology, non ruptured AAA  -- pain control as above   -- check UA to evaluate for infxn   --GI eval called; F/U recommendations -- checking duplex of abdomen per GI  --F/U GI recommendations      #Chronic systolic heart failure.   --c/w all GDMT - pt reports being off ASA 2/2 easy bruising  --Cardio consulted; F/U recommendations  --EF on TTE noted to be 40-45%   --C/W losartan and coreg     #ESTEVAN on CKD  --Cr of 1.34 on admission, now 1.29; continue to monitor   --Nephrology consulted; F/U recommendations     #Low grade Fever  --Tmax of 100.3 noted 01/11  --continue to monitor patient closely, monitor CBC and temperature curve  --if fever occurs, check BCx2, UC, CBC, CMP  --Currently afebrile     #Hyperkalemia  --Patient hyperkalemic to 5.8 on arrival, K noted to be 3.7  --Continue to monitor BMP and all electrolytes  closely      #AAA  --Recent us from 11/2021 with --A 4.2 cm distal infrarenal abdominal aortic aneurysm, unchanged from prior ultrasound dated 6/8/2021  --Outpatient F/U       #Adrenal insufficiency.   --c/w hydrocortisone 10mg in 0700 and 5mg at 1600.      #GERD   --c/w sucralfate and pepcid.      #COPD  - breathing comfortably on RA, no wheezing; low suspicion for exacerbation   -c/w home BD  -proair prn.      #Anxiety.   --c/w xanax 1mg TID prn   --c/w ambien at nighttime.      PPX with lovenox for VTE ppx   fall, aspiration precautions. Patient is a 71 year-old-female with a PMHx of AAA, CVA, CAD s/p stent, ventricular arrhythmia s/p ICD/PPM, CHF, COPD, IBD presents with 4-day history of abdominal pain, chest pain and nausea/vomiting, admitted for further cardiac work-up.      #Unstable angina.   --CP resolved after morphine in ED. Trop 6-->8, EKG not ischemic; c/f possible unstable angina vs GERD vs costochondritis   --TTE from 01/10 with EF of 40-45% (improved from TTE from 11/2021 with EF of 20%) and will get cardiology eval for possible stress vs cath   --c/w pain control with tylenol, oxycodone and morphine IVP prn - monitor for toxicity/sedation   --c/w plavix, high intensity statin, BB and ARB (off ASA 2/2 easy bruising per pt report)   --GERD treatment as below  --Patient denies CP, palpitations, SOB or dyspnea at time of visit 01/12. In no distress   --TTE with--The inferolateral wall is akinetic. The anterolateral wall is hypokinetic. F/U cardio recs  --Cardiology eval called; F/U recommendations -- No further cardiac w/u for now      #Abdominal pain.   --Pain improves with morphine   --Etiology includes ?UTI vs IBD pain; had CT at OSH showing no acute pathology, non ruptured AAA  -- pain control as above   -- check UA to evaluate for infxn   -- Continue with Miralax   --GI eval called; F/U recommendations -- checking duplex of abdomen per GI; F/U results   --F/U GI recommendations     #Hip pain  --Checking CT A/P non-cont for further evaluation   --Pain control   --Monitor patient closely      #Chronic systolic heart failure.   --c/w all GDMT - pt reports being off ASA 2/2 easy bruising  --Cardio consulted; F/U recommendations  --EF on TTE noted to be 40-45%   --C/W losartan and coreg     #ESTEVAN on CKD  --Cr of 1.34 on admission, now 1.10; continue to monitor   --Nephrology consulted; F/U recommendations     #Low grade Fever  --Tmax of 100.3 noted 01/11  --continue to monitor patient closely, monitor CBC and temperature curve  --if fever occurs, check BCx2, UC, CBC, CMP  --Currently afebrile     #Hyperkalemia  --Patient hyperkalemic to 5.8 on arrival, K noted to be 3.7  --Continue to monitor BMP and all electrolytes  closely      #AAA  --Recent us from 11/2021 with --A 4.2 cm distal infrarenal abdominal aortic aneurysm, unchanged from prior ultrasound dated 6/8/2021  --Outpatient F/U       #Adrenal insufficiency.   --c/w hydrocortisone 10mg in 0700 and 5mg at 1600.      #GERD   --c/w sucralfate and pepcid.      #COPD  - breathing comfortably on RA, no wheezing; low suspicion for exacerbation   -c/w home BD  -proair prn.      #Anxiety.   --c/w xanax 1mg TID prn   --c/w ambien at nighttime.      PPX with lovenox for VTE ppx   fall, aspiration precautions.

## 2022-01-13 ENCOUNTER — TRANSCRIPTION ENCOUNTER (OUTPATIENT)
Age: 72
End: 2022-01-13

## 2022-01-13 PROCEDURE — 99233 SBSQ HOSP IP/OBS HIGH 50: CPT | Mod: GC

## 2022-01-13 PROCEDURE — 74176 CT ABD & PELVIS W/O CONTRAST: CPT | Mod: 26

## 2022-01-13 RX ORDER — POLYETHYLENE GLYCOL 3350 17 G/17G
17 POWDER, FOR SOLUTION ORAL
Refills: 0 | Status: DISCONTINUED | OUTPATIENT
Start: 2022-01-13 | End: 2022-01-14

## 2022-01-13 RX ADMIN — CARVEDILOL PHOSPHATE 3.12 MILLIGRAM(S): 80 CAPSULE, EXTENDED RELEASE ORAL at 05:41

## 2022-01-13 RX ADMIN — Medication 10 MILLIGRAM(S): at 05:41

## 2022-01-13 RX ADMIN — POLYETHYLENE GLYCOL 3350 17 GRAM(S): 17 POWDER, FOR SOLUTION ORAL at 21:22

## 2022-01-13 RX ADMIN — CLOPIDOGREL BISULFATE 75 MILLIGRAM(S): 75 TABLET, FILM COATED ORAL at 11:03

## 2022-01-13 RX ADMIN — Medication 1 GRAM(S): at 05:41

## 2022-01-13 RX ADMIN — Medication 5 MILLIGRAM(S): at 17:06

## 2022-01-13 RX ADMIN — ZOLPIDEM TARTRATE 5 MILLIGRAM(S): 10 TABLET ORAL at 21:39

## 2022-01-13 RX ADMIN — CARVEDILOL PHOSPHATE 3.12 MILLIGRAM(S): 80 CAPSULE, EXTENDED RELEASE ORAL at 17:06

## 2022-01-13 RX ADMIN — LOSARTAN POTASSIUM 25 MILLIGRAM(S): 100 TABLET, FILM COATED ORAL at 05:41

## 2022-01-13 RX ADMIN — MORPHINE SULFATE 2 MILLIGRAM(S): 50 CAPSULE, EXTENDED RELEASE ORAL at 22:00

## 2022-01-13 RX ADMIN — MORPHINE SULFATE 2 MILLIGRAM(S): 50 CAPSULE, EXTENDED RELEASE ORAL at 22:30

## 2022-01-13 RX ADMIN — FAMOTIDINE 20 MILLIGRAM(S): 10 INJECTION INTRAVENOUS at 11:03

## 2022-01-13 RX ADMIN — POLYETHYLENE GLYCOL 3350 17 GRAM(S): 17 POWDER, FOR SOLUTION ORAL at 11:03

## 2022-01-13 RX ADMIN — MORPHINE SULFATE 2 MILLIGRAM(S): 50 CAPSULE, EXTENDED RELEASE ORAL at 14:02

## 2022-01-13 RX ADMIN — ATORVASTATIN CALCIUM 80 MILLIGRAM(S): 80 TABLET, FILM COATED ORAL at 21:21

## 2022-01-13 RX ADMIN — MONTELUKAST 10 MILLIGRAM(S): 4 TABLET, CHEWABLE ORAL at 11:02

## 2022-01-13 RX ADMIN — BUDESONIDE AND FORMOTEROL FUMARATE DIHYDRATE 2 PUFF(S): 160; 4.5 AEROSOL RESPIRATORY (INHALATION) at 17:06

## 2022-01-13 RX ADMIN — BUDESONIDE AND FORMOTEROL FUMARATE DIHYDRATE 2 PUFF(S): 160; 4.5 AEROSOL RESPIRATORY (INHALATION) at 05:43

## 2022-01-13 RX ADMIN — MORPHINE SULFATE 2 MILLIGRAM(S): 50 CAPSULE, EXTENDED RELEASE ORAL at 14:35

## 2022-01-13 RX ADMIN — Medication 1 GRAM(S): at 11:03

## 2022-01-13 RX ADMIN — Medication 1 MILLIGRAM(S): at 21:38

## 2022-01-13 RX ADMIN — Medication 1 GRAM(S): at 17:06

## 2022-01-13 NOTE — PROGRESS NOTE ADULT - SUBJECTIVE AND OBJECTIVE BOX
Patient seen and examined  no complaints    lactose (Rash)  No Known Allergies    Hospital Medications:   MEDICATIONS  (STANDING):  atorvastatin 80 milliGRAM(s) Oral at bedtime  budesonide 160 MICROgram(s)/formoterol 4.5 MICROgram(s) Inhaler 2 Puff(s) Inhalation two times a day  carvedilol 3.125 milliGRAM(s) Oral every 12 hours  clopidogrel Tablet 75 milliGRAM(s) Oral daily  enoxaparin Injectable 40 milliGRAM(s) SubCutaneous daily  famotidine    Tablet 20 milliGRAM(s) Oral daily  hydrocortisone 10 milliGRAM(s) Oral <User Schedule>  hydrocortisone 5 milliGRAM(s) Oral <User Schedule>  losartan 25 milliGRAM(s) Oral daily  montelukast 10 milliGRAM(s) Oral daily  polyethylene glycol 3350 17 Gram(s) Oral two times a day  sucralfate suspension 1 Gram(s) Oral four times a day      VITALS:  T(F): 98.3 (22 @ 11:49), Max: 98.7 (22 @ 20:23)  HR: 60 (22 @ 11:49)  BP: 117/69 (22 @ 11:49)  RR: 18 (22 @ 11:49)  SpO2: 95% (22 @ 11:49)  Wt(kg): --     @ 07:  -   @ 07:00  --------------------------------------------------------  IN: 240 mL / OUT: 0 mL / NET: 240 mL     @ 07:01   @ 12:36  --------------------------------------------------------  IN: 480 mL / OUT: 0 mL / NET: 480 mL        PHYSICAL EXAM:  Constitutional: NAD  HEENT: anicteric sclera, oropharynx clear, MMM  Neck: No JVD  Respiratory: CTAB, no wheezes, rales or rhonchi  Cardiovascular: S1, S2, RRR  Gastrointestinal: BS+, soft, NT/ND  Extremities: No cyanosis or clubbing. No peripheral edema  Neurological: A/O x 3, no focal deficits  Psychiatric: Normal mood, normal affect  : No CVA tenderness. No brunson.   Skin: No rashes    LABS:      141  |  105  |  9   ----------------------------<  89  3.9   |  27  |  1.10    Ca    9.2      2022 06:55      Creatinine Trend: 1.10 <--, 1.29 <--, 1.34 <--                        11.0   6.05  )-----------( 218      ( 2022 06:55 )             34.2     Urine Studies:  Urinalysis Basic - ( 2022 08:10 )    Color: Light Yellow / Appearance: Clear / S.009 / pH:   Gluc:  / Ketone: Negative  / Bili: Negative / Urobili: Negative   Blood:  / Protein: Negative / Nitrite: Negative   Leuk Esterase: Negative / RBC:  / WBC    Sq Epi:  / Non Sq Epi:  / Bacteria:         RADIOLOGY & ADDITIONAL STUDIES:

## 2022-01-13 NOTE — DISCHARGE NOTE PROVIDER - NSDCFUSCHEDAPPT_GEN_ALL_CORE_FT
SHAE HANEY ; 01/14/2022 ; [facility] [address]. SHAE Galeana ; 03/22/2022 ; [facility] [address]  SHAE HANEY ; 03/22/2022 ; [facility] [address]

## 2022-01-13 NOTE — PROVIDER CONTACT NOTE (OTHER) - ACTION/TREATMENT ORDERED:
Continue current treatment plan, continue to educate patient
educated on importance of medication & verbalizes understanding but she is still continues to refuse

## 2022-01-13 NOTE — PROGRESS NOTE ADULT - SUBJECTIVE AND OBJECTIVE BOX
Patient seen and examined at bedside.    Overnight Events:     No AEON     Denies any chest pain, SOB, palpitations. R hip pain improved     Review Of Systems:   CONSTITUTIONAL: No fever, weight loss, or fatigue  EYES: No eye pain, visual disturbances, or discharge  ENMT:  No difficulty hearing, tinnitus, vertigo; No sinus or throat pain  RESPIRATORY: No cough, wheezing, chills or hemoptysis; No shortness of breath  CARDIOVASCULAR: No chest pain, palpitations, dizziness, or leg swelling  GASTROINTESTINAL: No abdominal or epigastric pain. No nausea, vomiting, or hematemesis; No diarrhea or constipation. No melena or hematochezia.  GENITOURINARY: No dysuria, frequency, hematuria, or incontinence  NEUROLOGICAL: No headaches, loss of strength, numbness, or tremors  SKIN: No itching, burning, rashes, or lesions   ENDOCRINE: No heat or cold intolerance; No polydipsia or polyuria  MUSCULOSKELETAL: R hip pain   PSYCHIATRIC: Denies depression, anxiety  HEME/LYMPH: No easy bruising, or bleeding gums  ALLERGY AND IMMUNOLOGIC: No hives or eczema    Current Meds:  acetaminophen     Tablet .. 650 milliGRAM(s) Oral every 6 hours PRN  ALBUTerol    90 MICROgram(s) HFA Inhaler 2 Puff(s) Inhalation every 6 hours PRN  ALPRAZolam 1 milliGRAM(s) Oral three times a day PRN  aluminum hydroxide/magnesium hydroxide/simethicone Suspension 30 milliLiter(s) Oral every 4 hours PRN  atorvastatin 80 milliGRAM(s) Oral at bedtime  budesonide 160 MICROgram(s)/formoterol 4.5 MICROgram(s) Inhaler 2 Puff(s) Inhalation two times a day  carvedilol 3.125 milliGRAM(s) Oral every 12 hours  clopidogrel Tablet 75 milliGRAM(s) Oral daily  enoxaparin Injectable 40 milliGRAM(s) SubCutaneous daily  famotidine    Tablet 20 milliGRAM(s) Oral daily  hydrocortisone 10 milliGRAM(s) Oral <User Schedule>  hydrocortisone 5 milliGRAM(s) Oral <User Schedule>  losartan 25 milliGRAM(s) Oral daily  melatonin 3 milliGRAM(s) Oral at bedtime PRN  montelukast 10 milliGRAM(s) Oral daily  morphine  - Injectable 2 milliGRAM(s) IV Push every 6 hours PRN  ondansetron Injectable 4 milliGRAM(s) IV Push every 8 hours PRN  oxyCODONE    IR 5 milliGRAM(s) Oral every 6 hours PRN  polyethylene glycol 3350 17 Gram(s) Oral daily  sucralfate suspension 1 Gram(s) Oral four times a day  zolpidem 5 milliGRAM(s) Oral at bedtime PRN  zolpidem 5 milliGRAM(s) Oral at bedtime PRN      Vitals:  T(F): 98.5 (01-13), Max: 98.7 (01-12)  HR: 60 (01-13) (58 - 82)  BP: 123/77 (01-13) (123/77 - 137/80)  RR: 18 (01-13)  SpO2: 93% (01-13)  I&O's Summary    12 Jan 2022 07:01  -  13 Jan 2022 07:00  --------------------------------------------------------  IN: 240 mL / OUT: 0 mL / NET: 240 mL        Physical Exam:  GENERAL: No acute distress   HEAD:  Atraumatic, Normocephalic  ENT: EOMI, conjunctiva and sclera clear, Neck supple, No JVD, moist mucosa  CHEST/LUNG: Clear to auscultation bilaterally; No wheeze, equal breath sounds bilaterally   HEART: Regular rate and rhythm; No murmurs, rubs, or gallops  ABDOMEN: Soft, Nontender, Nondistended   EXTREMITIES:  No clubbing, cyanosis, or edema  PSYCH: Nl behavior, nl affect  NEUROLOGY: AAOx3, non-focal   SKIN: Normal color, No rashes or lesions                          11.0   6.05  )-----------( 218      ( 12 Jan 2022 06:55 )             34.2     01-12    141  |  105  |  9   ----------------------------<  89  3.9   |  27  |  1.10    Ca    9.2      12 Jan 2022 06:55        CARDIAC MARKERS ( 10 Zeke 2022 17:53 )  8 ng/L / x     / x     / x     / x     / x      CARDIAC MARKERS ( 10 Zeke 2022 15:45 )  6 ng/L / x     / x     / x     / x     / x                  New ECG(s): Personally reviewed    Echo:    Stress Testing:     Cath:    Imaging:    Interpretation of Telemetry:

## 2022-01-13 NOTE — DISCHARGE NOTE PROVIDER - HOSPITAL COURSE
Patient is a 71 year-old-female with a PMHx of AAA, CVA, CAD s/p stent, ventricular arrhythmia s/p ICD/PPM, CHF, COPD, IBD presents with 4-day history of abdominal pain, chest pain and nausea/vomiting, admitted for further cardiac work-up.      #Unstable angina.   --CP resolved after morphine in ED. Trop 6-->8, EKG not ischemic; c/f possible unstable angina vs GERD vs costochondritis   --TTE from 01/10 with EF of 40-45% (improved from TTE from 11/2021 with EF of 20%) and will get cardiology eval for possible stress vs cath   --c/w pain control with tylenol, oxycodone and morphine IVP prn - monitor for toxicity/sedation   --c/w plavix, high intensity statin, BB and ARB (off ASA 2/2 easy bruising per pt report)   --GERD treatment as below  --Patient denies CP, palpitations, SOB or dyspnea at time of visit 01/12. In no distress   --TTE with--The inferolateral wall is akinetic. The anterolateral wall is hypokinetic. F/U cardio recs  --Cardiology eval called; F/U recommendations -- No further cardiac w/u for now      #Abdominal pain.   --Pain improves with morphine   --Etiology includes ?UTI vs IBD pain; had CT at OSH showing no acute pathology, non ruptured AAA  -- pain control as above   -- check UA to evaluate for infxn   -- Continue with Miralax   --GI eval called; F/U recommendations -- checking duplex of abdomen per GI; F/U results   --F/U GI recommendations     #Hip pain  --Checking CT A/P non-cont for further evaluation   --Pain control   --Monitor patient closely      #Chronic systolic heart failure.   --c/w all GDMT - pt reports being off ASA 2/2 easy bruising  --Cardio consulted; F/U recommendations  --EF on TTE noted to be 40-45%   --C/W losartan and coreg     #ESTEVAN on CKD  --Cr of 1.34 on admission, now 1.10; continue to monitor   --Nephrology consulted; F/U recommendations     #Low grade Fever  --Tmax of 100.3 noted 01/11  --continue to monitor patient closely, monitor CBC and temperature curve  --if fever occurs, check BCx2, UC, CBC, CMP  --Currently afebrile     #Hyperkalemia  --Patient hyperkalemic to 5.8 on arrival, K noted to be 3.7  --Continue to monitor BMP and all electrolytes  closely      #AAA  --Recent us from 11/2021 with --A 4.2 cm distal infrarenal abdominal aortic aneurysm, unchanged from prior ultrasound dated 6/8/2021  --Outpatient F/U   D/C to home with [facility]

## 2022-01-13 NOTE — DISCHARGE NOTE PROVIDER - CARE PROVIDER_API CALL
Claire Sanchez)  Cardiovascular Disease; Interventional Cardiology  [address] [place] [id number]  Phone: [phone]  Fax: [phone]  Follow Up Time:     Randall Lofton (DO)  Cardiovascular Disease; Internal Medicine; Nuclear Cardiology  [address] [place] [id number]  Phone: [phone]  Fax: [phone]  Follow Up Time:

## 2022-01-13 NOTE — DISCHARGE NOTE PROVIDER - NSDCMRMEDTOKEN_GEN_ALL_CORE_FT
ALPRAZolam 1 mg oral tablet: 1 tab(s) orally 3 times a day, As Needed  Breo Ellipta 200 mcg-25 mcg/inh inhalation powder: 1 puff(s) inhaled once a day  Coreg 3.125 mg oral tablet: 1 tab(s) orally 2 times a day  Crestor 20 mg oral tablet: 1 tab(s) orally once a day (at bedtime)  hydrocortisone 10 mg oral tablet: 1 tab(s) orally once a day at 0700  hydrocortisone 5 mg oral tablet: 1 tab(s) orally once a day at 4PM  Incruse Ellipta 62.5 mcg/inh inhalation powder: 1 puff(s) inhaled every 24 hours  losartan 25 mg oral tablet: 1 tab(s) orally once a day  metoclopramide 10 mg oral tablet: 1 tab(s) orally 3 times a day (before meals), As needed, abdominal pain  oxyCODONE 20 mg oral tablet: 1 tab(s) orally 3 times a day, As Needed  pantoprazole 40 mg oral delayed release tablet: 1 tab(s) orally 2 times a day  Plavix 75 mg oral tablet: 1 tab(s) orally once a day  ProAir HFA 90 mcg/inh inhalation aerosol: 2 puff(s) inhaled 4 times a day, As Needed  Singulair 10 mg oral tablet: 1 tab(s) orally once a day  sucralfate 1 g/10 mL oral suspension: 10 milliliter(s) orally 4 times a day  zolpidem 10 mg oral tablet: 1 tab(s) orally once a day (at bedtime)   acetaminophen 325 mg oral tablet: 2 tab(s) orally every 6 hours, As needed, Temp greater or equal to 38C (100.4F), Mild Pain (1 - 3)  ALPRAZolam 1 mg oral tablet: 1 tab(s) orally 3 times a day, As Needed  aluminum hydroxide-magnesium hydroxide 200 mg-200 mg/5 mL oral suspension: 30 milliliter(s) orally every 4 hours, As needed, Dyspepsia  Breo Ellipta 200 mcg-25 mcg/inh inhalation powder: 1 puff(s) inhaled once a day  Coreg 3.125 mg oral tablet: 1 tab(s) orally 2 times a day  Crestor 20 mg oral tablet: 1 tab(s) orally once a day (at bedtime)  hydrocortisone 10 mg oral tablet: 1 tab(s) orally once a day at 0700  hydrocortisone 5 mg oral tablet: 1 tab(s) orally once a day at 4PM  Incruse Ellipta 62.5 mcg/inh inhalation powder: 1 puff(s) inhaled every 24 hours  losartan 25 mg oral tablet: 1 tab(s) orally once a day  metoclopramide 10 mg oral tablet: 1 tab(s) orally 3 times a day (before meals), As needed, abdominal pain  oxyCODONE 20 mg oral tablet: 1 tab(s) orally 3 times a day, As Needed  pantoprazole 40 mg oral delayed release tablet: 1 tab(s) orally 2 times a day  Plavix 75 mg oral tablet: 1 tab(s) orally once a day  polyethylene glycol 3350 oral powder for reconstitution: 17 gram(s) orally 2 times a day  ProAir HFA 90 mcg/inh inhalation aerosol: 2 puff(s) inhaled 4 times a day, As Needed  Singulair 10 mg oral tablet: 1 tab(s) orally once a day  sucralfate 1 g/10 mL oral suspension: 10 milliliter(s) orally 4 times a day  zolpidem 10 mg oral tablet: 1 tab(s) orally once a day (at bedtime)

## 2022-01-13 NOTE — PROGRESS NOTE ADULT - ASSESSMENT
Patient is a 71 year-old-female with a PMHx of AAA, CVA, CAD s/p stent, ventricular arrhythmia s/p ICD/PPM, CHF, COPD, IBD presents with 4-day history of abdominal pain, chest pain and nausea/vomiting, admitted for further cardiac work-up.      #Unstable angina.   --CP resolved after morphine in ED. Trop 6-->8, EKG not ischemic; c/f possible unstable angina vs GERD vs costochondritis   --TTE from 01/10 with EF of 40-45% (improved from TTE from 11/2021 with EF of 20%) and will get cardiology eval for possible stress vs cath   --c/w pain control with tylenol, oxycodone and morphine IVP prn - monitor for toxicity/sedation   --c/w plavix, high intensity statin, BB and ARB (off ASA 2/2 easy bruising per pt report)   --GERD treatment as below  --Patient denies CP, palpitations, SOB or dyspnea at time of visit 01/12. In no distress   --TTE with--The inferolateral wall is akinetic. The anterolateral wall is hypokinetic. F/U cardio recs  --Cardiology eval called; F/U recommendations -- No further cardiac w/u for now      #Abdominal pain.   --Pain improves with morphine   --Etiology includes ?UTI vs IBD pain; had CT at OSH showing no acute pathology, non ruptured AAA  -- pain control as above   -- check UA to evaluate for infxn   -- Continue with Miralax   --GI eval called; F/U recommendations -- checking duplex of abdomen per GI; F/U results   --F/U GI recommendations     #Hip pain  --Checking CT A/P non-cont for further evaluation   --Pain control   --Monitor patient closely      #Chronic systolic heart failure.   --c/w all GDMT - pt reports being off ASA 2/2 easy bruising  --Cardio consulted; F/U recommendations  --EF on TTE noted to be 40-45%   --C/W losartan and coreg     #ESTEVAN on CKD  --Cr of 1.34 on admission, now 1.10; continue to monitor   --Nephrology consulted; F/U recommendations     #Low grade Fever  --Tmax of 100.3 noted 01/11  --continue to monitor patient closely, monitor CBC and temperature curve  --if fever occurs, check BCx2, UC, CBC, CMP  --Currently afebrile     #Hyperkalemia  --Patient hyperkalemic to 5.8 on arrival, K noted to be 3.7  --Continue to monitor BMP and all electrolytes  closely      #AAA  --Recent us from 11/2021 with --A 4.2 cm distal infrarenal abdominal aortic aneurysm, unchanged from prior ultrasound dated 6/8/2021  --Outpatient F/U       #Adrenal insufficiency.   --c/w hydrocortisone 10mg in 0700 and 5mg at 1600.      #GERD   --c/w sucralfate and pepcid.      #COPD  - breathing comfortably on RA, no wheezing; low suspicion for exacerbation   -c/w home BD  -proair prn.      #Anxiety.   --c/w xanax 1mg TID prn   --c/w ambien at nighttime.      PPX with lovenox for VTE ppx   fall, aspiration precautions.

## 2022-01-13 NOTE — PROGRESS NOTE ADULT - ASSESSMENT
The patient is a 71 year old female with history of AAA, CVD, CAD s/p stent, ventricular arrythmia s/p ICD/PPM, CHF, COPD, IBD, RA presenting with abdominal pain, chest pain, nausea and vomiting.    1. abdominal pain, known stable infrarenal aneurysm. Ddx includes resolving viral gastroenteritis, less likely chronic mesenteric ischemia  -US shows no stenosis to celiac artery or mesenteric arteries. Patient's pain improving. pending CT abdomen  -prn pain meds  -no BM today, Miralax BID    2. chest pain  -as per cardiology    3. nausea/vomiting; resolved  -prn antiemetics     4. CAD s/p stent on Plavix    5. ESTEVAN on CKD stage 3  - as per nephro    6. new onset right hip/groin pain; possibly related to RA   -improving  -pending results of CT abd and pelvis for right hip pain    7. Constipation  -order changed to Miralax BID, pt's home dose       I had a prolonged conversation with the patient regarding the hospital course, differential diagnosis, results of diagnostic tests this far, and therapeutic modalities available. Plan of care discussed with the patient after the evaluation. Patient expresses a clear understanding of the plan of care.  125 minutes spent on the total encounter, of which more than fifty percent of the encounter was spent on counseling and/or coordinating care by the attending physician.  Advanced care planning forms were discussed. Code status including forceful chest compressions, defibrillation and intubation were discussed. The risks benefits and alternatives to pertinent gastrointestinal procedures and interventions were discussed in detail and all questions were answered. Duration: 15 Minutes.

## 2022-01-13 NOTE — PROGRESS NOTE ADULT - ATTENDING COMMENTS
Pt care and plan discussed and reviewed with PA. Plan as outlined above edited by me to reflect our discussion. Thirty five minutes spent on encounter, of which more than fifty percent of the encounter was spent on counseling and/or coordinating care by the attending physician. OMT on six regions for acute somatic dysfunctions done at the bedside.
71 year old woman with coronary stents, ischemic cardiomyopathy, ICD, and AAA, has had 4 days of vomiting, abdominal pain and also chest pain. EKG without evidence for ischemia, troponin negative. Pain has now subsided. No role for further cardiac testing or change in cardiac management. Remains without cardiac symptoms, but ongoing complaint of right pelvic or hip pain.    To contact call Cardiology Fellow or Attending as listed on [email or web address] password: [email or web address].
71 year old woman with coronary stents, ischemic cardiomyopathy, ICD, and AAA, has had 4 days of vomiting, abdominal pain and also chest pain. EKG without evidence for ischemia, troponin negative. Pain has now subsided. No role for further cardiac testing or change in cardiac management. Remains without cardiac symptoms, but ongoing complaint of right pelvic or hip pain. However, now feeling much better, all symptoms resolved. CT scan results pending. There are no acute cardiac issues at this time.    To contact call Cardiology Fellow or Attending as listed on [email or web address] password: [email or web address].
Pt care and plan discussed and reviewed with PA. Plan as outlined above edited by me to reflect our discussion. Thirty five minutes spent on encounter, of which more than fifty percent of the encounter was spent on counseling and/or coordinating care by the attending physician. OMT on six regions for acute somatic dysfunctions done at the bedside. Advanced care planning/advanced directives discussed with patient/family. DNR status including forceful chest compressions to attempt to restart the heart, ventilator support/artificial breathing, electric shock, artificial nutrition, health care proxy, Molst form all discussed with pt. Pt wishes to consider.
Pt care and plan discussed and reviewed with PA. Plan as outlined above edited by me to reflect our discussion. Thirty five minutes spent on encounter, of which more than fifty percent of the encounter was spent on counseling and/or coordinating care by the attending physician. OMT on six regions for acute somatic dysfunctions done at the bedside.

## 2022-01-13 NOTE — PROGRESS NOTE ADULT - SUBJECTIVE AND OBJECTIVE BOX
Name of Patient : SHAE HANEY  MRN: [id number]      Subjective: Patient seen and examined. No new events except as noted.   Doingokay  pain resolved     REVIEW OF SYSTEMS:    CONSTITUTIONAL: No weakness, fevers or chills  EYES/ENT: No visual changes;  No vertigo or throat pain   NECK: No pain or stiffness  RESPIRATORY: No cough, wheezing, hemoptysis; No shortness of breath  CARDIOVASCULAR: No chest pain or palpitations  GASTROINTESTINAL: No abdominal or epigastric pain. No nausea, vomiting, or hematemesis; No diarrhea or constipation. No melena or hematochezia.  GENITOURINARY: No dysuria, frequency or hematuria  NEUROLOGICAL: No numbness or weakness  SKIN: No itching, burning, rashes, or lesions   All other review of systems is negative unless indicated above.    MEDICATIONS:  MEDICATIONS  (STANDING):  atorvastatin 80 milliGRAM(s) Oral at bedtime  budesonide 160 MICROgram(s)/formoterol 4.5 MICROgram(s) Inhaler 2 Puff(s) Inhalation two times a day  carvedilol 3.125 milliGRAM(s) Oral every 12 hours  clopidogrel Tablet 75 milliGRAM(s) Oral daily  enoxaparin Injectable 40 milliGRAM(s) SubCutaneous daily  famotidine    Tablet 20 milliGRAM(s) Oral daily  hydrocortisone 10 milliGRAM(s) Oral <User Schedule>  hydrocortisone 5 milliGRAM(s) Oral <User Schedule>  losartan 25 milliGRAM(s) Oral daily  montelukast 10 milliGRAM(s) Oral daily  polyethylene glycol 3350 17 Gram(s) Oral two times a day  sucralfate suspension 1 Gram(s) Oral four times a day      PHYSICAL EXAM:  T(C): 36.7 (01-13-22 @ 20:14), Max: 36.9 (01-13-22 @ 05:37)  HR: 60 (01-13-22 @ 20:14) (60 - 70)  BP: 117/70 (01-13-22 @ 20:14) (117/69 - 125/77)  RR: 17 (01-13-22 @ 20:14) (17 - 18)  SpO2: 98% (01-13-22 @ 20:14) (93% - 98%)  Wt(kg): --  I&O's Summary    12 Jan 2022 07:01  -  13 Jan 2022 07:00  --------------------------------------------------------  IN: 240 mL / OUT: 0 mL / NET: 240 mL    13 Jan 2022 07:01  -  14 Jan 2022 00:22  --------------------------------------------------------  IN: 480 mL / OUT: 0 mL / NET: 480 mL          Appearance: Normal	  HEENT:  PERRLA   Lymphatic: No lymphadenopathy   Cardiovascular: Normal S1 S2, no JVD  Respiratory: normal effort , clear  Gastrointestinal:  Soft, Non-tender  Skin: No rashes,  warm to touch  Psychiatry:  Mood & affect appropriate  Musculuskeletal: No edema      All labs, Imaging and EKGs personally reviewed     01-12-22 @ 07:01  -  01-13-22 @ 07:00  --------------------------------------------------------  IN: 240 mL / OUT: 0 mL / NET: 240 mL    01-13-22 @ 07:01 - 01-14-22 @ 00:22  --------------------------------------------------------  IN: 480 mL / OUT: 0 mL / NET: 480 mL                          11.0   6.05  )-----------( 218      ( 12 Jan 2022 06:55 )             34.2               01-12    141  |  105  |  9   ----------------------------<  89  3.9   |  27  |  1.10    Ca    9.2      12 Jan 2022 06:55

## 2022-01-13 NOTE — PROVIDER CONTACT NOTE (OTHER) - ASSESSMENT
aox4.
Pt remains AxO4, VSS. Pt refusing lovenox injection, RN educated patient, patient understood education but continues to refuse.

## 2022-01-13 NOTE — PROGRESS NOTE ADULT - SUBJECTIVE AND OBJECTIVE BOX
Chief Complaint:  Patient is a 71y old  Female who presents with a chief complaint of abdominal and chest pain (13 Jan 2022 07:16)      Date of service 01-13-22 @ 12:18      Interval Events:   Patient seen and examined. States pain to right hip is improving. Tolerating diet. No BM today.     Hospital Medications:  acetaminophen     Tablet .. 650 milliGRAM(s) Oral every 6 hours PRN  ALBUTerol    90 MICROgram(s) HFA Inhaler 2 Puff(s) Inhalation every 6 hours PRN  ALPRAZolam 1 milliGRAM(s) Oral three times a day PRN  aluminum hydroxide/magnesium hydroxide/simethicone Suspension 30 milliLiter(s) Oral every 4 hours PRN  atorvastatin 80 milliGRAM(s) Oral at bedtime  budesonide 160 MICROgram(s)/formoterol 4.5 MICROgram(s) Inhaler 2 Puff(s) Inhalation two times a day  carvedilol 3.125 milliGRAM(s) Oral every 12 hours  clopidogrel Tablet 75 milliGRAM(s) Oral daily  enoxaparin Injectable 40 milliGRAM(s) SubCutaneous daily  famotidine    Tablet 20 milliGRAM(s) Oral daily  hydrocortisone 10 milliGRAM(s) Oral <User Schedule>  hydrocortisone 5 milliGRAM(s) Oral <User Schedule>  losartan 25 milliGRAM(s) Oral daily  melatonin 3 milliGRAM(s) Oral at bedtime PRN  montelukast 10 milliGRAM(s) Oral daily  morphine  - Injectable 2 milliGRAM(s) IV Push every 6 hours PRN  ondansetron Injectable 4 milliGRAM(s) IV Push every 8 hours PRN  oxyCODONE    IR 5 milliGRAM(s) Oral every 6 hours PRN  polyethylene glycol 3350 17 Gram(s) Oral daily  sucralfate suspension 1 Gram(s) Oral four times a day  zolpidem 5 milliGRAM(s) Oral at bedtime PRN  zolpidem 5 milliGRAM(s) Oral at bedtime PRN        Review of Systems:  General:  No wt loss, fevers, chills, night sweats, fatigue,   Eyes:  Good vision, no reported pain  ENT:  No sore throat, pain, runny nose, dysphagia  CV:  No pain, palpitations, hypo/hypertension  Resp:  No dyspnea, cough, tachypnea, wheezing  GI:  See HPI  :  No pain, bleeding, incontinence, nocturia  Muscle:  No pain, weakness  Neuro:  No weakness, tingling, memory problems  Psych:  No fatigue, insomnia, mood problems, depression  Endocrine:  No polyuria, polydipsia, cold/heat intolerance  Heme:  No petechiae, ecchymosis, easy bruisability  Integumentary:  No rash, edema    PHYSICAL EXAM:   Vital Signs:  Vital Signs Last 24 Hrs  T(C): 36.8 (13 Jan 2022 11:49), Max: 37.1 (12 Jan 2022 20:23)  T(F): 98.3 (13 Jan 2022 11:49), Max: 98.7 (12 Jan 2022 20:23)  HR: 60 (13 Jan 2022 11:49) (58 - 60)  BP: 117/69 (13 Jan 2022 11:49) (117/69 - 137/80)  BP(mean): --  RR: 18 (13 Jan 2022 11:49) (17 - 18)  SpO2: 95% (13 Jan 2022 11:49) (93% - 97%)  Daily     Daily       PHYSICAL EXAM:     GENERAL:  Appears stated age, well-groomed, well-nourished, no distress  HEENT:  NC/AT,  conjunctivae anicteric, clear and pink,   NECK: supple, trachea midline  CHEST:  Full & symmetric excursion, no increased effort, breath sounds clear  HEART:  Regular rhythm, no JVD  ABDOMEN:  Soft, non-tender, non-distended, normoactive bowel sounds,  no masses , no hepatosplenomegaly  EXTREMITIES:  no cyanosis,clubbing or edema  SKIN:  No rash, erythema, or, ecchymoses, no jaundice  NEURO:  Alert, non-focal, no asterixis  PSYCH: Appropriate affect, oriented to place and time  RECTAL: Deferred      LABS Personally reviewed by me:                        11.0   6.05  )-----------( 218      ( 12 Jan 2022 06:55 )             34.2       01-12    141  |  105  |  9   ----------------------------<  89  3.9   |  27  |  1.10    Ca    9.2      12 Jan 2022 06:55                                    11.0   6.05  )-----------( 218      ( 12 Jan 2022 06:55 )             34.2                         12.2   6.59  )-----------( 251      ( 10 Zeke 2022 15:45 )             37.4       Imaging personally reviewed by me:

## 2022-01-13 NOTE — PROGRESS NOTE ADULT - ASSESSMENT
70 yo F w/ PMH AAA, CAD s/p stents, ischemic cardiomyopathy s/p ICD/PPM, COPD who presents w/ several days of lower abd pain, NV, and 2x chest pain of unclear etiology.     #Chest pain   Could be 2/2 her emesis which can cause sternal pressure. Trop negative, EKG unrevealing.   CXRAY unrevealing, echo shows improved EF to 40-45%   -No further cardiac eval for now     #Lower abd pain   Unclear etiology, intermittent, had recent abd US which showed stable infrarenal aneursym, less likely her cause   -No need for further imaging at this time     #CAD   -Clopidogrel 75   -Atorvastatin 80     #HFrEF   -Losartan 25   -Carvedilol 3.125 q12  72 yo F w/ PMH AAA, CAD s/p stents, ischemic cardiomyopathy s/p ICD/PPM, COPD who presents w/ several days of lower abd pain, NV, and 2x chest pain of unclear etiology.     #Chest pain   Could be 2/2 her emesis which can cause sternal pressure. Trop negative, EKG unrevealing.   CXRAY unrevealing, echo shows improved EF to 40-45%   -No further cardiac eval for now     #Lower abd pain   Unclear etiology, intermittent, had recent abd US which showed stable infrarenal aneursym, less likely her cause   -No need for further imaging at this time     #CAD   -Clopidogrel 75   -Atorvastatin 80     #HFrEF   -Losartan 25   -Carvedilol 3.125 q12     Will sign off, please call back for any questions

## 2022-01-13 NOTE — PROGRESS NOTE ADULT - ASSESSMENT
71 year-old-female with history of AAA, CVA, CAD s/p stent, ventricular arrhythmia s/p ICD/PPM, CHF, COPD, IBD presents with 4-day history of abdominal pain, chest pain and nausea/vomiting    1) ESTEVAN on CKD stage 3  Sees my colleague as an output  b/l cr around 1 to 1.1mg/dl  Cr now at baseline-->pending today  will monitor cr for now  can cont with losartan  trend bmp    2) Hyponatremia- Na stable currently  has been seen in the past for low na  will trend na     Dr Luciano  [phone]

## 2022-01-14 ENCOUNTER — TRANSCRIPTION ENCOUNTER (OUTPATIENT)
Age: 72
End: 2022-01-14

## 2022-01-14 ENCOUNTER — APPOINTMENT (OUTPATIENT)
Dept: CARDIOLOGY | Facility: CLINIC | Age: 72
End: 2022-01-14

## 2022-01-14 VITALS
RESPIRATION RATE: 18 BRPM | HEART RATE: 60 BPM | DIASTOLIC BLOOD PRESSURE: 78 MMHG | SYSTOLIC BLOOD PRESSURE: 113 MMHG | TEMPERATURE: 98 F | OXYGEN SATURATION: 96 %

## 2022-01-14 LAB
ANION GAP SERPL CALC-SCNC: 10 MMOL/L — SIGNIFICANT CHANGE UP (ref 5–17)
BUN SERPL-MCNC: 11 MG/DL — SIGNIFICANT CHANGE UP (ref 7–23)
CALCIUM SERPL-MCNC: 10.3 MG/DL — SIGNIFICANT CHANGE UP (ref 8.4–10.5)
CHLORIDE SERPL-SCNC: 102 MMOL/L — SIGNIFICANT CHANGE UP (ref 96–108)
CO2 SERPL-SCNC: 28 MMOL/L — SIGNIFICANT CHANGE UP (ref 22–31)
CREAT SERPL-MCNC: 1.14 MG/DL — SIGNIFICANT CHANGE UP (ref 0.5–1.3)
GLUCOSE SERPL-MCNC: 96 MG/DL — SIGNIFICANT CHANGE UP (ref 70–99)
MAGNESIUM SERPL-MCNC: 2 MG/DL — SIGNIFICANT CHANGE UP (ref 1.6–2.6)
POTASSIUM SERPL-MCNC: 4.3 MMOL/L — SIGNIFICANT CHANGE UP (ref 3.5–5.3)
POTASSIUM SERPL-SCNC: 4.3 MMOL/L — SIGNIFICANT CHANGE UP (ref 3.5–5.3)
SODIUM SERPL-SCNC: 140 MMOL/L — SIGNIFICANT CHANGE UP (ref 135–145)

## 2022-01-14 PROCEDURE — 96374 THER/PROPH/DIAG INJ IV PUSH: CPT

## 2022-01-14 PROCEDURE — 83735 ASSAY OF MAGNESIUM: CPT

## 2022-01-14 PROCEDURE — 85025 COMPLETE CBC W/AUTO DIFF WBC: CPT

## 2022-01-14 PROCEDURE — U0003: CPT

## 2022-01-14 PROCEDURE — 93975 VASCULAR STUDY: CPT

## 2022-01-14 PROCEDURE — 80053 COMPREHEN METABOLIC PANEL: CPT

## 2022-01-14 PROCEDURE — 99285 EMERGENCY DEPT VISIT HI MDM: CPT

## 2022-01-14 PROCEDURE — 81003 URINALYSIS AUTO W/O SCOPE: CPT

## 2022-01-14 PROCEDURE — 36415 COLL VENOUS BLD VENIPUNCTURE: CPT

## 2022-01-14 PROCEDURE — 94640 AIRWAY INHALATION TREATMENT: CPT

## 2022-01-14 PROCEDURE — 93005 ELECTROCARDIOGRAM TRACING: CPT

## 2022-01-14 PROCEDURE — C8929: CPT

## 2022-01-14 PROCEDURE — 83690 ASSAY OF LIPASE: CPT

## 2022-01-14 PROCEDURE — 85027 COMPLETE CBC AUTOMATED: CPT

## 2022-01-14 PROCEDURE — 84484 ASSAY OF TROPONIN QUANT: CPT

## 2022-01-14 PROCEDURE — U0005: CPT

## 2022-01-14 PROCEDURE — 96375 TX/PRO/DX INJ NEW DRUG ADDON: CPT

## 2022-01-14 PROCEDURE — 74176 CT ABD & PELVIS W/O CONTRAST: CPT

## 2022-01-14 PROCEDURE — 80048 BASIC METABOLIC PNL TOTAL CA: CPT

## 2022-01-14 PROCEDURE — 71045 X-RAY EXAM CHEST 1 VIEW: CPT

## 2022-01-14 RX ORDER — ACETAMINOPHEN 500 MG
2 TABLET ORAL
Qty: 0 | Refills: 0 | DISCHARGE
Start: 2022-01-14

## 2022-01-14 RX ORDER — POLYETHYLENE GLYCOL 3350 17 G/17G
17 POWDER, FOR SOLUTION ORAL
Qty: 0 | Refills: 0 | DISCHARGE
Start: 2022-01-14

## 2022-01-14 RX ORDER — METOCLOPRAMIDE HCL 10 MG
1 TABLET ORAL
Qty: 21 | Refills: 0
Start: 2022-01-14 | End: 2022-01-20

## 2022-01-14 RX ORDER — OXYCODONE HYDROCHLORIDE 5 MG/1
1 TABLET ORAL
Qty: 0 | Refills: 0 | DISCHARGE

## 2022-01-14 RX ADMIN — CLOPIDOGREL BISULFATE 75 MILLIGRAM(S): 75 TABLET, FILM COATED ORAL at 11:29

## 2022-01-14 RX ADMIN — Medication 1 GRAM(S): at 00:00

## 2022-01-14 RX ADMIN — POLYETHYLENE GLYCOL 3350 17 GRAM(S): 17 POWDER, FOR SOLUTION ORAL at 05:44

## 2022-01-14 RX ADMIN — MONTELUKAST 10 MILLIGRAM(S): 4 TABLET, CHEWABLE ORAL at 11:29

## 2022-01-14 RX ADMIN — LOSARTAN POTASSIUM 25 MILLIGRAM(S): 100 TABLET, FILM COATED ORAL at 05:44

## 2022-01-14 RX ADMIN — Medication 1 GRAM(S): at 05:44

## 2022-01-14 RX ADMIN — FAMOTIDINE 20 MILLIGRAM(S): 10 INJECTION INTRAVENOUS at 11:29

## 2022-01-14 RX ADMIN — Medication 1 GRAM(S): at 11:29

## 2022-01-14 RX ADMIN — BUDESONIDE AND FORMOTEROL FUMARATE DIHYDRATE 2 PUFF(S): 160; 4.5 AEROSOL RESPIRATORY (INHALATION) at 05:45

## 2022-01-14 RX ADMIN — Medication 10 MILLIGRAM(S): at 07:27

## 2022-01-14 RX ADMIN — CARVEDILOL PHOSPHATE 3.12 MILLIGRAM(S): 80 CAPSULE, EXTENDED RELEASE ORAL at 05:44

## 2022-01-14 NOTE — PROGRESS NOTE ADULT - ASSESSMENT
The patient is a 71 year old female with history of AAA, CVD, CAD s/p stent, ventricular arrythmia s/p ICD/PPM, CHF, COPD, IBD, RA presenting with abdominal pain, chest pain, nausea and vomiting.    1. abdominal pain, known stable infrarenal aneurysm. Ddx includes resolving viral gastroenteritis, less likely chronic mesenteric ischemia  -US shows no stenosis to celiac artery or mesenteric arteries. Patient's pain improving. pending CT abdomen  -prn pain meds  -soft brown BM today, continue Miralax BID outpatient   -DC planning     2. chest pain  -as per cardiology    3. nausea/vomiting; resolved  -prn antiemetics     4. CAD s/p stent on Plavix    5. ESTEVAN on CKD stage 3  - as per nephro    6. new onset right hip/groin pain; possibly related to RA   -improving  -CT unchanged     7. Constipation  -order changed to Miralax BID, pt's home dose     Attending supervision statement: I have personally seen and examined the patient. I fully participated in the care of this patient. I have made amendments to the documentation where necessary, and agree with the history, physical exam, and plan as outlined by the ACP.

## 2022-01-14 NOTE — DISCHARGE NOTE NURSING/CASE MANAGEMENT/SOCIAL WORK - NSDCPEFALRISK_GEN_ALL_CORE
For information on Fall & Injury Prevention, visit: [email or web address] OR  [email or web address] OR  https://www.cdc.gov/steadi/patient.html

## 2022-01-14 NOTE — PROGRESS NOTE ADULT - SUBJECTIVE AND OBJECTIVE BOX
Patient  seen and examined  no complaints    lactose (Rash)  No Known Allergies    Hospital Medications:   MEDICATIONS  (STANDING):  atorvastatin 80 milliGRAM(s) Oral at bedtime  budesonide 160 MICROgram(s)/formoterol 4.5 MICROgram(s) Inhaler 2 Puff(s) Inhalation two times a day  carvedilol 3.125 milliGRAM(s) Oral every 12 hours  clopidogrel Tablet 75 milliGRAM(s) Oral daily  enoxaparin Injectable 40 milliGRAM(s) SubCutaneous daily  famotidine    Tablet 20 milliGRAM(s) Oral daily  hydrocortisone 10 milliGRAM(s) Oral <User Schedule>  hydrocortisone 5 milliGRAM(s) Oral <User Schedule>  losartan 25 milliGRAM(s) Oral daily  montelukast 10 milliGRAM(s) Oral daily  polyethylene glycol 3350 17 Gram(s) Oral two times a day  sucralfate suspension 1 Gram(s) Oral four times a day      VITALS:  T(F): 98.2 (22 @ 12:08), Max: 98.4 (22 @ 04:16)  HR: 60 (22 @ 12:08)  BP: 113/78 (22 @ 12:08)  RR: 18 (22 @ 12:08)  SpO2: 96% (22 @ 12:08)  Wt(kg): --     @ 07:  -   @ 07:00  --------------------------------------------------------  IN: 480 mL / OUT: 0 mL / NET: 480 mL     @ 07:01  -   @ 13:03  --------------------------------------------------------  IN: 300 mL / OUT: 0 mL / NET: 300 mL      PHYSICAL EXAM:  Constitutional: NAD  HEENT: anicteric sclera, oropharynx clear, MMM  Neck: No JVD  Respiratory: CTAB, no wheezes, rales or rhonchi  Cardiovascular: S1, S2, RRR  Gastrointestinal: BS+, soft, NT/ND  Extremities: No cyanosis or clubbing. No peripheral edema  Neurological: A/O x 3, no focal deficits  Psychiatric: Normal mood, normal affect  : No CVA tenderness. No brunson.   Skin: No rashes    LABS:      140  |  102  |  11  ----------------------------<  96  4.3   |  28  |  1.14    Ca    10.3      2022 07:09  Mg     2.0           Creatinine Trend: 1.14 <--, 1.10 <--, 1.29 <--, 1.34 <--    Urine Studies:  Urinalysis Basic - ( 2022 08:10 )    Color: Light Yellow / Appearance: Clear / S.009 / pH:   Gluc:  / Ketone: Negative  / Bili: Negative / Urobili: Negative   Blood:  / Protein: Negative / Nitrite: Negative   Leuk Esterase: Negative / RBC:  / WBC    Sq Epi:  / Non Sq Epi:  / Bacteria:         RADIOLOGY & ADDITIONAL STUDIES:

## 2022-01-14 NOTE — PROGRESS NOTE ADULT - PROVIDER SPECIALTY LIST ADULT
Gastroenterology
Internal Medicine
Nephrology
Gastroenterology
Internal Medicine
Nephrology
Cardiology
Cardiology
Gastroenterology
Internal Medicine
Nephrology
Internal Medicine

## 2022-01-14 NOTE — PROGRESS NOTE ADULT - SUBJECTIVE AND OBJECTIVE BOX
Name of Patient : SHAE HANEY  MRN: [id number]  Date of visit: 01-14-22       Subjective: Patient seen and examined. No new events except as noted.   Doing okay     REVIEW OF SYSTEMS:    CONSTITUTIONAL: No weakness, fevers or chills  EYES/ENT: No visual changes;  No vertigo or throat pain   NECK: No pain or stiffness  RESPIRATORY: No cough, wheezing, hemoptysis; No shortness of breath  CARDIOVASCULAR: No chest pain or palpitations  GASTROINTESTINAL: No abdominal or epigastric pain. No nausea, vomiting, or hematemesis; No diarrhea or constipation. No melena or hematochezia.  GENITOURINARY: No dysuria, frequency or hematuria  NEUROLOGICAL: No numbness or weakness  SKIN: No itching, burning, rashes, or lesions   All other review of systems is negative unless indicated above.    MEDICATIONS:  MEDICATIONS  (STANDING):  atorvastatin 80 milliGRAM(s) Oral at bedtime  budesonide 160 MICROgram(s)/formoterol 4.5 MICROgram(s) Inhaler 2 Puff(s) Inhalation two times a day  carvedilol 3.125 milliGRAM(s) Oral every 12 hours  clopidogrel Tablet 75 milliGRAM(s) Oral daily  enoxaparin Injectable 40 milliGRAM(s) SubCutaneous daily  famotidine    Tablet 20 milliGRAM(s) Oral daily  hydrocortisone 10 milliGRAM(s) Oral <User Schedule>  hydrocortisone 5 milliGRAM(s) Oral <User Schedule>  losartan 25 milliGRAM(s) Oral daily  montelukast 10 milliGRAM(s) Oral daily  polyethylene glycol 3350 17 Gram(s) Oral two times a day  sucralfate suspension 1 Gram(s) Oral four times a day      PHYSICAL EXAM:  T(C): 36.8 (01-14-22 @ 12:08), Max: 36.9 (01-14-22 @ 04:16)  HR: 60 (01-14-22 @ 12:08) (60 - 66)  BP: 113/78 (01-14-22 @ 12:08) (113/70 - 113/78)  RR: 18 (01-14-22 @ 12:08) (18 - 18)  SpO2: 96% (01-14-22 @ 12:08) (93% - 96%)  Wt(kg): --  I&O's Summary    13 Jan 2022 07:01 - 14 Jan 2022 07:00  --------------------------------------------------------  IN: 480 mL / OUT: 0 mL / NET: 480 mL    14 Jan 2022 07:01 - 14 Jan 2022 23:17  --------------------------------------------------------  IN: 540 mL / OUT: 0 mL / NET: 540 mL          Appearance: Normal	  HEENT:  PERRLA   Lymphatic: No lymphadenopathy   Cardiovascular: Normal S1 S2, no JVD  Respiratory: normal effort , clear  Gastrointestinal:  Soft, Non-tender  Skin: No rashes,  warm to touch  Psychiatry:  Mood & affect appropriate  Musculuskeletal: No edema      All labs, Imaging and EKGs personally reviewed     01-13-22 @ 07:01 - 01-14-22 @ 07:00  --------------------------------------------------------  IN: 480 mL / OUT: 0 mL / NET: 480 mL    01-14-22 @ 07:01 - 01-14-22 @ 23:17  --------------------------------------------------------  IN: 540 mL / OUT: 0 mL / NET: 540 mL                01-14    140  |  102  |  11  ----------------------------<  96  4.3   |  28  |  1.14    Ca    10.3      14 Jan 2022 07:09  Mg     2.0     01-14

## 2022-01-14 NOTE — DISCHARGE NOTE NURSING/CASE MANAGEMENT/SOCIAL WORK - PATIENT PORTAL LINK FT
You can access the FollowMyHealth Patient Portal offered by [facility] by registering at the following website: [email or web address]. By joining [employer]’s FollowMyHealth portal, you will also be able to view your health information using other applications (apps) compatible with our system.

## 2022-01-14 NOTE — PROGRESS NOTE ADULT - ASSESSMENT
71 year-old-female with history of AAA, CVA, CAD s/p stent, ventricular arrhythmia s/p ICD/PPM, CHF, COPD, IBD presents with 4-day history of abdominal pain, chest pain and nausea/vomiting    1) ESTEVAN on CKD stage 3  Sees my colleague as an output ( )  b/l cr around 1 to 1.1mg/dl  Cr now at baseline  will monitor cr for now  can cont with losartan  trend bmp    2) Hyponatremia- Na stable currently  has been seen in the past for low na  will trend na     Dr Luciano  [phone]

## 2022-01-14 NOTE — PROGRESS NOTE ADULT - REASON FOR ADMISSION
abdominal and chest pain

## 2022-01-14 NOTE — PROGRESS NOTE ADULT - SUBJECTIVE AND OBJECTIVE BOX
Chief Complaint:  Patient is a 71y old  Female who presents with a chief complaint of abdominal and chest pain (13 Jan 2022 17:23)      Date of service 01-14-22 @ 11:17      Interval Events:   Patient seen and examined. Denies any complaints of pain. Feeling better. Tolerating PO intake.       Hospital Medications:  acetaminophen     Tablet .. 650 milliGRAM(s) Oral every 6 hours PRN  ALBUTerol    90 MICROgram(s) HFA Inhaler 2 Puff(s) Inhalation every 6 hours PRN  ALPRAZolam 1 milliGRAM(s) Oral three times a day PRN  aluminum hydroxide/magnesium hydroxide/simethicone Suspension 30 milliLiter(s) Oral every 4 hours PRN  atorvastatin 80 milliGRAM(s) Oral at bedtime  budesonide 160 MICROgram(s)/formoterol 4.5 MICROgram(s) Inhaler 2 Puff(s) Inhalation two times a day  carvedilol 3.125 milliGRAM(s) Oral every 12 hours  clopidogrel Tablet 75 milliGRAM(s) Oral daily  enoxaparin Injectable 40 milliGRAM(s) SubCutaneous daily  famotidine    Tablet 20 milliGRAM(s) Oral daily  hydrocortisone 10 milliGRAM(s) Oral <User Schedule>  hydrocortisone 5 milliGRAM(s) Oral <User Schedule>  losartan 25 milliGRAM(s) Oral daily  melatonin 3 milliGRAM(s) Oral at bedtime PRN  montelukast 10 milliGRAM(s) Oral daily  morphine  - Injectable 2 milliGRAM(s) IV Push every 6 hours PRN  ondansetron Injectable 4 milliGRAM(s) IV Push every 8 hours PRN  oxyCODONE    IR 5 milliGRAM(s) Oral every 6 hours PRN  polyethylene glycol 3350 17 Gram(s) Oral two times a day  sucralfate suspension 1 Gram(s) Oral four times a day  zolpidem 5 milliGRAM(s) Oral at bedtime PRN  zolpidem 5 milliGRAM(s) Oral at bedtime PRN        Review of Systems:  General:  No wt loss, fevers, chills, night sweats, fatigue,   Eyes:  Good vision, no reported pain  ENT:  No sore throat, pain, runny nose, dysphagia  CV:  No pain, palpitations, hypo/hypertension  Resp:  No dyspnea, cough, tachypnea, wheezing  GI:  See HPI  :  No pain, bleeding, incontinence, nocturia  Muscle:  No pain, weakness  Neuro:  No weakness, tingling, memory problems  Psych:  No fatigue, insomnia, mood problems, depression  Endocrine:  No polyuria, polydipsia, cold/heat intolerance  Heme:  No petechiae, ecchymosis, easy bruisability  Integumentary:  No rash, edema    PHYSICAL EXAM:   Vital Signs:  Vital Signs Last 24 Hrs  T(C): 36.9 (14 Jan 2022 04:16), Max: 36.9 (14 Jan 2022 04:16)  T(F): 98.4 (14 Jan 2022 04:16), Max: 98.4 (14 Jan 2022 04:16)  HR: 66 (14 Jan 2022 04:16) (60 - 70)  BP: 113/70 (14 Jan 2022 04:16) (113/70 - 125/77)  BP(mean): --  RR: 18 (14 Jan 2022 04:16) (17 - 18)  SpO2: 93% (14 Jan 2022 04:16) (93% - 98%)  Daily     Daily       PHYSICAL EXAM:     GENERAL:  Appears stated age, well-groomed, well-nourished, no distress  HEENT:  NC/AT,  conjunctivae anicteric, clear and pink,   NECK: supple, trachea midline  CHEST:  Full & symmetric excursion, no increased effort, breath sounds clear  HEART:  Regular rhythm, no JVD  ABDOMEN:  Soft, non-tender, non-distended, normoactive bowel sounds,  no masses , no hepatosplenomegaly  EXTREMITIES:  no cyanosis,clubbing or edema  SKIN:  No rash, erythema, or, ecchymoses, no jaundice  NEURO:  Alert, non-focal, no asterixis  PSYCH: Appropriate affect, oriented to place and time  RECTAL: Deferred      LABS Personally reviewed by me:      01-14    140  |  102  |  11  ----------------------------<  96  4.3   |  28  |  1.14    Ca    10.3      14 Jan 2022 07:09  Mg     2.0     01-14                                    11.0   6.05  )-----------( 218      ( 12 Jan 2022 06:55 )             34.2       Imaging personally reviewed by me:

## 2022-01-25 ENCOUNTER — LABORATORY RESULT (OUTPATIENT)
Age: 72
End: 2022-01-25

## 2022-01-26 LAB
25(OH)D3 SERPL-MCNC: 51 NG/ML
ALBUMIN SERPL ELPH-MCNC: 3.9 G/DL
ALP BLD-CCNC: 63 U/L
ALT SERPL-CCNC: 20 U/L
ANION GAP SERPL CALC-SCNC: 13 MMOL/L
AST SERPL-CCNC: 17 U/L
BASOPHILS # BLD AUTO: 0.06 K/UL
BASOPHILS NFR BLD AUTO: 0.9 %
BILIRUB SERPL-MCNC: 0.2 MG/DL
BUN SERPL-MCNC: 18 MG/DL
CALCIUM SERPL-MCNC: 9.3 MG/DL
CHLORIDE SERPL-SCNC: 105 MMOL/L
CHOLEST SERPL-MCNC: 159 MG/DL
CO2 SERPL-SCNC: 24 MMOL/L
CREAT SERPL-MCNC: 1.17 MG/DL
EOSINOPHIL # BLD AUTO: 0.07 K/UL
EOSINOPHIL NFR BLD AUTO: 1.1 %
ESTIMATED AVERAGE GLUCOSE: 131 MG/DL
GLUCOSE SERPL-MCNC: 90 MG/DL
HBA1C MFR BLD HPLC: 6.2 %
HCT VFR BLD CALC: 37.3 %
HDLC SERPL-MCNC: 60 MG/DL
HGB BLD-MCNC: 11.7 G/DL
IMM GRANULOCYTES NFR BLD AUTO: 0.5 %
LDLC SERPL CALC-MCNC: 85 MG/DL
LYMPHOCYTES # BLD AUTO: 1.25 K/UL
LYMPHOCYTES NFR BLD AUTO: 19.2 %
MAN DIFF?: NORMAL
MCHC RBC-ENTMCNC: 31.4 GM/DL
MCHC RBC-ENTMCNC: 33 PG
MCV RBC AUTO: 105.1 FL
MONOCYTES # BLD AUTO: 0.54 K/UL
MONOCYTES NFR BLD AUTO: 8.3 %
NEUTROPHILS # BLD AUTO: 4.55 K/UL
NEUTROPHILS NFR BLD AUTO: 70 %
NONHDLC SERPL-MCNC: 99 MG/DL
PLATELET # BLD AUTO: 310 K/UL
POTASSIUM SERPL-SCNC: 5.2 MMOL/L
PROT SERPL-MCNC: 6.7 G/DL
RBC # BLD: 3.55 M/UL
RBC # FLD: 15.2 %
SODIUM SERPL-SCNC: 141 MMOL/L
TRIGL SERPL-MCNC: 72 MG/DL
WBC # FLD AUTO: 6.5 K/UL

## 2022-02-07 ENCOUNTER — RX RENEWAL (OUTPATIENT)
Age: 72
End: 2022-02-07

## 2022-03-22 ENCOUNTER — APPOINTMENT (OUTPATIENT)
Dept: ELECTROPHYSIOLOGY | Facility: CLINIC | Age: 72
End: 2022-03-22
Payer: MEDICARE

## 2022-03-22 ENCOUNTER — NON-APPOINTMENT (OUTPATIENT)
Age: 72
End: 2022-03-22

## 2022-03-22 PROCEDURE — 93296 REM INTERROG EVL PM/IDS: CPT

## 2022-03-22 PROCEDURE — 93295 DEV INTERROG REMOTE 1/2/MLT: CPT

## 2022-06-09 ENCOUNTER — EMERGENCY (EMERGENCY)
Facility: HOSPITAL | Age: 72
LOS: 1 days | Discharge: HOME | End: 2022-06-09
Attending: EMERGENCY MEDICINE
Payer: MEDICARE

## 2022-06-09 VITALS
SYSTOLIC BLOOD PRESSURE: 127 MMHG | OXYGEN SATURATION: 98 % | RESPIRATION RATE: 20 BRPM | HEART RATE: 80 BPM | TEMPERATURE: 98 F | DIASTOLIC BLOOD PRESSURE: 97 MMHG

## 2022-06-09 VITALS
WEIGHT: 121.92 LBS | HEART RATE: 84 BPM | SYSTOLIC BLOOD PRESSURE: 137 MMHG | TEMPERATURE: 98 F | DIASTOLIC BLOOD PRESSURE: 90 MMHG | OXYGEN SATURATION: 100 % | HEIGHT: 64 IN | RESPIRATION RATE: 24 BRPM

## 2022-06-09 PROCEDURE — 96375 TX/PRO/DX INJ NEW DRUG ADDON: CPT

## 2022-06-09 PROCEDURE — 99284 EMERGENCY DEPT VISIT MOD MDM: CPT

## 2022-06-09 PROCEDURE — 73502 X-RAY EXAM HIP UNI 2-3 VIEWS: CPT | Mod: 26,RT

## 2022-06-09 PROCEDURE — 73502 X-RAY EXAM HIP UNI 2-3 VIEWS: CPT

## 2022-06-09 PROCEDURE — 96374 THER/PROPH/DIAG INJ IV PUSH: CPT

## 2022-06-09 PROCEDURE — 99284 EMERGENCY DEPT VISIT MOD MDM: CPT | Mod: 25

## 2022-06-09 RX ORDER — MORPHINE SULFATE 50 MG/1
4 CAPSULE, EXTENDED RELEASE ORAL ONCE
Refills: 0 | Status: DISCONTINUED | OUTPATIENT
Start: 2022-06-09 | End: 2022-06-09

## 2022-06-09 RX ORDER — KETOROLAC TROMETHAMINE 30 MG/ML
15 SYRINGE (ML) INJECTION ONCE
Refills: 0 | Status: DISCONTINUED | OUTPATIENT
Start: 2022-06-09 | End: 2022-06-09

## 2022-06-09 RX ADMIN — Medication 15 MILLIGRAM(S): at 14:45

## 2022-06-09 RX ADMIN — Medication 15 MILLIGRAM(S): at 12:33

## 2022-06-09 RX ADMIN — MORPHINE SULFATE 4 MILLIGRAM(S): 50 CAPSULE, EXTENDED RELEASE ORAL at 12:33

## 2022-06-09 RX ADMIN — MORPHINE SULFATE 4 MILLIGRAM(S): 50 CAPSULE, EXTENDED RELEASE ORAL at 14:45

## 2022-06-09 NOTE — ED PROVIDER NOTE - OBJECTIVE STATEMENT
Dr. Romero Note: 72F with acute RIGHT hip pain, worse with movement, no trauma.  Has had RA flares in that hip in the past.  Off steroids for months.  No fever, cp, sob, ab pain, vomiting.  SEe PMHX below.  Took Tylenol 600mg per pt at 6am.  No Rheum, but sees pain management.

## 2022-06-09 NOTE — ED PROVIDER NOTE - NSFOLLOWUPINSTRUCTIONS_ED_ALL_ED_FT
You may use tylenol for pain. Please do not exceed 3250 mg in 24 hours. You may use ibuprofen for pain. Please do not exceed 3000 mg in 24 hours.     Please follow up with rheumatology.     Please follow up with your orthopedic doctor and your pain doctor.     Return to ED if you cannot walk, if you develop fevers, or if you have any other concern.

## 2022-06-09 NOTE — ED PROVIDER NOTE - PHYSICAL EXAMINATION
Dr. Romero Note: mild td soft tissue R hip to palpation, FROM of hip with pain with movement, no cellulitis.     No ab pain/td.     Nontoxic

## 2022-06-09 NOTE — ED ADULT NURSE NOTE - CAS DISCH TRANSFER METHOD
Cab ordered from /Transportation service Cab ordered from SW, Pt awaiting cab in triage./Transportation service

## 2022-06-09 NOTE — ED PROVIDER NOTE - PROGRESS NOTE DETAILS
Joseph Frankel PGY3: Patient with no more pain. Ambulated without issue. Xray non actionable. Will DC. Has pain and ortho follow up. Discussed plan and return precautions with patient who understands and agrees. All questions answered. Joseph Frankel PGY3: Patient with no more pain. Ambulated without issue. Xray non actionable. Will DC with rheum followup. Has pain and ortho follow up. Discussed plan and return precautions with patient who understands and agrees. All questions answered. Social work will help arrange transportation.

## 2022-06-09 NOTE — ED PROVIDER NOTE - PATIENT PORTAL LINK FT
You can access the FollowMyHealth Patient Portal offered by [facility] by registering at the following website: [email or web address]. By joining [employer]’s FollowMyHealth portal, you will also be able to view your health information using other applications (apps) compatible with our system.

## 2022-06-09 NOTE — CHART NOTE - NSCHARTNOTEFT_GEN_A_CORE
EMERGENCY : SW consulted by ED MD as patient cleared for discharge and in need of transportation home. As per MD patient independent and appropriate for taxi discharge. LMSW met with patient at bedside and introduced self and role to which she verbalized understanding. Patient is A&Ox4, confirmed independence. Declines caregiver. States her daughter drives but is working this evening and can’t pick her up, believes she gets transportation through her Medicaid. She confirms demographic information reflective in chart including home dropoff address. [facility] and was directed to [employer] PH: [phone]. LMSW booked taxi trip home through [employer] for 4PM pickup. SW was provided with [employer] PH: [phone], [email or web address]’s transportation brokerage who will assign transportation provider. Trip confirmation # [id number]. LMSW made ED MD, RN and patient aware. LMSW also provided handoff to incoming LMSW colleague should further follow up be required. Social work continues to remain available for any further needs.

## 2022-06-09 NOTE — ED ADULT NURSE NOTE - OBJECTIVE STATEMENT
received pt c/o not feeling well  after waiting outside a doctor's office for almost 2 hours. Pt states she has right hip pain. No distress. Breathing easy and non labored. Pt able to move all extremities. Per EMS pt has low saturation but pt arrived to assigned bed with oxygen saturation of 97% on room air. Pt speaks with a lisp which she says is normal for her. Pt alert and orientedX4.

## 2022-06-09 NOTE — ED PROVIDER NOTE - CLINICAL SUMMARY MEDICAL DECISION MAKING FREE TEXT BOX
Dr. Romero Note: acute on chronic right hip arthritis with flare-up, medication, imaging r/o occult fx.

## 2022-06-09 NOTE — ED PROVIDER NOTE - NSFOLLOWUPCLINICS_GEN_ALL_ED_FT
[facility] Rheumatology  Rheumatology  [address] [address]  Phone: [phone]  Fax:

## 2022-06-21 ENCOUNTER — APPOINTMENT (OUTPATIENT)
Dept: CARDIOLOGY | Facility: CLINIC | Age: 72
End: 2022-06-21
Payer: MEDICARE

## 2022-06-21 ENCOUNTER — APPOINTMENT (OUTPATIENT)
Dept: ULTRASOUND IMAGING | Facility: HOSPITAL | Age: 72
End: 2022-06-21
Payer: MEDICARE

## 2022-06-21 ENCOUNTER — OUTPATIENT (OUTPATIENT)
Dept: OUTPATIENT SERVICES | Facility: HOSPITAL | Age: 72
LOS: 1 days | End: 2022-06-21
Payer: MEDICARE

## 2022-06-21 ENCOUNTER — APPOINTMENT (OUTPATIENT)
Dept: ELECTROPHYSIOLOGY | Facility: CLINIC | Age: 72
End: 2022-06-21

## 2022-06-21 VITALS
SYSTOLIC BLOOD PRESSURE: 126 MMHG | BODY MASS INDEX: 24.75 KG/M2 | HEIGHT: 64 IN | WEIGHT: 145 LBS | DIASTOLIC BLOOD PRESSURE: 86 MMHG | HEART RATE: 80 BPM | OXYGEN SATURATION: 98 %

## 2022-06-21 DIAGNOSIS — I71.4 ABDOMINAL AORTIC ANEURYSM, WITHOUT RUPTURE: ICD-10-CM

## 2022-06-21 PROCEDURE — 76770 US EXAM ABDO BACK WALL COMP: CPT | Mod: 26

## 2022-06-21 PROCEDURE — 99214 OFFICE O/P EST MOD 30 MIN: CPT

## 2022-06-21 PROCEDURE — 93000 ELECTROCARDIOGRAM COMPLETE: CPT

## 2022-06-21 PROCEDURE — 76775 US EXAM ABDO BACK WALL LIM: CPT

## 2022-06-21 PROCEDURE — 99213 OFFICE O/P EST LOW 20 MIN: CPT

## 2022-06-21 NOTE — HISTORY OF PRESENT ILLNESS
[FreeTextEntry1] : 6/21/2022\par \par Mary is here for a followup visit\par Was in the hospital last week due to hip pains, was treated with pain meds\par Was instructed to see a rhuematologist and do PT\par She is seeing a pain specialiist \par She describes abdominal bloating\par No blood in the urine or stool.\par She is having bowel movements. \par \par \par She has an ultrasound aorta scheduled for 11AM today after this visit.\par Last US on Nov 5, 2021 showed stable AAA at 4.2cm\par \par Echo Jan 2022:  Aortic root 3.4cm, EF 40-45%\par

## 2022-06-21 NOTE — DISCUSSION/SUMMARY
[FreeTextEntry1] : Assessment:\par 1. AAA\par Nov 2021:  4.2cm\par Nov 2020 4.1 cm\par Feb 2019 4.1cm \par Oct 2018: 3.65\par March 2018 3.93\par Aug 2017 - 3.61\par Oct 2017 - CT scan AAA 3.8cm, R VIRIDIANA 1.6cm\par  November 2020 - 4.1 x 3.6 - fusiform \par 2. CHF\par 3. ICD\par \par Plan: \par 1.  Await US aorta later today\par 2.  If shows stability, then repeat again in 6 months\par 3.  she does have some PAD seen on duplex and CT scan in the past - plan for daily walking and daily foot checks\par 4.  She will see Rheumatology for hip and joint pains\par 5.  BP well controlled\par 6.  Appreciate excellent cardiac care with Dr. Sanchez. \par 7.  RTC in 6 months.

## 2022-06-21 NOTE — REVIEW OF SYSTEMS
[SOB] : no shortness of breath [Dyspnea on exertion] : not dyspnea during exertion [Chest Discomfort] : no chest discomfort [Lower Ext Edema] : no extremity edema [Leg Claudication] : no intermittent leg claudication [Palpitations] : no palpitations [Orthopnea] : no orthopnea [PND] : no PND

## 2022-06-21 NOTE — DISCUSSION/SUMMARY
[FreeTextEntry1] : The patient is a 72-year-old female COPD, CAD, AAA, ICD, CKD who has trouble walking.\par #1 COPD- on advair, spiriva and ventolin\par #2 CAD- stent 2009 and 2011 [place] and [place], no angina, continue plavix and off ASA, ECHO EF=20%\par #3 CMP - Medtronic ICD interrogation today, f/u Dr. Parker\par #4 Htn- continue coreg, hydralazine\par #5 Lipids- continue atorvastatin\par #6 AAA- 4.0cm, f/u  Dr. Lofton , Doppler of aorta 6 months- today, increase walking.\par #7 Renal- normal renal evaluation, continue low dose losartan and off spironolactone\par #8 GI- ? Intestinal bleed, f/u GI\par #9 Derm- keloids, gets injections, encouraged to increase walking for exercise, received Moderna vaccines.

## 2022-07-12 ENCOUNTER — APPOINTMENT (OUTPATIENT)
Dept: RHEUMATOLOGY | Facility: CLINIC | Age: 72
End: 2022-07-12

## 2022-07-12 VITALS
BODY MASS INDEX: 25.1 KG/M2 | HEART RATE: 71 BPM | HEIGHT: 64 IN | WEIGHT: 147 LBS | SYSTOLIC BLOOD PRESSURE: 143 MMHG | TEMPERATURE: 97.2 F | OXYGEN SATURATION: 85 % | DIASTOLIC BLOOD PRESSURE: 83 MMHG

## 2022-07-12 DIAGNOSIS — G89.29 OTHER CHRONIC PAIN: ICD-10-CM

## 2022-07-12 DIAGNOSIS — R53.81 OTHER MALAISE: ICD-10-CM

## 2022-07-12 PROCEDURE — 99203 OFFICE O/P NEW LOW 30 MIN: CPT

## 2022-09-20 ENCOUNTER — NON-APPOINTMENT (OUTPATIENT)
Age: 72
End: 2022-09-20

## 2022-09-20 ENCOUNTER — APPOINTMENT (OUTPATIENT)
Dept: ELECTROPHYSIOLOGY | Facility: CLINIC | Age: 72
End: 2022-09-20

## 2022-09-20 PROCEDURE — 93295 DEV INTERROG REMOTE 1/2/MLT: CPT

## 2022-09-20 PROCEDURE — 93296 REM INTERROG EVL PM/IDS: CPT

## 2022-09-27 ENCOUNTER — APPOINTMENT (OUTPATIENT)
Dept: ELECTROPHYSIOLOGY | Facility: CLINIC | Age: 72
End: 2022-09-27

## 2022-09-27 ENCOUNTER — NON-APPOINTMENT (OUTPATIENT)
Age: 72
End: 2022-09-27

## 2022-09-27 ENCOUNTER — APPOINTMENT (OUTPATIENT)
Dept: CARDIOLOGY | Facility: CLINIC | Age: 72
End: 2022-09-27

## 2022-09-27 VITALS
HEART RATE: 69 BPM | HEIGHT: 64 IN | BODY MASS INDEX: 25.1 KG/M2 | DIASTOLIC BLOOD PRESSURE: 71 MMHG | OXYGEN SATURATION: 100 % | WEIGHT: 147 LBS | SYSTOLIC BLOOD PRESSURE: 104 MMHG

## 2022-09-27 VITALS — HEIGHT: 64 IN | WEIGHT: 147 LBS | BODY MASS INDEX: 25.1 KG/M2

## 2022-09-27 DIAGNOSIS — F41.9 ANXIETY DISORDER, UNSPECIFIED: ICD-10-CM

## 2022-09-27 PROCEDURE — 99215 OFFICE O/P EST HI 40 MIN: CPT | Mod: 25

## 2022-09-27 PROCEDURE — 93000 ELECTROCARDIOGRAM COMPLETE: CPT

## 2022-09-27 RX ORDER — PANTOPRAZOLE 40 MG/1
40 TABLET, DELAYED RELEASE ORAL DAILY
Qty: 1 | Refills: 0 | Status: DISCONTINUED | COMMUNITY
Start: 2021-06-08 | End: 2022-09-27

## 2022-09-27 RX ORDER — UMECLIDINIUM 62.5 UG/1
62.5 AEROSOL, POWDER ORAL DAILY
Qty: 3 | Refills: 3 | Status: ACTIVE | COMMUNITY
Start: 2022-09-27

## 2022-09-27 RX ORDER — TIOTROPIUM BROMIDE 18 UG/1
18 CAPSULE ORAL; RESPIRATORY (INHALATION)
Qty: 30 | Refills: 0 | Status: DISCONTINUED | COMMUNITY
Start: 2016-11-18 | End: 2022-09-27

## 2022-09-27 RX ORDER — ALBUTEROL SULFATE 1.25 MG/3ML
1.25 SOLUTION RESPIRATORY (INHALATION)
Refills: 0 | Status: ACTIVE | COMMUNITY
Start: 2022-09-27

## 2022-09-27 RX ORDER — FLUTICASONE FUROATE AND VILANTEROL TRIFENATATE 200; 25 UG/1; UG/1
200-25 POWDER RESPIRATORY (INHALATION) TWICE DAILY
Refills: 3 | Status: ACTIVE | COMMUNITY
Start: 2022-09-27

## 2022-09-27 RX ORDER — ALBUTEROL SULFATE 90 UG/1
108 (90 BASE) AEROSOL, METERED RESPIRATORY (INHALATION) EVERY 6 HOURS
Refills: 3 | Status: ACTIVE | COMMUNITY
Start: 2022-09-27

## 2022-09-27 NOTE — HISTORY OF PRESENT ILLNESS
[FreeTextEntry1] : Mary lost another sister recently. She is having SOB. Pulmonologist requesting calcium score and possible angiogram. She is upset she has severe emphysema. Recent CT chest with enlarged heart and coronoary calcium.

## 2022-09-27 NOTE — DISCUSSION/SUMMARY
[FreeTextEntry1] : The patient is a 72-year-old female COPD, CAD, AAA, ICD, CKD whose emphysema has progressed..\par #1 COPD- on breo, incruse, and ventolin\par #2 CAD- stent 2009 and 2011 [place] and [place], no angina, continue plavix and off ASA, initial ECHO EF=20% but repeat 1/2022 EF=40-45%, no indication calcium score because known CAD\par #3 CMP - Medtronic ICD interrogation negative, f/u Dr. Parker\par #4 Htn- continue coreg, hydralazine\par #5 Lipids- continue rosuvastatin\par #6 AAA- 4.5cm, f/u  Dr. Lofton , Doppler of aorta 6 months,increase walking.\par #7 Renal- normal renal evaluation\par #8 GI- ? Intestinal bleed, f/u GI\par #9 Derm- keloids, gets injections, encouraged to increase walking for exercise, received Moderna vaccines. [EKG obtained to assist in diagnosis and management of assessed problem(s)] : EKG obtained to assist in diagnosis and management of assessed problem(s)

## 2022-10-05 NOTE — PROGRESS NOTE ADULT - REASON FOR ADMISSION
abd pain Burow's Graft Text: The defect edges were debeveled with a #15 scalpel blade.  Given the location of the defect, shape of the defect, the proximity to free margins and the presence of a standing cone deformity a Burow's skin graft was deemed most appropriate. The standing cone was removed and this tissue was then trimmed to the shape of the primary defect. The adipose tissue was also removed until only dermis and epidermis were left.  The skin margins of the secondary defect were undermined to an appropriate distance in all directions utilizing iris scissors.  The secondary defect was closed with interrupted buried subcutaneous sutures.  The skin edges were then re-apposed with running  sutures.  The skin graft was then placed in the primary defect and oriented appropriately.

## 2022-11-25 ENCOUNTER — APPOINTMENT (OUTPATIENT)
Dept: CARDIOLOGY | Facility: CLINIC | Age: 72
End: 2022-11-25

## 2022-11-25 VITALS
HEART RATE: 65 BPM | WEIGHT: 145 LBS | DIASTOLIC BLOOD PRESSURE: 84 MMHG | OXYGEN SATURATION: 100 % | HEIGHT: 64 IN | SYSTOLIC BLOOD PRESSURE: 143 MMHG | BODY MASS INDEX: 24.75 KG/M2

## 2022-11-25 PROCEDURE — 93000 ELECTROCARDIOGRAM COMPLETE: CPT

## 2022-11-25 PROCEDURE — 99214 OFFICE O/P EST MOD 30 MIN: CPT | Mod: 25

## 2022-11-25 NOTE — HISTORY OF PRESENT ILLNESS
[FreeTextEntry1] : Mary is still coughing. She was in the hospital with sinusitis and ? heart failure but not sent home on diuretic. She is completing three antibiotics.

## 2022-11-25 NOTE — DISCUSSION/SUMMARY
[FreeTextEntry1] : The patient is a 72-year-old female COPD, CAD, AAA, ICD, CKD with SOB.\par #1 COPD- on breo, incruse, and ventolin\par #2 CAD- stent 2009 and 2011 [place] and [place], no angina, continue plavix and off ASA, initial ECHO EF=20% but repeat 1/2022 EF=40-45%, no indication calcium score because known CAD, restart spironolactone 12.5mg, check labs.\par #3 CMP - Medtronic ICD interrogation negative, f/u Dr. Parker\par #4 Htn- continue coreg, hydralazine\par #5 Lipids- continue rosuvastatin\par #6 AAA- 4.5cm, f/u  Dr. Lofton , Doppler of aorta 6 months,increase walking.\par #7 Renal- normal renal evaluation\par #8 GI- ? Intestinal bleed, f/u GI\par #9 Derm- keloids, gets injections, encouraged to increase walking for exercise, received Moderna vaccines. [EKG obtained to assist in diagnosis and management of assessed problem(s)] : EKG obtained to assist in diagnosis and management of assessed problem(s)

## 2022-11-26 LAB
ANION GAP SERPL CALC-SCNC: 10 MMOL/L
BASOPHILS # BLD AUTO: 0.05 K/UL
BASOPHILS NFR BLD AUTO: 0.6 %
BUN SERPL-MCNC: 22 MG/DL
CALCIUM SERPL-MCNC: 9.8 MG/DL
CHLORIDE SERPL-SCNC: 112 MMOL/L
CO2 SERPL-SCNC: 24 MMOL/L
CREAT SERPL-MCNC: 1.26 MG/DL
EGFR: 45 ML/MIN/1.73M2
EOSINOPHIL # BLD AUTO: 0.08 K/UL
EOSINOPHIL NFR BLD AUTO: 0.9 %
GLUCOSE SERPL-MCNC: 95 MG/DL
HCT VFR BLD CALC: 37.2 %
HGB BLD-MCNC: 11.9 G/DL
IMM GRANULOCYTES NFR BLD AUTO: 0.7 %
LYMPHOCYTES # BLD AUTO: 1.78 K/UL
LYMPHOCYTES NFR BLD AUTO: 20.3 %
MAN DIFF?: NORMAL
MCHC RBC-ENTMCNC: 32 GM/DL
MCHC RBC-ENTMCNC: 32.8 PG
MCV RBC AUTO: 102.5 FL
MONOCYTES # BLD AUTO: 0.68 K/UL
MONOCYTES NFR BLD AUTO: 7.7 %
NEUTROPHILS # BLD AUTO: 6.14 K/UL
NEUTROPHILS NFR BLD AUTO: 69.8 %
NT-PROBNP SERPL-MCNC: 384 PG/ML
PLATELET # BLD AUTO: 257 K/UL
POTASSIUM SERPL-SCNC: 5 MMOL/L
RBC # BLD: 3.63 M/UL
RBC # FLD: 14.6 %
SODIUM SERPL-SCNC: 145 MMOL/L
WBC # FLD AUTO: 8.79 K/UL

## 2022-12-01 ENCOUNTER — OUTPATIENT (OUTPATIENT)
Dept: OUTPATIENT SERVICES | Facility: HOSPITAL | Age: 72
LOS: 1 days | End: 2022-12-01
Payer: COMMERCIAL

## 2022-12-01 ENCOUNTER — APPOINTMENT (OUTPATIENT)
Dept: ULTRASOUND IMAGING | Facility: CLINIC | Age: 72
End: 2022-12-01

## 2022-12-01 DIAGNOSIS — I71.40 ABDOMINAL AORTIC ANEURYSM, WITHOUT RUPTURE, UNSPECIFIED: ICD-10-CM

## 2022-12-01 PROCEDURE — 76775 US EXAM ABDO BACK WALL LIM: CPT

## 2022-12-01 PROCEDURE — 76775 US EXAM ABDO BACK WALL LIM: CPT | Mod: 26

## 2022-12-14 ENCOUNTER — APPOINTMENT (OUTPATIENT)
Dept: HEART FAILURE | Facility: CLINIC | Age: 72
End: 2022-12-14
Payer: MEDICARE

## 2022-12-14 VITALS
DIASTOLIC BLOOD PRESSURE: 75 MMHG | BODY MASS INDEX: 22.76 KG/M2 | WEIGHT: 145 LBS | SYSTOLIC BLOOD PRESSURE: 137 MMHG | RESPIRATION RATE: 16 BRPM | OXYGEN SATURATION: 100 % | TEMPERATURE: 98.3 F | HEART RATE: 63 BPM | HEIGHT: 67 IN

## 2022-12-14 DIAGNOSIS — Z78.9 OTHER SPECIFIED HEALTH STATUS: ICD-10-CM

## 2022-12-14 PROCEDURE — 99215 OFFICE O/P EST HI 40 MIN: CPT

## 2022-12-14 RX ORDER — LOSARTAN POTASSIUM 25 MG/1
25 TABLET, FILM COATED ORAL DAILY
Qty: 90 | Refills: 3 | Status: DISCONTINUED | COMMUNITY
Start: 2021-11-02 | End: 2022-12-14

## 2022-12-14 RX ORDER — ASPIRIN 81 MG/1
81 TABLET ORAL
Qty: 90 | Refills: 1 | Status: DISCONTINUED | COMMUNITY
Start: 2018-03-01 | End: 2022-12-14

## 2022-12-14 RX ORDER — HYDRALAZINE HYDROCHLORIDE 10 MG/1
10 TABLET ORAL
Qty: 180 | Refills: 3 | Status: DISCONTINUED | COMMUNITY
Start: 2021-11-29 | End: 2022-12-14

## 2022-12-20 ENCOUNTER — APPOINTMENT (OUTPATIENT)
Dept: ELECTROPHYSIOLOGY | Facility: CLINIC | Age: 72
End: 2022-12-20

## 2022-12-20 ENCOUNTER — NON-APPOINTMENT (OUTPATIENT)
Age: 72
End: 2022-12-20

## 2022-12-20 PROCEDURE — 93295 DEV INTERROG REMOTE 1/2/MLT: CPT

## 2022-12-20 PROCEDURE — 93296 REM INTERROG EVL PM/IDS: CPT

## 2022-12-28 ENCOUNTER — TRANSCRIPTION ENCOUNTER (OUTPATIENT)
Age: 72
End: 2022-12-28

## 2022-12-29 ENCOUNTER — APPOINTMENT (OUTPATIENT)
Dept: CARDIOLOGY | Facility: CLINIC | Age: 72
End: 2022-12-29

## 2023-01-04 ENCOUNTER — EMERGENCY (EMERGENCY)
Facility: HOSPITAL | Age: 73
LOS: 1 days | Discharge: HOME | End: 2023-01-04
Attending: EMERGENCY MEDICINE
Payer: COMMERCIAL

## 2023-01-04 ENCOUNTER — APPOINTMENT (OUTPATIENT)
Dept: HEART FAILURE | Facility: CLINIC | Age: 73
End: 2023-01-04
Payer: MEDICARE

## 2023-01-04 VITALS
TEMPERATURE: 97.9 F | WEIGHT: 142 LBS | HEART RATE: 74 BPM | BODY MASS INDEX: 22.29 KG/M2 | SYSTOLIC BLOOD PRESSURE: 97 MMHG | DIASTOLIC BLOOD PRESSURE: 66 MMHG | OXYGEN SATURATION: 99 % | HEIGHT: 67 IN

## 2023-01-04 VITALS
SYSTOLIC BLOOD PRESSURE: 93 MMHG | OXYGEN SATURATION: 96 % | HEART RATE: 78 BPM | TEMPERATURE: 98 F | DIASTOLIC BLOOD PRESSURE: 69 MMHG | RESPIRATION RATE: 18 BRPM | HEIGHT: 64 IN | WEIGHT: 141.1 LBS

## 2023-01-04 VITALS
OXYGEN SATURATION: 100 % | TEMPERATURE: 100 F | HEART RATE: 65 BPM | RESPIRATION RATE: 18 BRPM | SYSTOLIC BLOOD PRESSURE: 105 MMHG | DIASTOLIC BLOOD PRESSURE: 73 MMHG

## 2023-01-04 PROCEDURE — 99284 EMERGENCY DEPT VISIT MOD MDM: CPT | Mod: 25

## 2023-01-04 PROCEDURE — 72125 CT NECK SPINE W/O DYE: CPT | Mod: MA

## 2023-01-04 PROCEDURE — 72125 CT NECK SPINE W/O DYE: CPT | Mod: 26,MA

## 2023-01-04 PROCEDURE — 73502 X-RAY EXAM HIP UNI 2-3 VIEWS: CPT | Mod: 26,RT

## 2023-01-04 PROCEDURE — 73502 X-RAY EXAM HIP UNI 2-3 VIEWS: CPT

## 2023-01-04 PROCEDURE — 99284 EMERGENCY DEPT VISIT MOD MDM: CPT

## 2023-01-04 PROCEDURE — 99213 OFFICE O/P EST LOW 20 MIN: CPT

## 2023-01-04 PROCEDURE — 70450 CT HEAD/BRAIN W/O DYE: CPT | Mod: 26,MA

## 2023-01-04 PROCEDURE — 70450 CT HEAD/BRAIN W/O DYE: CPT | Mod: MA

## 2023-01-04 RX ORDER — ACETAMINOPHEN 500 MG
650 TABLET ORAL ONCE
Refills: 0 | Status: COMPLETED | OUTPATIENT
Start: 2023-01-04 | End: 2023-01-04

## 2023-01-04 RX ORDER — BLOOD PRESSURE TEST KIT-LARGE
KIT MISCELLANEOUS
Qty: 1 | Refills: 0 | Status: ACTIVE | COMMUNITY
Start: 2023-01-04 | End: 1900-01-01

## 2023-01-04 RX ADMIN — Medication 650 MILLIGRAM(S): at 16:28

## 2023-01-04 NOTE — ED PROVIDER NOTE - PATIENT PORTAL LINK FT
You can access the FollowMyHealth Patient Portal offered by [facility] by registering at the following website: [email or web address]. By joining [employer]’s FollowMyHealth portal, you will also be able to view your health information using other applications (apps) compatible with our system.

## 2023-01-04 NOTE — PHYSICAL EXAM
[Well Nourished] : well nourished [No Acute Distress] : no acute distress [Soft] : abdomen soft [Non Tender] : non-tender [No Edema] : no edema [No Rash] : no rash [Moves all extremities] : moves all extremities [Alert and Oriented] : alert and oriented [de-identified] : CINDI wearign face mask [de-identified] : JVP <6cm of H20 [de-identified] : warm peripherally [de-identified] : expiratory wheezing audible [de-identified] : uses cane
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
## 2023-01-04 NOTE — ED PROVIDER NOTE - PHYSICAL EXAMINATION
A&Ox3, NAD, well appearing  NCAT. PERRL, EOMI.  Neck supple, no LAD.   Lungs CTAB. No w/r/r  Cardiac +S1S2, RRR, No m/r/g.   Abd soft, NT/ND, +BS, no rebound or guarding.   Extremities: cap refill <2, pulses in distal extremities 4+, no edema.   Skin without rash. A&Ox3, NAD, well appearing  NCAT. PERRL, EOMI.  Neck supple, no LAD. no vertebral ttp of the c/t/l spine, c-spine with FAROM.   Lungs CTAB. No w/r/r  Cardiac +S1S2, RRR, No m/r/g.   Abd soft, NT/ND, +BS, no rebound or guarding.   Extremities: BL knees, elbows and shoulders without any bony ttp, no edema or abrasions, R hip with mild ttp (pt states this may be chronic) cap refill <2, pulses in distal extremities 4+, no edema.   Skin without rash. strength and sensation intact throughout. A&Ox3, NAD, well appearing  NCAT. PERRL, EOMI.  Neck supple, no LAD. no vertebral ttp of the c/t/l spine, c-spine with FAROM.   Lungs CTAB. No w/r/r  Cardiac +S1S2, RRR, No m/r/g.   Abd soft, NT/ND, +BS, no rebound or guarding.   Extremities: BL knees, elbows and shoulders without any bony ttp, no edema or abrasions, R hip with mild ttp (pt states this may be chronic) cap refill <2, pulses in distal extremities 4+, no edema.   Skin without rash. strength and sensation intact throughout.      Attn - alert, nad, head - NC/AT, no facial tenderness, mandible and TMJ are NT, tongue - no trauma,  nose - NT, no deformity or signs of epistaxis, neck/spine/back - NT, Chest/ribs - NT, Lungs - clear, good BS bilaterally without splinting, Cor - RR, no M, Abdo - soft, NT, ND, no HSM or tenderness, no ecchymosis or signs of trauma, no CVAT, Pelvis/hips - mild tenderness R hip and pain with AROM.  UExt - FROM without pain, NT, LExt - left - FROM without pain, NT,  Neuro - CN, motor, sensory, cerebellar, speech intact and non focal
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
## 2023-01-04 NOTE — ED ADULT NURSE NOTE - NS_SISCREENINGSR_GEN_ALL_ED
Monitor:The patient's pulmonary condition is unchanged and under control.  Evaluation: No diagnostic tests required today.  Assessment/Treatment:  Continue current treatment/monitoring regimen. prednisone 20 mg #5 given per asthma action plan.  Condition will be reassessed in 6 months   Negative
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
## 2023-01-04 NOTE — HISTORY OF PRESENT ILLNESS
[FreeTextEntry1] : Ms. Sprague is a 72 year old woman with a longstanding history of ICM with recent improvement of LVEF (LVIDd 5.5 cm, LVEF 40-45% from 20%) s/p primary prevention dual chamber ICD (2010), CAD s/p PCIs (2009 and in 2011), AAA, HTN, COPD, emphysema, CVA with residual R sided weakness, pre-DM (Ha1c 6.2%), anxiety, osteoarthritis and chronic pain on opioids, who presents for routine follow-up of her cardiomyopathy. Referred by  Dr. Sanchez. \par \par She learned of her cardiomyopathy following her first heart attack, she felt well after PCI intervention and was very active but following her second MI in 2011, noted significant decline in functional capacity. She reports many hospitalizations in the past few years, more for chronic pain and few for ADHF (2x in past 6 months). Her last hospitalization for ADHF was in October at [facility] where she received IV diuretics but reportedly not discharged on a loop diuretics. \par \par Since last seen in December she had mistakenly been taking Farxiga 5 mg bid instead of QD. She also, has had ED visit at [facility] 1 week ago for persistent cough and found to have COPD exacerbation and started on ABT and oral steroids which she has completed\par \par She presents to clinic today and states she has felt OK. She endorses an AT of ~1/2 block before needing to stop due to SOB, and hip pain. She states since running out of Farxiga she now requires +3 pillows to sleep previously needed +1, denies PND, no LE edema. She takes her weight every other day at reports last weight was 141lbs and she does not have BP monitor at home as of yet. She takes her other medications as instructed and admits to consuming ~48 oz of fluid daily. She denies CP, palpitations, syncope, LH/dizziness, orthopnea, PND, abdominal discomfort, and LE edema.

## 2023-01-04 NOTE — PHYSICAL THERAPY INITIAL EVALUATION ADULT - GAIT DEVIATIONS NOTED, PT EVAL
Jimena Barriga is a 33 month old female presenting to the walk-in clinic today with mother c/o bilateral eye redness since yesterday. Mother reports crust build up today morning. Denies any other symptoms. Concerned for pink eye     Swabs/Specimens collected during rooming process:  None    PPE worn during room process  Writer: N95, Face shield/eye protection, gown, gloves  Patient: masked     Patient would like communication of their results via:    [employer]   decreased roxana

## 2023-01-04 NOTE — ED ADULT NURSE NOTE - CHPI ED NUR SYMPTOMS NEG
no crying/no decreased eating/drinking/no disorientation/no dizziness/no laceration/no loss of consciousness/no sleeping issues

## 2023-01-04 NOTE — CARDIOLOGY SUMMARY
[de-identified] : \par \par  [de-identified] : \par 01/11/22 TTE: LVIDd 5.5 cm, LVEF 40-45% (segmental), mod DD, normal RV size and function, LA/RA not well visualized, min MR, mod TR, est RVSP 48 mmHg, trace pericardial effusion\par \par 11/26/21 TTE: LVIDd 5.4 cm, LVEF 20% (regional variation), mild DD, normal RV size and function, normal biatria, min MR, mild-mod TR, est RVSP 37 mmHg\par \par 12/12/08 TTE: LVIDd 4.7 cm, mod segmental LVSD, normal RV size and function, mod MR, est RVSP 34 mmHg\par \par  [de-identified] : \par 06/18/12 LHC: 30% pLAD, 45% OM1 at site of prior stent, otherwise minor luminal irregularities of LM and RCA\par

## 2023-01-04 NOTE — ED PROVIDER NOTE - NSFOLLOWUPINSTRUCTIONS_ED_ALL_ED_FT
- stay hydrated.   -take all home medications as prescribed  - follow up with your primary care provider in 1-2 days.    - return if symptoms worsen, fever, weakness, numbness/tingling, blurred vision, difficulty ambulating and all other concerns.     Head Injury, Adult       There are many types of head injuries. They can be as minor as a small bump. Some head injuries can be worse. Worse injuries include:  •A strong hit to the head that shakes the brain back and forth, causing damage (concussion).      •A bruise (contusion) of the brain. This means there is bleeding in the brain that can cause swelling.      •A cracked skull (skull fracture).      •Bleeding in the brain that gathers, gets thick (makes a clot), and forms a bump (hematoma).      Most problems from a head injury come in the first 24 hours. However, you may still have side effects up to 7–10 days after your injury. It is important to watch your condition for any changes. You may need to be watched in the emergency department or urgent care, or you may need to stay in the hospital.      What are the causes?    There are many possible causes of a head injury. A serious head injury may be caused by:  •A car accident.      •Bicycle or motorcycle accidents.      •Sports injuries.      •Falls.      •Being hit by an object.        What are the signs or symptoms?    Symptoms of a head injury include a bruise, bump, or bleeding where the injury happened. Other physical symptoms may include:  •Headache.      •Feeling like you may vomit (nauseous) or vomiting.      •Dizziness.      •Blurred or double vision.      •Being uncomfortable around bright lights or loud noises.      •Shaking movements that you cannot control (seizures).      •Feeling tired.      •Trouble being woken up.      •Fainting or loss of consciousness.      Mental or emotional symptoms may include:  •Feeling grumpy or cranky.      •Confusion and memory problems.      •Having trouble paying attention or concentrating.      •Changes in eating or sleeping habits.      •Feeling worried or nervous (anxious).      •Feeling sad (depressed).        How is this treated?    Treatment for this condition depends on how severe the injury is and the type of injury you have. The main goal is to prevent problems and to allow the brain time to heal.    Mild head injury     If you have a mild head injury, you may be sent home, and treatment may include:  •Being watched. A responsible adult should stay with you for 24 hours after your injury and check on you often.      •Physical rest.      •Brain rest.      •Pain medicines.      Severe head injury    If you have a severe head injury, treatment may include:  •Being watched closely. This includes staying in the hospital.    •Medicines to:  •Help with pain.      •Prevent seizures.      •Help with brain swelling.        •Protecting your airway and using a machine that helps you breathe (ventilator).      •Treatments to watch for and manage swelling inside the brain.    •Brain surgery. This may be needed to:  •Remove a collection of blood or blood clots.      •Stop the bleeding.      •Remove a part of the skull. This allows room for the brain to swell.          Follow these instructions at home:    Activity     •Rest.      •Avoid activities that are hard or tiring.      •Make sure you get enough sleep.    •Let your brain rest. Do this by limiting activities that need a lot of thought or attention, such as:  •Watching TV.      •Playing memory games and puzzles.      •Job-related work or homework.      •Working on the computer, social media, and texting.        •Avoid activities that could cause another head injury until your doctor says it is okay. This includes playing sports. Having another head injury, especially before the first one has healed, can be dangerous.      •Ask your doctor when it is safe for you to go back to your normal activities, such as work or school. Ask your doctor for a step-by-step plan for slowly going back to your normal activities.      •Ask your doctor when you can drive, ride a bicycle, or use heavy machinery. Do not do these activities if you are dizzy.        Lifestyle      • Do not drink alcohol until your doctor says it is okay.      • Do not use drugs.      •If it is harder than usual to remember things, write them down.      •If you are easily distracted, try to do one thing at a time.      •Talk with family members or close friends when making important decisions.      •Tell your friends, family, a trusted co-worker, and  about your injury, symptoms, and limits (restrictions). Have them watch for any problems that are new or getting worse.      General instructions     •Take over-the-counter and prescription medicines only as told by your doctor.      •Have someone stay with you for 24 hours after your head injury. This person should watch you for any changes in your symptoms and be ready to get help.      •Keep all follow-up visits as told by your doctor. This is important.      How is this prevented?     •Work on your balance and strength. This can help you avoid falls.      •Wear a seat belt when you are in a moving vehicle.    •Wear a helmet when you:  •Ride a bicycle.      •Ski.      •Do any other sport or activity that has a risk of injury.      •If you drink alcohol:•Limit how much you use to:  •0–1 drink a day for nonpregnant women.      •0–2 drinks a day for men.        •Be aware of how much alcohol is in your drink. In the [place] one drink equals one 12 oz bottle of beer (355 mL), one 5 oz glass of wine (148 mL), or one 1½ oz glass of hard liquor (44 mL).      •Make your home safer by:  •Getting rid of clutter from the floors and stairs. This includes things that can make you trip.      •Using grab bars in bathrooms and handrails by stairs.      •Placing non-slip mats on floors and in bathtubs.      •Putting more light in dim areas.          Where to find more information    •Centers for Disease Control and Prevention: www.cdc.gov        Get help right away if:  •You have:  •A very bad headache that is not helped by medicine.      •Trouble walking or weakness in your arms and legs.      •Clear or bloody fluid coming from your nose or ears.      •Changes in how you see (vision).      •A seizure.      •More confusion or more grumpy moods.        •Your symptoms get worse.      •You are sleepier than normal and have trouble staying awake.      •You lose your balance.      •The black centers of your eyes (pupils) change in size.      •Your speech is slurred.      •Your dizziness gets worse.      •You vomit.      These symptoms may be an emergency. Do not wait to see if the symptoms will go away. Get medical help right away. Call your local emergency services (911 in the [place] Do not drive yourself to the hospital.       Summary    •Head injuries can be as minor as a small bump. Some head injuries can be worse.      •Treatment for this condition depends on how severe the injury is and the type of injury you have.      •Have someone stay with you for 24 hours after your head injury.      •Ask your doctor when it is safe for you to go back to your normal activities, such as work or school.      •To prevent a head injury, wear a seat belt in a car, wear a helmet when you use a bicycle, limit your alcohol use, and make your home safer.

## 2023-01-04 NOTE — DISCUSSION/SUMMARY
[FreeTextEntry1] : 72 year old AA woman with a longstanding history of ICM with recent improvement of LVEF (LVIDd 5.5 cm, LVEF 40-45% from 20%) s/p dual chamber ICD, CAD s/p PCIs, CVA with residual R sided weakness and chronic pain on opioids  who presents for routine follow-up of her cardiomyopathy. She is ACC/AHA Stage C, NYHA Class III HF, she is low euvolemic on exam. I have recommended the following:\par \par 1. Chronic systolic HF - Stable and compensated. Unclear what elicited recent improvement in LVEF but yet to recover. Continue Entresto 24-26 mg BID, spironolactone 12.5mg qd and Coreg 3.125 mg BID; further escalation limited by BP today likely in setting of mild hypovolemia. Will plan to escalate at next visit if SBP readings improve with hydration if not will consider switching to Toprol to allow BP room. Also, Discussed importance of daily BP monitoring for GDMT up titration, which she understands and will obtain BP monitoring device; Rx re-sent. Will plan to resume Farxiga at reduced dose of 5 mg QD as is without a loop diuretic requirement in the next two days once she increases fluid intake and if repeat labs today stable. Recent labs from 11/25 with K 5, Cr 1.26 and pro-; will repeat today\par \par 2. CAD - Underwent PCI in 2009 and 2011. No signs of ischemia. Continue Plavix, Statin, BB, ARB and MRA. Reportedly off ASA due to significant bruising. \par \par 3. AAA - Follows with Dr. Lofton with routine surveillance\par \par 4. Obstructive lung disease - Appears well controlled on exam today. Managed on maintenance and rescue inhalers.  \par \par \par Follow-up with Dr. Colon in 3 months and in the interim, with the NP every 2-3 weeks\par

## 2023-01-05 LAB
ALBUMIN SERPL ELPH-MCNC: 4 G/DL
ALP BLD-CCNC: 57 U/L
ALT SERPL-CCNC: 21 U/L
ANION GAP SERPL CALC-SCNC: 17 MMOL/L
AST SERPL-CCNC: 19 U/L
BILIRUB SERPL-MCNC: 0.3 MG/DL
BUN SERPL-MCNC: 27 MG/DL
CALCIUM SERPL-MCNC: 9.7 MG/DL
CHLORIDE SERPL-SCNC: 102 MMOL/L
CO2 SERPL-SCNC: 19 MMOL/L
CREAT SERPL-MCNC: 1.42 MG/DL
EGFR: 39 ML/MIN/1.73M2
GLUCOSE SERPL-MCNC: 74 MG/DL
NT-PROBNP SERPL-MCNC: 369 PG/ML
POTASSIUM SERPL-SCNC: 5.3 MMOL/L
PROT SERPL-MCNC: 6.9 G/DL
SODIUM SERPL-SCNC: 138 MMOL/L

## 2023-01-06 NOTE — REASON FOR VISIT
[Cardiac Failure] : cardiac failure [FreeTextEntry1] : PATIENT INSTRUCTIONS:\par \par 1. START a new medication called FARXIGA at 5 mg taken DAILY in the morning\par (please contact our office should you develop pain with urination, redness/swelling/discomfort in genital area)\par \par 2. STOP the LOSARTAN and instead, START it's replacement ENTRESTO 24-26 mg taken TWICE a day \par \par 3. STOP the HYDRALAZINE\par \par 4. Continue the remainder of your medications as prescribed\par \par 5. Please keep a log of your blood pressure taken daily. You may check your blood pressure at a random time, preferably when you are at rest. \par \par 6. Follow-up with Dr. Colon in 3 months and in the interim, every 2-3 weeks with the NP to get you on better heart failure medications

## 2023-01-06 NOTE — END OF VISIT
[Time Spent: ___ minutes] : I have spent [unfilled] minutes of time on the encounter. [FreeTextEntry3] : I, Dr. Colon, personally performed the evaluation and management (E/M) services for this new patient.  That E/M includes conducting the initial examination, assessing all conditions, and establishing the plan of care.  Today, my SORAIDA, Marli Martinez, was here to observe my evaluation and management services for this patient to be followed going forward.

## 2023-01-06 NOTE — PHYSICAL EXAM
[No Xanthelasma] : no xanthelasma [Normal Radial B/L] : normal radial B/L [Normal] : alert and oriented, normal memory [Normal Venous Pressure] : normal venous pressure [de-identified] : CINDI - wearing face mask [de-identified] : JVP 6 cm H2O, no HJR [de-identified] : slow steady gait with cane, using wheelchair for distance  [de-identified] : warm peripherally

## 2023-01-06 NOTE — DISCUSSION/SUMMARY
[Patient] : the patient [FreeTextEntry1] : 72 year old AA woman with a longstanding history of ICM with recent improvement of LVEF (LVIDd 5.5 cm, LVEF 40-45% from 20%) s/p dual chamber ICD, CAD s/p PCIs, CVA with residual R sided weakness and chronic pain on opioids who presents as an initial consultation for her cardiomyopathy. She is ACC/AHA Stage C, NYHA Class III HF, euvolemic and with room for optimization of medical therapies. I have recommended the following:\par \par 1. Chronic systolic HF - Stable and well compensated. Unclear what elicited recent improvement in LVEF as there were no interventions. Start Farxiga at reduced dose of 5 mg QD given no loop diuretic requirement. Stop HDZN to simplify regimen and allow room for GDMT. Stop Losartan 25 mg QD and instead, start Entresto 24-26 mg BID. Continue Coreg 3.125 BID but with goal to escalate at next visit. Continue Spironolactone 12.5 mg QD. Recent labs from 11/25 with K 5, Cr 1.26 and pro-. To repeat labs at next visit in 2 weeks.\par \par 2. CAD - Underwent PCI in 2009 and 2011. No signs of ischemia. Continue Plavix, Statin, BB, ARB and MRA. Reportedly off ASA due to significant bruising. \par \par 3. Hypertension - well controlled on current therapies.\par \par 4. AAA - Follows with Dr. Lofton with routine surveillance.\par \par 5. Follow-up with Dr. Cloon in 3 months and in the interim, with the NP every 2-3 weeks for escalation of GDMT.

## 2023-01-06 NOTE — CARDIOLOGY SUMMARY
[de-identified] : \par 09/27/22 ECG: SR at 71 bpm, BETTE, negative Tw. [de-identified] : \par 01/11/22 TTE: LVIDd 5.5 cm, LVEF 40-45% (segmental), mod DD, normal RV size and function, LA/RA not well visualized, min MR, mod TR, est RVSP 48 mmHg, trace pericardial effusion\par \par 11/26/21 TTE: LVIDd 5.4 cm, LVEF 20% (regional variation), mild DD, normal RV size and function, normal biatria, min MR, mild-mod TR, est RVSP 37 mmHg\par \par 12/12/08 TTE: LVIDd 4.7 cm, mod segmental LVSD, normal RV size and function, mod MR, est RVSP 34 mmHg\par \par  [de-identified] : \par 06/18/12 LHC: 30% pLAD, 45% OM1 at site of prior stent, otherwise minor luminal irregularities of LM and RCA\par

## 2023-01-06 NOTE — HISTORY OF PRESENT ILLNESS
[FreeTextEntry1] : Ms. Sprague is a 72 year old woman with a longstanding history of ICM with recent improvement of LVEF (LVIDd 5.5 cm, LVEF 40-45% from 20%) s/p primary prevention dual chamber ICD (2010), CAD s/p PCIs (2009 and in 2011), AAA, HTN, COPD, emphysema, CVA with residual R sided weakness, pre-DM (Ha1c 6.2%), anxiety, osteoarthritis and chronic pain on opioids who presents for an initial consultation at the referral of Dr. Sanchez. \par \par She learned of her cardiomyopathy following her first heart attack, she felt well after PCI intervention and was very active but following her second MI in 2011, noted significant decline in functional capacity. She reports many hospitalizations in the past few years, more for chronic pain and few for ADHF (2x in past 6 months). Her last hospitalization for ADHF was in October at [facility] where she received IV diuretics but reportedly not discharged on a loop diuretics. \par \par She is limited to 1/2 block on flat ground due to combination of SOB, LE fatigue and hip pain. Approx 6 months ago was able to walk a full block slowly. She has not tried stairs or inclines. She notes palpitations when over exerting herslef but quickly resolves with rest. She monitors her weight and reports 4 lbs weight loss since her hospitalization in October, today 145 lbs from 149 lbs. Not regularly checking BP. She denies CP, SOB at rest, orthopnea, PND, LH/dizziness, abdominal discomfort and syncope. Her appetite is normal and her bowel and bladder habits are unchanged and normal for her. Her ICD has never fired. She has been limiting fluid and sodium in her diet and taking her medications as directed. She does not use NSAIDs. \par

## 2023-01-17 ENCOUNTER — APPOINTMENT (OUTPATIENT)
Dept: HEART FAILURE | Facility: CLINIC | Age: 73
End: 2023-01-17
Payer: MEDICARE

## 2023-01-17 VITALS
HEIGHT: 67 IN | OXYGEN SATURATION: 100 % | BODY MASS INDEX: 22.13 KG/M2 | WEIGHT: 141 LBS | SYSTOLIC BLOOD PRESSURE: 94 MMHG | HEART RATE: 65 BPM | TEMPERATURE: 97.6 F | DIASTOLIC BLOOD PRESSURE: 64 MMHG

## 2023-01-17 VITALS — DIASTOLIC BLOOD PRESSURE: 67 MMHG | HEART RATE: 82 BPM | SYSTOLIC BLOOD PRESSURE: 102 MMHG

## 2023-01-17 DIAGNOSIS — N17.9 ACUTE KIDNEY FAILURE, UNSPECIFIED: ICD-10-CM

## 2023-01-17 DIAGNOSIS — Z86.79 PERSONAL HISTORY OF OTHER DISEASES OF THE CIRCULATORY SYSTEM: ICD-10-CM

## 2023-01-17 PROCEDURE — 99214 OFFICE O/P EST MOD 30 MIN: CPT

## 2023-01-17 RX ORDER — LIDOCAINE 5 G/100G
5 OINTMENT TOPICAL
Qty: 60 | Refills: 0 | Status: DISCONTINUED | COMMUNITY
Start: 2017-03-23 | End: 2023-01-17
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
## 2023-01-17 NOTE — CARDIOLOGY SUMMARY
[de-identified] : \par 09/27/22 ECG: SR at 71 bpm, BETTE, negative Tw. [de-identified] : \par 01/11/22 TTE: LVIDd 5.5 cm, LVEF 40-45% (segmental), mod DD, normal RV size and function, LA/RA not well visualized, min MR, mod TR, est RVSP 48 mmHg, trace pericardial effusion\par \par 11/26/21 TTE: LVIDd 5.4 cm, LVEF 20% (regional variation), mild DD, normal RV size and function, normal biatria, min MR, mild-mod TR, est RVSP 37 mmHg\par \par 12/12/08 TTE: LVIDd 4.7 cm, mod segmental LVSD, normal RV size and function, mod MR, est RVSP 34 mmHg\par \par  [de-identified] : \par 06/18/12 LHC: 30% pLAD, 45% OM1 at site of prior stent, otherwise minor luminal irregularities of LM and RCA\par

## 2023-01-17 NOTE — REASON FOR VISIT
[Cardiac Failure] : cardiac failure [FreeTextEntry1] : PATIENT INSTRUCTIONS:\par \par 1. STOP CARVEDILOL after PM dose on 1/17\par \par 2. START METOPROLOL SUCCINATE 25mg ONCE daily at BEDTIME starting 1/18\par \par 3. Keep a log of your blood pressure and heart rate. If you're unable to get a BP cuff please make note of this when you go to doctor's appointments and physical therapy a few times a week. This will allow us to adjust your medications for your heart failure.\par \par 3. Increase your fluid intake to 2L/day\par \par 4. Continue to weight yourself daily. \par \par 5. Labs today. We will follow up with you regarding the results.

## 2023-01-17 NOTE — PHYSICAL EXAM
[No Xanthelasma] : no xanthelasma [Normal Venous Pressure] : normal venous pressure [Normal Radial B/L] : normal radial B/L [Normal] : alert and oriented, normal memory [de-identified] : CINDI - wearing face mask [de-identified] : JVP 6 cm H2O, no HJR [de-identified] : slow steady gait with cane, using wheelchair for distance  [de-identified] : warm peripherally

## 2023-01-17 NOTE — DISCUSSION/SUMMARY
[Patient] : the patient [FreeTextEntry1] : 72 year old AA woman with a longstanding history of ICM with recent improvement of LVEF (LVIDd 5.5 cm, LVEF 40-45% from 20%) s/p dual chamber ICD, CAD s/p PCIs, CVA with residual R sided weakness and chronic pain on opioids who presents today for follow up of her cardiomyopathy. She is ACC/AHA Stage C, describing NYHA Class III HF symptoms. Her BP is marginal on low dose GDMT and her filling pressures appear low on exam. Most recent labs from 1/4 show K 5.3 with mild hemolysis and Cr 1.4 from 1.2-1.3. I have recommended the following:\par \par 1. Chronic systolic HF - Stable and well compensated. Unclear what elicited recent improvement in LVEF as there were no interventions. Further escalation of her GDMT is currently limited by marginal blood pressure likely in the setting of low right sided filling pressures. Encouraged her to increase her PO fluid intake as shes on Entresto, farxiga and spironolactone with no loop diuretic requirement. Will switch coreg to Toprol XL 25mg QHS. Will continue Entresto 24-26mg BID, Farxiga 5mg daily and spironolactone 12.5mg daily. Asked that she get a BP cuff for monitoring of daily home BP and to continue to monitor daily weights. Reviewed low potassium diet. Will repeat labs today to reassess renal function and electrolytes.\par \par 2. CAD - Underwent PCI in 2009 and 2011. No signs of ischemia. Continue Plavix, Statin, BB, ARB and MRA. Reportedly off ASA due to significant bruising. \par \par 3. Hypertension - well controlled on current therapies.\par \par 4. AAA - Follows with Dr. Lofton with routine surveillance.\par \par 5. Follow-up with the NP every 2-3 weeks for escalation of GDMT and with Dr. Colon in 3 months.

## 2023-01-20 LAB
ANION GAP SERPL CALC-SCNC: 11 MMOL/L
BUN SERPL-MCNC: 29 MG/DL
CALCIUM SERPL-MCNC: 9.9 MG/DL
CHLORIDE SERPL-SCNC: 109 MMOL/L
CO2 SERPL-SCNC: 20 MMOL/L
CREAT SERPL-MCNC: 1.39 MG/DL
EGFR: 40 ML/MIN/1.73M2
GLUCOSE SERPL-MCNC: 79 MG/DL
NT-PROBNP SERPL-MCNC: 362 PG/ML
POTASSIUM SERPL-SCNC: 4.7 MMOL/L
SODIUM SERPL-SCNC: 141 MMOL/L

## 2023-02-07 ENCOUNTER — APPOINTMENT (OUTPATIENT)
Dept: HEART FAILURE | Facility: CLINIC | Age: 73
End: 2023-02-07
Payer: MEDICARE

## 2023-02-07 VITALS
DIASTOLIC BLOOD PRESSURE: 65 MMHG | WEIGHT: 139 LBS | SYSTOLIC BLOOD PRESSURE: 100 MMHG | HEIGHT: 64 IN | TEMPERATURE: 97.7 F | BODY MASS INDEX: 23.73 KG/M2 | HEART RATE: 80 BPM | OXYGEN SATURATION: 98 %

## 2023-02-07 PROCEDURE — 99214 OFFICE O/P EST MOD 30 MIN: CPT

## 2023-02-07 RX ORDER — BLOOD PRESSURE TEST KIT-LARGE
KIT MISCELLANEOUS
Qty: 1 | Refills: 0 | Status: COMPLETED | COMMUNITY
Start: 2022-12-16 | End: 2023-02-07
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
## 2023-02-10 NOTE — PHYSICAL EXAM
[No Xanthelasma] : no xanthelasma [Normal Venous Pressure] : normal venous pressure [Normal Radial B/L] : normal radial B/L [Normal] : alert and oriented, normal memory [de-identified] : CINDI - wearing face mask [de-identified] : JVP 6 cm H2O, no HJR [de-identified] : Pt arrived to the office via wheelchair.  [de-identified] : warm peripherally

## 2023-02-10 NOTE — REASON FOR VISIT
[Cardiac Failure] : cardiac failure [FreeTextEntry1] : PATIENT INSTRUCTIONS:\par \par 2. INCREASE METOPROLOL SUCCINATE 25mg ONCE daily to 25MG in the MORNING and 25mg in the EVENING\par \par 3. Continue to Keep a log of your blood pressure and heart rate for you next visit. This will allow us to adjust your medications for your heart failure.\par \par 3. Increase your fluid intake to 2L/day\par \par 4. Continue to weight yourself daily. \par \par 5. Labs today. We will follow up with you regarding the results.

## 2023-02-10 NOTE — CARDIOLOGY SUMMARY
[de-identified] : \par 09/27/22 ECG: SR at 71 bpm, BETTE, negative Tw. [de-identified] : \par 01/11/22 TTE: LVIDd 5.5 cm, LVEF 40-45% (segmental), mod DD, normal RV size and function, LA/RA not well visualized, min MR, mod TR, est RVSP 48 mmHg, trace pericardial effusion\par \par 11/26/21 TTE: LVIDd 5.4 cm, LVEF 20% (regional variation), mild DD, normal RV size and function, normal biatria, min MR, mild-mod TR, est RVSP 37 mmHg\par \par 12/12/08 TTE: LVIDd 4.7 cm, mod segmental LVSD, normal RV size and function, mod MR, est RVSP 34 mmHg\par \par  [de-identified] : \par 06/18/12 LHC: 30% pLAD, 45% OM1 at site of prior stent, otherwise minor luminal irregularities of LM and RCA\par

## 2023-02-10 NOTE — DISCUSSION/SUMMARY
[Patient] : the patient [FreeTextEntry1] : 72 year old AA woman with a longstanding history of ICM with recent improvement of LVEF (LVIDd 5.5 cm, LVEF 40-45% from 20%) s/p dual chamber ICD, CAD s/p PCIs, CVA with residual R sided weakness and chronic pain on opioids who presents today for follow up of her cardiomyopathy. She is ACC/AHA Stage C, describing NYHA Class II-III HF symptoms. Pt is euvolemic on examination with normotensive b/p. Currently tolerating her neurohormonal  blockade regimen with room for uptitration.  The following recommendations have been made:\par \par 1. Chronic systolic HF - Stable and well compensated. Unclear what elicited recent improvement in LVEF as there were no interventions. \par - Increase Metoprolol 25 mg QD to Metoprolol 25mg BID, taking one in the morning and one in the evening. Notify the office for HR <60\par -Continue taking Entresto 24-26 mg BID as prescribed notify office for BP <90 or Lightheadness/Dizziness\par -Continue taking Farxiga 5 mg QD and Spironolactone 12.5 mg QD as prescribed. Labs to be completed after visit today to Assess renal function and electrolyes. \par - Pt was able to obtain a b/p and has been compliant with daily BP's and weights. Encouraged her to main this regimen and keep a log for her next visit.\par -Maintain a low sodium diet and Increase fluids to 2L a day. Educated her about current  GDMT regimen and how necessary it is to have proper hydration to prevent adverse reactions.\par - Counseled her on the use of celecoxib and  other forms of NSAIDS at this time. ensured that Acetaminophen was a safe alternative.\par - Advised her to follow up with her Electrophysiologist in regards to contraindications with a muscle stimulator and ICD implant.\par -Encouraged her to keep maintaining her followup appointments for proper medication titration. \par \par \par 2. CAD - Underwent PCI in 2009 and 2011. No signs of ischemia. Continue Plavix, Statin, BB, ARB and MRA. Reportedly off ASA due to significant bruising. \par \par 3. Hypertension - well controlled on current therapies.\par \par 4. AAA - Follows with Dr. Lofton with routine surveillance.\par \par 5. Follow-up with the NP on 2/24/2023 for medication uptitration and with Dr. Colon in 3/29/2023.

## 2023-02-10 NOTE — END OF VISIT
[Time Spent: ___ minutes] : I have spent [unfilled] minutes of time on the encounter. [FreeTextEntry3] : I was physically present for the key portions of the evaluation and management provided.  I agree with the above history, physical, and plan which I have reviewed and edited where appropriate. \par Feb 7 2023  9:00AM\par

## 2023-02-21 ENCOUNTER — NON-APPOINTMENT (OUTPATIENT)
Age: 73
End: 2023-02-21

## 2023-02-24 ENCOUNTER — APPOINTMENT (OUTPATIENT)
Dept: HEART FAILURE | Facility: CLINIC | Age: 73
End: 2023-02-24
Payer: MEDICARE

## 2023-02-24 ENCOUNTER — RX RENEWAL (OUTPATIENT)
Age: 73
End: 2023-02-24

## 2023-02-24 PROCEDURE — 99443: CPT | Mod: 95

## 2023-03-02 ENCOUNTER — INPATIENT (INPATIENT)
Facility: HOSPITAL | Age: 73
LOS: 8 days | Discharge: HOME | DRG: 287 | End: 2023-03-11
Attending: STUDENT IN AN ORGANIZED HEALTH CARE EDUCATION/TRAINING PROGRAM | Admitting: STUDENT IN AN ORGANIZED HEALTH CARE EDUCATION/TRAINING PROGRAM
Payer: MEDICARE

## 2023-03-02 VITALS
HEIGHT: 64 IN | DIASTOLIC BLOOD PRESSURE: 59 MMHG | OXYGEN SATURATION: 100 % | SYSTOLIC BLOOD PRESSURE: 88 MMHG | WEIGHT: 130.95 LBS | HEART RATE: 90 BPM | RESPIRATION RATE: 20 BRPM | TEMPERATURE: 97 F

## 2023-03-02 DIAGNOSIS — R07.9 CHEST PAIN, UNSPECIFIED: ICD-10-CM

## 2023-03-02 LAB
ALBUMIN SERPL ELPH-MCNC: 3.5 G/DL — SIGNIFICANT CHANGE UP (ref 3.3–5)
ALP SERPL-CCNC: 59 U/L — SIGNIFICANT CHANGE UP (ref 40–120)
ALT FLD-CCNC: 27 U/L — SIGNIFICANT CHANGE UP (ref 10–45)
ANION GAP SERPL CALC-SCNC: 13 MMOL/L — SIGNIFICANT CHANGE UP (ref 5–17)
APPEARANCE UR: CLEAR — SIGNIFICANT CHANGE UP
AST SERPL-CCNC: 31 U/L — SIGNIFICANT CHANGE UP (ref 10–40)
BASE EXCESS BLDV CALC-SCNC: -1.1 MMOL/L — SIGNIFICANT CHANGE UP (ref -2–3)
BASOPHILS # BLD AUTO: 0.04 K/UL — SIGNIFICANT CHANGE UP (ref 0–0.2)
BASOPHILS NFR BLD AUTO: 0.5 % — SIGNIFICANT CHANGE UP (ref 0–2)
BILIRUB SERPL-MCNC: 0.2 MG/DL — SIGNIFICANT CHANGE UP (ref 0.2–1.2)
BILIRUB UR-MCNC: NEGATIVE — SIGNIFICANT CHANGE UP
BUN SERPL-MCNC: 18 MG/DL — SIGNIFICANT CHANGE UP (ref 7–23)
CA-I SERPL-SCNC: 1.28 MMOL/L — SIGNIFICANT CHANGE UP (ref 1.15–1.33)
CALCIUM SERPL-MCNC: 9.6 MG/DL — SIGNIFICANT CHANGE UP (ref 8.4–10.5)
CHLORIDE BLDV-SCNC: 103 MMOL/L — SIGNIFICANT CHANGE UP (ref 96–108)
CHLORIDE SERPL-SCNC: 105 MMOL/L — SIGNIFICANT CHANGE UP (ref 96–108)
CO2 BLDV-SCNC: 23 MMOL/L — SIGNIFICANT CHANGE UP (ref 22–26)
CO2 SERPL-SCNC: 20 MMOL/L — LOW (ref 22–31)
COLOR SPEC: YELLOW — SIGNIFICANT CHANGE UP
CREAT SERPL-MCNC: 1.1 MG/DL — SIGNIFICANT CHANGE UP (ref 0.5–1.3)
DIFF PNL FLD: NEGATIVE — SIGNIFICANT CHANGE UP
EGFR: 53 ML/MIN/1.73M2 — LOW
EOSINOPHIL # BLD AUTO: 0.16 K/UL — SIGNIFICANT CHANGE UP (ref 0–0.5)
EOSINOPHIL NFR BLD AUTO: 2.1 % — SIGNIFICANT CHANGE UP (ref 0–6)
FLUAV AG NPH QL: SIGNIFICANT CHANGE UP
FLUBV AG NPH QL: SIGNIFICANT CHANGE UP
GAS PNL BLDV: 134 MMOL/L — LOW (ref 136–145)
GAS PNL BLDV: SIGNIFICANT CHANGE UP
GAS PNL BLDV: SIGNIFICANT CHANGE UP
GLUCOSE BLDV-MCNC: 112 MG/DL — HIGH (ref 70–99)
GLUCOSE SERPL-MCNC: 113 MG/DL — HIGH (ref 70–99)
GLUCOSE UR QL: NEGATIVE — SIGNIFICANT CHANGE UP
HCO3 BLDV-SCNC: 22 MMOL/L — SIGNIFICANT CHANGE UP (ref 22–29)
HCT VFR BLD CALC: 37.9 % — SIGNIFICANT CHANGE UP (ref 34.5–45)
HCT VFR BLDA CALC: 38 % — SIGNIFICANT CHANGE UP (ref 34.5–46.5)
HGB BLD CALC-MCNC: 12.6 G/DL — SIGNIFICANT CHANGE UP (ref 11.7–16.1)
HGB BLD-MCNC: 11.9 G/DL — SIGNIFICANT CHANGE UP (ref 11.5–15.5)
IMM GRANULOCYTES NFR BLD AUTO: 1.7 % — HIGH (ref 0–0.9)
KETONES UR-MCNC: NEGATIVE — SIGNIFICANT CHANGE UP
LACTATE BLDV-MCNC: 1.2 MMOL/L — SIGNIFICANT CHANGE UP (ref 0.5–2)
LEUKOCYTE ESTERASE UR-ACNC: NEGATIVE — SIGNIFICANT CHANGE UP
LIDOCAIN IGE QN: 30 U/L — SIGNIFICANT CHANGE UP (ref 7–60)
LYMPHOCYTES # BLD AUTO: 1.25 K/UL — SIGNIFICANT CHANGE UP (ref 1–3.3)
LYMPHOCYTES # BLD AUTO: 16.6 % — SIGNIFICANT CHANGE UP (ref 13–44)
MCHC RBC-ENTMCNC: 31.4 GM/DL — LOW (ref 32–36)
MCHC RBC-ENTMCNC: 31.7 PG — SIGNIFICANT CHANGE UP (ref 27–34)
MCV RBC AUTO: 101.1 FL — HIGH (ref 80–100)
MONOCYTES # BLD AUTO: 0.94 K/UL — HIGH (ref 0–0.9)
MONOCYTES NFR BLD AUTO: 12.5 % — SIGNIFICANT CHANGE UP (ref 2–14)
NEUTROPHILS # BLD AUTO: 4.99 K/UL — SIGNIFICANT CHANGE UP (ref 1.8–7.4)
NEUTROPHILS NFR BLD AUTO: 66.6 % — SIGNIFICANT CHANGE UP (ref 43–77)
NITRITE UR-MCNC: NEGATIVE — SIGNIFICANT CHANGE UP
NRBC # BLD: 0 /100 WBCS — SIGNIFICANT CHANGE UP (ref 0–0)
PCO2 BLDV: 33 MMHG — LOW (ref 39–42)
PH BLDV: 7.44 — HIGH (ref 7.32–7.43)
PH UR: 6 — SIGNIFICANT CHANGE UP (ref 5–8)
PLATELET # BLD AUTO: 267 K/UL — SIGNIFICANT CHANGE UP (ref 150–400)
PO2 BLDV: 142 MMHG — HIGH (ref 25–45)
POTASSIUM BLDV-SCNC: 3.5 MMOL/L — SIGNIFICANT CHANGE UP (ref 3.5–5.1)
POTASSIUM SERPL-MCNC: 3.8 MMOL/L — SIGNIFICANT CHANGE UP (ref 3.5–5.3)
POTASSIUM SERPL-SCNC: 3.8 MMOL/L — SIGNIFICANT CHANGE UP (ref 3.5–5.3)
PROT SERPL-MCNC: 6.8 G/DL — SIGNIFICANT CHANGE UP (ref 6–8.3)
PROT UR-MCNC: SIGNIFICANT CHANGE UP
RBC # BLD: 3.75 M/UL — LOW (ref 3.8–5.2)
RBC # FLD: 14.1 % — SIGNIFICANT CHANGE UP (ref 10.3–14.5)
RSV RNA NPH QL NAA+NON-PROBE: SIGNIFICANT CHANGE UP
SAO2 % BLDV: 100 % — HIGH (ref 67–88)
SARS-COV-2 RNA SPEC QL NAA+PROBE: SIGNIFICANT CHANGE UP
SODIUM SERPL-SCNC: 138 MMOL/L — SIGNIFICANT CHANGE UP (ref 135–145)
SP GR SPEC: 1.02 — SIGNIFICANT CHANGE UP (ref 1.01–1.02)
TROPONIN T, HIGH SENSITIVITY RESULT: 10 NG/L — SIGNIFICANT CHANGE UP (ref 0–51)
TROPONIN T, HIGH SENSITIVITY RESULT: 8 NG/L — SIGNIFICANT CHANGE UP (ref 0–51)
UROBILINOGEN FLD QL: NEGATIVE — SIGNIFICANT CHANGE UP
WBC # BLD: 7.51 K/UL — SIGNIFICANT CHANGE UP (ref 3.8–10.5)
WBC # FLD AUTO: 7.51 K/UL — SIGNIFICANT CHANGE UP (ref 3.8–10.5)

## 2023-03-02 PROCEDURE — 71045 X-RAY EXAM CHEST 1 VIEW: CPT | Mod: 26

## 2023-03-02 PROCEDURE — 74177 CT ABD & PELVIS W/CONTRAST: CPT | Mod: 26,MA

## 2023-03-02 PROCEDURE — 99285 EMERGENCY DEPT VISIT HI MDM: CPT

## 2023-03-02 NOTE — ED PROVIDER NOTE - PHYSICAL EXAMINATION
Physical Exam:  Gen: awake alert   HEENT: normal conjunctiva, oral mucosa moist  Lung: CTAB, no respiratory distress, no wheezes/rhonchi/rales B/L, speaking in full sentences  CV: RRR  Abd: soft, NT, ND, no guarding, no rigidity, no rebound tenderness  MSK: no visible deformities  Skin: Warm, well perfused, no rash  ~Jose Villela MD (PGY-3)

## 2023-03-02 NOTE — ED ADULT NURSE NOTE - FINAL NURSING ELECTRONIC SIGNATURE
Noa is 13 mo female patient of Dr Chung in [place] that presents to office today with 3 day history of fever. According to mother, Noa started running fever 103 on Friday 7/ 14  She was seen at [facility] and clinically diagnosed with strep throat and started on amoxil. By Saturday she refused to eat or drink and was gagging. She was brought back to er and discharged home with zofran(mother was unable to get filled) she returns today with irritability, rash with eczema and poor oral intake. Upon evaluation at the office she was dehydrated with herpangina and a rash that appeared to be eczema  Herpet icum. She will be admitted for further care    Child at  had hand foot and mouth last week    02-Mar-2023 23:51

## 2023-03-02 NOTE — ED PROVIDER NOTE - NS ED ROS FT
CONST: no fevers, no chills  ENT: no sore throar  CV: +chest pain, no leg swelling  RESP: +shortness of breath, no cough  ABD: +abdominal pain, no nausea, no vomiting, no diarrhea  : no dysuria, no flank pain, no hematuria  MSK: no back pain, no extremity pain  SKIN:  no rash

## 2023-03-02 NOTE — ED PROVIDER NOTE - ATTENDING CONTRIBUTION TO CARE
Private Physician [facility] You pcp, LUCILLE Colon  72y female pmh  AAA CHF, HTN, COPD, Gerd, HLD, ICD, Pt was recently admitted to [facility] and left today. Pt c/o shortness of breath, past week, worse supine/walking. No cp/fc/abd pain. PT states had been vomiting off/on past several weeks. Lost 18lb over past month. PE elderly female awake alert normocephalic atraumatic neck supple chest clear anterior & posterior cv no rubs, gallops or murmurs abd soft +bs no mass guarding neuro no focal defects. msk no pedal edema  Dave Arita MD, Facep

## 2023-03-02 NOTE — ED ADULT NURSE NOTE - OBJECTIVE STATEMENT
First encounter with the pt, pt received from triage with complaints of SOB. Pt states that she was recently admitted for CHF but left the hospital AMA due to lack of care. Pt is a/ox4 and verbal. Speech is clear and able to speak in full sentences without distress. Airway is patent with no obstruction nor blocking secretions. Breathing is even and unlabored on room air. Pt has strong pulses palpated bilaterally. Pt is SR on the cardiac monitor. Neuro check is wnl. Full rom. Pt denies chest pain, n/v and dizziness. No acute distress noted. Pt has call light within the reach of the pt at the bedside. Will continue to monitor the pt.

## 2023-03-02 NOTE — ED PROVIDER NOTE - OBJECTIVE STATEMENT
72F PMH CHF on entresto, COPD, AAA, CVA, CAD s/p stent, ventricular arrhythmia s/p ICD/PPM, CHF, COPD, IBD p/w SOB that started 2 weeks ago, worse with ambulation. Also endorsing substernal cp that worsens with exertion. Pt was discharged from [facility] for hyponatremia and ?colitis (treated wtith cipro/flagyl). No f/c, urinary symptom. Does have lower abd pain w/o vomiting or diarrhea 72F PMH CHF on entresto, COPD, AAA, CVA, CAD s/p stent, ventricular arrhythmia s/p ICD/PPM, CHF, COPD, IBD p/w SOB that started 2 weeks ago, worse with ambulation. Also endorsing substernal cp that worsens with exertion. Pt was discharged from [facility] for hyponatremia and ?colitis (treated wtith cipro/flagyl) last night. No f/c, urinary symptom. Does have lower abd pain w/o vomiting or diarrhea

## 2023-03-02 NOTE — ED PROVIDER NOTE - CLINICAL SUMMARY MEDICAL DECISION MAKING FREE TEXT BOX
72F PMH CHF on entresto, COPD, AAA, CVA, CAD s/p stent, ventricular arrhythmia s/p ICD/PPM, CHF, COPD, IBD p/w SOB that started 2 weeks ago, worse with ambulation. VS and exam as above. ECG nondiagnostic  Concern for CHF exacerbation vs. unstable angina. Lower concern for PE as pt is not tachy/hypoxic. Lower concern for infectious eitology as pt is afebrile and nontoxic appearing. Will also do CT A/P to eval possible recurrent colitis or bowel obstruction. Plan: cardiac labs, CT A/P, ua/ucx, reassess symptoms

## 2023-03-03 DIAGNOSIS — R07.9 CHEST PAIN, UNSPECIFIED: ICD-10-CM

## 2023-03-03 DIAGNOSIS — R63.8 OTHER SYMPTOMS AND SIGNS CONCERNING FOOD AND FLUID INTAKE: ICD-10-CM

## 2023-03-03 DIAGNOSIS — N18.30 CHRONIC KIDNEY DISEASE, STAGE 3 UNSPECIFIED: ICD-10-CM

## 2023-03-03 DIAGNOSIS — K52.9 NONINFECTIVE GASTROENTERITIS AND COLITIS, UNSPECIFIED: ICD-10-CM

## 2023-03-03 DIAGNOSIS — R42 DIZZINESS AND GIDDINESS: ICD-10-CM

## 2023-03-03 DIAGNOSIS — I25.10 ATHEROSCLEROTIC HEART DISEASE OF NATIVE CORONARY ARTERY WITHOUT ANGINA PECTORIS: ICD-10-CM

## 2023-03-03 DIAGNOSIS — I50.20 UNSPECIFIED SYSTOLIC (CONGESTIVE) HEART FAILURE: ICD-10-CM

## 2023-03-03 DIAGNOSIS — Z86.79 PERSONAL HISTORY OF OTHER DISEASES OF THE CIRCULATORY SYSTEM: ICD-10-CM

## 2023-03-03 DIAGNOSIS — J44.9 CHRONIC OBSTRUCTIVE PULMONARY DISEASE, UNSPECIFIED: ICD-10-CM

## 2023-03-03 LAB
ALBUMIN SERPL ELPH-MCNC: 3.3 G/DL — SIGNIFICANT CHANGE UP (ref 3.3–5)
ALP SERPL-CCNC: 55 U/L — SIGNIFICANT CHANGE UP (ref 40–120)
ALT FLD-CCNC: 26 U/L — SIGNIFICANT CHANGE UP (ref 10–45)
ANION GAP SERPL CALC-SCNC: 12 MMOL/L — SIGNIFICANT CHANGE UP (ref 5–17)
AST SERPL-CCNC: 31 U/L — SIGNIFICANT CHANGE UP (ref 10–40)
BASOPHILS # BLD AUTO: 0.08 K/UL — SIGNIFICANT CHANGE UP (ref 0–0.2)
BASOPHILS NFR BLD AUTO: 0.9 % — SIGNIFICANT CHANGE UP (ref 0–2)
BILIRUB SERPL-MCNC: 0.2 MG/DL — SIGNIFICANT CHANGE UP (ref 0.2–1.2)
BUN SERPL-MCNC: 17 MG/DL — SIGNIFICANT CHANGE UP (ref 7–23)
CALCIUM SERPL-MCNC: 9.4 MG/DL — SIGNIFICANT CHANGE UP (ref 8.4–10.5)
CHLORIDE SERPL-SCNC: 104 MMOL/L — SIGNIFICANT CHANGE UP (ref 96–108)
CO2 SERPL-SCNC: 22 MMOL/L — SIGNIFICANT CHANGE UP (ref 22–31)
CREAT SERPL-MCNC: 1 MG/DL — SIGNIFICANT CHANGE UP (ref 0.5–1.3)
EGFR: 59 ML/MIN/1.73M2 — LOW
EOSINOPHIL # BLD AUTO: 0.21 K/UL — SIGNIFICANT CHANGE UP (ref 0–0.5)
EOSINOPHIL NFR BLD AUTO: 2.5 % — SIGNIFICANT CHANGE UP (ref 0–6)
GLUCOSE SERPL-MCNC: 87 MG/DL — SIGNIFICANT CHANGE UP (ref 70–99)
HCT VFR BLD CALC: 34.7 % — SIGNIFICANT CHANGE UP (ref 34.5–45)
HGB BLD-MCNC: 11.2 G/DL — LOW (ref 11.5–15.5)
IMM GRANULOCYTES NFR BLD AUTO: 1.8 % — HIGH (ref 0–0.9)
LYMPHOCYTES # BLD AUTO: 1.27 K/UL — SIGNIFICANT CHANGE UP (ref 1–3.3)
LYMPHOCYTES # BLD AUTO: 14.8 % — SIGNIFICANT CHANGE UP (ref 13–44)
MAGNESIUM SERPL-MCNC: 1.7 MG/DL — SIGNIFICANT CHANGE UP (ref 1.6–2.6)
MCHC RBC-ENTMCNC: 32.3 GM/DL — SIGNIFICANT CHANGE UP (ref 32–36)
MCHC RBC-ENTMCNC: 32.7 PG — SIGNIFICANT CHANGE UP (ref 27–34)
MCV RBC AUTO: 101.2 FL — HIGH (ref 80–100)
MONOCYTES # BLD AUTO: 1.14 K/UL — HIGH (ref 0–0.9)
MONOCYTES NFR BLD AUTO: 13.3 % — SIGNIFICANT CHANGE UP (ref 2–14)
NEUTROPHILS # BLD AUTO: 5.71 K/UL — SIGNIFICANT CHANGE UP (ref 1.8–7.4)
NEUTROPHILS NFR BLD AUTO: 66.7 % — SIGNIFICANT CHANGE UP (ref 43–77)
NRBC # BLD: 0 /100 WBCS — SIGNIFICANT CHANGE UP (ref 0–0)
PHOSPHATE SERPL-MCNC: 2.8 MG/DL — SIGNIFICANT CHANGE UP (ref 2.5–4.5)
PLATELET # BLD AUTO: 248 K/UL — SIGNIFICANT CHANGE UP (ref 150–400)
POTASSIUM SERPL-MCNC: 3.4 MMOL/L — LOW (ref 3.5–5.3)
POTASSIUM SERPL-SCNC: 3.4 MMOL/L — LOW (ref 3.5–5.3)
PROT SERPL-MCNC: 6.3 G/DL — SIGNIFICANT CHANGE UP (ref 6–8.3)
RBC # BLD: 3.43 M/UL — LOW (ref 3.8–5.2)
RBC # FLD: 14.2 % — SIGNIFICANT CHANGE UP (ref 10.3–14.5)
SODIUM SERPL-SCNC: 138 MMOL/L — SIGNIFICANT CHANGE UP (ref 135–145)
WBC # BLD: 8.56 K/UL — SIGNIFICANT CHANGE UP (ref 3.8–10.5)
WBC # FLD AUTO: 8.56 K/UL — SIGNIFICANT CHANGE UP (ref 3.8–10.5)

## 2023-03-03 PROCEDURE — 99223 1ST HOSP IP/OBS HIGH 75: CPT

## 2023-03-03 PROCEDURE — 12345: CPT | Mod: NC

## 2023-03-03 RX ORDER — ALBUTEROL 90 UG/1
2 AEROSOL, METERED ORAL EVERY 6 HOURS
Refills: 0 | Status: DISCONTINUED | OUTPATIENT
Start: 2023-03-03 | End: 2023-03-11

## 2023-03-03 RX ORDER — NYSTATIN 500MM UNIT
500000 POWDER (EA) MISCELLANEOUS
Refills: 0 | Status: COMPLETED | OUTPATIENT
Start: 2023-03-03 | End: 2023-03-09

## 2023-03-03 RX ORDER — LOSARTAN POTASSIUM 100 MG/1
1 TABLET, FILM COATED ORAL
Qty: 0 | Refills: 0 | DISCHARGE

## 2023-03-03 RX ORDER — SACUBITRIL AND VALSARTAN 24; 26 MG/1; MG/1
1 TABLET, FILM COATED ORAL
Refills: 0 | Status: DISCONTINUED | OUTPATIENT
Start: 2023-03-03 | End: 2023-03-11

## 2023-03-03 RX ORDER — SUCRALFATE 1 G
1 TABLET ORAL
Refills: 0 | Status: DISCONTINUED | OUTPATIENT
Start: 2023-03-03 | End: 2023-03-11

## 2023-03-03 RX ORDER — TIOTROPIUM BROMIDE 18 UG/1
2 CAPSULE ORAL; RESPIRATORY (INHALATION) DAILY
Refills: 0 | Status: DISCONTINUED | OUTPATIENT
Start: 2023-03-03 | End: 2023-03-11

## 2023-03-03 RX ORDER — HYDROCORTISONE 20 MG
1 TABLET ORAL
Qty: 0 | Refills: 0 | DISCHARGE

## 2023-03-03 RX ORDER — SPIRONOLACTONE 25 MG/1
1 TABLET, FILM COATED ORAL
Qty: 0 | Refills: 0 | DISCHARGE

## 2023-03-03 RX ORDER — FAMOTIDINE 10 MG/ML
20 INJECTION INTRAVENOUS DAILY
Refills: 0 | Status: DISCONTINUED | OUTPATIENT
Start: 2023-03-03 | End: 2023-03-11

## 2023-03-03 RX ORDER — ATORVASTATIN CALCIUM 80 MG/1
80 TABLET, FILM COATED ORAL AT BEDTIME
Refills: 0 | Status: DISCONTINUED | OUTPATIENT
Start: 2023-03-03 | End: 2023-03-11

## 2023-03-03 RX ORDER — MONTELUKAST 4 MG/1
10 TABLET, CHEWABLE ORAL DAILY
Refills: 0 | Status: DISCONTINUED | OUTPATIENT
Start: 2023-03-03 | End: 2023-03-11

## 2023-03-03 RX ORDER — POLYETHYLENE GLYCOL 3350 17 G/17G
17 POWDER, FOR SOLUTION ORAL DAILY
Refills: 0 | Status: DISCONTINUED | OUTPATIENT
Start: 2023-03-03 | End: 2023-03-11

## 2023-03-03 RX ORDER — BUDESONIDE AND FORMOTEROL FUMARATE DIHYDRATE 160; 4.5 UG/1; UG/1
2 AEROSOL RESPIRATORY (INHALATION)
Refills: 0 | Status: DISCONTINUED | OUTPATIENT
Start: 2023-03-03 | End: 2023-03-11

## 2023-03-03 RX ORDER — ALPRAZOLAM 0.25 MG
1 TABLET ORAL
Qty: 0 | Refills: 0 | DISCHARGE

## 2023-03-03 RX ORDER — FINASTERIDE 5 MG/1
5 TABLET, FILM COATED ORAL DAILY
Refills: 0 | Status: DISCONTINUED | OUTPATIENT
Start: 2023-03-03 | End: 2023-03-05

## 2023-03-03 RX ORDER — ZOLPIDEM TARTRATE 10 MG/1
1 TABLET ORAL
Qty: 0 | Refills: 0 | DISCHARGE

## 2023-03-03 RX ORDER — METOPROLOL TARTRATE 50 MG
25 TABLET ORAL DAILY
Refills: 0 | Status: DISCONTINUED | OUTPATIENT
Start: 2023-03-03 | End: 2023-03-11

## 2023-03-03 RX ORDER — CIPROFLOXACIN LACTATE 400MG/40ML
500 VIAL (ML) INTRAVENOUS EVERY 12 HOURS
Refills: 0 | Status: COMPLETED | OUTPATIENT
Start: 2023-03-03 | End: 2023-03-09

## 2023-03-03 RX ORDER — CLOPIDOGREL BISULFATE 75 MG/1
75 TABLET, FILM COATED ORAL DAILY
Refills: 0 | Status: DISCONTINUED | OUTPATIENT
Start: 2023-03-03 | End: 2023-03-11

## 2023-03-03 RX ORDER — ENOXAPARIN SODIUM 100 MG/ML
40 INJECTION SUBCUTANEOUS EVERY 24 HOURS
Refills: 0 | Status: DISCONTINUED | OUTPATIENT
Start: 2023-03-03 | End: 2023-03-11

## 2023-03-03 RX ORDER — ZOLPIDEM TARTRATE 10 MG/1
5 TABLET ORAL AT BEDTIME
Refills: 0 | Status: DISCONTINUED | OUTPATIENT
Start: 2023-03-03 | End: 2023-03-09

## 2023-03-03 RX ORDER — POTASSIUM CHLORIDE 20 MEQ
40 PACKET (EA) ORAL ONCE
Refills: 0 | Status: COMPLETED | OUTPATIENT
Start: 2023-03-03 | End: 2023-03-03

## 2023-03-03 RX ORDER — METRONIDAZOLE 500 MG
500 TABLET ORAL THREE TIMES A DAY
Refills: 0 | Status: COMPLETED | OUTPATIENT
Start: 2023-03-03 | End: 2023-03-09

## 2023-03-03 RX ORDER — DAPAGLIFLOZIN 10 MG/1
5 TABLET, FILM COATED ORAL EVERY 24 HOURS
Refills: 0 | Status: DISCONTINUED | OUTPATIENT
Start: 2023-03-03 | End: 2023-03-09

## 2023-03-03 RX ORDER — OXYCODONE HYDROCHLORIDE 5 MG/1
20 TABLET ORAL THREE TIMES A DAY
Refills: 0 | Status: DISCONTINUED | OUTPATIENT
Start: 2023-03-03 | End: 2023-03-05

## 2023-03-03 RX ORDER — ALPRAZOLAM 0.25 MG
0.5 TABLET ORAL AT BEDTIME
Refills: 0 | Status: DISCONTINUED | OUTPATIENT
Start: 2023-03-03 | End: 2023-03-09

## 2023-03-03 RX ORDER — SPIRONOLACTONE 25 MG/1
25 TABLET, FILM COATED ORAL DAILY
Refills: 0 | Status: DISCONTINUED | OUTPATIENT
Start: 2023-03-03 | End: 2023-03-04

## 2023-03-03 RX ORDER — CARVEDILOL PHOSPHATE 80 MG/1
1 CAPSULE, EXTENDED RELEASE ORAL
Qty: 0 | Refills: 0 | DISCHARGE

## 2023-03-03 RX ORDER — MONTELUKAST 4 MG/1
1 TABLET, CHEWABLE ORAL
Qty: 0 | Refills: 0 | DISCHARGE

## 2023-03-03 RX ORDER — ACETAMINOPHEN 500 MG
650 TABLET ORAL EVERY 6 HOURS
Refills: 0 | Status: DISCONTINUED | OUTPATIENT
Start: 2023-03-03 | End: 2023-03-11

## 2023-03-03 RX ADMIN — Medication 1 GRAM(S): at 17:48

## 2023-03-03 RX ADMIN — Medication 25 MILLIGRAM(S): at 08:13

## 2023-03-03 RX ADMIN — Medication 500000 UNIT(S): at 05:36

## 2023-03-03 RX ADMIN — DAPAGLIFLOZIN 5 MILLIGRAM(S): 10 TABLET, FILM COATED ORAL at 05:35

## 2023-03-03 RX ADMIN — Medication 10 MILLIGRAM(S): at 03:49

## 2023-03-03 RX ADMIN — BUDESONIDE AND FORMOTEROL FUMARATE DIHYDRATE 2 PUFF(S): 160; 4.5 AEROSOL RESPIRATORY (INHALATION) at 06:10

## 2023-03-03 RX ADMIN — ZOLPIDEM TARTRATE 5 MILLIGRAM(S): 10 TABLET ORAL at 23:17

## 2023-03-03 RX ADMIN — SACUBITRIL AND VALSARTAN 1 TABLET(S): 24; 26 TABLET, FILM COATED ORAL at 17:48

## 2023-03-03 RX ADMIN — SACUBITRIL AND VALSARTAN 1 TABLET(S): 24; 26 TABLET, FILM COATED ORAL at 08:13

## 2023-03-03 RX ADMIN — FAMOTIDINE 20 MILLIGRAM(S): 10 INJECTION INTRAVENOUS at 11:38

## 2023-03-03 RX ADMIN — TIOTROPIUM BROMIDE 2 PUFF(S): 18 CAPSULE ORAL; RESPIRATORY (INHALATION) at 17:48

## 2023-03-03 RX ADMIN — SPIRONOLACTONE 25 MILLIGRAM(S): 25 TABLET, FILM COATED ORAL at 08:13

## 2023-03-03 RX ADMIN — BUDESONIDE AND FORMOTEROL FUMARATE DIHYDRATE 2 PUFF(S): 160; 4.5 AEROSOL RESPIRATORY (INHALATION) at 17:47

## 2023-03-03 RX ADMIN — Medication 500 MILLIGRAM(S): at 05:36

## 2023-03-03 RX ADMIN — Medication 500000 UNIT(S): at 17:47

## 2023-03-03 RX ADMIN — Medication 500000 UNIT(S): at 11:39

## 2023-03-03 RX ADMIN — Medication 1 GRAM(S): at 11:38

## 2023-03-03 RX ADMIN — MONTELUKAST 10 MILLIGRAM(S): 4 TABLET, CHEWABLE ORAL at 11:38

## 2023-03-03 RX ADMIN — POLYETHYLENE GLYCOL 3350 17 GRAM(S): 17 POWDER, FOR SOLUTION ORAL at 03:48

## 2023-03-03 RX ADMIN — Medication 1 GRAM(S): at 05:36

## 2023-03-03 RX ADMIN — Medication 10 MILLIGRAM(S): at 23:18

## 2023-03-03 RX ADMIN — Medication 40 MILLIEQUIVALENT(S): at 22:16

## 2023-03-03 RX ADMIN — CLOPIDOGREL BISULFATE 75 MILLIGRAM(S): 75 TABLET, FILM COATED ORAL at 11:38

## 2023-03-03 RX ADMIN — ZOLPIDEM TARTRATE 5 MILLIGRAM(S): 10 TABLET ORAL at 02:51

## 2023-03-03 RX ADMIN — Medication 500 MILLIGRAM(S): at 11:39

## 2023-03-03 RX ADMIN — Medication 500 MILLIGRAM(S): at 17:48

## 2023-03-03 RX ADMIN — Medication 500 MILLIGRAM(S): at 21:40

## 2023-03-03 RX ADMIN — FINASTERIDE 5 MILLIGRAM(S): 5 TABLET, FILM COATED ORAL at 11:38

## 2023-03-03 RX ADMIN — Medication 500000 UNIT(S): at 23:18

## 2023-03-03 RX ADMIN — ATORVASTATIN CALCIUM 80 MILLIGRAM(S): 80 TABLET, FILM COATED ORAL at 21:40

## 2023-03-03 NOTE — H&P ADULT - PROBLEM SELECTOR PLAN 5
-Continue breo ellipta (therapeutic interchange)  -Continue incruse ellipta  (therapeutic interchange)  -Continue albuterol PRN  -Continue montelukast 10 mg daily

## 2023-03-03 NOTE — H&P ADULT - PROBLEM SELECTOR PLAN 4
From CT abdominal aortic aneurysm measuring up to 4.1 x 4.0 x 4.6 cm; unchanged there is an increased degree of thrombosis with intraluminal narrowing.  -Stable diameter  -consider vascular consult for increased degree of thrombosis in AM

## 2023-03-03 NOTE — CONSULT NOTE ADULT - ASSESSMENT
72F w/ hx of ICM (LVEF 40-45% from 20% prior), s/p dual chamber ICD, CAD (s/p prior PCI 2009 and 2011), CVA (with residual R sided weakness), presenting with chest pain, currently admitted to medicine for work-up. Cardiology consulted for assistance in care.    #Chest pain: pt with hx of CAD s/p prior PCI's 2009 and 2011. Current presentation atypical (not associated with exertion or improved with rest). Overall ACS less likely as pt currently chest pain free, EKG nonspecific findings as above, trop-hs 10->8  - c/w home plavix 75mg daily  - c/w home rosuvastatin 20mg daily  - repeat TTE  - pending TTE results will consider inpt ischemic evaluation with stress test  - c/w tele monitoring  - check A1c, lipids for risk factor evaluation    #HFrEF: ICM, LVEF most recent 40-45%, follows with Dr. Colon in HF clinic  - appears euvolemic on exam, BP at baseline as per outpt records 90's/60's  - c/w home entresto 24-26mg BID  - c/w home farxiga 5mg daily  - c/w home metoprolol succinate 25mg BID  - c/w home spironolactone 12.5mg daily  - repeat TTE as above    Zach Matthews MD  Cardiology Fellow - PGY4    Please check [email or web address]: [email or web address] for cardiology service schedule and contact information.  72F w/ hx of ICM (LVEF 40-45% from 20% prior), s/p dual chamber ICD, CAD (s/p prior PCI 2009 and 2011), CVA (with residual R sided weakness), presenting with chest pain, currently admitted to medicine for work-up. Cardiology consulted for assistance in care.    #Chest pain: pt with hx of CAD s/p prior PCI's 2009 and 2011. Current presentation atypical (not associated with exertion or improved with rest). Overall ACS less likely as pt currently chest pain free, EKG nonspecific findings as above, trop-hs 10->8  - c/w home plavix 75mg daily  - c/w home rosuvastatin 20mg daily  - repeat TTE  - pending TTE results will consider inpt ischemic evaluation with stress test  - c/w tele monitoring    #HFrEF: ICM, LVEF most recent 40-45%, follows with Dr. Colon in HF clinic  - appears euvolemic on exam, BP at baseline as per outpt records 90's/60's  - c/w home entresto 24-26mg BID  - c/w home farxiga 5mg daily  - c/w home metoprolol succinate 25mg BID  - c/w home spironolactone 12.5mg daily  - repeat TTE as above    Zach Matthews MD  Cardiology Fellow - PGY4    Please check [email or web address]: [email or web address] for cardiology service schedule and contact information.

## 2023-03-03 NOTE — CONSULT NOTE ADULT - ATTENDING COMMENTS
Chronic heart failure  Non-cardiac chest pain.  Needs workup for unintentional weight loss.    Continue outpatient CHF meds.    Cardiology to sign off  Patient to follow up with Dr. Colon in heart failure clinic. She has a number of symptoms which are concerning her, including weight loss, nausea, and pains. But her cardiac condition is stable at present. Chart and laboratory studies were reviewed.     Chronic heart failure with recovered ejection fraction, pro-BNP normal.  Non-cardiac chest pain.    Continue outpatient CHF meds.  Continue workup for GI symptoms and ? weight loss, which appear to be an ongoing concern for her.    Cardiology to sign off    Patient should follow up with Dr. Blanka Colon as outpatient for management of non-ischemic cardiomyopathy with reduced EF. She has a number of symptoms which concern her, including weight loss, nausea, and pains. Chart and laboratory studies were reviewed. Cardiac condition is stable at present.     Non-cardiac chest pain.  Chronic heart failure with recovered ejection fraction, pro-BNP normal.    Continue outpatient CHF meds.  Continue workup for GI symptoms and ? weight loss, which appear to be an ongoing concern for her.    Cardiology to sign off.     Patient should follow up with Dr. Blanka Colon as outpatient for management of non-ischemic cardiomyopathy with reduced EF.

## 2023-03-03 NOTE — CHART NOTE - NSCHARTNOTEFT_GEN_A_CORE
Addendum Note - Medicine Attending:  ========================================================    The patient was seen and examined by me today. C/o constipation x 7 days. C/o chest pain and SOB.     Consulted house cardiology to help with chest pain work up. Not in acute heart failure at this time. Appears euvolemic on exam. Does also have MSK chest pain to palpation, but states this is different from the chest pain she felt earlier.   Will give 1 dose of Tap water enema for constipation.     Thrush on exam - start nystatin swish and swallow 4x/day    Home medication reconciliation completed.     Pipe Miranda MD, MARY LOU  Available on [employer] Teams

## 2023-03-03 NOTE — H&P ADULT - PROBLEM SELECTOR PLAN 8
not in [place]AN  -Monitor BMP daily  -Avoid nephrotoxic agents  -Renally dose medications (decreased home famotidine to 20 mg daily)

## 2023-03-03 NOTE — H&P ADULT - HISTORY OF PRESENT ILLNESS
72 y F PMH of AAA, CVA, CAD s/p stent, ventricular arrhythmia s/p ICD/PPM, CHF, COPD, IBD presents to the ED with complaints of chest pain on exertion    ED Course:  VS: Afebrile, HR: 90, BP: 88/59--> 97/66, saturating 100% on RA  Medications: none given    72 y F PMH of AAA, CVA, CAD s/p stent, ventricular arrhythmia s/p ICD/PPM, CHF, COPD, IBD presents to the ED with complaints of dizziness and chest pain.  Patient is a tangential historian- states that she was admitted to [facility] for an intra-abdominal infection for 4 days and was discharged w antibiotics and her BP medications were changed at that time as well. She states that she came to the hospital because she was feeling dizziness, predominantly when standing up, and she was very dehydrated during her time inpatient.   Patient also endorsed chest pain, located on the L side, could not tell intensity and description of pain, radiating to R side, aggravated by touch (endorsed tenderness) no relieving factors. The chest pain is not related to exertion and also occured at rest.   Patient states that she at baseline feels SOB 2/2 to her COPD (does not use O2 at home)  Denies palpitations, LE edema, fever, chills.   Endorses residual LLQ abdominal pain, has not had BM in 7 days.   ROS negative as below    Of note- home hydralazine DC at [facility]     ED Course:  VS: Afebrile, HR: 90, BP: 88/59--> 97/66, saturating 100% on RA BP at time of interview: 107/70   Medications: none given

## 2023-03-03 NOTE — H&P ADULT - ASSESSMENT
72 y F PMH of AAA, CVA, CAD s/p stent, ventricular arrhythmia s/p ICD/PPM, CHF, COPD, IBD presents to the ED with complaints of chest pain on exertion   72 y F PMH of AAA, CVA, CAD s/p stent, ventricular arrhythmia s/p ICD/PPM, CHF, COPD, IBD presents to the ED with complaints of dizziness and chest pain.  No new EKG changes from prior, trop x 2 negative, appears to be more MSK in nature   Endorses chronic FERRERA, plan was to get ECHO at [facility] however was not pursued as per patient, will obtain inpatient  Dizziness appears to be in setting of polypharmacy, it is possible patient is also orthostatic (low BP on admission    Admitted for further eval

## 2023-03-03 NOTE — H&P ADULT - PROBLEM SELECTOR PLAN 1
Appears to be more MSK in nature   -Continue tylenol and home oxycodone   -F/u EKG in AM  -Telemetry monitoring   -If chest pain obtain STAT EKG and cardiac enzymes  -F/u TTE

## 2023-03-03 NOTE — CONSULT NOTE ADULT - SUBJECTIVE AND OBJECTIVE BOX
Cardiology Consult Note   [Please check [email or web address] password: [email or web address]" for cardiology service schedule and contact information]    HPI:  72F w/ hx of ICM (LVEF 40-45% from 20% prior), s/p dual chamber ICD, CAD (s/p prior PCI 2009 and 2011), CVA (with residual R sided weakness), presenting with chest pain.    The pt follows regularly w/ Dr. Colon in HF clinic. The pt reports for last few weeks she has experienced intermittent chest pain. The pain is located diffusely over her L and R chest, feels "dull" in quality. Occurs few times a day, is not related to exertion or improved with rest. When it occurs, lasts few seconds at a time before resolving. Associated with shortness of breath and dizziness when it occurs. The pt reports that the pain has not increased in severity or frequency recently. States that she has lost weight approx 15 lbs over last month. Otherwise denies orthopnea/pnd, LE swelling, fever, syncope.      PAST MEDICAL & SURGICAL HISTORY:  Anxiety      Hypercholesteremia      Osteoporosis      Pacemaker      CAD (coronary artery disease)      COPD (chronic obstructive pulmonary disease)      Congestive heart failure      Ventricular arrhythmia      CVA (cerebral vascular accident) X2, right sided weakness      Cataract Surgery      Hand Surgery        FAMILY HISTORY:  No pertinent family history in first degree relatives      SOCIAL HISTORY:  unchanged    MEDICATIONS:  clopidogrel Tablet 75 milliGRAM(s) Oral daily  enoxaparin Injectable 40 milliGRAM(s) SubCutaneous every 24 hours  metoprolol succinate ER 25 milliGRAM(s) Oral daily  sacubitril 24 mG/valsartan 26 mG 1 Tablet(s) Oral two times a day  spironolactone 25 milliGRAM(s) Oral daily    ciprofloxacin     Tablet 500 milliGRAM(s) Oral every 12 hours  metroNIDAZOLE    Tablet 500 milliGRAM(s) Oral three times a day  nystatin    Suspension 019336 Unit(s) Oral four times a day    albuterol    90 MICROgram(s) HFA Inhaler 2 Puff(s) Inhalation every 6 hours PRN  budesonide 160 MICROgram(s)/formoterol 4.5 MICROgram(s) Inhaler 2 Puff(s) Inhalation two times a day  montelukast 10 milliGRAM(s) Oral daily  tiotropium 2.5 MICROgram(s) Inhaler 2 Puff(s) Inhalation daily    acetaminophen     Tablet .. 650 milliGRAM(s) Oral every 6 hours PRN  ALPRAZolam 0.5 milliGRAM(s) Oral at bedtime PRN  oxyCODONE    IR 20 milliGRAM(s) Oral three times a day PRN  zolpidem 5 milliGRAM(s) Oral at bedtime PRN    bisacodyl Suppository 10 milliGRAM(s) Rectal daily PRN  famotidine    Tablet 20 milliGRAM(s) Oral daily  polyethylene glycol 3350 17 Gram(s) Oral daily PRN  sucralfate suspension 1 Gram(s) Oral <User Schedule>    atorvastatin 80 milliGRAM(s) Oral at bedtime  dapagliflozin 5 milliGRAM(s) Oral every 24 hours  finasteride 5 milliGRAM(s) Oral daily        REVIEW OF SYSTEMS:  CV: chest pain (+), palpitation (-), orthopnea (-), PND (-), edema (-)  PULM: SOB (+), cough (-), wheezing (-), hemoptysis (-).   CONST: fever (-), chills (-) or fatigue (-)  GI: abdominal distension (-), abdominal pain (-) , nausea/vomiting (-), hematemesis, (-), melena (-), hematochezia (-)  : dysuria (-), frequency (-), hematuria (-).   NEURO: numbness (-), weakness (-), dizziness (-)  SKIN: itching (-), rash (-)  HEENT:  visual changes (-); vertigo or throat pain (-);  neck stiffness (-)     All other review of systems is negative unless indicated above.   -------------------------------------------------------------------------------------------  PHYSICAL EXAM:  T(C): 36.5 (03-03-23 @ 10:36), Max: 36.8 (03-03-23 @ 05:35)  HR: 70 (03-03-23 @ 10:36) (67 - 88)  BP: 93/58 (03-03-23 @ 10:36) (93/58 - 103/65)  RR: 18 (03-03-23 @ 10:36) (18 - 20)  SpO2: 98% (03-03-23 @ 10:36) (95% - 99%)  Wt(kg): --  I&O's Summary      GENERAL: NAD  HEAD: Atraumatic, Normocephalic.  EYES: EOMI, PERRLA, conjunctiva and sclera clear.  ENT: Moist mucous membranes.  NECK: Supple, No JVD.  CHEST/LUNG: Clear to auscultation bilaterally; No rales, rhonchi, wheezing, or rubs. Unlabored respirations.  HEART: Regular rate and rhythm; No murmurs, rubs, or gallops.  ABDOMEN: Bowel sounds present; Soft, Nontender, Nondistended.   EXTREMITIES:  2+ Peripheral Pulses, brisk capillary refill. No clubbing, cyanosis, or edema.  PSYCH: Normal affect.  SKIN: No rashes or lesions.    -------------------------------------------------------------------------------------------  LABS:                          11.2   8.56  )-----------( 248      ( 03 Mar 2023 06:52 )             34.7     03-03    138  |  104  |  17  ----------------------------<  87  3.4<L>   |  22  |  1.00    Ca    9.4      03 Mar 2023 06:52  Phos  2.8     03-03  Mg     1.7     03-03    TPro  6.3  /  Alb  3.3  /  TBili  0.2  /  DBili  x   /  AST  31  /  ALT  26  /  AlkPhos  55  03-03      CARDIAC MARKERS ( 02 Mar 2023 18:30 )  8 ng/L / x     / x     / x     / x     / x      CARDIAC MARKERS ( 02 Mar 2023 16:21 )  10 ng/L / x     / x     / x     / x     / x          ciprofloxacin     Tablet 500 milliGRAM(s) Oral every 12 hours  metroNIDAZOLE    Tablet 500 milliGRAM(s) Oral three times a day  nystatin    Suspension 463941 Unit(s) Oral four times a day    albuterol    90 MICROgram(s) HFA Inhaler 2 Puff(s) Inhalation every 6 hours PRN  budesonide 160 MICROgram(s)/formoterol 4.5 MICROgram(s) Inhaler 2 Puff(s) Inhalation two times a day  montelukast 10 milliGRAM(s) Oral daily  tiotropium 2.5 MICROgram(s) Inhaler 2 Puff(s) Inhalation daily    acetaminophen     Tablet .. 650 milliGRAM(s) Oral every 6 hours PRN  ALPRAZolam 0.5 milliGRAM(s) Oral at bedtime PRN  oxyCODONE    IR 20 milliGRAM(s) Oral three times a day PRN  zolpidem 5 milliGRAM(s) Oral at bedtime PRN      EKG: NSR, normal axis, normal intervals, nonspecific T wave inversion in lateral leads    TTE 1/2022:  EF (Visual Estimate): 40-45 %  Doppler Peak Velocity (m/sec): AoV=1.6  ------------------------------------------------------------------------  Observations:  Mitral Valve: Tethered mitral valve leaflets with normal  opening. Minimal mitral regurgitation.  Aortic Valve/Aorta: Calcified trileaflet aortic valve with  normal opening. Peak transaortic valve gradient equals 10  mm Hg, mean transaortic valve gradient equals 6 mm Hg,  aortic valve velocity time integral equals 35 cm. No aortic  valve regurgitation seen. Peak left ventricular outflow  tract gradient equals 3 mm Hg, LVOT velocity time integral  equals 18 cm.  Aortic Root: 3.4 cm.  LVOT diameter: 2.1 cm.  Left Atrium: Left atrium not well visualized, probably  normal.  Left Ventricle: Endocardial visualization enhanced with  intravenous injection of Ultrasonic Enhancing Agent  (Definity). Left ventricle suboptimally visualized despite  intravenous injeciton of ultrasonic enhancing agent; mild  segmental left ventricular systolic dysfunction. The  inferolateral wall is akinetic. The anterolateral wall is  hypokinetic. No left ventricular thrombus. Moderate left  ventricular enlargement. Moderate diastolic dysfunction  (Stage II).  Right Heart: Right atrium not well visualized, probably  normal. The right ventricle is not well visualized; grossly  normal right ventricular size and systolic function. A  device wire is noted in the right heart. Tricuspid valve  not well visualized, probably normal. Moderate tricuspid  regurgitation. Pulmonic valve not well visualized, probably  normal. Minimal pulmonic regurgitation.  Pericardium/Pleura: Trace pericardial effusion.  Hemodynamic: Estimated right atrial pressure equals 3 mmHg.  Estimated right ventricular systolic pressure equals 48 mm  Hg, assuming right atrial pressure equals 3 mm Hg,  consistent with mild pulmonary hypertension.  ------------------------------------------------------------------------  Conclusions:  1. Tethered mitral valve leaflets with normal opening.  Minimal mitral regurgitation.  2. Calcified trileaflet aortic valve with normal opening.  No aortic valve regurgitation seen.  3. Endocardial visualization enhanced with intravenous  injection of Ultrasonic Enhancing Agent (Definity). Left  ventricle suboptimally visualized despite intravenous  injeciton of ultrasonic enhancing agent; mild segmental  left ventricular systolic dysfunction.The inferolateral  wall is akinetic. The anterolateral wall is hypokinetic. No  left ventricular thrombus.  4. The right ventricle is not well visualized; grossly  normal right ventricular size and systolic function. A  device wire is noted in the right heart.  5. Trace pericardial effusion.  *** Compared with echocardiogram of 11/26/2021, overall EF  has improved.    -------------------------------------------------------------------------------------------                 Cardiology Consult Note   [Please check [email or web address] password: [email or web address]" for cardiology service schedule and contact information]    HPI:  72F w/ hx of ICM (LVEF 40-45% from 20% prior), s/p dual chamber ICD, CAD (s/p prior PCI 2009 and 2011), CVA (with residual R sided weakness), presenting with chest pain.    The pt follows regularly w/ Dr. Colon in HF clinic. The pt reports for last few weeks she has experienced intermittent chest pain. The pain is located diffusely over her L and R chest, feels "dull" in quality. Occurs few times a day, is not related to exertion or improved with rest. When it occurs, lasts few seconds at a time before resolving. Associated with shortness of breath and dizziness when it occurs. The pt reports that the pain has not increased in severity or frequency recently. States that she has lost weight approx 15 lbs over last month. Otherwise denies orthopnea/pnd, LE swelling, fever, syncope.      PAST MEDICAL & SURGICAL HISTORY:  Anxiety      Hypercholesteremia      Osteoporosis      Pacemaker      CAD (coronary artery disease)      COPD (chronic obstructive pulmonary disease)      Congestive heart failure      Ventricular arrhythmia      CVA (cerebral vascular accident) X2, right sided weakness      Cataract Surgery      Hand Surgery        FAMILY HISTORY:  No pertinent family history in first degree relatives      SOCIAL HISTORY:  unchanged    MEDICATIONS:  clopidogrel Tablet 75 milliGRAM(s) Oral daily  enoxaparin Injectable 40 milliGRAM(s) SubCutaneous every 24 hours  metoprolol succinate ER 25 milliGRAM(s) Oral daily  sacubitril 24 mG/valsartan 26 mG 1 Tablet(s) Oral two times a day  spironolactone 25 milliGRAM(s) Oral daily    ciprofloxacin     Tablet 500 milliGRAM(s) Oral every 12 hours  metroNIDAZOLE    Tablet 500 milliGRAM(s) Oral three times a day  nystatin    Suspension 179484 Unit(s) Oral four times a day    albuterol    90 MICROgram(s) HFA Inhaler 2 Puff(s) Inhalation every 6 hours PRN  budesonide 160 MICROgram(s)/formoterol 4.5 MICROgram(s) Inhaler 2 Puff(s) Inhalation two times a day  montelukast 10 milliGRAM(s) Oral daily  tiotropium 2.5 MICROgram(s) Inhaler 2 Puff(s) Inhalation daily    acetaminophen     Tablet .. 650 milliGRAM(s) Oral every 6 hours PRN  ALPRAZolam 0.5 milliGRAM(s) Oral at bedtime PRN  oxyCODONE    IR 20 milliGRAM(s) Oral three times a day PRN  zolpidem 5 milliGRAM(s) Oral at bedtime PRN    bisacodyl Suppository 10 milliGRAM(s) Rectal daily PRN  famotidine    Tablet 20 milliGRAM(s) Oral daily  polyethylene glycol 3350 17 Gram(s) Oral daily PRN  sucralfate suspension 1 Gram(s) Oral <User Schedule>    atorvastatin 80 milliGRAM(s) Oral at bedtime  dapagliflozin 5 milliGRAM(s) Oral every 24 hours  finasteride 5 milliGRAM(s) Oral daily        REVIEW OF SYSTEMS:  CV: chest pain (+), palpitation (-), orthopnea (-), PND (-), edema (-)  PULM: SOB (+), cough (-), wheezing (-), hemoptysis (-).   CONST: fever (-), chills (-) or fatigue (-)  GI: abdominal distension (-), abdominal pain (-) , nausea/vomiting (-), hematemesis, (-), melena (-), hematochezia (-)  : dysuria (-), frequency (-), hematuria (-).   NEURO: numbness (-), weakness (-), dizziness (-)  SKIN: itching (-), rash (-)  HEENT:  visual changes (-); vertigo or throat pain (-);  neck stiffness (-)     All other review of systems is negative unless indicated above.   -------------------------------------------------------------------------------------------  PHYSICAL EXAM:  T(C): 36.5 (03-03-23 @ 10:36), Max: 36.8 (03-03-23 @ 05:35)  HR: 70 (03-03-23 @ 10:36) (67 - 88)  BP: 93/58 (03-03-23 @ 10:36) (93/58 - 103/65)  RR: 18 (03-03-23 @ 10:36) (18 - 20)  SpO2: 98% (03-03-23 @ 10:36) (95% - 99%)  Wt(kg): --  I&O's Summary      GENERAL: NAD  HEAD: Atraumatic, Normocephalic.  EYES: EOMI, PERRLA, conjunctiva and sclera clear.  ENT: Moist mucous membranes.  NECK: Supple, No JVD.  CHEST/LUNG: Clear to auscultation bilaterally; No rales, rhonchi, wheezing, or rubs. Unlabored respirations. Laying flat in stretcher.  HEART: Regular rate and rhythm; No murmurs, rubs, or gallops.  ABDOMEN: soft  EXTREMITIES:  No clubbing, cyanosis, or edema.  PSYCH: circumlocution, pleasant but tangential        -------------------------------------------------------------------------------------------  LABS:                          11.2   8.56  )-----------( 248      ( 03 Mar 2023 06:52 )             34.7     03-03    138  |  104  |  17  ----------------------------<  87  3.4<L>   |  22  |  1.00    Ca    9.4      03 Mar 2023 06:52  Phos  2.8     03-03  Mg     1.7     03-03    TPro  6.3  /  Alb  3.3  /  TBili  0.2  /  DBili  x   /  AST  31  /  ALT  26  /  AlkPhos  55  03-03      CARDIAC MARKERS ( 02 Mar 2023 18:30 )  8 ng/L / x     / x     / x     / x     / x      CARDIAC MARKERS ( 02 Mar 2023 16:21 )  10 ng/L / x     / x     / x     / x     / x          ciprofloxacin     Tablet 500 milliGRAM(s) Oral every 12 hours  metroNIDAZOLE    Tablet 500 milliGRAM(s) Oral three times a day  nystatin    Suspension 341923 Unit(s) Oral four times a day    albuterol    90 MICROgram(s) HFA Inhaler 2 Puff(s) Inhalation every 6 hours PRN  budesonide 160 MICROgram(s)/formoterol 4.5 MICROgram(s) Inhaler 2 Puff(s) Inhalation two times a day  montelukast 10 milliGRAM(s) Oral daily  tiotropium 2.5 MICROgram(s) Inhaler 2 Puff(s) Inhalation daily    acetaminophen     Tablet .. 650 milliGRAM(s) Oral every 6 hours PRN  ALPRAZolam 0.5 milliGRAM(s) Oral at bedtime PRN  oxyCODONE    IR 20 milliGRAM(s) Oral three times a day PRN  zolpidem 5 milliGRAM(s) Oral at bedtime PRN      EKG: NSR, normal axis, normal intervals, nonspecific T wave inversion in lateral leads    TTE 1/2022:  EF (Visual Estimate): 40-45 %  Doppler Peak Velocity (m/sec): AoV=1.6  ------------------------------------------------------------------------  Observations:  Mitral Valve: Tethered mitral valve leaflets with normal  opening. Minimal mitral regurgitation.  Aortic Valve/Aorta: Calcified trileaflet aortic valve with  normal opening. Peak transaortic valve gradient equals 10  mm Hg, mean transaortic valve gradient equals 6 mm Hg,  aortic valve velocity time integral equals 35 cm. No aortic  valve regurgitation seen. Peak left ventricular outflow  tract gradient equals 3 mm Hg, LVOT velocity time integral  equals 18 cm.  Aortic Root: 3.4 cm.  LVOT diameter: 2.1 cm.  Left Atrium: Left atrium not well visualized, probably  normal.  Left Ventricle: Endocardial visualization enhanced with  intravenous injection of Ultrasonic Enhancing Agent  (Definity). Left ventricle suboptimally visualized despite  intravenous injeciton of ultrasonic enhancing agent; mild  segmental left ventricular systolic dysfunction. The  inferolateral wall is akinetic. The anterolateral wall is  hypokinetic. No left ventricular thrombus. Moderate left  ventricular enlargement. Moderate diastolic dysfunction  (Stage II).  Right Heart: Right atrium not well visualized, probably  normal. The right ventricle is not well visualized; grossly  normal right ventricular size and systolic function. A  device wire is noted in the right heart. Tricuspid valve  not well visualized, probably normal. Moderate tricuspid  regurgitation. Pulmonic valve not well visualized, probably  normal. Minimal pulmonic regurgitation.  Pericardium/Pleura: Trace pericardial effusion.  Hemodynamic: Estimated right atrial pressure equals 3 mmHg.  Estimated right ventricular systolic pressure equals 48 mm  Hg, assuming right atrial pressure equals 3 mm Hg,  consistent with mild pulmonary hypertension.  ------------------------------------------------------------------------  Conclusions:  1. Tethered mitral valve leaflets with normal opening.  Minimal mitral regurgitation.  2. Calcified trileaflet aortic valve with normal opening.  No aortic valve regurgitation seen.  3. Endocardial visualization enhanced with intravenous  injection of Ultrasonic Enhancing Agent (Definity). Left  ventricle suboptimally visualized despite intravenous  injeciton of ultrasonic enhancing agent; mild segmental  left ventricular systolic dysfunction.The inferolateral  wall is akinetic. The anterolateral wall is hypokinetic. No  left ventricular thrombus.  4. The right ventricle is not well visualized; grossly  normal right ventricular size and systolic function. A  device wire is noted in the right heart.  5. Trace pericardial effusion.  *** Compared with echocardiogram of 11/26/2021, overall EF  has improved.    -------------------------------------------------------------------------------------------

## 2023-03-03 NOTE — H&P ADULT - NSHPLABSRESULTS_GEN_ALL_CORE
LABS:                         11.9   7.51  )-----------( 267      ( 02 Mar 2023 16:21 )             37.9         138  |  105  |  18  ----------------------------<  113<H>  3.8   |  20<L>  |  1.10    Ca    9.6      02 Mar 2023 16:21    TPro  6.8  /  Alb  3.5  /  TBili  0.2  /  DBili  x   /  AST  31  /  ALT  27  /  AlkPhos  59        Urinalysis Basic - ( 02 Mar 2023 19:28 )    Color: Yellow / Appearance: Clear / S.024 / pH: x  Gluc: x / Ketone: Negative  / Bili: Negative / Urobili: Negative   Blood: x / Protein: Trace / Nitrite: Negative   Leuk Esterase: Negative / RBC: x / WBC x   Sq Epi: x / Non Sq Epi: x / Bacteria: x          Serum Pro-Brain Natriuretic Peptide: 377 pg/mL ( @ 16:21)      Records reviewed from prior hospitalization.  Labs reviewed remarkable for   EKG personally reviewed   CXR personally reviewed    CTAP:     FINDINGS:  LOWER CHEST: Emphysematous change. Small volume round atelectasis in the left lung base. Cardiomegaly with partially imaged cardiac device leads.    LIVER: Within normal limits.  BILE DUCTS: Normal caliber.  GALLBLADDER: Contracted  SPLEEN: Within normal limits.  PANCREAS: Within normal limits.  ADRENALS: Within normal limits.  KIDNEYS/URETERS: Symmetric enhancement bilaterally, no hydronephrosis. Bilateral cortical scarring.    BLADDER: Within normal limits.  REPRODUCTIVE ORGANS: Calcified uterine fibroid.    BOWEL: No bowel obstruction. Appendix is normal. Small volume positive oral contrast versus high attenuation ingested material seen throughout various bowel loops.  PERITONEUM: No ascites.  VESSELS: Again seen is a partially thrombosed abdominal aortic aneurysm measuring up to 4.1 x 4.0 x 4.6 cm; unchanged when measured with same technique and at same level to 2022 study. There is an increased degree of thrombosis with intraluminal narrowing when compared to prior. Patent bilateral internal iliac arteries with a redemonstrated 1.9 cm right common iliac artery aneurysm, stable when compared to prior and measured with similar technique.  RETROPERITONEUM/LYMPH NODES: No lymphadenopathy.  ABDOMINAL WALL: Small fat-containing umbilical hernia.  BONES: Stable degenerative changes.  IMPRESSION:  Redemonstrated abdominal aortic aneurysm with stable diameter and increased degree of luminal thrombosis when compared to prior.    Stable right common iliac artery aneurysm. LABS:                         11.9   7.51  )-----------( 267      ( 02 Mar 2023 16:21 )             37.9         138  |  105  |  18  ----------------------------<  113<H>  3.8   |  20<L>  |  1.10    Ca    9.6      02 Mar 2023 16:21    TPro  6.8  /  Alb  3.5  /  TBili  0.2  /  DBili  x   /  AST  31  /  ALT  27  /  AlkPhos  59        Urinalysis Basic - ( 02 Mar 2023 19:28 )    Color: Yellow / Appearance: Clear / S.024 / pH: x  Gluc: x / Ketone: Negative  / Bili: Negative / Urobili: Negative   Blood: x / Protein: Trace / Nitrite: Negative   Leuk Esterase: Negative / RBC: x / WBC x   Sq Epi: x / Non Sq Epi: x / Bacteria: x          Serum Pro-Brain Natriuretic Peptide: 377 pg/mL ( @ 16:21)      Records reviewed from prior hospitalization.  Labs reviewed remarkable for   EKG personally reviewed- NSR, TWI in I, AVL, V2, flat in V1, V3, V4, V5. EKG from  showed TWI in I, AVL V2-V6.   CXR personally reviewed- cardiomegaly, no acute infiltrates or congestion     CTAP:     FINDINGS:  LOWER CHEST: Emphysematous change. Small volume round atelectasis in the left lung base. Cardiomegaly with partially imaged cardiac device leads.    LIVER: Within normal limits.  BILE DUCTS: Normal caliber.  GALLBLADDER: Contracted  SPLEEN: Within normal limits.  PANCREAS: Within normal limits.  ADRENALS: Within normal limits.  KIDNEYS/URETERS: Symmetric enhancement bilaterally, no hydronephrosis. Bilateral cortical scarring.    BLADDER: Within normal limits.  REPRODUCTIVE ORGANS: Calcified uterine fibroid.    BOWEL: No bowel obstruction. Appendix is normal. Small volume positive oral contrast versus high attenuation ingested material seen throughout various bowel loops.  PERITONEUM: No ascites.  VESSELS: Again seen is a partially thrombosed abdominal aortic aneurysm measuring up to 4.1 x 4.0 x 4.6 cm; unchanged when measured with same technique and at same level to 2022 study. There is an increased degree of thrombosis with intraluminal narrowing when compared to prior. Patent bilateral internal iliac arteries with a redemonstrated 1.9 cm right common iliac artery aneurysm, stable when compared to prior and measured with similar technique.  RETROPERITONEUM/LYMPH NODES: No lymphadenopathy.  ABDOMINAL WALL: Small fat-containing umbilical hernia.  BONES: Stable degenerative changes.  IMPRESSION:  Redemonstrated abdominal aortic aneurysm with stable diameter and increased degree of luminal thrombosis when compared to prior.    Stable right common iliac artery aneurysm.    TTE:   Conclusions:  1. Tethered mitral valve leaflets with normal opening.  Minimal mitral regurgitation.  2. Calcified trileaflet aortic valve with normal opening.  No aortic valve regurgitation seen.  3. Endocardial visualization enhanced with intravenous  injection of Ultrasonic Enhancing Agent (Definity). Left  ventricle suboptimally visualized despite intravenous  injeciton of ultrasonic enhancing agent; mild segmental  left ventricular systolic dysfunction. The inferolateral  wall is akinetic. The anterolateral wall is hypokinetic. No  left ventricular thrombus.  4. The right ventricle is not well visualized; grossly  normal right ventricular size and systolic function. A  device wire is noted in the right heart.  5. Trace pericardial effusion.  *** Compared with echocardiogram of 2021, overall EF  has improved.

## 2023-03-03 NOTE — PATIENT PROFILE ADULT - FALL HARM RISK - HARM RISK INTERVENTIONS

## 2023-03-03 NOTE — H&P ADULT - NSHPPHYSICALEXAM_GEN_ALL_CORE
VITALS:   T(C): 36.4 (03-02-23 @ 22:30), Max: 36.4 (03-02-23 @ 22:30)  HR: 88 (03-02-23 @ 22:30) (85 - 90)  BP: 97/66 (03-02-23 @ 22:30) (88/59 - 102/57)  RR: 19 (03-02-23 @ 22:30) (18 - 20)  SpO2: 95% (03-02-23 @ 22:30) (95% - 100%)    GENERAL: NAD, lying in bed comfortably  HEAD:  Atraumatic, Normocephalic  EYES: EOMI, PERRLA, conjunctiva and sclera clear  ENT: Moist mucous membranes  NECK: Supple, No JVD  CHEST/LUNG: Clear to auscultation bilaterally; No rales, rhonchi, wheezing, or rubs. Unlabored respirations  HEART: Regular rate and rhythm; No murmurs, rubs, or gallops  ABDOMEN: BSx4; Soft, nontender, nondistended  EXTREMITIES:  2+ Peripheral Pulses, brisk capillary refill. No clubbing, cyanosis, or edema  NERVOUS SYSTEM:  A&Ox3, no focal deficits   SKIN: No rashes or lesions VITALS:   T(C): 36.4 (03-02-23 @ 22:30), Max: 36.4 (03-02-23 @ 22:30)  HR: 88 (03-02-23 @ 22:30) (85 - 90)  BP: 97/66 (03-02-23 @ 22:30) (88/59 - 102/57)  RR: 19 (03-02-23 @ 22:30) (18 - 20)  SpO2: 95% (03-02-23 @ 22:30) (95% - 100%)    GENERAL: NAD, lying in bed comfortably  HEAD:  Atraumatic, Normocephalic  EYES: EOMI, PERRLA, conjunctiva and sclera clear  ENT: Moist mucous membranes  NECK: Supple, No JVD  CHEST/LUNG: Chest wall tenderness over left side+, Clear to auscultation bilaterally; No rales, rhonchi, wheezing, or rubs. Unlabored respirations  HEART: Regular rate and rhythm; No murmurs, rubs, or gallops  ABDOMEN: BSx4; Soft, LLQ tenderness+ nondistended  EXTREMITIES:  2+ Peripheral Pulses, brisk capillary refill. No clubbing, cyanosis, or edema  NERVOUS SYSTEM:  A&Ox3, no focal deficits   SKIN: No rashes or lesions

## 2023-03-03 NOTE — H&P ADULT - PROBLEM SELECTOR PLAN 3
-Continue entresto 24/26 BID w hold parameters  -Continue toprol 25 XL   -Continue farxiga 5 mg daily   -Continue spironolactone 25 mg daily   -F/u TTE  -HF consult for optimizaiton  -Strict Is/Os  -Daily weights

## 2023-03-03 NOTE — H&P ADULT - PROBLEM SELECTOR PLAN 2
-Check orthostatics  -Will hold amlodipine for now  -Monitor vitals [id number]  -Fall precaution  -HF consult in AM for medication optimization given labile BP

## 2023-03-03 NOTE — H&P ADULT - NSHPSOCIALHISTORY_GEN_ALL_CORE
Lives at home alone  Daughter is HHA for 5 hours daily   Ambulates w walker  Denies smoking, ETOH, illicit drug use

## 2023-03-03 NOTE — H&P ADULT - NSHPREVIEWOFSYSTEMS_GEN_ALL_CORE
Review of Systems:   CONSTITUTIONAL: No fever, weight loss  EYES: No eye pain, visual disturbances, or discharge  ENMT:  No difficulty hearing, tinnitus, vertigo; No sinus or throat pain  RESPIRATORY: No SOB. No cough, wheezing, chills or hemoptysis  CARDIOVASCULAR: No chest pain, palpitations, dizziness, or leg swelling  GASTROINTESTINAL: No abdominal or epigastric pain. No nausea, vomiting, or hematemesis; No diarrhea or constipation. No melena or hematochezia.  GENITOURINARY: No dysuria, frequency, hematuria, or incontinence  NEUROLOGICAL: No headaches, memory loss, loss of strength, numbness, or tremors  SKIN: No itching, burning, rashes, or lesions   LYMPH NODES: No enlarged glands  ENDOCRINE: No heat or cold intolerance; No hair loss  MUSCULOSKELETAL: No joint pain or swelling; No muscle, back pain  PSYCHIATRIC: No depression, anxiety, mood swings, or difficulty sleeping  HEME/LYMPH: No easy bruising, or bleeding gums Review of Systems:   CONSTITUTIONAL: No fever, weight loss  EYES: No eye pain, visual disturbances, or discharge  ENMT:  No difficulty hearing, tinnitus, vertigo; No sinus or throat pain  RESPIRATORY: No SOB. No cough, wheezing, chills or hemoptysis  CARDIOVASCULAR: +Chest pain, dizziness+ no palpitations or leg swelling  GASTROINTESTINAL: +Abdominal pain, constipation+ no epigastric pain. No nausea, vomiting, or hematemesis; No diarrhea No melena or hematochezia.  GENITOURINARY: No dysuria, frequency, hematuria, or incontinence  NEUROLOGICAL: No headaches, memory loss, loss of strength, numbness, or tremors  SKIN: No itching, burning, rashes, or lesions   LYMPH NODES: No enlarged glands  ENDOCRINE: No heat or cold intolerance; No hair loss  MUSCULOSKELETAL: No joint pain or swelling; No muscle, back pain  PSYCHIATRIC: No depression, anxiety, mood swings, or difficulty sleeping  HEME/LYMPH: No easy bruising, or bleeding gums

## 2023-03-04 LAB
ANION GAP SERPL CALC-SCNC: 14 MMOL/L — SIGNIFICANT CHANGE UP (ref 5–17)
BUN SERPL-MCNC: 17 MG/DL — SIGNIFICANT CHANGE UP (ref 7–23)
CALCIUM SERPL-MCNC: 9.9 MG/DL — SIGNIFICANT CHANGE UP (ref 8.4–10.5)
CHLORIDE SERPL-SCNC: 105 MMOL/L — SIGNIFICANT CHANGE UP (ref 96–108)
CO2 SERPL-SCNC: 20 MMOL/L — LOW (ref 22–31)
CREAT SERPL-MCNC: 1.16 MG/DL — SIGNIFICANT CHANGE UP (ref 0.5–1.3)
EGFR: 49 ML/MIN/1.73M2 — LOW
GLUCOSE SERPL-MCNC: 115 MG/DL — HIGH (ref 70–99)
HCT VFR BLD CALC: 41.9 % — SIGNIFICANT CHANGE UP (ref 34.5–45)
HGB BLD-MCNC: 13.1 G/DL — SIGNIFICANT CHANGE UP (ref 11.5–15.5)
MAGNESIUM SERPL-MCNC: 1.9 MG/DL — SIGNIFICANT CHANGE UP (ref 1.6–2.6)
MCHC RBC-ENTMCNC: 31.3 GM/DL — LOW (ref 32–36)
MCHC RBC-ENTMCNC: 31.8 PG — SIGNIFICANT CHANGE UP (ref 27–34)
MCV RBC AUTO: 101.7 FL — HIGH (ref 80–100)
NRBC # BLD: 0 /100 WBCS — SIGNIFICANT CHANGE UP (ref 0–0)
PHOSPHATE SERPL-MCNC: 1.9 MG/DL — LOW (ref 2.5–4.5)
PLATELET # BLD AUTO: 289 K/UL — SIGNIFICANT CHANGE UP (ref 150–400)
POTASSIUM SERPL-MCNC: 4.1 MMOL/L — SIGNIFICANT CHANGE UP (ref 3.5–5.3)
POTASSIUM SERPL-SCNC: 4.1 MMOL/L — SIGNIFICANT CHANGE UP (ref 3.5–5.3)
RBC # BLD: 4.12 M/UL — SIGNIFICANT CHANGE UP (ref 3.8–5.2)
RBC # FLD: 14.6 % — HIGH (ref 10.3–14.5)
SODIUM SERPL-SCNC: 139 MMOL/L — SIGNIFICANT CHANGE UP (ref 135–145)
WBC # BLD: 9.88 K/UL — SIGNIFICANT CHANGE UP (ref 3.8–10.5)
WBC # FLD AUTO: 9.88 K/UL — SIGNIFICANT CHANGE UP (ref 3.8–10.5)

## 2023-03-04 PROCEDURE — 99232 SBSQ HOSP IP/OBS MODERATE 35: CPT

## 2023-03-04 RX ORDER — POTASSIUM CHLORIDE 20 MEQ
40 PACKET (EA) ORAL ONCE
Refills: 0 | Status: COMPLETED | OUTPATIENT
Start: 2023-03-04 | End: 2023-03-04

## 2023-03-04 RX ORDER — SPIRONOLACTONE 25 MG/1
12.5 TABLET, FILM COATED ORAL DAILY
Refills: 0 | Status: DISCONTINUED | OUTPATIENT
Start: 2023-03-05 | End: 2023-03-08

## 2023-03-04 RX ORDER — MAGNESIUM SULFATE 500 MG/ML
1 VIAL (ML) INJECTION ONCE
Refills: 0 | Status: COMPLETED | OUTPATIENT
Start: 2023-03-04 | End: 2023-03-04

## 2023-03-04 RX ADMIN — FINASTERIDE 5 MILLIGRAM(S): 5 TABLET, FILM COATED ORAL at 14:18

## 2023-03-04 RX ADMIN — Medication 650 MILLIGRAM(S): at 19:00

## 2023-03-04 RX ADMIN — BUDESONIDE AND FORMOTEROL FUMARATE DIHYDRATE 2 PUFF(S): 160; 4.5 AEROSOL RESPIRATORY (INHALATION) at 17:31

## 2023-03-04 RX ADMIN — Medication 500000 UNIT(S): at 17:30

## 2023-03-04 RX ADMIN — POLYETHYLENE GLYCOL 3350 17 GRAM(S): 17 POWDER, FOR SOLUTION ORAL at 21:44

## 2023-03-04 RX ADMIN — ENOXAPARIN SODIUM 40 MILLIGRAM(S): 100 INJECTION SUBCUTANEOUS at 06:10

## 2023-03-04 RX ADMIN — Medication 650 MILLIGRAM(S): at 17:37

## 2023-03-04 RX ADMIN — MONTELUKAST 10 MILLIGRAM(S): 4 TABLET, CHEWABLE ORAL at 13:24

## 2023-03-04 RX ADMIN — ZOLPIDEM TARTRATE 5 MILLIGRAM(S): 10 TABLET ORAL at 21:46

## 2023-03-04 RX ADMIN — Medication 1 GRAM(S): at 17:30

## 2023-03-04 RX ADMIN — Medication 500 MILLIGRAM(S): at 06:11

## 2023-03-04 RX ADMIN — FAMOTIDINE 20 MILLIGRAM(S): 10 INJECTION INTRAVENOUS at 13:35

## 2023-03-04 RX ADMIN — Medication 40 MILLIEQUIVALENT(S): at 14:18

## 2023-03-04 RX ADMIN — Medication 63.75 MILLIMOLE(S): at 21:39

## 2023-03-04 RX ADMIN — Medication 500000 UNIT(S): at 13:23

## 2023-03-04 RX ADMIN — CLOPIDOGREL BISULFATE 75 MILLIGRAM(S): 75 TABLET, FILM COATED ORAL at 13:24

## 2023-03-04 RX ADMIN — Medication 500 MILLIGRAM(S): at 21:39

## 2023-03-04 RX ADMIN — Medication 100 GRAM(S): at 19:53

## 2023-03-04 RX ADMIN — TIOTROPIUM BROMIDE 2 PUFF(S): 18 CAPSULE ORAL; RESPIRATORY (INHALATION) at 13:24

## 2023-03-04 RX ADMIN — Medication 1 GRAM(S): at 13:23

## 2023-03-04 RX ADMIN — Medication 500 MILLIGRAM(S): at 13:24

## 2023-03-04 RX ADMIN — Medication 500 MILLIGRAM(S): at 17:31

## 2023-03-04 RX ADMIN — ATORVASTATIN CALCIUM 80 MILLIGRAM(S): 80 TABLET, FILM COATED ORAL at 21:39

## 2023-03-04 RX ADMIN — BUDESONIDE AND FORMOTEROL FUMARATE DIHYDRATE 2 PUFF(S): 160; 4.5 AEROSOL RESPIRATORY (INHALATION) at 06:42

## 2023-03-04 RX ADMIN — SPIRONOLACTONE 25 MILLIGRAM(S): 25 TABLET, FILM COATED ORAL at 06:13

## 2023-03-04 RX ADMIN — Medication 500000 UNIT(S): at 06:10

## 2023-03-04 RX ADMIN — Medication 1 GRAM(S): at 06:13

## 2023-03-04 RX ADMIN — DAPAGLIFLOZIN 5 MILLIGRAM(S): 10 TABLET, FILM COATED ORAL at 06:41

## 2023-03-04 NOTE — PROGRESS NOTE ADULT - SUBJECTIVE AND OBJECTIVE BOX
PROGRESS NOTE:   Authoted by Dr. Jason James MD, Available on MS Teams    Patient is a 72y old  Female who presents with a chief complaint of CP on exertion (03 Mar 2023 15:04)      SUBJECTIVE / OVERNIGHT EVENTS: No acute events overnight. Patient's shortness of breath is improving.     ADDITIONAL REVIEW OF SYSTEMS:    MEDICATIONS  (STANDING):  atorvastatin 80 milliGRAM(s) Oral at bedtime  budesonide 160 MICROgram(s)/formoterol 4.5 MICROgram(s) Inhaler 2 Puff(s) Inhalation two times a day  ciprofloxacin     Tablet 500 milliGRAM(s) Oral every 12 hours  clopidogrel Tablet 75 milliGRAM(s) Oral daily  dapagliflozin 5 milliGRAM(s) Oral every 24 hours  enoxaparin Injectable 40 milliGRAM(s) SubCutaneous every 24 hours  famotidine    Tablet 20 milliGRAM(s) Oral daily  finasteride 5 milliGRAM(s) Oral daily  metoprolol succinate ER 25 milliGRAM(s) Oral daily  metroNIDAZOLE    Tablet 500 milliGRAM(s) Oral three times a day  montelukast 10 milliGRAM(s) Oral daily  nystatin    Suspension 091379 Unit(s) Oral four times a day  sacubitril 24 mG/valsartan 26 mG 1 Tablet(s) Oral two times a day  sucralfate suspension 1 Gram(s) Oral <User Schedule>  tiotropium 2.5 MICROgram(s) Inhaler 2 Puff(s) Inhalation daily    MEDICATIONS  (PRN):  acetaminophen     Tablet .. 650 milliGRAM(s) Oral every 6 hours PRN Temp greater or equal to 38C (100.4F), Mild Pain (1 - 3)  albuterol    90 MICROgram(s) HFA Inhaler 2 Puff(s) Inhalation every 6 hours PRN Shortness of Breath and/or Wheezing  ALPRAZolam 0.5 milliGRAM(s) Oral at bedtime PRN sleep/anxiety  bisacodyl Suppository 10 milliGRAM(s) Rectal daily PRN Constipation  oxyCODONE    IR 20 milliGRAM(s) Oral three times a day PRN Severe Pain (7 - 10)  polyethylene glycol 3350 17 Gram(s) Oral daily PRN Constipation  zolpidem 5 milliGRAM(s) Oral at bedtime PRN Insomnia      CAPILLARY BLOOD GLUCOSE        I&O's Summary    03 Mar 2023 07:01  -  04 Mar 2023 07:00  --------------------------------------------------------  IN: 200 mL / OUT: 0 mL / NET: 200 mL        PHYSICAL EXAM:  Vital Signs Last 24 Hrs  T(C): 36.7 (04 Mar 2023 12:45), Max: 36.7 (04 Mar 2023 05:21)  T(F): 98 (04 Mar 2023 12:45), Max: 98 (04 Mar 2023 05:21)  HR: 99 (04 Mar 2023 12:45) (62 - 103)  BP: 90/60 (04 Mar 2023 12:45) (90/58 - 111/66)  BP(mean): --  RR: 18 (04 Mar 2023 12:45) (16 - 18)  SpO2: 95% (04 Mar 2023 12:45) (95% - 99%)    Parameters below as of 04 Mar 2023 12:45  Patient On (Oxygen Delivery Method): room air        CONSTITUTIONAL: NAD, well-developed  RESPIRATORY: Normal respiratory effort; lungs are clear to auscultation bilaterally  CARDIOVASCULAR: Regular rate and rhythm, normal S1 and S2, no murmur/rub/gallop; No lower extremity edema  ABDOMEN: Nontender to palpation, normoactive bowel sounds, no rebound/guarding  MUSCLOSKELETAL: no clubbing or cyanosis of digits; no joint swelling or tenderness to palpation  PSYCH: A+O to person, place, and time; affect appropriate    LABS:                        13.1   9.88  )-----------( 289      ( 04 Mar 2023 13:05 )             41.9     03-04    139  |  105  |  17  ----------------------------<  115<H>  4.1   |  20<L>  |  1.16    Ca    9.9      04 Mar 2023 13:06  Phos  1.9     03-04  Mg     1.9     03-04    TPro  6.3  /  Alb  3.3  /  TBili  0.2  /  DBili  x   /  AST  31  /  ALT  26  /  AlkPhos  55  03-03          Urinalysis Basic - ( 02 Mar 2023 19:28 )    Color: Yellow / Appearance: Clear / S.024 / pH: x  Gluc: x / Ketone: Negative  / Bili: Negative / Urobili: Negative   Blood: x / Protein: Trace / Nitrite: Negative   Leuk Esterase: Negative / RBC: x / WBC x   Sq Epi: x / Non Sq Epi: x / Bacteria: x

## 2023-03-04 NOTE — PROGRESS NOTE ADULT - PROBLEM SELECTOR PLAN 4
From CT abdominal aortic aneurysm measuring up to 4.1 x 4.0 x 4.6 cm; unchanged there is an increased degree of thrombosis with intraluminal narrowing.  -Stable diameter  -vascular consult for increased degree of thrombosis

## 2023-03-04 NOTE — PROGRESS NOTE ADULT - PROBLEM SELECTOR PLAN 3
-Continue entresto 24/26 BID w hold parameters  -Continue toprol 25 XL   -Continue farxiga 5 mg daily   -Continue spironolactone 25 mg daily   -F/u TTE  -HF recs appreciated  -Strict Is/Os  -Daily weights

## 2023-03-04 NOTE — PROGRESS NOTE ADULT - ASSESSMENT
72 y F PMH of AAA, CVA, CAD s/p stent, ventricular arrhythmia s/p ICD/PPM, CHF, COPD, IBD presents to the ED with complaints of dizziness and chest pain.  No new EKG changes from prior, trop x 2 negative, appears to be more MSK in nature   Endorses chronic FERRERA, plan was to get ECHO at [facility] however was not pursued as per patient, will obtain inpatient  Dizziness appears to be in setting of polypharmacy, it is possible patient is also orthostatic (low BP on admission    Admitted for further eval

## 2023-03-04 NOTE — CONSULT NOTE ADULT - SUBJECTIVE AND OBJECTIVE BOX
CARDIOLOGY FELLOW CONSULT NOTE      HPI:  72F w/ hx of ICM (LVEF 40-45% from 20% prior), s/p dual chamber ICD, CAD (s/p prior PCI 2009 and 2011), CVA (with residual R sided weakness), presenting with multiple complaints including some chest discomfort, currently admitted to medicine for work-up. General Cardiology consulted for assistance in care. Chest pain deemed to be non-cardiac in origin. General Cardiology has signed off. Pt had a CT abdomen/pelvis with redemonstrated abdominal aortic aneurysm with stable diameter and  increased degree of luminal thrombosis when compared to prior. Stable right common iliac artery aneurysm. Pt denies any chest pain, dyspnea, palpitations, numbness, weakness at this time.         PMHx:   Anxiety    Hypercholesteremia    Osteoporosis    Pacemaker    CAD (coronary artery disease)    COPD (chronic obstructive pulmonary disease)    Congestive heart failure    Ventricular arrhythmia    CVA (cerebral vascular accident) X2, right sided weakness        PSHx:   Cataract Surgery    Hand Surgery        Allergies:  lactose (Rash)  No Known Allergies      Current Medications:   acetaminophen     Tablet .. 650 milliGRAM(s) Oral every 6 hours PRN  albuterol    90 MICROgram(s) HFA Inhaler 2 Puff(s) Inhalation every 6 hours PRN  ALPRAZolam 0.5 milliGRAM(s) Oral at bedtime PRN  atorvastatin 80 milliGRAM(s) Oral at bedtime  bisacodyl Suppository 10 milliGRAM(s) Rectal daily PRN  budesonide 160 MICROgram(s)/formoterol 4.5 MICROgram(s) Inhaler 2 Puff(s) Inhalation two times a day  ciprofloxacin     Tablet 500 milliGRAM(s) Oral every 12 hours  clopidogrel Tablet 75 milliGRAM(s) Oral daily  dapagliflozin 5 milliGRAM(s) Oral every 24 hours  enoxaparin Injectable 40 milliGRAM(s) SubCutaneous every 24 hours  famotidine    Tablet 20 milliGRAM(s) Oral daily  finasteride 5 milliGRAM(s) Oral daily  magnesium sulfate  IVPB 1 Gram(s) IV Intermittent once  metoprolol succinate ER 25 milliGRAM(s) Oral daily  metroNIDAZOLE    Tablet 500 milliGRAM(s) Oral three times a day  montelukast 10 milliGRAM(s) Oral daily  nystatin    Suspension 242656 Unit(s) Oral four times a day  oxyCODONE    IR 20 milliGRAM(s) Oral three times a day PRN  polyethylene glycol 3350 17 Gram(s) Oral daily PRN  sacubitril 24 mG/valsartan 26 mG 1 Tablet(s) Oral two times a day  sodium phosphate 15 milliMole(s)/250 mL IVPB 15 milliMole(s) IV Intermittent once  sucralfate suspension 1 Gram(s) Oral <User Schedule>  tiotropium 2.5 MICROgram(s) Inhaler 2 Puff(s) Inhalation daily  zolpidem 5 milliGRAM(s) Oral at bedtime PRN      FAMILY HISTORY:  No pertinent family history in first degree relatives    REVIEW OF SYSTEMS:  CONSTITUTIONAL: No weakness, fevers or chills  EYES/ENT: No visual changes;  No dysphagia  NECK: No pain or stiffness  RESPIRATORY: No cough, wheezing, hemoptysis; No shortness of breath  CARDIOVASCULAR: No chest pain or palpitations; No lower extremity edema  GASTROINTESTINAL: No abdominal or epigastric pain. No nausea, vomiting, or hematemesis; No diarrhea or constipation. No melena or hematochezia.  BACK: No back pain  GENITOURINARY: No dysuria, frequency or hematuria  NEUROLOGICAL: No numbness or weakness  SKIN: No itching, burning, rashes, or lesions   All other review of systems is negative unless indicated above.    Physical Exam:  T(F): 98 (03-04), Max: 98 (03-04)  HR: 73 (03-04) (63 - 103)  BP: 90/60 (03-04) (90/58 - 102/63)  RR: 18 (03-04)  SpO2: 96% (03-04)        Appearance: No acute distress; well appearing  Eyes: pink conjunctiva  HEENT: AT/NC   Cardiovascular: RRR, S1, S2, no murmurs, rubs, or gallops; no edema; no JVD  Respiratory: Clear to auscultation bilaterally  Gastrointestinal: soft, non-tender, non-distended   Musculoskeletal: No clubbing; no joint deformity   Neurologic: Normal speech, no facial asymmetry  Psychiatry: AAOx3, mood & affect appropriate  Skin: No rashes, ecchymoses, or cyanosis of exposed skin                             13.1   9.88  )-----------( 289      ( 04 Mar 2023 13:05 )             41.9     03-04    139  |  105  |  17  ----------------------------<  115<H>  4.1   |  20<L>  |  1.16    Ca    9.9      04 Mar 2023 13:06  Phos  1.9     03-04  Mg     1.9     03-04    TPro  6.3  /  Alb  3.3  /  TBili  0.2  /  DBili  x   /  AST  31  /  ALT  26  /  AlkPhos  55  03-03        CARDIAC MARKERS ( 02 Mar 2023 18:30 )  8 ng/L / x     / x     / x     / x     / x      CARDIAC MARKERS ( 02 Mar 2023 16:21 )  10 ng/L / x     / x     / x     / x     / x            Serum Pro-Brain Natriuretic Peptide: 377 pg/mL (03-02 @ 16:21)

## 2023-03-04 NOTE — CONSULT NOTE ADULT - ASSESSMENT
72F w/ hx of ICM (LVEF 40-45% from 20% prior), s/p dual chamber ICD, CAD (s/p prior PCI 2009 and 2011), CVA (with residual R sided weakness), presenting with multiple complaints including some chest discomfort, currently admitted to medicine for work-up. CT abdomen/pelvis with redemonstrated abdominal aortic aneurysm with stable diameter and  increased degree of luminal thrombosis.    Impression and recommendations:   - Infrarenal AAA, known to be partially thrombosed  - CT Abdomen/Pelvis 3/2: Partially thrombosed abdominal aortic aneurysm measuring up to 4.1 x 4.0 x 4.6 cm; unchanged when measured with same technique and at same level to 1/13/2022 study. There is an increased degree of thrombosis with intraluminal narrowing when compared to prior. Patent bilateral internal iliac arteries with a redemonstrated 1.9 cm right common iliac artery aneurysm, stable when compared to prior and measured with similar technique.  - Continue Plavix 75mg daily   - Further recommendations to follow     Koki Zendejas MD  Cardiology Fellow     Recommendations are preliminary until cosigned by attending

## 2023-03-04 NOTE — CHART NOTE - NSCHARTNOTEFT_GEN_A_CORE
72F w/ hx of ICM (LVEF 40-45% from 20% prior), s/p dual chamber ICD, CAD (s/p prior PCI 2009 and 2011), CVA (with residual R sided weakness), presenting with multiple complaints including some chest discomfort, currently admitted to medicine for work-up. General Cardiology consulted for assistance in care. Chest pain deemed to be non-cardiac in origin. Cardiology has signed off      Received a call to interrogate the patient's ICD which was reportedly "firing." Pt evaluated at bedside and history obtained; pt states her ICD has not been giving her any issues and has not shocked her. In fact, it was her telemetry monitor in her gown pocket that was "sizzling" and she felt like something was burning.   - Telemetry reviews, patient is sinus rhythm with HRs 100-60, no arrhythmias or ectopy   - ICD briefly evaluated for events and no VT/VF or AT/AF events recorded with no shocks administered   - Total  < 0.1%  - 5.0 years of battery life     Koki Zendejas MD  Cardiology Fellow     Recommendations are preliminary until cosigned by attending 72F w/ hx of ICM (LVEF 40-45% from 20% prior), s/p dual chamber ICD, CAD (s/p prior PCI 2009 and 2011), CVA (with residual R sided weakness), presenting with multiple complaints including some chest discomfort, currently admitted to medicine for work-up. General Cardiology consulted for assistance in care. Chest pain deemed to be non-cardiac in origin. Cardiology has signed off      Received a call to interrogate the patient's ICD which was reportedly "firing." Pt evaluated at bedside and history obtained; pt states her ICD has not been giving her any issues and has not shocked her. In fact, it was her telemetry monitor in her gown pocket that was "sizzling" and she felt like something was burning.   - Telemetry reviews, patient is sinus rhythm with HRs 100-60, no arrhythmias or ectopy, not currently paced   - ICD briefly evaluated for events and no VT/VF or AT/AF events recorded with no shocks administered   - Total  < 0.1%  - 5.0 years of battery life     Koki Zendejas MD  Cardiology Fellow     Recommendations are preliminary until cosigned by attending

## 2023-03-04 NOTE — END OF VISIT
[>50% of Time Spent on Coordination of Care for  ___] : Greater than 50% of the encounter time was spent on coordination of care for [unfilled] [Time Spent: ___ minutes] : I have spent [unfilled] minutes of face to face time with the patient 04-Mar-2023 20:12

## 2023-03-04 NOTE — PROGRESS NOTE ADULT - PROBLEM SELECTOR PLAN 1
Appears to be more MSK in nature   -Continue tylenol and home oxycodone   -Telemetry monitoring   -If chest pain obtain STAT EKG and cardiac enzymes  -F/u TTE

## 2023-03-04 NOTE — CONSULT NOTE ADULT - ATTENDING COMMENTS
AAA is known  Continue antiplatlet and statin therapy   Will need outpatient followup with repeat US abdominal aorta in 6 months  She can see me in the office  Please call if needed    Kirsty

## 2023-03-05 LAB
ANION GAP SERPL CALC-SCNC: 10 MMOL/L — SIGNIFICANT CHANGE UP (ref 5–17)
BUN SERPL-MCNC: 15 MG/DL — SIGNIFICANT CHANGE UP (ref 7–23)
CALCIUM SERPL-MCNC: 9 MG/DL — SIGNIFICANT CHANGE UP (ref 8.4–10.5)
CHLORIDE SERPL-SCNC: 105 MMOL/L — SIGNIFICANT CHANGE UP (ref 96–108)
CO2 SERPL-SCNC: 21 MMOL/L — LOW (ref 22–31)
CREAT SERPL-MCNC: 1.16 MG/DL — SIGNIFICANT CHANGE UP (ref 0.5–1.3)
EGFR: 49 ML/MIN/1.73M2 — LOW
GLUCOSE SERPL-MCNC: 98 MG/DL — SIGNIFICANT CHANGE UP (ref 70–99)
MAGNESIUM SERPL-MCNC: 2.1 MG/DL — SIGNIFICANT CHANGE UP (ref 1.6–2.6)
PHOSPHATE SERPL-MCNC: 3.8 MG/DL — SIGNIFICANT CHANGE UP (ref 2.5–4.5)
POTASSIUM SERPL-MCNC: 4.1 MMOL/L — SIGNIFICANT CHANGE UP (ref 3.5–5.3)
POTASSIUM SERPL-SCNC: 4.1 MMOL/L — SIGNIFICANT CHANGE UP (ref 3.5–5.3)
SODIUM SERPL-SCNC: 136 MMOL/L — SIGNIFICANT CHANGE UP (ref 135–145)

## 2023-03-05 PROCEDURE — 99232 SBSQ HOSP IP/OBS MODERATE 35: CPT

## 2023-03-05 PROCEDURE — 99222 1ST HOSP IP/OBS MODERATE 55: CPT

## 2023-03-05 RX ADMIN — SACUBITRIL AND VALSARTAN 1 TABLET(S): 24; 26 TABLET, FILM COATED ORAL at 05:52

## 2023-03-05 RX ADMIN — ATORVASTATIN CALCIUM 80 MILLIGRAM(S): 80 TABLET, FILM COATED ORAL at 21:38

## 2023-03-05 RX ADMIN — Medication 1 GRAM(S): at 13:11

## 2023-03-05 RX ADMIN — ZOLPIDEM TARTRATE 5 MILLIGRAM(S): 10 TABLET ORAL at 21:38

## 2023-03-05 RX ADMIN — ENOXAPARIN SODIUM 40 MILLIGRAM(S): 100 INJECTION SUBCUTANEOUS at 05:52

## 2023-03-05 RX ADMIN — Medication 500 MILLIGRAM(S): at 13:03

## 2023-03-05 RX ADMIN — MONTELUKAST 10 MILLIGRAM(S): 4 TABLET, CHEWABLE ORAL at 13:04

## 2023-03-05 RX ADMIN — Medication 500000 UNIT(S): at 05:51

## 2023-03-05 RX ADMIN — FAMOTIDINE 20 MILLIGRAM(S): 10 INJECTION INTRAVENOUS at 13:04

## 2023-03-05 RX ADMIN — BUDESONIDE AND FORMOTEROL FUMARATE DIHYDRATE 2 PUFF(S): 160; 4.5 AEROSOL RESPIRATORY (INHALATION) at 19:21

## 2023-03-05 RX ADMIN — Medication 500000 UNIT(S): at 00:58

## 2023-03-05 RX ADMIN — CLOPIDOGREL BISULFATE 75 MILLIGRAM(S): 75 TABLET, FILM COATED ORAL at 13:04

## 2023-03-05 RX ADMIN — Medication 500 MILLIGRAM(S): at 21:40

## 2023-03-05 RX ADMIN — BUDESONIDE AND FORMOTEROL FUMARATE DIHYDRATE 2 PUFF(S): 160; 4.5 AEROSOL RESPIRATORY (INHALATION) at 05:56

## 2023-03-05 RX ADMIN — Medication 1 GRAM(S): at 05:51

## 2023-03-05 RX ADMIN — Medication 1 GRAM(S): at 17:52

## 2023-03-05 RX ADMIN — Medication 25 MILLIGRAM(S): at 05:52

## 2023-03-05 RX ADMIN — TIOTROPIUM BROMIDE 2 PUFF(S): 18 CAPSULE ORAL; RESPIRATORY (INHALATION) at 13:04

## 2023-03-05 RX ADMIN — SPIRONOLACTONE 12.5 MILLIGRAM(S): 25 TABLET, FILM COATED ORAL at 05:52

## 2023-03-05 RX ADMIN — DAPAGLIFLOZIN 5 MILLIGRAM(S): 10 TABLET, FILM COATED ORAL at 05:52

## 2023-03-05 RX ADMIN — Medication 500000 UNIT(S): at 17:52

## 2023-03-05 RX ADMIN — Medication 500000 UNIT(S): at 13:04

## 2023-03-05 RX ADMIN — Medication 500 MILLIGRAM(S): at 06:03

## 2023-03-05 RX ADMIN — Medication 500 MILLIGRAM(S): at 17:52

## 2023-03-05 RX ADMIN — Medication 500 MILLIGRAM(S): at 05:52

## 2023-03-05 NOTE — PROGRESS NOTE ADULT - PROBLEM SELECTOR PLAN 4
From CT abdominal aortic aneurysm measuring up to 4.1 x 4.0 x 4.6 cm; unchanged there is an increased degree of thrombosis with intraluminal narrowing.  -Stable diameter  -vascular recs appreciated, no acute surgical intervention

## 2023-03-05 NOTE — PROGRESS NOTE ADULT - SUBJECTIVE AND OBJECTIVE BOX
PROGRESS NOTE:   Authoted by Dr. Jason James MD, Available on MS Teams    Patient is a 72y old  Female who presents with a chief complaint of CP on exertion (04 Mar 2023 18:30)      SUBJECTIVE / OVERNIGHT EVENTS: Patient continues to have intermittent shortness of breath but otherwise feeling better    ADDITIONAL REVIEW OF SYSTEMS:    MEDICATIONS  (STANDING):  atorvastatin 80 milliGRAM(s) Oral at bedtime  budesonide 160 MICROgram(s)/formoterol 4.5 MICROgram(s) Inhaler 2 Puff(s) Inhalation two times a day  ciprofloxacin     Tablet 500 milliGRAM(s) Oral every 12 hours  clopidogrel Tablet 75 milliGRAM(s) Oral daily  dapagliflozin 5 milliGRAM(s) Oral every 24 hours  enoxaparin Injectable 40 milliGRAM(s) SubCutaneous every 24 hours  famotidine    Tablet 20 milliGRAM(s) Oral daily  finasteride 5 milliGRAM(s) Oral daily  metoprolol succinate ER 25 milliGRAM(s) Oral daily  metroNIDAZOLE    Tablet 500 milliGRAM(s) Oral three times a day  montelukast 10 milliGRAM(s) Oral daily  nystatin    Suspension 826959 Unit(s) Oral four times a day  sacubitril 24 mG/valsartan 26 mG 1 Tablet(s) Oral two times a day  spironolactone 12.5 milliGRAM(s) Oral daily  sucralfate suspension 1 Gram(s) Oral <User Schedule>  tiotropium 2.5 MICROgram(s) Inhaler 2 Puff(s) Inhalation daily    MEDICATIONS  (PRN):  acetaminophen     Tablet .. 650 milliGRAM(s) Oral every 6 hours PRN Temp greater or equal to 38C (100.4F), Mild Pain (1 - 3)  albuterol    90 MICROgram(s) HFA Inhaler 2 Puff(s) Inhalation every 6 hours PRN Shortness of Breath and/or Wheezing  ALPRAZolam 0.5 milliGRAM(s) Oral at bedtime PRN sleep/anxiety  bisacodyl Suppository 10 milliGRAM(s) Rectal daily PRN Constipation  oxyCODONE    IR 20 milliGRAM(s) Oral three times a day PRN Severe Pain (7 - 10)  polyethylene glycol 3350 17 Gram(s) Oral daily PRN Constipation  zolpidem 5 milliGRAM(s) Oral at bedtime PRN Insomnia      CAPILLARY BLOOD GLUCOSE        I&O's Summary      PHYSICAL EXAM:  Vital Signs Last 24 Hrs  T(C): 36.7 (05 Mar 2023 12:04), Max: 36.7 (04 Mar 2023 15:55)  T(F): 98 (05 Mar 2023 12:04), Max: 98.1 (04 Mar 2023 20:56)  HR: 89 (05 Mar 2023 05:25) (70 - 89)  BP: 105/71 (05 Mar 2023 05:25) (96/63 - 105/71)  BP(mean): --  RR: 18 (05 Mar 2023 12:04) (18 - 18)  SpO2: 95% (05 Mar 2023 12:04) (95% - 96%)    Parameters below as of 05 Mar 2023 12:04  Patient On (Oxygen Delivery Method): room air        CONSTITUTIONAL: NAD, well-developed  RESPIRATORY: Normal respiratory effort; lungs are clear to auscultation bilaterally  CARDIOVASCULAR: Regular rate and rhythm, normal S1 and S2, no murmur/rub/gallop; No lower extremity edema  ABDOMEN: Nontender to palpation, normoactive bowel sounds, no rebound/guarding  MUSCLOSKELETAL: no clubbing or cyanosis of digits; no joint swelling or tenderness to palpation  PSYCH: A+O to person, place, and time; affect appropriate    LABS:                        13.1   9.88  )-----------( 289      ( 04 Mar 2023 13:05 )             41.9     03-05    136  |  105  |  15  ----------------------------<  98  4.1   |  21<L>  |  1.16    Ca    9.0      05 Mar 2023 06:36  Phos  3.8     03-05  Mg     2.1     03-05

## 2023-03-05 NOTE — PROGRESS NOTE ADULT - PROBLEM SELECTOR PLAN 2
-orthostatics wnl although bps low  -Will hold amlodipine for now  -Monitor vitals [id number]  -Fall precaution

## 2023-03-05 NOTE — PROGRESS NOTE ADULT - PROBLEM SELECTOR PLAN 3
-Continue entresto 24/26 BID w hold parameters  -Continue toprol 25 XL   -Continue farxiga 5 mg daily   -Continue spironolactone 25 mg daily   -F/u TTE  -HF recs appreciated, EP recs appreciated  -Strict Is/Os  -Daily weights

## 2023-03-05 NOTE — PHYSICAL THERAPY INITIAL EVALUATION ADULT - PERTINENT HX OF CURRENT PROBLEM, REHAB EVAL
72 y/oF admitted 3/3 with c/o dizziness and chest pain. As per H&P, pt stated was s admitted to [facility] for an intra-abdominal infection for 4 days and was discharged w antibiotics and her BP medications were changed at that time as well. She states that she came to the hospital because she was feeling dizziness, predominantly when standing up, and she was very dehydrated during her time inpatient. Patient also endorsed chest pain, located on the L side, could not tell intensity and description of pain, radiating to R side, aggravated by touch (endorsed tenderness) no relieving factors. The chest pain is not related to exertion and also occured at rest. Patient states that she at baseline feels SOB 2/2 to her COPD (does not use O2 at home). Endorses residual LLQ abdominal pain, has not had BM in 7 days. PMH AAA, ICM (LVEF 40-45% from 20% prior), ventricular arrhythmia s/p dual chamber ICD, CAD (s/p prior PCI 2009 and 2011), CVA (with residual R sided weakness), CHF, COPD, IBD

## 2023-03-05 NOTE — PHYSICAL THERAPY INITIAL EVALUATION ADULT - ADDITIONAL COMMENTS
Pt lives alone in senior housing, no stairs to negotiate. Ambulates with cane at home, with rolling walker in the community. Her daughter is her CPAP 5 hours/day. Has grab bars and seat in shower, raised toilet seat. Daughter is in bathroom when pt showers, but she does it independently. States goes to outpatient PT with medical transportation.

## 2023-03-05 NOTE — PHYSICAL THERAPY INITIAL EVALUATION ADULT - GENERAL OBSERVATIONS, REHAB EVAL
Received pt bedside on unit. +tele. BP Received pt bedside on unit. +tele. BP low, but stable. Supine 88/55, sitting 82/52, standing 87/59. Nurse Soheila aware.

## 2023-03-06 LAB
ANION GAP SERPL CALC-SCNC: 12 MMOL/L — SIGNIFICANT CHANGE UP (ref 5–17)
BUN SERPL-MCNC: 14 MG/DL — SIGNIFICANT CHANGE UP (ref 7–23)
CALCIUM SERPL-MCNC: 10.3 MG/DL — SIGNIFICANT CHANGE UP (ref 8.4–10.5)
CHLORIDE SERPL-SCNC: 104 MMOL/L — SIGNIFICANT CHANGE UP (ref 96–108)
CO2 SERPL-SCNC: 23 MMOL/L — SIGNIFICANT CHANGE UP (ref 22–31)
CREAT SERPL-MCNC: 1.34 MG/DL — HIGH (ref 0.5–1.3)
EGFR: 42 ML/MIN/1.73M2 — LOW
GLUCOSE SERPL-MCNC: 162 MG/DL — HIGH (ref 70–99)
POTASSIUM SERPL-MCNC: 5.3 MMOL/L — SIGNIFICANT CHANGE UP (ref 3.5–5.3)
POTASSIUM SERPL-SCNC: 5.3 MMOL/L — SIGNIFICANT CHANGE UP (ref 3.5–5.3)
SODIUM SERPL-SCNC: 139 MMOL/L — SIGNIFICANT CHANGE UP (ref 135–145)

## 2023-03-06 PROCEDURE — 93306 TTE W/DOPPLER COMPLETE: CPT | Mod: 26

## 2023-03-06 PROCEDURE — 99232 SBSQ HOSP IP/OBS MODERATE 35: CPT

## 2023-03-06 RX ORDER — ALBUTEROL 90 UG/1
2.5 AEROSOL, METERED ORAL ONCE
Refills: 0 | Status: COMPLETED | OUTPATIENT
Start: 2023-03-06 | End: 2023-03-06

## 2023-03-06 RX ADMIN — Medication 500000 UNIT(S): at 17:20

## 2023-03-06 RX ADMIN — Medication 25 MILLIGRAM(S): at 05:45

## 2023-03-06 RX ADMIN — CLOPIDOGREL BISULFATE 75 MILLIGRAM(S): 75 TABLET, FILM COATED ORAL at 11:43

## 2023-03-06 RX ADMIN — Medication 500000 UNIT(S): at 23:04

## 2023-03-06 RX ADMIN — Medication 500 MILLIGRAM(S): at 05:45

## 2023-03-06 RX ADMIN — ALBUTEROL 2.5 MILLIGRAM(S): 90 AEROSOL, METERED ORAL at 15:51

## 2023-03-06 RX ADMIN — FAMOTIDINE 20 MILLIGRAM(S): 10 INJECTION INTRAVENOUS at 11:43

## 2023-03-06 RX ADMIN — Medication 500 MILLIGRAM(S): at 22:06

## 2023-03-06 RX ADMIN — SACUBITRIL AND VALSARTAN 1 TABLET(S): 24; 26 TABLET, FILM COATED ORAL at 05:45

## 2023-03-06 RX ADMIN — Medication 500 MILLIGRAM(S): at 13:17

## 2023-03-06 RX ADMIN — Medication 1 GRAM(S): at 17:21

## 2023-03-06 RX ADMIN — ATORVASTATIN CALCIUM 80 MILLIGRAM(S): 80 TABLET, FILM COATED ORAL at 22:06

## 2023-03-06 RX ADMIN — MONTELUKAST 10 MILLIGRAM(S): 4 TABLET, CHEWABLE ORAL at 11:43

## 2023-03-06 RX ADMIN — BUDESONIDE AND FORMOTEROL FUMARATE DIHYDRATE 2 PUFF(S): 160; 4.5 AEROSOL RESPIRATORY (INHALATION) at 05:48

## 2023-03-06 RX ADMIN — Medication 1 GRAM(S): at 11:44

## 2023-03-06 RX ADMIN — Medication 500 MILLIGRAM(S): at 17:22

## 2023-03-06 RX ADMIN — TIOTROPIUM BROMIDE 2 PUFF(S): 18 CAPSULE ORAL; RESPIRATORY (INHALATION) at 11:46

## 2023-03-06 RX ADMIN — Medication 500000 UNIT(S): at 05:46

## 2023-03-06 RX ADMIN — DAPAGLIFLOZIN 5 MILLIGRAM(S): 10 TABLET, FILM COATED ORAL at 05:44

## 2023-03-06 RX ADMIN — Medication 500000 UNIT(S): at 01:22

## 2023-03-06 RX ADMIN — SPIRONOLACTONE 12.5 MILLIGRAM(S): 25 TABLET, FILM COATED ORAL at 05:45

## 2023-03-06 RX ADMIN — BUDESONIDE AND FORMOTEROL FUMARATE DIHYDRATE 2 PUFF(S): 160; 4.5 AEROSOL RESPIRATORY (INHALATION) at 17:22

## 2023-03-06 RX ADMIN — ENOXAPARIN SODIUM 40 MILLIGRAM(S): 100 INJECTION SUBCUTANEOUS at 05:43

## 2023-03-06 RX ADMIN — Medication 1 GRAM(S): at 05:47

## 2023-03-06 RX ADMIN — Medication 500000 UNIT(S): at 11:43

## 2023-03-06 RX ADMIN — Medication 0.5 MILLIGRAM(S): at 22:07

## 2023-03-06 NOTE — PROGRESS NOTE ADULT - ASSESSMENT
72 y F PMH of AAA, CVA, CAD s/p stent, ventricular arrhythmia s/p ICD/PPM, CHF, COPD, IBD presents to the ED with complaints of dizziness and chest pain.  No new EKG changes from prior, trop x 2 negative, appears to be more MSK in nature   Endorses chronic FERRERA, plan was to get ECHO at [facility] however was not pursued as per patient, will obtain inpatient  Dizziness appears to be in setting of polypharmacy, it is possible patient is also orthostatic (low BP on admission    Admitted for further eval

## 2023-03-06 NOTE — PROGRESS NOTE ADULT - SUBJECTIVE AND OBJECTIVE BOX
PROGRESS NOTE:   Authoted by Dr. Jason James MD, Available on MS Teams    Patient is a 72y old  Female who presents with a chief complaint of CP on exertion (05 Mar 2023 13:13)      SUBJECTIVE / OVERNIGHT EVENTS: Patient feels fine. No chest pain or shortness of breath.     ADDITIONAL REVIEW OF SYSTEMS:    MEDICATIONS  (STANDING):  atorvastatin 80 milliGRAM(s) Oral at bedtime  budesonide 160 MICROgram(s)/formoterol 4.5 MICROgram(s) Inhaler 2 Puff(s) Inhalation two times a day  ciprofloxacin     Tablet 500 milliGRAM(s) Oral every 12 hours  clopidogrel Tablet 75 milliGRAM(s) Oral daily  dapagliflozin 5 milliGRAM(s) Oral every 24 hours  enoxaparin Injectable 40 milliGRAM(s) SubCutaneous every 24 hours  famotidine    Tablet 20 milliGRAM(s) Oral daily  metoprolol succinate ER 25 milliGRAM(s) Oral daily  metroNIDAZOLE    Tablet 500 milliGRAM(s) Oral three times a day  montelukast 10 milliGRAM(s) Oral daily  nystatin    Suspension 869086 Unit(s) Oral four times a day  sacubitril 24 mG/valsartan 26 mG 1 Tablet(s) Oral two times a day  spironolactone 12.5 milliGRAM(s) Oral daily  sucralfate suspension 1 Gram(s) Oral <User Schedule>  tiotropium 2.5 MICROgram(s) Inhaler 2 Puff(s) Inhalation daily    MEDICATIONS  (PRN):  acetaminophen     Tablet .. 650 milliGRAM(s) Oral every 6 hours PRN Temp greater or equal to 38C (100.4F), Mild Pain (1 - 3)  albuterol    90 MICROgram(s) HFA Inhaler 2 Puff(s) Inhalation every 6 hours PRN Shortness of Breath and/or Wheezing  ALPRAZolam 0.5 milliGRAM(s) Oral at bedtime PRN sleep/anxiety  bisacodyl Suppository 10 milliGRAM(s) Rectal daily PRN Constipation  polyethylene glycol 3350 17 Gram(s) Oral daily PRN Constipation  zolpidem 5 milliGRAM(s) Oral at bedtime PRN Insomnia      CAPILLARY BLOOD GLUCOSE        I&O's Summary      PHYSICAL EXAM:  Vital Signs Last 24 Hrs  T(C): 36.4 (06 Mar 2023 11:11), Max: 37.3 (06 Mar 2023 04:36)  T(F): 97.6 (06 Mar 2023 11:11), Max: 99.2 (06 Mar 2023 04:36)  HR: 85 (06 Mar 2023 11:50) (73 - 91)  BP: 101/66 (06 Mar 2023 11:50) (91/57 - 111/73)  BP(mean): --  RR: 18 (06 Mar 2023 11:11) (16 - 18)  SpO2: 99% (06 Mar 2023 11:11) (96% - 100%)    Parameters below as of 06 Mar 2023 11:11  Patient On (Oxygen Delivery Method): room air        CONSTITUTIONAL: NAD, well-developed  RESPIRATORY: Normal respiratory effort; lungs are clear to auscultation bilaterally  CARDIOVASCULAR: Regular rate and rhythm, normal S1 and S2, no murmur/rub/gallop; No lower extremity edema  ABDOMEN: Nontender to palpation, normoactive bowel sounds, no rebound/guarding  MUSCLOSKELETAL: no clubbing or cyanosis of digits; no joint swelling or tenderness to palpation  PSYCH: A+O to person, place, and time; affect appropriate    LABS:    03-06    139  |  104  |  14  ----------------------------<  162<H>  5.3   |  23  |  1.34<H>    Ca    10.3      06 Mar 2023 11:02  Phos  3.8     03-05  Mg     2.1     03-05

## 2023-03-06 NOTE — PROGRESS NOTE ADULT - PROBLEM SELECTOR PLAN 2
-orthostatics wnl although bps low  -Will hold amlodipine for now  -Monitor vitals [id number]  -Fall precaution

## 2023-03-06 NOTE — PROGRESS NOTE ADULT - PROBLEM SELECTOR PLAN 4
From CT abdominal aortic aneurysm measuring up to 4.1 x 4.0 x 4.6 cm; unchanged there is an increased degree of thrombosis with intraluminal narrowing.  -Stable diameter  -vascular recs appreciated, no acute surgical intervention  -Will need outpatient followup with repeat US abdominal aorta in 6 months

## 2023-03-07 LAB
ANION GAP SERPL CALC-SCNC: 13 MMOL/L — SIGNIFICANT CHANGE UP (ref 5–17)
BUN SERPL-MCNC: 15 MG/DL — SIGNIFICANT CHANGE UP (ref 7–23)
CALCIUM SERPL-MCNC: 9.9 MG/DL — SIGNIFICANT CHANGE UP (ref 8.4–10.5)
CHLORIDE SERPL-SCNC: 104 MMOL/L — SIGNIFICANT CHANGE UP (ref 96–108)
CO2 SERPL-SCNC: 17 MMOL/L — LOW (ref 22–31)
CREAT SERPL-MCNC: 1.24 MG/DL — SIGNIFICANT CHANGE UP (ref 0.5–1.3)
EGFR: 46 ML/MIN/1.73M2 — LOW
GLUCOSE SERPL-MCNC: 137 MG/DL — HIGH (ref 70–99)
POTASSIUM SERPL-MCNC: 4.3 MMOL/L — SIGNIFICANT CHANGE UP (ref 3.5–5.3)
POTASSIUM SERPL-SCNC: 4.3 MMOL/L — SIGNIFICANT CHANGE UP (ref 3.5–5.3)
SODIUM SERPL-SCNC: 134 MMOL/L — LOW (ref 135–145)

## 2023-03-07 PROCEDURE — 99232 SBSQ HOSP IP/OBS MODERATE 35: CPT

## 2023-03-07 PROCEDURE — 93010 ELECTROCARDIOGRAM REPORT: CPT

## 2023-03-07 PROCEDURE — 99221 1ST HOSP IP/OBS SF/LOW 40: CPT

## 2023-03-07 RX ORDER — ONDANSETRON 8 MG/1
4 TABLET, FILM COATED ORAL EVERY 8 HOURS
Refills: 0 | Status: DISCONTINUED | OUTPATIENT
Start: 2023-03-07 | End: 2023-03-11

## 2023-03-07 RX ORDER — LOPERAMIDE HCL 2 MG
2 TABLET ORAL ONCE
Refills: 0 | Status: COMPLETED | OUTPATIENT
Start: 2023-03-07 | End: 2023-03-07

## 2023-03-07 RX ORDER — ZOLPIDEM TARTRATE 10 MG/1
5 TABLET ORAL ONCE
Refills: 0 | Status: DISCONTINUED | OUTPATIENT
Start: 2023-03-07 | End: 2023-03-07

## 2023-03-07 RX ADMIN — Medication 500 MILLIGRAM(S): at 13:02

## 2023-03-07 RX ADMIN — SPIRONOLACTONE 12.5 MILLIGRAM(S): 25 TABLET, FILM COATED ORAL at 05:26

## 2023-03-07 RX ADMIN — SACUBITRIL AND VALSARTAN 1 TABLET(S): 24; 26 TABLET, FILM COATED ORAL at 05:25

## 2023-03-07 RX ADMIN — Medication 1 GRAM(S): at 13:02

## 2023-03-07 RX ADMIN — ATORVASTATIN CALCIUM 80 MILLIGRAM(S): 80 TABLET, FILM COATED ORAL at 22:17

## 2023-03-07 RX ADMIN — Medication 500000 UNIT(S): at 23:12

## 2023-03-07 RX ADMIN — Medication 500000 UNIT(S): at 13:02

## 2023-03-07 RX ADMIN — Medication 500 MILLIGRAM(S): at 05:25

## 2023-03-07 RX ADMIN — Medication 1 GRAM(S): at 05:22

## 2023-03-07 RX ADMIN — DAPAGLIFLOZIN 5 MILLIGRAM(S): 10 TABLET, FILM COATED ORAL at 05:26

## 2023-03-07 RX ADMIN — Medication 2 MILLIGRAM(S): at 13:01

## 2023-03-07 RX ADMIN — ZOLPIDEM TARTRATE 5 MILLIGRAM(S): 10 TABLET ORAL at 23:13

## 2023-03-07 RX ADMIN — Medication 500000 UNIT(S): at 05:26

## 2023-03-07 RX ADMIN — Medication 500 MILLIGRAM(S): at 22:17

## 2023-03-07 RX ADMIN — Medication 25 MILLIGRAM(S): at 05:25

## 2023-03-07 RX ADMIN — ZOLPIDEM TARTRATE 5 MILLIGRAM(S): 10 TABLET ORAL at 01:42

## 2023-03-07 RX ADMIN — SACUBITRIL AND VALSARTAN 1 TABLET(S): 24; 26 TABLET, FILM COATED ORAL at 17:37

## 2023-03-07 RX ADMIN — Medication 0.5 MILLIGRAM(S): at 19:17

## 2023-03-07 RX ADMIN — BUDESONIDE AND FORMOTEROL FUMARATE DIHYDRATE 2 PUFF(S): 160; 4.5 AEROSOL RESPIRATORY (INHALATION) at 17:38

## 2023-03-07 RX ADMIN — Medication 500000 UNIT(S): at 17:34

## 2023-03-07 RX ADMIN — FAMOTIDINE 20 MILLIGRAM(S): 10 INJECTION INTRAVENOUS at 13:02

## 2023-03-07 RX ADMIN — Medication 1 GRAM(S): at 17:34

## 2023-03-07 RX ADMIN — CLOPIDOGREL BISULFATE 75 MILLIGRAM(S): 75 TABLET, FILM COATED ORAL at 13:02

## 2023-03-07 RX ADMIN — BUDESONIDE AND FORMOTEROL FUMARATE DIHYDRATE 2 PUFF(S): 160; 4.5 AEROSOL RESPIRATORY (INHALATION) at 05:26

## 2023-03-07 RX ADMIN — MONTELUKAST 10 MILLIGRAM(S): 4 TABLET, CHEWABLE ORAL at 13:02

## 2023-03-07 RX ADMIN — Medication 500 MILLIGRAM(S): at 17:38

## 2023-03-07 RX ADMIN — TIOTROPIUM BROMIDE 2 PUFF(S): 18 CAPSULE ORAL; RESPIRATORY (INHALATION) at 13:03

## 2023-03-07 NOTE — PROGRESS NOTE ADULT - PROBLEM SELECTOR PLAN 2
-orthostatics wnl although bps low  -Will hold amlodipine for now  -Monitor vitals [id number]  -Fall precaution

## 2023-03-07 NOTE — PROGRESS NOTE ADULT - PROBLEM SELECTOR PLAN 1
Appears to be more MSK in nature   -Continue tylenol and home oxycodone   -Telemetry monitoring   -If chest pain obtain STAT EKG and cardiac enzymes  -TTE showing EF of 29%, severe LV systolic dysfunction, mild diastolic dysfunction  -f/u cardiology recs

## 2023-03-07 NOTE — CONSULT NOTE ADULT - ATTENDING COMMENTS
pt with other comorbidities and stable 4.6 cm AAA  continue medical management   recommend follow up as out patient  all above was discussed with the patient in detail

## 2023-03-07 NOTE — PROGRESS NOTE ADULT - SUBJECTIVE AND OBJECTIVE BOX
PROGRESS NOTE:   Authoted by Dr. Jason James MD, Available on MS Teams    Patient is a 72y old  Female who presents with a chief complaint of CP on exertion (07 Mar 2023 12:11)      SUBJECTIVE / OVERNIGHT EVENTS: Patient has had a few loose BMs this morning. No chest pain or shortness of breath but she feels weak and fatigued. Also has nausea but no vomiting.    ADDITIONAL REVIEW OF SYSTEMS:    MEDICATIONS  (STANDING):  atorvastatin 80 milliGRAM(s) Oral at bedtime  budesonide 160 MICROgram(s)/formoterol 4.5 MICROgram(s) Inhaler 2 Puff(s) Inhalation two times a day  ciprofloxacin     Tablet 500 milliGRAM(s) Oral every 12 hours  clopidogrel Tablet 75 milliGRAM(s) Oral daily  dapagliflozin 5 milliGRAM(s) Oral every 24 hours  enoxaparin Injectable 40 milliGRAM(s) SubCutaneous every 24 hours  famotidine    Tablet 20 milliGRAM(s) Oral daily  metoprolol succinate ER 25 milliGRAM(s) Oral daily  metroNIDAZOLE    Tablet 500 milliGRAM(s) Oral three times a day  montelukast 10 milliGRAM(s) Oral daily  nystatin    Suspension 803490 Unit(s) Oral four times a day  sacubitril 24 mG/valsartan 26 mG 1 Tablet(s) Oral two times a day  spironolactone 12.5 milliGRAM(s) Oral daily  sucralfate suspension 1 Gram(s) Oral <User Schedule>  tiotropium 2.5 MICROgram(s) Inhaler 2 Puff(s) Inhalation daily    MEDICATIONS  (PRN):  acetaminophen     Tablet .. 650 milliGRAM(s) Oral every 6 hours PRN Temp greater or equal to 38C (100.4F), Mild Pain (1 - 3)  albuterol    90 MICROgram(s) HFA Inhaler 2 Puff(s) Inhalation every 6 hours PRN Shortness of Breath and/or Wheezing  ALPRAZolam 0.5 milliGRAM(s) Oral at bedtime PRN sleep/anxiety  bisacodyl Suppository 10 milliGRAM(s) Rectal daily PRN Constipation  ondansetron    Tablet 4 milliGRAM(s) Oral every 8 hours PRN Nausea and/or Vomiting  polyethylene glycol 3350 17 Gram(s) Oral daily PRN Constipation  zolpidem 5 milliGRAM(s) Oral at bedtime PRN Insomnia      CAPILLARY BLOOD GLUCOSE        I&O's Summary    06 Mar 2023 07:01  -  07 Mar 2023 07:00  --------------------------------------------------------  IN: 480 mL / OUT: 2 mL / NET: 478 mL        PHYSICAL EXAM:  Vital Signs Last 24 Hrs  T(C): 36.8 (07 Mar 2023 12:12), Max: 37.2 (07 Mar 2023 04:32)  T(F): 98.3 (07 Mar 2023 12:12), Max: 99 (07 Mar 2023 04:32)  HR: 76 (07 Mar 2023 12:12) (69 - 106)  BP: 111/75 (07 Mar 2023 12:12) (92/64 - 124/84)  BP(mean): --  RR: 18 (07 Mar 2023 12:12) (18 - 18)  SpO2: 97% (07 Mar 2023 12:12) (95% - 100%)    Parameters below as of 07 Mar 2023 12:12  Patient On (Oxygen Delivery Method): room air        CONSTITUTIONAL: NAD, well-developed  RESPIRATORY: Normal respiratory effort; lungs are clear to auscultation bilaterally  CARDIOVASCULAR: Regular rate and rhythm, normal S1 and S2, no murmur/rub/gallop; No lower extremity edema  ABDOMEN: Nontender to palpation, normoactive bowel sounds, no rebound/guarding  MUSCLOSKELETAL: no clubbing or cyanosis of digits; no joint swelling or tenderness to palpation  PSYCH: A+O to person, place, and time; affect appropriate    LABS:    03-07    134<L>  |  104  |  15  ----------------------------<  137<H>  4.3   |  17<L>  |  1.24    Ca    9.9      07 Mar 2023 10:35

## 2023-03-07 NOTE — PROGRESS NOTE ADULT - PROBLEM SELECTOR PLAN 3
-Continue entresto 24/26 BID w hold parameters  -Continue toprol 25 XL   -Continue farxiga 5 mg daily   -Continue spironolactone 25 mg daily   -TTE results noted  -HF recs appreciated, EP recs appreciated  -Strict Is/Os  -Daily weights

## 2023-03-07 NOTE — CONSULT NOTE ADULT - ASSESSMENT
73 yo female with stable AAA and stable right common iliac artery aneurysm   -no acute vascular interventions   -follow up with Dr. Valle as outpatient   -discussed with vascular fellow     Vascular Sx [id number]

## 2023-03-07 NOTE — CONSULT NOTE ADULT - SUBJECTIVE AND OBJECTIVE BOX
HPI:  72 y F PMH of AAA, CVA, CAD s/p stent, ventricular arrhythmia s/p ICD/PPM, CHF, COPD, IBD presents to the ED with complaints of dizziness and chest pain.  Patient is a tangential historian- states that she was admitted to [facility] for an intra-abdominal infection for 4 days and was discharged w antibiotics and her BP medications were changed at that time as well. She states that she came to the hospital because she was feeling dizziness, predominantly when standing up, and she was very dehydrated during her time inpatient.   Patient also endorsed chest pain, located on the L side, could not tell intensity and description of pain, radiating to R side, aggravated by touch (endorsed tenderness) no relieving factors. The chest pain is not related to exertion and also occured at rest.   Patient states that she at baseline feels SOB 2/2 to her COPD (does not use O2 at home)  Denies palpitations, LE edema, fever, chills.   Endorses residual LLQ abdominal pain, has not had BM in 7 days.   ROS negative as below    Of note- home hydralazine DC at [facility]     ED Course:  VS: Afebrile, HR: 90, BP: 88/59--> 97/66, saturating 100% on RA BP at time of interview: 107/70   Medications: none given    (03 Mar 2023 00:21)   complaint of Patient is a 72y old  Female who presents with a chief complaint of CP on exertion (06 Mar 2023 13:22)    Called to evaluate patient for AAA, Patient reports known AAA being followed by Dr. Lofton. No abdominal pain, no n/v, tolerating diet, passing gas, episode of diarrhea yesterday     PAST MEDICAL & SURGICAL HISTORY:  Anxiety      Hypercholesteremia      Osteoporosis      Pacemaker      CAD (coronary artery disease)      COPD (chronic obstructive pulmonary disease)      Congestive heart failure      Ventricular arrhythmia      CVA (cerebral vascular accident) X2, right sided weakness      Cataract Surgery      Hand Surgery          FAMILY HISTORY:  No pertinent family history in first degree relatives        ( )Tobacco  ( )Etoh  ( )Drugs    [This allergen will not trigger allergy alert] IV DYE, IODINE CONTRAST (Unknown)  lactose (Rash)  latex (Unknown)      Home Medications:  ALPRAZolam 0.5 mg oral tablet: 1 tab(s) orally once a day (at bedtime), As Needed (03 Mar 2023 09:41)  amLODIPine 5 mg oral tablet: 1 tab(s) orally once a day (03 Mar 2023 09:41)  Breo Ellipta 200 mcg-25 mcg/inh inhalation powder: 1 puff(s) inhaled once a day (03 Mar 2023 09:41)  ciprofloxacin 500 mg oral tablet: 1 tab(s) orally every 12 hours (03 Mar 2023 09:41)  Crestor 20 mg oral tablet: 1 tab(s) orally once a day (at bedtime) (03 Mar 2023 09:41)  Entresto 24 mg-26 mg oral tablet: 1 tab(s) orally 2 times a day (03 Mar 2023 09:41)  famotidine 20 mg oral tablet: 1 tab(s) orally 2 times a day (03 Mar 2023 09:41)  Farxiga 5 mg oral tablet: 1 tab(s) orally once a day (03 Mar 2023 09:41)  finasteride 5 mg oral tablet: 1 tab(s) orally once a day (03 Mar 2023 09:41)  Incruse Ellipta 62.5 mcg/inh inhalation powder: 1 puff(s) inhaled every 24 hours (03 Mar 2023 09:41)  metroNIDAZOLE 500 mg oral tablet: 1 tab(s) orally 3 times a day (03 Mar 2023 09:41)  nystatin 100,000 units/mL oral suspension: 5 milliliter(s) orally 4 times a day (03 Mar 2023 09:41)  oxyCODONE 20 mg oral tablet: 1 tab(s) orally 3 times a day, As Needed (03 Mar 2023 09:41)  Plavix 75 mg oral tablet: 1 tab(s) orally once a day (03 Mar 2023 09:41)  ProAir HFA 90 mcg/inh inhalation aerosol: 2 puff(s) inhaled 4 times a day, As Needed (03 Mar 2023 09:41)  spironolactone 25 mg oral tablet: 0.5 tab(s) orally once a day (03 Mar 2023 09:41)  sucralfate 1 g/10 mL oral suspension: 10 milliliter(s) orally 3 times a day (03 Mar 2023 09:41)  Toprol-XL 25 mg oral tablet, extended release: 1 tab(s) orally once a day (03 Mar 2023 09:41)      MEDICATIONS  (STANDING):  atorvastatin 80 milliGRAM(s) Oral at bedtime  budesonide 160 MICROgram(s)/formoterol 4.5 MICROgram(s) Inhaler 2 Puff(s) Inhalation two times a day  ciprofloxacin     Tablet 500 milliGRAM(s) Oral every 12 hours  clopidogrel Tablet 75 milliGRAM(s) Oral daily  dapagliflozin 5 milliGRAM(s) Oral every 24 hours  enoxaparin Injectable 40 milliGRAM(s) SubCutaneous every 24 hours  famotidine    Tablet 20 milliGRAM(s) Oral daily  loperamide 2 milliGRAM(s) Oral once  metoprolol succinate ER 25 milliGRAM(s) Oral daily  metroNIDAZOLE    Tablet 500 milliGRAM(s) Oral three times a day  montelukast 10 milliGRAM(s) Oral daily  nystatin    Suspension 203440 Unit(s) Oral four times a day  sacubitril 24 mG/valsartan 26 mG 1 Tablet(s) Oral two times a day  spironolactone 12.5 milliGRAM(s) Oral daily  sucralfate suspension 1 Gram(s) Oral <User Schedule>  tiotropium 2.5 MICROgram(s) Inhaler 2 Puff(s) Inhalation daily    MEDICATIONS  (PRN):  acetaminophen     Tablet .. 650 milliGRAM(s) Oral every 6 hours PRN Temp greater or equal to 38C (100.4F), Mild Pain (1 - 3)  albuterol    90 MICROgram(s) HFA Inhaler 2 Puff(s) Inhalation every 6 hours PRN Shortness of Breath and/or Wheezing  ALPRAZolam 0.5 milliGRAM(s) Oral at bedtime PRN sleep/anxiety  bisacodyl Suppository 10 milliGRAM(s) Rectal daily PRN Constipation  polyethylene glycol 3350 17 Gram(s) Oral daily PRN Constipation  zolpidem 5 milliGRAM(s) Oral at bedtime PRN Insomnia      REVIEW OF SYSTEMS    CONSTITUTIONAL: feels weak   EYES/ENT: No visual changes;  No vertigo or throat pain   NECK: No pain or stiffness  RESPIRATORY: shortness of breathe with movement   CARDIOVASCULAR: complaining of chest pain   GASTROINTESTINAL: see HPI   GENITOURINARY: No dysuria, frequency or hematuria  NEUROLOGICAL: No numbness or weakness  SKIN: No itching, rashes          Vital Signs Last 24 Hrs  T(C): 37.2 (07 Mar 2023 04:32), Max: 37.2 (07 Mar 2023 04:32)  T(F): 99 (07 Mar 2023 04:32), Max: 99 (07 Mar 2023 04:32)  HR: 94 (07 Mar 2023 04:32) (69 - 106)  BP: 124/84 (07 Mar 2023 04:32) (92/64 - 124/84)  BP(mean): --  RR: 18 (07 Mar 2023 04:32) (18 - 18)  SpO2: 100% (07 Mar 2023 04:32) (95% - 100%)    Parameters below as of 07 Mar 2023 04:32  Patient On (Oxygen Delivery Method): room air        I&O's Detail    06 Mar 2023 07:01  -  07 Mar 2023 07:00  --------------------------------------------------------  IN:    Oral Fluid: 480 mL  Total IN: 480 mL    OUT:    Voided (mL): 2 mL  Total OUT: 2 mL    Total NET: 478 mL            PHYSICAL EXAM:  GENERAL: NAD, lying comfortably in bed  chest non labored breathing   abd soft nontender nondistended   ext moving all extremities, warm to touch       03-07    134<L>  |  104  |  15  ----------------------------<  137<H>  4.3   |  17<L>  |  1.24    Ca    9.9      07 Mar 2023 10:35    Radiology Findings:     < from: CT Abdomen and Pelvis w/ IV Cont (03.02.23 @ 19:17) >  MPRESSION:  Redemonstrated abdominal aortic aneurysm with stable diameter and   increased degree of luminal thrombosis when compared to prior.    Stable right common iliac artery aneurysm.    < end of copied text >

## 2023-03-07 NOTE — PROGRESS NOTE ADULT - ASSESSMENT
72 y F PMH of AAA, CVA, CAD s/p stent, ventricular arrhythmia s/p ICD/PPM, CHF, COPD, IBD presents to the ED with complaints of dizziness and chest pain.  No new EKG changes from prior, trop x 2 negative, appears to be more MSK in nature   Endorses chronic FERRERA, plan was to get ECHO at [facility] however was not pursued as per patient, will obtain inpatient  Dizziness appears to be in setting of polypharmacy, it is possible patient is also orthostatic (low BP on admission    Admitted for further eval

## 2023-03-08 PROCEDURE — 99223 1ST HOSP IP/OBS HIGH 75: CPT

## 2023-03-08 PROCEDURE — 99232 SBSQ HOSP IP/OBS MODERATE 35: CPT

## 2023-03-08 RX ORDER — SODIUM CHLORIDE 9 MG/ML
250 INJECTION INTRAMUSCULAR; INTRAVENOUS; SUBCUTANEOUS ONCE
Refills: 0 | Status: COMPLETED | OUTPATIENT
Start: 2023-03-08 | End: 2023-03-08

## 2023-03-08 RX ORDER — MORPHINE SULFATE 50 MG/1
1 CAPSULE, EXTENDED RELEASE ORAL ONCE
Refills: 0 | Status: DISCONTINUED | OUTPATIENT
Start: 2023-03-08 | End: 2023-03-08

## 2023-03-08 RX ADMIN — SODIUM CHLORIDE 250 MILLILITER(S): 9 INJECTION INTRAMUSCULAR; INTRAVENOUS; SUBCUTANEOUS at 06:01

## 2023-03-08 RX ADMIN — Medication 1 GRAM(S): at 05:24

## 2023-03-08 RX ADMIN — Medication 500 MILLIGRAM(S): at 05:26

## 2023-03-08 RX ADMIN — Medication 1 GRAM(S): at 12:20

## 2023-03-08 RX ADMIN — MORPHINE SULFATE 1 MILLIGRAM(S): 50 CAPSULE, EXTENDED RELEASE ORAL at 20:32

## 2023-03-08 RX ADMIN — ONDANSETRON 4 MILLIGRAM(S): 8 TABLET, FILM COATED ORAL at 08:55

## 2023-03-08 RX ADMIN — BUDESONIDE AND FORMOTEROL FUMARATE DIHYDRATE 2 PUFF(S): 160; 4.5 AEROSOL RESPIRATORY (INHALATION) at 18:01

## 2023-03-08 RX ADMIN — Medication 500000 UNIT(S): at 18:00

## 2023-03-08 RX ADMIN — SODIUM CHLORIDE 250 MILLILITER(S): 9 INJECTION INTRAMUSCULAR; INTRAVENOUS; SUBCUTANEOUS at 14:02

## 2023-03-08 RX ADMIN — TIOTROPIUM BROMIDE 2 PUFF(S): 18 CAPSULE ORAL; RESPIRATORY (INHALATION) at 12:20

## 2023-03-08 RX ADMIN — Medication 500000 UNIT(S): at 12:20

## 2023-03-08 RX ADMIN — DAPAGLIFLOZIN 5 MILLIGRAM(S): 10 TABLET, FILM COATED ORAL at 05:25

## 2023-03-08 RX ADMIN — Medication 500000 UNIT(S): at 05:25

## 2023-03-08 RX ADMIN — MONTELUKAST 10 MILLIGRAM(S): 4 TABLET, CHEWABLE ORAL at 12:19

## 2023-03-08 RX ADMIN — ATORVASTATIN CALCIUM 80 MILLIGRAM(S): 80 TABLET, FILM COATED ORAL at 21:35

## 2023-03-08 RX ADMIN — Medication 500 MILLIGRAM(S): at 16:15

## 2023-03-08 RX ADMIN — CLOPIDOGREL BISULFATE 75 MILLIGRAM(S): 75 TABLET, FILM COATED ORAL at 12:19

## 2023-03-08 RX ADMIN — Medication 500 MILLIGRAM(S): at 18:00

## 2023-03-08 RX ADMIN — FAMOTIDINE 20 MILLIGRAM(S): 10 INJECTION INTRAVENOUS at 12:19

## 2023-03-08 RX ADMIN — BUDESONIDE AND FORMOTEROL FUMARATE DIHYDRATE 2 PUFF(S): 160; 4.5 AEROSOL RESPIRATORY (INHALATION) at 05:27

## 2023-03-08 RX ADMIN — Medication 500 MILLIGRAM(S): at 21:35

## 2023-03-08 RX ADMIN — ZOLPIDEM TARTRATE 5 MILLIGRAM(S): 10 TABLET ORAL at 21:38

## 2023-03-08 RX ADMIN — MORPHINE SULFATE 1 MILLIGRAM(S): 50 CAPSULE, EXTENDED RELEASE ORAL at 22:05

## 2023-03-08 RX ADMIN — Medication 1 GRAM(S): at 18:00

## 2023-03-08 NOTE — PROGRESS NOTE ADULT - PROBLEM SELECTOR PLAN 1
Appears to be more MSK in nature   -Continue tylenol and home oxycodone   -Telemetry monitoring   -If chest pain obtain STAT EKG and cardiac enzymes  -TTE showing EF of 29%, severe LV systolic dysfunction, mild diastolic dysfunction  -HF consulted

## 2023-03-08 NOTE — CONSULT NOTE ADULT - PROBLEM SELECTOR RECOMMENDATION 9
- Daily weights, I and Os', and BP  -Continue entresto 24/26 BID w hold parameters  -Continue toprol 25 XL   -HOLD farxiga 5 mg daily, in the setting of dehydration, and poor PO intake.   -Continue spironolactone 25 mg daily

## 2023-03-08 NOTE — PROGRESS NOTE ADULT - SUBJECTIVE AND OBJECTIVE BOX
PROGRESS NOTE:   Authoted by Dr. Jason James MD, Available on MS Teams    Patient is a 72y old  Female who presents with a chief complaint of CP on exertion (07 Mar 2023 13:51)      SUBJECTIVE / OVERNIGHT EVENTS: Overnight patient received 250cc bolus of NS due to hypotension. Patient feels weak. Diarrhea is improving. No chest pain or shortness of breath.     ADDITIONAL REVIEW OF SYSTEMS:    MEDICATIONS  (STANDING):  atorvastatin 80 milliGRAM(s) Oral at bedtime  budesonide 160 MICROgram(s)/formoterol 4.5 MICROgram(s) Inhaler 2 Puff(s) Inhalation two times a day  ciprofloxacin     Tablet 500 milliGRAM(s) Oral every 12 hours  clopidogrel Tablet 75 milliGRAM(s) Oral daily  dapagliflozin 5 milliGRAM(s) Oral every 24 hours  enoxaparin Injectable 40 milliGRAM(s) SubCutaneous every 24 hours  famotidine    Tablet 20 milliGRAM(s) Oral daily  metoprolol succinate ER 25 milliGRAM(s) Oral daily  metroNIDAZOLE    Tablet 500 milliGRAM(s) Oral three times a day  montelukast 10 milliGRAM(s) Oral daily  nystatin    Suspension 989578 Unit(s) Oral four times a day  sacubitril 24 mG/valsartan 26 mG 1 Tablet(s) Oral two times a day  sucralfate suspension 1 Gram(s) Oral <User Schedule>  tiotropium 2.5 MICROgram(s) Inhaler 2 Puff(s) Inhalation daily    MEDICATIONS  (PRN):  acetaminophen     Tablet .. 650 milliGRAM(s) Oral every 6 hours PRN Temp greater or equal to 38C (100.4F), Mild Pain (1 - 3)  albuterol    90 MICROgram(s) HFA Inhaler 2 Puff(s) Inhalation every 6 hours PRN Shortness of Breath and/or Wheezing  ALPRAZolam 0.5 milliGRAM(s) Oral at bedtime PRN sleep/anxiety  bisacodyl Suppository 10 milliGRAM(s) Rectal daily PRN Constipation  ondansetron    Tablet 4 milliGRAM(s) Oral every 8 hours PRN Nausea and/or Vomiting  polyethylene glycol 3350 17 Gram(s) Oral daily PRN Constipation  zolpidem 5 milliGRAM(s) Oral at bedtime PRN Insomnia      CAPILLARY BLOOD GLUCOSE        I&O's Summary      PHYSICAL EXAM:  Vital Signs Last 24 Hrs  T(C): 37.3 (08 Mar 2023 13:43), Max: 37.3 (08 Mar 2023 04:17)  T(F): 99.2 (08 Mar 2023 13:43), Max: 99.2 (08 Mar 2023 04:17)  HR: 98 (08 Mar 2023 13:43) (86 - 99)  BP: 82/56 (08 Mar 2023 13:43) (82/56 - 106/67)  BP(mean): --  RR: 18 (08 Mar 2023 13:43) (18 - 19)  SpO2: 98% (08 Mar 2023 13:43) (97% - 100%)    Parameters below as of 08 Mar 2023 13:43  Patient On (Oxygen Delivery Method): room air        CONSTITUTIONAL: NAD, well-developed  RESPIRATORY: Normal respiratory effort; lungs are clear to auscultation bilaterally  CARDIOVASCULAR: Regular rate and rhythm, normal S1 and S2, no murmur/rub/gallop; No lower extremity edema  ABDOMEN: Nontender to palpation, normoactive bowel sounds, no rebound/guarding  MUSCLOSKELETAL: no clubbing or cyanosis of digits; no joint swelling or tenderness to palpation  PSYCH: A+O to person, place, and time; affect appropriate    LABS:    03-07    134<L>  |  104  |  15  ----------------------------<  137<H>  4.3   |  17<L>  |  1.24    Ca    9.9      07 Mar 2023 10:35
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
## 2023-03-08 NOTE — CONSULT NOTE ADULT - PROBLEM SELECTOR RECOMMENDATION 2
- EKG: NSR with non specific T wave inversions in lateral leads.  - Troponins 10-->8  -Continue toprol 25 mg XL   -Continue plavix 75 mg   -Continue rosuvastatin 20 mg.

## 2023-03-08 NOTE — CONSULT NOTE ADULT - PROBLEM SELECTOR RECOMMENDATION 3
- Pt is not on a fluid restriction at this time in the setting of Dehydration, Maintain daily weights for future adjustments.  - P.O. intake as tolerated.

## 2023-03-08 NOTE — PROGRESS NOTE ADULT - PROBLEM SELECTOR PLAN 2
-orthostatics wnl although bps low  -Will hold amlodipine for now, hold spironolactone  -Monitor vitals [id number]  -Fall precaution

## 2023-03-08 NOTE — PROGRESS NOTE ADULT - ASSESSMENT
72 y F PMH of AAA, CVA, CAD s/p stent, ventricular arrhythmia s/p ICD/PPM, CHF, COPD, IBD presents to the ED with complaints of dizziness and chest pain.  No new EKG changes from prior, trop x 2 negative, appears to be more MSK in nature   Endorses chronic FERRERA, plan was to get ECHO at [facility] however was not pursued as per patient, will obtain inpatient  Dizziness appears to be in setting of polypharmacy, it is possible patient is also orthostatic (low BP on admission    Admitted for further eval

## 2023-03-08 NOTE — PROGRESS NOTE ADULT - PROBLEM SELECTOR PLAN 3
-Continue entresto 24/26 BID w hold parameters  -Continue toprol 25 XL   -Continue farxiga 5 mg daily   -spironolactone 25 mg discontinued due to soft pressures  -TTE results noted  -f/u HF recs  -Strict Is/Os  -Daily weights

## 2023-03-09 DIAGNOSIS — M06.9 RHEUMATOID ARTHRITIS, UNSPECIFIED: ICD-10-CM

## 2023-03-09 LAB
ANION GAP SERPL CALC-SCNC: 10 MMOL/L — SIGNIFICANT CHANGE UP (ref 5–17)
ANION GAP SERPL CALC-SCNC: 13 MMOL/L — SIGNIFICANT CHANGE UP (ref 5–17)
BUN SERPL-MCNC: 13 MG/DL — SIGNIFICANT CHANGE UP (ref 7–23)
BUN SERPL-MCNC: 15 MG/DL — SIGNIFICANT CHANGE UP (ref 7–23)
CALCIUM SERPL-MCNC: 9.1 MG/DL — SIGNIFICANT CHANGE UP (ref 8.4–10.5)
CALCIUM SERPL-MCNC: 9.1 MG/DL — SIGNIFICANT CHANGE UP (ref 8.4–10.5)
CHLORIDE SERPL-SCNC: 107 MMOL/L — SIGNIFICANT CHANGE UP (ref 96–108)
CHLORIDE SERPL-SCNC: 109 MMOL/L — HIGH (ref 96–108)
CO2 SERPL-SCNC: 15 MMOL/L — LOW (ref 22–31)
CO2 SERPL-SCNC: 20 MMOL/L — LOW (ref 22–31)
CREAT SERPL-MCNC: 1.05 MG/DL — SIGNIFICANT CHANGE UP (ref 0.5–1.3)
CREAT SERPL-MCNC: 1.11 MG/DL — SIGNIFICANT CHANGE UP (ref 0.5–1.3)
EGFR: 52 ML/MIN/1.73M2 — LOW
EGFR: 56 ML/MIN/1.73M2 — LOW
GLUCOSE SERPL-MCNC: 106 MG/DL — HIGH (ref 70–99)
GLUCOSE SERPL-MCNC: 140 MG/DL — HIGH (ref 70–99)
POTASSIUM SERPL-MCNC: 4 MMOL/L — SIGNIFICANT CHANGE UP (ref 3.5–5.3)
POTASSIUM SERPL-MCNC: 4.7 MMOL/L — SIGNIFICANT CHANGE UP (ref 3.5–5.3)
POTASSIUM SERPL-SCNC: 4 MMOL/L — SIGNIFICANT CHANGE UP (ref 3.5–5.3)
POTASSIUM SERPL-SCNC: 4.7 MMOL/L — SIGNIFICANT CHANGE UP (ref 3.5–5.3)
SARS-COV-2 RNA SPEC QL NAA+PROBE: SIGNIFICANT CHANGE UP
SODIUM SERPL-SCNC: 137 MMOL/L — SIGNIFICANT CHANGE UP (ref 135–145)
SODIUM SERPL-SCNC: 137 MMOL/L — SIGNIFICANT CHANGE UP (ref 135–145)

## 2023-03-09 PROCEDURE — 99233 SBSQ HOSP IP/OBS HIGH 50: CPT

## 2023-03-09 PROCEDURE — 99232 SBSQ HOSP IP/OBS MODERATE 35: CPT

## 2023-03-09 RX ORDER — SODIUM CHLORIDE 9 MG/ML
250 INJECTION INTRAMUSCULAR; INTRAVENOUS; SUBCUTANEOUS ONCE
Refills: 0 | Status: COMPLETED | OUTPATIENT
Start: 2023-03-09 | End: 2023-03-09

## 2023-03-09 RX ORDER — SODIUM CHLORIDE 9 MG/ML
1000 INJECTION INTRAMUSCULAR; INTRAVENOUS; SUBCUTANEOUS
Refills: 0 | Status: DISCONTINUED | OUTPATIENT
Start: 2023-03-09 | End: 2023-03-11

## 2023-03-09 RX ORDER — ZOLPIDEM TARTRATE 10 MG/1
5 TABLET ORAL AT BEDTIME
Refills: 0 | Status: DISCONTINUED | OUTPATIENT
Start: 2023-03-09 | End: 2023-03-11

## 2023-03-09 RX ORDER — SPIRONOLACTONE 25 MG/1
25 TABLET, FILM COATED ORAL DAILY
Refills: 0 | Status: DISCONTINUED | OUTPATIENT
Start: 2023-03-09 | End: 2023-03-09

## 2023-03-09 RX ORDER — ALPRAZOLAM 0.25 MG
0.5 TABLET ORAL AT BEDTIME
Refills: 0 | Status: DISCONTINUED | OUTPATIENT
Start: 2023-03-09 | End: 2023-03-11

## 2023-03-09 RX ADMIN — Medication 500000 UNIT(S): at 22:05

## 2023-03-09 RX ADMIN — Medication 0.5 MILLIGRAM(S): at 17:48

## 2023-03-09 RX ADMIN — Medication 650 MILLIGRAM(S): at 22:05

## 2023-03-09 RX ADMIN — ATORVASTATIN CALCIUM 80 MILLIGRAM(S): 80 TABLET, FILM COATED ORAL at 21:32

## 2023-03-09 RX ADMIN — TIOTROPIUM BROMIDE 2 PUFF(S): 18 CAPSULE ORAL; RESPIRATORY (INHALATION) at 09:28

## 2023-03-09 RX ADMIN — Medication 500000 UNIT(S): at 12:22

## 2023-03-09 RX ADMIN — SACUBITRIL AND VALSARTAN 1 TABLET(S): 24; 26 TABLET, FILM COATED ORAL at 05:17

## 2023-03-09 RX ADMIN — Medication 650 MILLIGRAM(S): at 13:04

## 2023-03-09 RX ADMIN — SACUBITRIL AND VALSARTAN 1 TABLET(S): 24; 26 TABLET, FILM COATED ORAL at 17:42

## 2023-03-09 RX ADMIN — Medication 25 MILLIGRAM(S): at 05:18

## 2023-03-09 RX ADMIN — BUDESONIDE AND FORMOTEROL FUMARATE DIHYDRATE 2 PUFF(S): 160; 4.5 AEROSOL RESPIRATORY (INHALATION) at 17:45

## 2023-03-09 RX ADMIN — Medication 1 GRAM(S): at 12:23

## 2023-03-09 RX ADMIN — Medication 500000 UNIT(S): at 17:43

## 2023-03-09 RX ADMIN — Medication 500 MILLIGRAM(S): at 13:05

## 2023-03-09 RX ADMIN — SPIRONOLACTONE 25 MILLIGRAM(S): 25 TABLET, FILM COATED ORAL at 09:27

## 2023-03-09 RX ADMIN — Medication 1 GRAM(S): at 05:17

## 2023-03-09 RX ADMIN — MONTELUKAST 10 MILLIGRAM(S): 4 TABLET, CHEWABLE ORAL at 09:27

## 2023-03-09 RX ADMIN — CLOPIDOGREL BISULFATE 75 MILLIGRAM(S): 75 TABLET, FILM COATED ORAL at 09:28

## 2023-03-09 RX ADMIN — SODIUM CHLORIDE 30 MILLILITER(S): 9 INJECTION INTRAMUSCULAR; INTRAVENOUS; SUBCUTANEOUS at 11:04

## 2023-03-09 RX ADMIN — Medication 500000 UNIT(S): at 05:17

## 2023-03-09 RX ADMIN — Medication 500 MILLIGRAM(S): at 21:33

## 2023-03-09 RX ADMIN — Medication 500 MILLIGRAM(S): at 17:42

## 2023-03-09 RX ADMIN — SODIUM CHLORIDE 500 MILLILITER(S): 9 INJECTION INTRAMUSCULAR; INTRAVENOUS; SUBCUTANEOUS at 10:33

## 2023-03-09 RX ADMIN — Medication 650 MILLIGRAM(S): at 13:50

## 2023-03-09 RX ADMIN — Medication 500000 UNIT(S): at 01:33

## 2023-03-09 RX ADMIN — Medication 1 GRAM(S): at 17:42

## 2023-03-09 RX ADMIN — ZOLPIDEM TARTRATE 5 MILLIGRAM(S): 10 TABLET ORAL at 21:33

## 2023-03-09 RX ADMIN — FAMOTIDINE 20 MILLIGRAM(S): 10 INJECTION INTRAVENOUS at 09:28

## 2023-03-09 RX ADMIN — BUDESONIDE AND FORMOTEROL FUMARATE DIHYDRATE 2 PUFF(S): 160; 4.5 AEROSOL RESPIRATORY (INHALATION) at 05:15

## 2023-03-09 RX ADMIN — DAPAGLIFLOZIN 5 MILLIGRAM(S): 10 TABLET, FILM COATED ORAL at 05:16

## 2023-03-09 NOTE — PROGRESS NOTE ADULT - PROBLEM SELECTOR PLAN 2
Appears to be more MSK in nature   -Continue tylenol and home oxycodone   -Telemetry monitoring   -If chest pain obtain STAT EKG and cardiac enzymes  -TTE showing EF of 29%, severe LV systolic dysfunction, mild diastolic dysfunction  -cards recs appreciated

## 2023-03-09 NOTE — PROGRESS NOTE ADULT - ASSESSMENT
72 y F PMH of AAA, CVA, CAD s/p stent, ventricular arrhythmia s/p ICD/PPM, CHF, COPD, IBD presents to the ED with complaints of dizziness and chest pain.  No new EKG changes from prior, trop x 2 negative, appears to be more MSK in nature   Endorses chronic FERRERA, plan was to get ECHO at [facility] however was not pursued as per patient, will obtain inpatient  Dizziness appears to be in setting of polypharmacy, it is possible patient is also orthostatic (low BP on admission    Admitted for further eval
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
## 2023-03-09 NOTE — PROGRESS NOTE ADULT - PROBLEM SELECTOR PLAN 1
- 3/8/23 TTE:  LVIDd of 3.7 and LVEF of 29% severe segmental LVSF, minimal MR, mild-moderate TR,  Mild AS,   - Recommend an angiogram and RHC to further assess newly declined EF, please obtain a covid test in preperation  - Daily weights, I and Os', and BP  -Continue entresto 24/26 BID w hold parameters  -Continue toprol 25 XL   -HOLD farxiga 5 mg daily, in the setting of dehydration, and poor PO intake.   -Continue spironolactone 25 mg daily.

## 2023-03-09 NOTE — PROGRESS NOTE ADULT - PROBLEM SELECTOR PLAN 3
- Pt has been having increased neck and back pain. Has a history of RA  - Recommend a Rheumatology consult for further evaluation

## 2023-03-09 NOTE — PROGRESS NOTE ADULT - SUBJECTIVE AND OBJECTIVE BOX
Subjective:  - Pt seen and examined at bedside   - Currently in no acute distress and feels well  - Endorses tolerating dinner and breakfast and has not had any emesis in two days  - Has been having increased back and neck pain states she has rheumatoid arthritis and has not received her medication since admission.    Medications:  acetaminophen     Tablet .. 650 milliGRAM(s) Oral every 6 hours PRN  albuterol    90 MICROgram(s) HFA Inhaler 2 Puff(s) Inhalation every 6 hours PRN  ALPRAZolam 0.5 milliGRAM(s) Oral at bedtime PRN  atorvastatin 80 milliGRAM(s) Oral at bedtime  bisacodyl Suppository 10 milliGRAM(s) Rectal daily PRN  budesonide 160 MICROgram(s)/formoterol 4.5 MICROgram(s) Inhaler 2 Puff(s) Inhalation two times a day  ciprofloxacin     Tablet 500 milliGRAM(s) Oral every 12 hours  clopidogrel Tablet 75 milliGRAM(s) Oral daily  enoxaparin Injectable 40 milliGRAM(s) SubCutaneous every 24 hours  famotidine    Tablet 20 milliGRAM(s) Oral daily  metoprolol succinate ER 25 milliGRAM(s) Oral daily  metroNIDAZOLE    Tablet 500 milliGRAM(s) Oral three times a day  montelukast 10 milliGRAM(s) Oral daily  nystatin    Suspension 240042 Unit(s) Oral four times a day  ondansetron    Tablet 4 milliGRAM(s) Oral every 8 hours PRN  polyethylene glycol 3350 17 Gram(s) Oral daily PRN  sacubitril 24 mG/valsartan 26 mG 1 Tablet(s) Oral two times a day  sodium chloride 0.9%. 1000 milliLiter(s) IV Continuous <Continuous>  sucralfate suspension 1 Gram(s) Oral <User Schedule>  tiotropium 2.5 MICROgram(s) Inhaler 2 Puff(s) Inhalation daily  zolpidem 5 milliGRAM(s) Oral at bedtime PRN      Physical Exam:    Vitals:  Vital Signs Last 24 Hours  T(C): 36.6 (23 @ 12:35), Max: 36.6 (23 @ 12:35)  HR: 74 (23 @ 12:35) (74 - 86)  BP: 97/61 (23 @ 12:35) (95/61 - 104/66)  RR: 18 (23 @ 12:35) (18 - 18)  SpO2: 100% (23 @ 12:35) (98% - 100%)    Weight in k.9 ( @ 00:58)    I&O's Summary      Tele:    General: No distress. Comfortable.  HEENT: EOM intact.  Neck: Neck supple. JVP not elevated. No masses  Chest: Clear to auscultation bilaterally  CV: Normal S1 and S2. No murmurs, rub, or gallops. Radial pulses normal.  Abdomen: Soft, non-distended, non-tender  Skin: No rashes or skin breakdown  Neurology: Alert and oriented times three. Sensation intact  Psych: Affect normal    Labs:        137  |  107  |  13  ----------------------------<  140<H>  4.0   |  20<L>  |  1.05    Ca    9.1      09 Mar 2023 14:04            Serum Pro-Brain Natriuretic Peptide: 377 pg/mL ( @ 16:21)

## 2023-03-09 NOTE — PROGRESS NOTE ADULT - PROBLEM SELECTOR PLAN 3
-orthostatics wnl although bps low  -Will hold amlodipine for now, hold spironolactone  -Monitor vitals [id number]  -Fall precaution

## 2023-03-09 NOTE — PROGRESS NOTE ADULT - SUBJECTIVE AND OBJECTIVE BOX
PROGRESS NOTE:   Authoted by Dr. Jason James MD, Available on MS Teams    Patient is a 72y old  Female who presents with a chief complaint of CP on exertion (08 Mar 2023 14:53)      SUBJECTIVE / OVERNIGHT EVENTS: Patient denies chest pain or shortness of breath today. Feels very weak.     ADDITIONAL REVIEW OF SYSTEMS:    MEDICATIONS  (STANDING):  atorvastatin 80 milliGRAM(s) Oral at bedtime  budesonide 160 MICROgram(s)/formoterol 4.5 MICROgram(s) Inhaler 2 Puff(s) Inhalation two times a day  ciprofloxacin     Tablet 500 milliGRAM(s) Oral every 12 hours  clopidogrel Tablet 75 milliGRAM(s) Oral daily  enoxaparin Injectable 40 milliGRAM(s) SubCutaneous every 24 hours  famotidine    Tablet 20 milliGRAM(s) Oral daily  metoprolol succinate ER 25 milliGRAM(s) Oral daily  metroNIDAZOLE    Tablet 500 milliGRAM(s) Oral three times a day  montelukast 10 milliGRAM(s) Oral daily  nystatin    Suspension 280565 Unit(s) Oral four times a day  sacubitril 24 mG/valsartan 26 mG 1 Tablet(s) Oral two times a day  sodium chloride 0.9%. 1000 milliLiter(s) (30 mL/Hr) IV Continuous <Continuous>  sucralfate suspension 1 Gram(s) Oral <User Schedule>  tiotropium 2.5 MICROgram(s) Inhaler 2 Puff(s) Inhalation daily    MEDICATIONS  (PRN):  acetaminophen     Tablet .. 650 milliGRAM(s) Oral every 6 hours PRN Temp greater or equal to 38C (100.4F), Mild Pain (1 - 3)  albuterol    90 MICROgram(s) HFA Inhaler 2 Puff(s) Inhalation every 6 hours PRN Shortness of Breath and/or Wheezing  ALPRAZolam 0.5 milliGRAM(s) Oral at bedtime PRN sleep/anxiety  bisacodyl Suppository 10 milliGRAM(s) Rectal daily PRN Constipation  ondansetron    Tablet 4 milliGRAM(s) Oral every 8 hours PRN Nausea and/or Vomiting  polyethylene glycol 3350 17 Gram(s) Oral daily PRN Constipation  zolpidem 5 milliGRAM(s) Oral at bedtime PRN Insomnia      CAPILLARY BLOOD GLUCOSE        I&O's Summary      PHYSICAL EXAM:  Vital Signs Last 24 Hrs  T(C): 36.6 (09 Mar 2023 12:35), Max: 36.6 (09 Mar 2023 12:35)  T(F): 97.9 (09 Mar 2023 12:35), Max: 97.9 (09 Mar 2023 12:35)  HR: 74 (09 Mar 2023 12:35) (74 - 86)  BP: 97/61 (09 Mar 2023 12:35) (95/61 - 104/66)  BP(mean): --  RR: 18 (09 Mar 2023 12:35) (18 - 18)  SpO2: 100% (09 Mar 2023 12:35) (98% - 100%)    Parameters below as of 09 Mar 2023 12:35  Patient On (Oxygen Delivery Method): room air        CONSTITUTIONAL: NAD, well-developed  RESPIRATORY: Normal respiratory effort; lungs are clear to auscultation bilaterally  CARDIOVASCULAR: Regular rate and rhythm, normal S1 and S2, no murmur/rub/gallop; No lower extremity edema  ABDOMEN: Nontender to palpation, normoactive bowel sounds, no rebound/guarding  MUSCLOSKELETAL: no clubbing or cyanosis of digits; no joint swelling or tenderness to palpation  PSYCH: A+O to person, place, and time; affect appropriate    LABS:    03-09    137  |  109<H>  |  15  ----------------------------<  106<H>  4.7   |  15<L>  |  1.11    Ca    9.1      09 Mar 2023 06:36

## 2023-03-09 NOTE — PROGRESS NOTE ADULT - PROBLEM SELECTOR PLAN 1
-Continue entresto 24/26 BID w hold parameters  -Continue toprol 25 XL   -disconinued farxiga 5 mg due to low bps  -spironolactone 25 mg discontinued  -TTE showing EF of 29%, severe LV systolic dysfunction, mild diastolic dysfunction  -HF recs appreciated, plan for possible cath?  -Strict Is/Os  -Daily weights

## 2023-03-10 DIAGNOSIS — K59.00 CONSTIPATION, UNSPECIFIED: ICD-10-CM

## 2023-03-10 LAB
ANION GAP SERPL CALC-SCNC: 9 MMOL/L — SIGNIFICANT CHANGE UP (ref 5–17)
BUN SERPL-MCNC: 12 MG/DL — SIGNIFICANT CHANGE UP (ref 7–23)
CALCIUM SERPL-MCNC: 9.1 MG/DL — SIGNIFICANT CHANGE UP (ref 8.4–10.5)
CHLORIDE SERPL-SCNC: 106 MMOL/L — SIGNIFICANT CHANGE UP (ref 96–108)
CO2 SERPL-SCNC: 21 MMOL/L — LOW (ref 22–31)
CREAT SERPL-MCNC: 1.08 MG/DL — SIGNIFICANT CHANGE UP (ref 0.5–1.3)
EGFR: 54 ML/MIN/1.73M2 — LOW
GLUCOSE SERPL-MCNC: 104 MG/DL — HIGH (ref 70–99)
HCT VFR BLD CALC: 36.2 % — SIGNIFICANT CHANGE UP (ref 34.5–45)
HGB BLD-MCNC: 11.2 G/DL — LOW (ref 11.5–15.5)
HGB FLD-MCNC: 11.6 G/DL — LOW (ref 11.7–16.5)
MCHC RBC-ENTMCNC: 30.9 GM/DL — LOW (ref 32–36)
MCHC RBC-ENTMCNC: 32 PG — SIGNIFICANT CHANGE UP (ref 27–34)
MCV RBC AUTO: 103.4 FL — HIGH (ref 80–100)
NRBC # BLD: 0 /100 WBCS — SIGNIFICANT CHANGE UP (ref 0–0)
OXYHGB MFR BLDMV: 68.1 % — LOW (ref 90–95)
PLATELET # BLD AUTO: 211 K/UL — SIGNIFICANT CHANGE UP (ref 150–400)
POTASSIUM SERPL-MCNC: 3.7 MMOL/L — SIGNIFICANT CHANGE UP (ref 3.5–5.3)
POTASSIUM SERPL-SCNC: 3.7 MMOL/L — SIGNIFICANT CHANGE UP (ref 3.5–5.3)
RBC # BLD: 3.5 M/UL — LOW (ref 3.8–5.2)
RBC # FLD: 15.2 % — HIGH (ref 10.3–14.5)
SAO2 % BLD: 69.8 % — SIGNIFICANT CHANGE UP (ref 60–90)
SODIUM SERPL-SCNC: 136 MMOL/L — SIGNIFICANT CHANGE UP (ref 135–145)
WBC # BLD: 6.14 K/UL — SIGNIFICANT CHANGE UP (ref 3.8–10.5)
WBC # FLD AUTO: 6.14 K/UL — SIGNIFICANT CHANGE UP (ref 3.8–10.5)

## 2023-03-10 PROCEDURE — 99152 MOD SED SAME PHYS/QHP 5/>YRS: CPT

## 2023-03-10 PROCEDURE — 99233 SBSQ HOSP IP/OBS HIGH 50: CPT

## 2023-03-10 PROCEDURE — 93460 R&L HRT ART/VENTRICLE ANGIO: CPT | Mod: 26

## 2023-03-10 RX ORDER — POLYETHYLENE GLYCOL 3350 17 G/17G
17 POWDER, FOR SOLUTION ORAL
Qty: 0 | Refills: 0 | DISCHARGE
Start: 2023-03-10

## 2023-03-10 RX ORDER — ACETAMINOPHEN 500 MG
1000 TABLET ORAL ONCE
Refills: 0 | Status: COMPLETED | OUTPATIENT
Start: 2023-03-10 | End: 2023-03-10

## 2023-03-10 RX ORDER — AMLODIPINE BESYLATE 2.5 MG/1
1 TABLET ORAL
Qty: 0 | Refills: 0 | DISCHARGE

## 2023-03-10 RX ORDER — CIPROFLOXACIN LACTATE 400MG/40ML
1 VIAL (ML) INTRAVENOUS
Qty: 0 | Refills: 0 | DISCHARGE

## 2023-03-10 RX ORDER — OXYCODONE HYDROCHLORIDE 5 MG/1
1 TABLET ORAL
Qty: 0 | Refills: 0 | DISCHARGE

## 2023-03-10 RX ORDER — FINASTERIDE 5 MG/1
1 TABLET, FILM COATED ORAL
Qty: 0 | Refills: 0 | DISCHARGE

## 2023-03-10 RX ORDER — ACETAMINOPHEN 500 MG
2 TABLET ORAL
Qty: 0 | Refills: 0 | DISCHARGE
Start: 2023-03-10

## 2023-03-10 RX ORDER — METRONIDAZOLE 500 MG
1 TABLET ORAL
Qty: 0 | Refills: 0 | DISCHARGE

## 2023-03-10 RX ADMIN — BUDESONIDE AND FORMOTEROL FUMARATE DIHYDRATE 2 PUFF(S): 160; 4.5 AEROSOL RESPIRATORY (INHALATION) at 05:04

## 2023-03-10 RX ADMIN — Medication 1 GRAM(S): at 12:02

## 2023-03-10 RX ADMIN — ONDANSETRON 4 MILLIGRAM(S): 8 TABLET, FILM COATED ORAL at 23:01

## 2023-03-10 RX ADMIN — Medication 1 GRAM(S): at 05:05

## 2023-03-10 RX ADMIN — BUDESONIDE AND FORMOTEROL FUMARATE DIHYDRATE 2 PUFF(S): 160; 4.5 AEROSOL RESPIRATORY (INHALATION) at 20:57

## 2023-03-10 RX ADMIN — SACUBITRIL AND VALSARTAN 1 TABLET(S): 24; 26 TABLET, FILM COATED ORAL at 20:58

## 2023-03-10 RX ADMIN — Medication 0.5 MILLIGRAM(S): at 21:30

## 2023-03-10 RX ADMIN — FAMOTIDINE 20 MILLIGRAM(S): 10 INJECTION INTRAVENOUS at 11:53

## 2023-03-10 RX ADMIN — ATORVASTATIN CALCIUM 80 MILLIGRAM(S): 80 TABLET, FILM COATED ORAL at 21:31

## 2023-03-10 RX ADMIN — Medication 25 MILLIGRAM(S): at 05:04

## 2023-03-10 RX ADMIN — Medication 10 MILLIGRAM(S): at 12:03

## 2023-03-10 RX ADMIN — Medication 1 GRAM(S): at 20:57

## 2023-03-10 RX ADMIN — Medication 650 MILLIGRAM(S): at 12:24

## 2023-03-10 RX ADMIN — TIOTROPIUM BROMIDE 2 PUFF(S): 18 CAPSULE ORAL; RESPIRATORY (INHALATION) at 11:55

## 2023-03-10 RX ADMIN — MONTELUKAST 10 MILLIGRAM(S): 4 TABLET, CHEWABLE ORAL at 11:53

## 2023-03-10 RX ADMIN — ZOLPIDEM TARTRATE 5 MILLIGRAM(S): 10 TABLET ORAL at 21:31

## 2023-03-10 RX ADMIN — Medication 650 MILLIGRAM(S): at 11:53

## 2023-03-10 RX ADMIN — CLOPIDOGREL BISULFATE 75 MILLIGRAM(S): 75 TABLET, FILM COATED ORAL at 11:53

## 2023-03-10 RX ADMIN — SACUBITRIL AND VALSARTAN 1 TABLET(S): 24; 26 TABLET, FILM COATED ORAL at 05:04

## 2023-03-10 RX ADMIN — ONDANSETRON 4 MILLIGRAM(S): 8 TABLET, FILM COATED ORAL at 15:13

## 2023-03-10 RX ADMIN — Medication 400 MILLIGRAM(S): at 18:29
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
## 2023-03-10 NOTE — PROGRESS NOTE ADULT - SUBJECTIVE AND OBJECTIVE BOX
Subjective:  - Pt seen and examined in bed, in no acute distress at this time   - Pt reports constipation for the past 5 days with some abdominal discomfort   - Confused about her   Medications:  acetaminophen     Tablet .. 650 milliGRAM(s) Oral every 6 hours PRN  albuterol    90 MICROgram(s) HFA Inhaler 2 Puff(s) Inhalation every 6 hours PRN  ALPRAZolam 0.5 milliGRAM(s) Oral at bedtime PRN  atorvastatin 80 milliGRAM(s) Oral at bedtime  bisacodyl Suppository 10 milliGRAM(s) Rectal daily PRN  budesonide 160 MICROgram(s)/formoterol 4.5 MICROgram(s) Inhaler 2 Puff(s) Inhalation two times a day  clopidogrel Tablet 75 milliGRAM(s) Oral daily  enoxaparin Injectable 40 milliGRAM(s) SubCutaneous every 24 hours  famotidine    Tablet 20 milliGRAM(s) Oral daily  metoprolol succinate ER 25 milliGRAM(s) Oral daily  montelukast 10 milliGRAM(s) Oral daily  ondansetron    Tablet 4 milliGRAM(s) Oral every 8 hours PRN  polyethylene glycol 3350 17 Gram(s) Oral daily PRN  sacubitril 24 mG/valsartan 26 mG 1 Tablet(s) Oral two times a day  sodium chloride 0.9%. 1000 milliLiter(s) IV Continuous <Continuous>  sucralfate suspension 1 Gram(s) Oral <User Schedule>  tiotropium 2.5 MICROgram(s) Inhaler 2 Puff(s) Inhalation daily  zolpidem 5 milliGRAM(s) Oral at bedtime PRN      Physical Exam:    Vitals:  Vital Signs Last 24 Hours  T(C): 36.6 (03-10-23 @ 11:45), Max: 36.9 (03-10-23 @ 06:04)  HR: 76 (03-10-23 @ 11:45) (75 - 78)  BP: 99/67 (03-10-23 @ 11:45) (97/61 - 113/70)  RR: 18 (03-10-23 @ 11:45) (18 - 18)  SpO2: 99% (03-10-23 @ 11:45) (99% - 100%)        I&O's Summary    09 Mar 2023 07:01  -  10 Mar 2023 07:00  --------------------------------------------------------  IN: 610 mL / OUT: 0 mL / NET: 610 mL    10 Mar 2023 07:01  -  10 Mar 2023 15:35  --------------------------------------------------------  IN: 200 mL / OUT: 0 mL / NET: 200 mL        Tele:    General: No distress. Comfortable.  HEENT: EOM intact.  Neck: Neck supple. JVP not elevated. No masses  Chest: Clear to auscultation bilaterally  CV: Normal S1 and S2. No murmurs, rub, or gallops. Radial pulses normal.  Abdomen: Soft, non-distended, non-tender  Skin: No rashes or skin breakdown  Neurology: Alert and oriented times three. Sensation intact  Psych: Affect normal    Labs:                        11.2   6.14  )-----------( 211      ( 10 Mar 2023 11:08 )             36.2     03-10    136  |  106  |  12  ----------------------------<  104<H>  3.7   |  21<L>  |  1.08    Ca    9.1      10 Mar 2023 11:09                     Subjective:  - Pt seen and examined in bed, in no acute distress at this time   - Pt reports constipation for the past 5 days with some abdominal discomfort   - Reports tolerating medication and food with no recent episodes of emesis.  - Denies SOB, CP , Palpitations     Medications:  acetaminophen     Tablet .. 650 milliGRAM(s) Oral every 6 hours PRN  albuterol    90 MICROgram(s) HFA Inhaler 2 Puff(s) Inhalation every 6 hours PRN  ALPRAZolam 0.5 milliGRAM(s) Oral at bedtime PRN  atorvastatin 80 milliGRAM(s) Oral at bedtime  bisacodyl Suppository 10 milliGRAM(s) Rectal daily PRN  budesonide 160 MICROgram(s)/formoterol 4.5 MICROgram(s) Inhaler 2 Puff(s) Inhalation two times a day  clopidogrel Tablet 75 milliGRAM(s) Oral daily  enoxaparin Injectable 40 milliGRAM(s) SubCutaneous every 24 hours  famotidine    Tablet 20 milliGRAM(s) Oral daily  metoprolol succinate ER 25 milliGRAM(s) Oral daily  montelukast 10 milliGRAM(s) Oral daily  ondansetron    Tablet 4 milliGRAM(s) Oral every 8 hours PRN  polyethylene glycol 3350 17 Gram(s) Oral daily PRN  sacubitril 24 mG/valsartan 26 mG 1 Tablet(s) Oral two times a day  sodium chloride 0.9%. 1000 milliLiter(s) IV Continuous <Continuous>  sucralfate suspension 1 Gram(s) Oral <User Schedule>  tiotropium 2.5 MICROgram(s) Inhaler 2 Puff(s) Inhalation daily  zolpidem 5 milliGRAM(s) Oral at bedtime PRN      Physical Exam:    Vitals:  Vital Signs Last 24 Hours  T(C): 36.6 (03-10-23 @ 11:45), Max: 36.9 (03-10-23 @ 06:04)  HR: 76 (03-10-23 @ 11:45) (75 - 78)  BP: 99/67 (03-10-23 @ 11:45) (97/61 - 113/70)  RR: 18 (03-10-23 @ 11:45) (18 - 18)  SpO2: 99% (03-10-23 @ 11:45) (99% - 100%)        I&O's Summary    09 Mar 2023 07:01  -  10 Mar 2023 07:00  --------------------------------------------------------  IN: 610 mL / OUT: 0 mL / NET: 610 mL    10 Mar 2023 07:01  -  10 Mar 2023 15:35  --------------------------------------------------------  IN: 200 mL / OUT: 0 mL / NET: 200 mL        Tele:    General: No distress. Comfortable.  HEENT: EOM intact.  Neck: Neck supple. JVP not elevated. No masses  Chest: Clear to auscultation bilaterally  CV: Normal S1 and S2. No murmurs, rub, or gallops. Radial pulses normal.  Abdomen: Soft, non-distended, non-tender  Skin: No rashes or skin breakdown  Neurology: Alert and oriented times three. Sensation intact  Psych: Affect normal    Labs:                        11.2   6.14  )-----------( 211      ( 10 Mar 2023 11:08 )             36.2     03-10    136  |  106  |  12  ----------------------------<  104<H>  3.7   |  21<L>  |  1.08    Ca    9.1      10 Mar 2023 11:09

## 2023-03-10 NOTE — PROGRESS NOTE ADULT - PROBLEM SELECTOR PLAN 3
-orthostatics wnl although bps low  -Will hold amlodipine for now, hold spironolactone (resume as tolerated)  -Monitor vitals [id number]  -Fall precaution

## 2023-03-10 NOTE — CHART NOTE - NSCHARTNOTEFT_GEN_A_CORE
Discussed with patient and her daughter. Patient denies any contrast allergy, has never had any reaction to contrast previously. Contrast allergy removed from chart. Patient agreeable to getting contrast for any imaging or procedure.     Jason James MD  Hospitalist  [facility]

## 2023-03-10 NOTE — PROGRESS NOTE ADULT - ASSESSMENT
72 y F PMH of AAA, CVA, CAD s/p stent, ventricular arrhythmia s/p ICD/PPM, CHF, COPD, IBD presents to the ED with complaints of dizziness and chest pain.  No new EKG changes from prior, trop x 2 negative, appears to be more MSK in nature   Endorses chronic FERRERA, plan was to get ECHO at [facility] however was not pursued as per patient, will obtain inpatient  Dizziness appears to be in setting of polypharmacy, it is possible patient is also orthostatic (low BP on admission    Admitted for further eval

## 2023-03-10 NOTE — PROGRESS NOTE ADULT - PROBLEM SELECTOR PLAN 1
- 3/8/23 TTE:  LVIDd of 3.7 and LVEF of 29% severe segmental LVSF, minimal MR, mild-moderate TR,  Mild AS,   - Recommend an angiogram and RHC to further assess newly declined EF, please obtain a covid test in preperation  - Daily weights, I and Os', and BP  -Continue entresto 24/26 BID w hold parameters  -Continue toprol 25 XL   -HOLD farxiga 5 mg daily, in the setting of dehydration, and poor PO intake.   -Continue spironolactone 25 mg daily. - 3/8/23 TTE:  LVIDd of 3.7 and LVEF of 29% severe segmental LVSF, minimal MR, mild-moderate TR,  Mild AS,   - Recommend an angiogram and RHC to further assess newly declined EF, please obtain a covid test in preperation  - Daily weights, I and Os', and BP  -Continue entresto 24/26 BID w hold parameters  -Continue toprol 25 XL   -HOLD farxiga 5 mg daily, in the setting of dehydration, and poor PO intake.   -Continue spironolactone 25 mg daily.  - Pt pending angiogram/ RHC today,  procedure was delayed to clarify/ reconcile pts allergies

## 2023-03-10 NOTE — PROGRESS NOTE ADULT - PROBLEM SELECTOR PLAN 1
-Continue entresto 24/26 BID w hold parameters  -Continue toprol 25 XL   -discontinued farxiga 5 mg due to low bps  -spironolactone 25 mg discontinued  -TTE showing EF of 29%, severe LV systolic dysfunction, mild diastolic dysfunction  -HF recs appreciated, plan for RH cath today  -Strict Is/Os  -Daily weights
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
## 2023-03-10 NOTE — PROGRESS NOTE ADULT - SUBJECTIVE AND OBJECTIVE BOX
PROGRESS NOTE:   Authoted by Dr. Jason James MD, Available on MS Teams    Patient is a 72y old  Female who presents with a chief complaint of CP on exertion (09 Mar 2023 15:15)      SUBJECTIVE / OVERNIGHT EVENTS: Patient denies any chest pain or shortness of breath. No nausea, vomiting, abdominal pain.     ADDITIONAL REVIEW OF SYSTEMS:    MEDICATIONS  (STANDING):  atorvastatin 80 milliGRAM(s) Oral at bedtime  budesonide 160 MICROgram(s)/formoterol 4.5 MICROgram(s) Inhaler 2 Puff(s) Inhalation two times a day  clopidogrel Tablet 75 milliGRAM(s) Oral daily  enoxaparin Injectable 40 milliGRAM(s) SubCutaneous every 24 hours  famotidine    Tablet 20 milliGRAM(s) Oral daily  metoprolol succinate ER 25 milliGRAM(s) Oral daily  montelukast 10 milliGRAM(s) Oral daily  sacubitril 24 mG/valsartan 26 mG 1 Tablet(s) Oral two times a day  sodium chloride 0.9%. 1000 milliLiter(s) (30 mL/Hr) IV Continuous <Continuous>  sucralfate suspension 1 Gram(s) Oral <User Schedule>  tiotropium 2.5 MICROgram(s) Inhaler 2 Puff(s) Inhalation daily    MEDICATIONS  (PRN):  acetaminophen     Tablet .. 650 milliGRAM(s) Oral every 6 hours PRN Temp greater or equal to 38C (100.4F), Mild Pain (1 - 3)  albuterol    90 MICROgram(s) HFA Inhaler 2 Puff(s) Inhalation every 6 hours PRN Shortness of Breath and/or Wheezing  ALPRAZolam 0.5 milliGRAM(s) Oral at bedtime PRN sleep/anxiety  bisacodyl Suppository 10 milliGRAM(s) Rectal daily PRN Constipation  ondansetron    Tablet 4 milliGRAM(s) Oral every 8 hours PRN Nausea and/or Vomiting  polyethylene glycol 3350 17 Gram(s) Oral daily PRN Constipation  zolpidem 5 milliGRAM(s) Oral at bedtime PRN Insomnia      CAPILLARY BLOOD GLUCOSE        I&O's Summary    09 Mar 2023 07:01  -  10 Mar 2023 07:00  --------------------------------------------------------  IN: 610 mL / OUT: 0 mL / NET: 610 mL    10 Mar 2023 07:01  -  10 Mar 2023 13:39  --------------------------------------------------------  IN: 200 mL / OUT: 0 mL / NET: 200 mL        PHYSICAL EXAM:  Vital Signs Last 24 Hrs  T(C): 36.6 (10 Mar 2023 11:45), Max: 36.9 (10 Mar 2023 06:04)  T(F): 97.8 (10 Mar 2023 11:45), Max: 98.4 (10 Mar 2023 06:04)  HR: 76 (10 Mar 2023 11:45) (75 - 78)  BP: 99/67 (10 Mar 2023 11:45) (97/61 - 113/70)  BP(mean): --  RR: 18 (10 Mar 2023 11:45) (18 - 18)  SpO2: 99% (10 Mar 2023 11:45) (99% - 100%)    Parameters below as of 10 Mar 2023 11:45  Patient On (Oxygen Delivery Method): room air        CONSTITUTIONAL: NAD, well-developed  RESPIRATORY: Normal respiratory effort; lungs are clear to auscultation bilaterally  CARDIOVASCULAR: Regular rate and rhythm, normal S1 and S2, no murmur/rub/gallop; No lower extremity edema  ABDOMEN: Nontender to palpation, normoactive bowel sounds, no rebound/guarding  MUSCLOSKELETAL: no clubbing or cyanosis of digits; no joint swelling or tenderness to palpation  PSYCH: A+O to person, place, and time; affect appropriate    LABS:                        11.2   6.14  )-----------( 211      ( 10 Mar 2023 11:08 )             36.2     03-10    136  |  106  |  12  ----------------------------<  104<H>  3.7   |  21<L>  |  1.08    Ca    9.1      10 Mar 2023 11:09      Discussed with Dr Slade, HF. Will plan for cath given no contrast allergy

## 2023-03-11 ENCOUNTER — TRANSCRIPTION ENCOUNTER (OUTPATIENT)
Age: 73
End: 2023-03-11

## 2023-03-11 VITALS — DIASTOLIC BLOOD PRESSURE: 62 MMHG | SYSTOLIC BLOOD PRESSURE: 103 MMHG

## 2023-03-11 PROCEDURE — C1889: CPT

## 2023-03-11 PROCEDURE — 83690 ASSAY OF LIPASE: CPT

## 2023-03-11 PROCEDURE — 83880 ASSAY OF NATRIURETIC PEPTIDE: CPT

## 2023-03-11 PROCEDURE — 93460 R&L HRT ART/VENTRICLE ANGIO: CPT

## 2023-03-11 PROCEDURE — 84295 ASSAY OF SERUM SODIUM: CPT

## 2023-03-11 PROCEDURE — 85027 COMPLETE CBC AUTOMATED: CPT

## 2023-03-11 PROCEDURE — 99239 HOSP IP/OBS DSCHRG MGMT >30: CPT | Mod: GC

## 2023-03-11 PROCEDURE — C1887: CPT

## 2023-03-11 PROCEDURE — 93005 ELECTROCARDIOGRAM TRACING: CPT

## 2023-03-11 PROCEDURE — 97530 THERAPEUTIC ACTIVITIES: CPT

## 2023-03-11 PROCEDURE — 82435 ASSAY OF BLOOD CHLORIDE: CPT

## 2023-03-11 PROCEDURE — 71045 X-RAY EXAM CHEST 1 VIEW: CPT

## 2023-03-11 PROCEDURE — 74177 CT ABD & PELVIS W/CONTRAST: CPT | Mod: MA

## 2023-03-11 PROCEDURE — 83735 ASSAY OF MAGNESIUM: CPT

## 2023-03-11 PROCEDURE — 93306 TTE W/DOPPLER COMPLETE: CPT

## 2023-03-11 PROCEDURE — 84145 PROCALCITONIN (PCT): CPT

## 2023-03-11 PROCEDURE — 93010 ELECTROCARDIOGRAM REPORT: CPT

## 2023-03-11 PROCEDURE — 85014 HEMATOCRIT: CPT

## 2023-03-11 PROCEDURE — U0003: CPT

## 2023-03-11 PROCEDURE — 84132 ASSAY OF SERUM POTASSIUM: CPT

## 2023-03-11 PROCEDURE — 82330 ASSAY OF CALCIUM: CPT

## 2023-03-11 PROCEDURE — 80048 BASIC METABOLIC PNL TOTAL CA: CPT

## 2023-03-11 PROCEDURE — C1769: CPT

## 2023-03-11 PROCEDURE — 84100 ASSAY OF PHOSPHORUS: CPT

## 2023-03-11 PROCEDURE — 85018 HEMOGLOBIN: CPT

## 2023-03-11 PROCEDURE — U0005: CPT

## 2023-03-11 PROCEDURE — 99285 EMERGENCY DEPT VISIT HI MDM: CPT

## 2023-03-11 PROCEDURE — 81003 URINALYSIS AUTO W/O SCOPE: CPT

## 2023-03-11 PROCEDURE — 84484 ASSAY OF TROPONIN QUANT: CPT

## 2023-03-11 PROCEDURE — C1894: CPT

## 2023-03-11 PROCEDURE — 82947 ASSAY GLUCOSE BLOOD QUANT: CPT

## 2023-03-11 PROCEDURE — 36415 COLL VENOUS BLD VENIPUNCTURE: CPT

## 2023-03-11 PROCEDURE — 80053 COMPREHEN METABOLIC PANEL: CPT

## 2023-03-11 PROCEDURE — 85025 COMPLETE CBC W/AUTO DIFF WBC: CPT

## 2023-03-11 PROCEDURE — 82803 BLOOD GASES ANY COMBINATION: CPT

## 2023-03-11 PROCEDURE — 97116 GAIT TRAINING THERAPY: CPT

## 2023-03-11 PROCEDURE — 83605 ASSAY OF LACTIC ACID: CPT

## 2023-03-11 PROCEDURE — 87637 SARSCOV2&INF A&B&RSV AMP PRB: CPT

## 2023-03-11 PROCEDURE — 94640 AIRWAY INHALATION TREATMENT: CPT

## 2023-03-11 PROCEDURE — 97162 PT EVAL MOD COMPLEX 30 MIN: CPT

## 2023-03-11 RX ORDER — FAMOTIDINE 10 MG/ML
1 INJECTION INTRAVENOUS
Qty: 0 | Refills: 0 | DISCHARGE

## 2023-03-11 RX ORDER — SPIRONOLACTONE 25 MG/1
1 TABLET, FILM COATED ORAL
Qty: 30 | Refills: 0
Start: 2023-03-11 | End: 2023-04-09

## 2023-03-11 RX ORDER — FAMOTIDINE 10 MG/ML
1 INJECTION INTRAVENOUS
Qty: 30 | Refills: 0
Start: 2023-03-11 | End: 2023-04-09

## 2023-03-11 RX ORDER — SPIRONOLACTONE 25 MG/1
25 TABLET, FILM COATED ORAL DAILY
Refills: 0 | Status: DISCONTINUED | OUTPATIENT
Start: 2023-03-11 | End: 2023-03-11

## 2023-03-11 RX ORDER — DAPAGLIFLOZIN 10 MG/1
1 TABLET, FILM COATED ORAL
Qty: 0 | Refills: 0 | DISCHARGE

## 2023-03-11 RX ORDER — METOCLOPRAMIDE HCL 10 MG
5 TABLET ORAL ONCE
Refills: 0 | Status: COMPLETED | OUTPATIENT
Start: 2023-03-11 | End: 2023-03-11

## 2023-03-11 RX ORDER — MONTELUKAST 4 MG/1
1 TABLET, CHEWABLE ORAL
Qty: 30 | Refills: 0
Start: 2023-03-11 | End: 2023-04-09

## 2023-03-11 RX ORDER — SPIRONOLACTONE 25 MG/1
0.5 TABLET, FILM COATED ORAL
Qty: 0 | Refills: 0 | DISCHARGE

## 2023-03-11 RX ADMIN — Medication 25 MILLIGRAM(S): at 05:15

## 2023-03-11 RX ADMIN — MONTELUKAST 10 MILLIGRAM(S): 4 TABLET, CHEWABLE ORAL at 12:06

## 2023-03-11 RX ADMIN — Medication 1 GRAM(S): at 05:57

## 2023-03-11 RX ADMIN — SACUBITRIL AND VALSARTAN 1 TABLET(S): 24; 26 TABLET, FILM COATED ORAL at 05:57

## 2023-03-11 RX ADMIN — FAMOTIDINE 20 MILLIGRAM(S): 10 INJECTION INTRAVENOUS at 12:06

## 2023-03-11 RX ADMIN — Medication 5 MILLIGRAM(S): at 03:07

## 2023-03-11 RX ADMIN — CLOPIDOGREL BISULFATE 75 MILLIGRAM(S): 75 TABLET, FILM COATED ORAL at 12:06

## 2023-03-11 RX ADMIN — ENOXAPARIN SODIUM 40 MILLIGRAM(S): 100 INJECTION SUBCUTANEOUS at 05:57

## 2023-03-11 RX ADMIN — BUDESONIDE AND FORMOTEROL FUMARATE DIHYDRATE 2 PUFF(S): 160; 4.5 AEROSOL RESPIRATORY (INHALATION) at 05:58

## 2023-03-11 RX ADMIN — Medication 1 GRAM(S): at 12:06

## 2023-03-11 NOTE — DISCHARGE NOTE PROVIDER - NSFOLLOWUPCLINICS_GEN_ALL_ED_FT
Gastroenterology at [facility]  Gastroenterology  [address] [place] [id number]  Phone: [phone]  Fax:

## 2023-03-11 NOTE — PROGRESS NOTE ADULT - PROBLEM SELECTOR PLAN 1
TTE showing EF of 29%, severe LV systolic dysfunction, mild diastolic dysfunction  - s/p LHC/RHC done yesterday, LAD 40%, Cx 5-60% occlusions. Reviewed RH pressures  - awaiting on HF team re-eval and plans  -Continue entresto 24/26 BID w hold parameters  -Continue toprol 25 XL   -discontinued farxiga 5 mg due to low bps  -spironolactone 25 mg discontinued  -Strict Is/Os and Daily weights TTE showing EF of 29%, severe LV systolic dysfunction, mild diastolic dysfunction  - s/p LHC/RHC done yesterday, LAD 40%, Cx 5-60% occlusions. Reviewed RH pressures  - HF team evaluated post cath and cleared for d/c, outpatient f/u with Dr Colon in a week.  -Continue Entresto 24/26 BID, toprol 25 XL, spironolactone 25 mg/d (in 2 days)  -discontinued farxiga 5 mg due to low bps  - d/c home today, spoke to her daughter  - d/c time 42 min

## 2023-03-11 NOTE — PROGRESS NOTE ADULT - PROBLEM SELECTOR PLAN 3
Orthostatics wnl although bps low  - held amlodipine & spironolactone (resume as tolerated)  - Plans from HF team to follow  -Monitor vitals [id number]  -Fall precaution From CT abdominal aortic aneurysm measuring up to 4.1 x 4.0 x 4.6 cm; unchanged there is an increased degree of thrombosis with intraluminal narrowing.  -Stable diameter  -vascular recs appreciated, no acute surgical intervention  -Will need outpatient followup with repeat US abdominal aorta in 6 months

## 2023-03-11 NOTE — PROGRESS NOTE ADULT - PROBLEM SELECTOR PLAN 2
Appears to be more MSK in nature. TTE showing EF of 29%, severe LV systolic dysfunction, mild diastolic dysfunction  -Continue tylenol and home oxycodone   - LHC LAD 40%, Cx 5-60% occlusions  - Telemetry monitoring   -cards recs appreciated- c/w Plavix/statin/BB Afebrile, no acute SOB  -Continue breo ellipta (therapeutic interchange)  -Continue incruse ellipta  (therapeutic interchange)  -Continue albuterol PRN  -Continue montelukast 10 mg daily

## 2023-03-11 NOTE — DISCHARGE NOTE PROVIDER - NSDCMRMEDTOKEN_GEN_ALL_CORE_FT
acetaminophen 325 mg oral tablet: 2 tab(s) orally every 6 hours, As needed, Temp greater or equal to 38C (100.4F), Mild Pain (1 - 3)  ALPRAZolam 0.5 mg oral tablet: 1 tab(s) orally once a day (at bedtime), As Needed  Breo Ellipta 200 mcg-25 mcg/inh inhalation powder: 1 puff(s) inhaled once a day  Crestor 20 mg oral tablet: 1 tab(s) orally once a day (at bedtime)  Entresto 24 mg-26 mg oral tablet: 1 tab(s) orally 2 times a day  famotidine 20 mg oral tablet: 1 tab(s) orally 2 times a day  Farxiga 5 mg oral tablet: 1 tab(s) orally once a day  Incruse Ellipta 62.5 mcg/inh inhalation powder: 1 puff(s) inhaled every 24 hours  nystatin 100,000 units/mL oral suspension: 5 milliliter(s) orally 4 times a day  Plavix 75 mg oral tablet: 1 tab(s) orally once a day  polyethylene glycol 3350 oral powder for reconstitution: 17 gram(s) orally once a day, As needed, Constipation  ProAir HFA 90 mcg/inh inhalation aerosol: 2 puff(s) inhaled 4 times a day, As Needed  spironolactone 25 mg oral tablet: 0.5 tab(s) orally once a day  sucralfate 1 g/10 mL oral suspension: 10 milliliter(s) orally 3 times a day  Toprol-XL 25 mg oral tablet, extended release: 1 tab(s) orally once a day   acetaminophen 325 mg oral tablet: 2 tab(s) orally every 6 hours, As needed, Temp greater or equal to 38C (100.4F), Mild Pain (1 - 3)  ALPRAZolam 0.5 mg oral tablet: 1 tab(s) orally once a day (at bedtime), As Needed  Breo Ellipta 200 mcg-25 mcg/inh inhalation powder: 1 puff(s) inhaled once a day  Crestor 20 mg oral tablet: 1 tab(s) orally once a day (at bedtime)  Entresto 24 mg-26 mg oral tablet: 1 tab(s) orally 2 times a day  famotidine 20 mg oral tablet: 1 tab(s) orally once a day  Incruse Ellipta 62.5 mcg/inh inhalation powder: 1 puff(s) inhaled every 24 hours  montelukast 10 mg oral tablet: 1 tab(s) orally once a day  nystatin 100,000 units/mL oral suspension: 5 milliliter(s) orally 4 times a day  Plavix 75 mg oral tablet: 1 tab(s) orally once a day  polyethylene glycol 3350 oral powder for reconstitution: 17 gram(s) orally once a day, As needed, Constipation  ProAir HFA 90 mcg/inh inhalation aerosol: 2 puff(s) inhaled 4 times a day, As Needed  spironolactone 25 mg oral tablet: 1 tab(s) orally once a day    TO START on 3/13/23  sucralfate 1 g/10 mL oral suspension: 10 milliliter(s) orally 3 times a day  Toprol-XL 25 mg oral tablet, extended release: 1 tab(s) orally once a day

## 2023-03-11 NOTE — DISCHARGE NOTE PROVIDER - NSDCFUSCHEDAPPT_GEN_ALL_CORE_FT
[facility]  ELECTROPH [address]  Scheduled Appointment: 03/28/2023    Claire Sanchez  [facility]  CARDIOLOGY [address]  Scheduled Appointment: 03/28/2023    Randall Lofton  [facility]  CARDIOLOGY [address]  Scheduled Appointment: 03/28/2023    Blanka Colon  [facility]  HEARTFAIL [address]  Scheduled Appointment: 03/29/2023

## 2023-03-11 NOTE — PROGRESS NOTE ADULT - PROBLEM SELECTOR PLAN 5
Afebrile, no acute SOB  -Continue breo ellipta (therapeutic interchange)  -Continue incruse ellipta  (therapeutic interchange)  -Continue albuterol PRN  -Continue montelukast 10 mg daily -s/p course of cipro and flagyl  - outpatient Gi f/u

## 2023-03-11 NOTE — DISCHARGE NOTE PROVIDER - CARE PROVIDERS DIRECT ADDRESSES
[email or web address]

## 2023-03-11 NOTE — DISCHARGE NOTE NURSING/CASE MANAGEMENT/SOCIAL WORK - NSDCPEFALRISK_GEN_ALL_CORE
For information on Fall & Injury Prevention, visit: [email or web address] OR  [email or web address] OR  https://www.cdc.gov/steadi/patient.html

## 2023-03-11 NOTE — PROGRESS NOTE ADULT - ASSESSMENT
72 y F PMH of AAA, CVA, CAD s/p stent, ventricular arrhythmia s/p ICD/PPM, CHF, COPD, IBD presents to the ED with complaints of dizziness and chest pain.  No new EKG changes from prior, trop x 2 negative, appears to be more MSK in nature Endorses chronic FERRERA, plan was to get ECHO at [facility] however was not pursued as per patient, will obtain inpatient  Dizziness appears to be in setting of polypharmacy, it is possible patient is also orthostatic (low BP on admission). HF team evaluated.

## 2023-03-11 NOTE — PROGRESS NOTE ADULT - SUBJECTIVE AND OBJECTIVE BOX
Mohsin Khan, MD  Attending Physician, Division Of Hospital Medicine  Pager: [phone], Office: [phone]  Off hour pager: [phone]    Patient is a 72y old  Female who presents with a chief complaint of CP on exertion     SUBJECTIVE / OVERNIGHT EVENTS:  Seen, examined the patient this am      MEDICATIONS  (STANDING):  atorvastatin 80 milliGRAM(s) Oral at bedtime  budesonide 160 MICROgram(s)/formoterol 4.5 MICROgram(s) Inhaler 2 Puff(s) Inhalation two times a day  clopidogrel Tablet 75 milliGRAM(s) Oral daily  enoxaparin Injectable 40 milliGRAM(s) SubCutaneous every 24 hours  famotidine    Tablet 20 milliGRAM(s) Oral daily  metoprolol succinate ER 25 milliGRAM(s) Oral daily  montelukast 10 milliGRAM(s) Oral daily  sacubitril 24 mG/valsartan 26 mG 1 Tablet(s) Oral two times a day  sodium chloride 0.9%. 1000 milliLiter(s) (30 mL/Hr) IV Continuous <Continuous>  sucralfate suspension 1 Gram(s) Oral <User Schedule>  tiotropium 2.5 MICROgram(s) Inhaler 2 Puff(s) Inhalation daily    MEDICATIONS  (PRN):  acetaminophen     Tablet .. 650 milliGRAM(s) Oral every 6 hours PRN Temp greater or equal to 38C (100.4F), Mild Pain (1 - 3)  albuterol    90 MICROgram(s) HFA Inhaler 2 Puff(s) Inhalation every 6 hours PRN Shortness of Breath and/or Wheezing  ALPRAZolam 0.5 milliGRAM(s) Oral at bedtime PRN sleep/anxiety  bisacodyl Suppository 10 milliGRAM(s) Rectal daily PRN Constipation  ondansetron    Tablet 4 milliGRAM(s) Oral every 8 hours PRN Nausea and/or Vomiting  polyethylene glycol 3350 17 Gram(s) Oral daily PRN Constipation  zolpidem 5 milliGRAM(s) Oral at bedtime PRN Insomnia      Vital Signs Last 24 Hrs  T(C): 37.1 (11 Mar 2023 04:17), Max: 37.1 (11 Mar 2023 04:17)  T(F): 98.7 (11 Mar 2023 04:17), Max: 98.7 (11 Mar 2023 04:17)  HR: 89 (11 Mar 2023 04:17) (70 - 93)  BP: 142/92 (11 Mar 2023 04:17) (95/54 - 142/92)  BP(mean): --  RR: 17 (11 Mar 2023 04:17) (13 - 18)  SpO2: 100% (11 Mar 2023 04:17) (94% - 100%)    Parameters below as of 11 Mar 2023 04:17  Patient On (Oxygen Delivery Method): room air      CAPILLARY BLOOD GLUCOSE        I&O's Summary    10 Mar 2023 07:01  -  11 Mar 2023 07:00  --------------------------------------------------------  IN: 200 mL / OUT: 0 mL / NET: 200 mL        PHYSICAL EXAM:-  GENERAL: NAD, well-developed  EYES: EOMI, PERRLA, conjunctiva and sclera clear  NECK: Supple, No JVD, no thyromegaly  CHEST/LUNG: Clear to auscultation bilaterally; No wheeze  HEART: Regular rate and rhythm; S1, S2 audible, No murmurs, rubs, or gallops  ABDOMEN: Soft, Nontender, Nondistended; Bowel sounds present  EXTREMITIES:  2+ Peripheral Pulses, No clubbing, cyanosis, or edema  NEURO: AAOx3, no focal deficit      LABS:                        11.2   6.14  )-----------( 211      ( 10 Mar 2023 11:08 )             36.2     03-10    136  |  106  |  12  ----------------------------<  104<H>  3.7   |  21<L>  |  1.08    Ca    9.1      10 Mar 2023 11:09      RADIOLOGY & ADDITIONAL TESTS:    Imaging Personally Reviewed: CXR, echo, Cath  Consultant(s) Notes Reviewed: card      Mohsin Khan, MD  Attending Physician, Division Of Hospital Medicine  Pager: [phone], Office: [phone]  Off hour pager: [phone]    Patient is a 72y old  Female who presents with a chief complaint of CP on exertion     SUBJECTIVE / OVERNIGHT EVENTS:  Seen, examined the patient this am  Resting in bed, no c/o chest pain, SOB, no N/V,       MEDICATIONS  (STANDING):  atorvastatin 80 milliGRAM(s) Oral at bedtime  budesonide 160 MICROgram(s)/formoterol 4.5 MICROgram(s) Inhaler 2 Puff(s) Inhalation two times a day  clopidogrel Tablet 75 milliGRAM(s) Oral daily  enoxaparin Injectable 40 milliGRAM(s) SubCutaneous every 24 hours  famotidine    Tablet 20 milliGRAM(s) Oral daily  metoprolol succinate ER 25 milliGRAM(s) Oral daily  montelukast 10 milliGRAM(s) Oral daily  sacubitril 24 mG/valsartan 26 mG 1 Tablet(s) Oral two times a day  sodium chloride 0.9%. 1000 milliLiter(s) (30 mL/Hr) IV Continuous <Continuous>  sucralfate suspension 1 Gram(s) Oral <User Schedule>  tiotropium 2.5 MICROgram(s) Inhaler 2 Puff(s) Inhalation daily    MEDICATIONS  (PRN):  acetaminophen     Tablet .. 650 milliGRAM(s) Oral every 6 hours PRN Temp greater or equal to 38C (100.4F), Mild Pain (1 - 3)  albuterol    90 MICROgram(s) HFA Inhaler 2 Puff(s) Inhalation every 6 hours PRN Shortness of Breath and/or Wheezing  ALPRAZolam 0.5 milliGRAM(s) Oral at bedtime PRN sleep/anxiety  bisacodyl Suppository 10 milliGRAM(s) Rectal daily PRN Constipation  ondansetron    Tablet 4 milliGRAM(s) Oral every 8 hours PRN Nausea and/or Vomiting  polyethylene glycol 3350 17 Gram(s) Oral daily PRN Constipation  zolpidem 5 milliGRAM(s) Oral at bedtime PRN Insomnia      Vital Signs Last 24 Hrs  T(C): 37.1 (11 Mar 2023 04:17), Max: 37.1 (11 Mar 2023 04:17)  T(F): 98.7 (11 Mar 2023 04:17), Max: 98.7 (11 Mar 2023 04:17)  HR: 89 (11 Mar 2023 04:17) (70 - 93)  BP: 142/92 (11 Mar 2023 04:17) (95/54 - 142/92)  BP(mean): --  RR: 17 (11 Mar 2023 04:17) (13 - 18)  SpO2: 100% (11 Mar 2023 04:17) (94% - 100%)    Parameters below as of 11 Mar 2023 04:17  Patient On (Oxygen Delivery Method): room air      CAPILLARY BLOOD GLUCOSE        I&O's Summary    10 Mar 2023 07:01  -  11 Mar 2023 07:00  --------------------------------------------------------  IN: 200 mL / OUT: 0 mL / NET: 200 mL        PHYSICAL EXAM:-  GENERAL: NAD, well-developed  EYES: EOMI, PERRLA, conjunctiva and sclera clear  NECK: Supple, No JVD, no thyromegaly  CHEST/LUNG: Clear to auscultation bilaterally; No wheeze  HEART: Regular rate and rhythm; S1, S2 audible, No murmurs, rubs, or gallops  ABDOMEN: Soft, Nontender, Nondistended; Bowel sounds present  EXTREMITIES:  2+ Peripheral Pulses, No clubbing, cyanosis, or edema  NEURO: AAOx3, no focal deficit      LABS:                        11.2   6.14  )-----------( 211      ( 10 Mar 2023 11:08 )             36.2     03-10    136  |  106  |  12  ----------------------------<  104<H>  3.7   |  21<L>  |  1.08    Ca    9.1      10 Mar 2023 11:09      RADIOLOGY & ADDITIONAL TESTS:    Imaging Personally Reviewed: CXR, echo, Cath  Consultant(s) Notes Reviewed: card

## 2023-03-11 NOTE — PROGRESS NOTE ADULT - PROBLEM SELECTOR PLAN 6
-Continue toprol 25 mg XL   -Continue plavix 75 mg   -Continue rosuvastatin 20 mg

## 2023-03-11 NOTE — PROGRESS NOTE ADULT - PROBLEM SELECTOR PLAN 4
From CT abdominal aortic aneurysm measuring up to 4.1 x 4.0 x 4.6 cm; unchanged there is an increased degree of thrombosis with intraluminal narrowing.  -Stable diameter  -vascular recs appreciated, no acute surgical intervention  -Will need outpatient followup with repeat US abdominal aorta in 6 months not in [facility]  -Monitor BMP daily  -Avoid nephrotoxic agents  -Renally dose medications (decreased home famotidine to 20 mg daily)

## 2023-03-11 NOTE — DISCHARGE NOTE PROVIDER - NSDCCPCAREPLAN_GEN_ALL_CORE_FT
PRINCIPAL DISCHARGE DIAGNOSIS  Diagnosis: Chest pain  Assessment and Plan of Treatment: For the next few days, avoid physical activities that bring on chest pain. Continue physical activities as directed.  Do not smoke.  Avoid drinking alcohol.   Only take over-the-counter or prescription medicine for pain, discomfort, or fever as directed by your caregiver.  Follow your caregiver's suggestions for further testing if your chest pain does not go away.  Keep any follow-up appointments you made. If you do not go to an appointment, you could develop lasting (chronic) problems with pain. If there is any problem keeping an appointment, you must call to reschedule.   SEEK MEDICAL CARE IF:  You think you are having problems from the medicine you are taking. Read your medicine instructions carefully.  Your chest pain does not go away, even after treatment.  You develop a rash with blisters on your chest.  SEEK IMMEDIATE MEDICAL CARE IF:  You have increased chest pain or pain that spreads to your arm, neck, jaw, back, or abdomen.   You develop shortness of breath, an increasing cough, or you are coughing up blood.  You have severe back or abdominal pain, feel nauseous, or vomit.  You develop severe weakness, fainting, or chills.  You have a fever.      SECONDARY DISCHARGE DIAGNOSES  Diagnosis: CAD (coronary artery disease)  Assessment and Plan of Treatment: Continue with Crestor and Plavix as prescribed.  Coronary artery disease is a condition where the arteries the supply the heart muscle get clogges with fatty deposits & puts you at risk for a heart attack  Call your doctor if you have any new pain, pressure, or discomfort in the center of your chest, pain, tingling or discomfort in arms, back, neck, jaw, or stomach, shortness of breath, nausea, vomiting, burping or heartburn, sweating, cold and clammy skin, racing or abnormal heartbeat for more than 10 minutes or if they keep coming & going.  Call 911 and do not tr to get to hospital by care  You can help yourself with lefestyle changes (quitting smoking if you smoke), eat lots of fruits & vegetables & low fat dairy products, not a lot of meat & fatty foods, walk or some form of physical activity most days of the week, lose weight if you are overweight  Take your cardiac medication as prescribed to lower cholesterol, to lower blood pressure, aspirin to prevent blood clots, and diabetes control  Make sure to keep appointments with doctor for cardiac follow up care    Diagnosis: HFrEF (heart failure with reduced ejection fraction)  Assessment and Plan of Treatment: Echo with EF of 29%, severe LV systolic dysfunction, mild diastolic dysfunction.  You had a LHC/RHC, LAD 40%, Cx 5-60% occlusions. Reviewed RH pressures.  HF team evaluated post cath and cleared for discharge, outpatient follow up with Dr Colon in a week.  Continue Entresto 24/26 BID, toprol 25 XL. Start spironolactone 25 mg/d in 2 days.  Discontinued farxiga 5 mg due to low blood pressure.  Follow up with cardiology and primary care provider within 1 week.    Diagnosis: History of abdominal aortic aneurysm (AAA)  Assessment and Plan of Treatment: Stable diameter. No acute surgical intervention from vascular standpoint. You will need outpatient followup with repeat US abdominal aorta in 6 months.    Diagnosis: Stage 3 chronic kidney disease  Assessment and Plan of Treatment: Avoid taking (NSAIDs) - (ex: Ibuprofen, Advil, Celebrex, Naprosyn)  Avoid taking any nephrotoxic agents (can harm kidneys) - Intravenous contrast for diagnostic testing, combination cold medications.  Have all medications adjusted for your renal function by your Health Care Provider.  Blood pressure control is important.  Take all medication as prescribed.    Diagnosis: Rheumatoid arthritis  Assessment and Plan of Treatment: Stable    Diagnosis: Colitis  Assessment and Plan of Treatment: S/p course of cipro and flagyl  Outpatient Gi follow up.     PRINCIPAL DISCHARGE DIAGNOSIS  Diagnosis: Chest pain  Assessment and Plan of Treatment: For the next few days, avoid physical activities that bring on chest pain. Continue physical activities as directed.  Do not smoke.  Avoid drinking alcohol.   Only take over-the-counter or prescription medicine for pain, discomfort, or fever as directed by your caregiver.  Follow your caregiver's suggestions for further testing if your chest pain does not go away.  Keep any follow-up appointments you made. If you do not go to an appointment, you could develop lasting (chronic) problems with pain. If there is any problem keeping an appointment, you must call to reschedule.   SEEK MEDICAL CARE IF:  You think you are having problems from the medicine you are taking. Read your medicine instructions carefully.  Your chest pain does not go away, even after treatment.  You develop a rash with blisters on your chest.  SEEK IMMEDIATE MEDICAL CARE IF:  You have increased chest pain or pain that spreads to your arm, neck, jaw, back, or abdomen.   You develop shortness of breath, an increasing cough, or you are coughing up blood.  You have severe back or abdominal pain, feel nauseous, or vomit.  You develop severe weakness, fainting, or chills.  You have a fever.      SECONDARY DISCHARGE DIAGNOSES  Diagnosis: HFrEF (heart failure with reduced ejection fraction)  Assessment and Plan of Treatment: Echo with EF of 29%, severe LV systolic dysfunction, mild diastolic dysfunction.  You had a LHC/RHC, LAD 40%, Cx 5-60% occlusions. Reviewed RH pressures.  HF team evaluated you post cath and cleared for discharge, outpatient follow up with Dr Colon in a week.  Continue Entresto 24/26 BID, toprol 25 XL. Start spironolactone 25 mg/d in 2 days.  Discontinued farxiga 5 mg due to low blood pressure.  Follow up with cardiology and primary care provider within 1 week.    Diagnosis: CAD (coronary artery disease)  Assessment and Plan of Treatment: Continue with Crestor and Plavix as prescribed.  Coronary artery disease is a condition where the arteries the supply the heart muscle get clogges with fatty deposits & puts you at risk for a heart attack  Call your doctor if you have any new pain, pressure, or discomfort in the center of your chest, pain, tingling or discomfort in arms, back, neck, jaw, or stomach, shortness of breath, nausea, vomiting, burping or heartburn, sweating, cold and clammy skin, racing or abnormal heartbeat for more than 10 minutes or if they keep coming & going.  Call 911 and do not tr to get to hospital by care  You can help yourself with lefestyle changes (quitting smoking if you smoke), eat lots of fruits & vegetables & low fat dairy products, not a lot of meat & fatty foods, walk or some form of physical activity most days of the week, lose weight if you are overweight  Take your cardiac medication as prescribed to lower cholesterol, to lower blood pressure, aspirin to prevent blood clots, and diabetes control  Make sure to keep appointments with doctor for cardiac follow up care    Diagnosis: History of abdominal aortic aneurysm (AAA)  Assessment and Plan of Treatment: Stable diameter. No acute surgical intervention from vascular standpoint. You will need outpatient followup with repeat US abdominal aorta in 6 months. Follow up with Vascular as an outpatient.    Diagnosis: Stage 3 chronic kidney disease  Assessment and Plan of Treatment: Avoid taking (NSAIDs) - (ex: Ibuprofen, Advil, Celebrex, Naprosyn)  Avoid taking any nephrotoxic agents (can harm kidneys) - Intravenous contrast for diagnostic testing, combination cold medications.  Have all medications adjusted for your renal function by your Health Care Provider.  Blood pressure control is important.  Take all medication as prescribed.    Diagnosis: Rheumatoid arthritis  Assessment and Plan of Treatment: Stable    Diagnosis: Colitis  Assessment and Plan of Treatment: S/p course of cipro and flagyl  Outpatient Gi follow up.

## 2023-03-11 NOTE — DISCHARGE NOTE PROVIDER - PROVIDER TOKENS
PROVIDER:[TOKEN:[[id number]]] PROVIDER:[TOKEN:[[id number]]],PROVIDER:[TOKEN:[[id number]],FOLLOWUP:[1 week]],PROVIDER:[TOKEN:[[id number]],FOLLOWUP:[1 week]],PROVIDER:[TOKEN:[[id number]],FOLLOWUP:[1 week]],FREE:[LAST:[Fredo],FIRST:[Mohsen],PHONE:[phone]],FAX:[(   )    -],ADDRESS:[Vascular Surgery  [address], [place] [address]],FOLLOWUP:[Routine]]

## 2023-03-11 NOTE — PROGRESS NOTE ADULT - PROVIDER SPECIALTY LIST ADULT
Heart Failure
Internal Medicine
Heart Failure
Internal Medicine

## 2023-03-11 NOTE — PROGRESS NOTE ADULT - PROBLEM SELECTOR PROBLEM 1
Chest pain
HFrEF (heart failure with reduced ejection fraction)
HFrEF (heart failure with reduced ejection fraction)
Chest pain
HFrEF (heart failure with reduced ejection fraction)
Chest pain

## 2023-03-11 NOTE — CHART NOTE - NSCHARTNOTEFT_GEN_A_CORE
Medicine PA Note    Informed by RN that pt with episode of asymptomatic hypotension. BP taken earlier today was 82/54, manually. Pt seen and assessed at bedside, laying comfortably in bed. She denied dizziness, HA, N/V, blood in stool, palpitations, worsening SOB, and chest pain. Instructed RN to repeat BP in 30 minutes. Repeat BP was 103/62.     Discussed BP trend with Dr. Luciano who is clearing pt for discharge today. Plan to hold Spironolactone for 2 days and restart on 3/13 as per medicine attending. Med rec reviewed and pt for discharge today. Plan discussed with pt at bedside.    Pamela Mccord PA-C  [id number]

## 2023-03-11 NOTE — DISCHARGE NOTE PROVIDER - CARE PROVIDER_API CALL
Blanka Colon (MD; PhD)  Adv Heart Fail Trnsplnt Cardio; Cardiovascular Disease; Internal Medicine  [address] [place] [address]  Phone: [phone]  Fax: [phone]  Follow Up Time:    Blanka Colon (MD; PhD)  Adv Heart Fail Trnsplnt Cardio; Cardiovascular Disease; Internal Medicine  [address] [place] [address]  Phone: [phone]  Fax: [phone]  Follow Up Time:     JEFF TALAMANTES  [address] [address]  Phone: [phone]  Fax: ()-  Follow Up Time: 1 week    Randall Lofton (DO)  Cardiovascular Disease; Internal Medicine; Nuclear Cardiology  [address] [place] [address]  Phone: [phone]  Fax: [phone]  Follow Up Time: 1 week    Claire Sanchez)  Cardiovascular Disease; Interventional Cardiology  [address] [place] [address]  Phone: [phone]  Fax: [phone]  Follow Up Time: 1 week    Bannazadeh, Mohsen  Vascular Surgery  [address], [place] [address]  Phone: [phone]  Fax: (   )    -  Follow Up Time: Routine

## 2023-03-11 NOTE — DISCHARGE NOTE PROVIDER - HOSPITAL COURSE
72 y F PMH of AAA, CVA, CAD s/p stent, ventricular arrhythmia s/p ICD/PPM, CHF, COPD, IBD presents to the ED with complaints of dizziness and chest pain.  No new EKG changes from prior, trop x 2 negative, appears to be more MSK in nature Endorses chronic FERRERA, plan was to get ECHO at [facility] however was not pursued as per patient, will obtain inpatient  Dizziness appears to be in setting of polypharmacy, it is possible patient is also orthostatic (low BP on admission). HF team evaluated.      HFrEF (heart failure with reduced ejection fraction).   - TTE showing EF of 29%, severe LV systolic dysfunction, mild diastolic dysfunction  - s/p LHC/RHC, LAD 40%, Cx 5-60% occlusions. Reviewed RH pressures  - HF team evaluated post cath and cleared for d/c, outpatient f/u with Dr Colon in a week.  -Continue Entresto 24/26 BID, toprol 25 XL, spironolactone 25 mg/d (in 2 days)  -discontinued farxiga 5 mg due to low bps    COPD (chronic obstructive pulmonary disease).  - Afebrile, no acute SOB  -Continue breo ellipta (therapeutic interchange)  -Continue incruse ellipta  (therapeutic interchange)  -Continue albuterol PRN  -Continue montelukast 10 mg daily.      History of abdominal aortic aneurysm (AAA).  - From CT abdominal aortic aneurysm measuring up to 4.1 x 4.0 x 4.6 cm; unchanged there is an increased degree of thrombosis with intraluminal narrowing.  -Stable diameter  -vascular recs appreciated, no acute surgical intervention  -Will need outpatient followup with repeat US abdominal aorta in 6 months.    Stage 3 chronic kidney disease.  -not in [facility]    Colitis.  -s/p course of cipro and flagyl  - outpatient Gi f/u.    CAD (coronary artery disease).   -Continue toprol 25 mg XL   -Continue plavix 75 mg   -Continue rosuvastatin 20 mg.    Medically cleared for discharge home. Discussed with medical attending.

## 2023-03-11 NOTE — DISCHARGE NOTE PROVIDER - NSDCFUADDAPPT_GEN_ALL_CORE_FT
APPTS ARE READY TO BE MADE: [X] YES    Best Family or Patient Contact (if needed):    Additional Information about above appointments (if needed):    1:   2:   3:     Other comments or requests:    APPTS ARE READY TO BE MADE: [X] YES    Best Family or Patient Contact (if needed):    Additional Information about above appointments (if needed):    1:   2:   3:       Other comments or requests:       Patient is scheduled with Dr. Blanka Colon on 3/29 at 8:30A at [facility].  Patient is scheduled with Dr. Claire Sanchez on 3/28 at 9:30A at [facility].    Patient is scheduled with Dr. Randall Lofton on 3/28 at 9:45A at [facility].  Patient advised that she secured appointment with primary care provider but did not disclose details of the appointment and her daughter will secure appointment for vascular referral.

## 2023-03-11 NOTE — PROGRESS NOTE ADULT - PROBLEM SELECTOR PLAN 7
-continue cipro and flagyl
-continue cipro and flagyl
-s/p course of cipro and flagyl  -stool studies if diarrhea persists
-s/p course of cipro and flagyl  -stool studies if diarrhea persists
-continue cipro and flagyl  -stool studies if diarrhea persists
-Finish 7 more days of cipro and flagyl
-continue cipro and flagyl  -stool studies if diarrhea persists
-continue cipro and flagyl  -stool studies if diarrhea persists

## 2023-03-11 NOTE — PROVIDER CONTACT NOTE (OTHER) - ASSESSMENT
Pt A&Ox4, denies chest pain, SOB, dizziness, nausea.
pt is A&Ox3, denies any s/s of cp, sob or lightheadedness. PMH: RA, CVA, CAD
Right lower back pain 10/10

## 2023-03-11 NOTE — PROVIDER CONTACT NOTE (OTHER) - SITUATION
Patient 
Request morphine iv for right lower back pain 10/10
Pt BP 86/57, HR 99. Due for metoprolol, aldactone, and entresto

## 2023-03-11 NOTE — DISCHARGE NOTE NURSING/CASE MANAGEMENT/SOCIAL WORK - PATIENT PORTAL LINK FT
You can access the FollowMyHealth Patient Portal offered by [facility] by registering at the following website: [email or web address]. By joining [employer]’s FollowMyHealth portal, you will also be able to view your health information using other applications (apps) compatible with our system.

## 2023-03-11 NOTE — PROGRESS NOTE ADULT - REASON FOR ADMISSION
CP on exertion
CP on exertion
Chest pain on exertion
CP on exertion

## 2023-03-11 NOTE — PROGRESS NOTE ADULT - PROBLEM SELECTOR PLAN 9
F: not indicated  E: replete PRN  N: DASH diet    DVT ppx: lovenox    Full Code

## 2023-03-16 ENCOUNTER — INPATIENT (INPATIENT)
Facility: HOSPITAL | Age: 73
LOS: 4 days | Discharge: HOME HEALTH | DRG: 392 | End: 2023-03-21
Attending: INTERNAL MEDICINE | Admitting: HOSPITALIST
Payer: MEDICARE

## 2023-03-16 VITALS
WEIGHT: 149.91 LBS | RESPIRATION RATE: 20 BRPM | TEMPERATURE: 98 F | OXYGEN SATURATION: 98 % | SYSTOLIC BLOOD PRESSURE: 130 MMHG | HEIGHT: 64 IN | DIASTOLIC BLOOD PRESSURE: 82 MMHG | HEART RATE: 93 BPM

## 2023-03-16 LAB
ALBUMIN SERPL ELPH-MCNC: 4.3 G/DL — SIGNIFICANT CHANGE UP (ref 3.3–5)
ALP SERPL-CCNC: 64 U/L — SIGNIFICANT CHANGE UP (ref 40–120)
ALT FLD-CCNC: 16 U/L — SIGNIFICANT CHANGE UP (ref 10–45)
ANION GAP SERPL CALC-SCNC: 17 MMOL/L — SIGNIFICANT CHANGE UP (ref 5–17)
AST SERPL-CCNC: 19 U/L — SIGNIFICANT CHANGE UP (ref 10–40)
BASE EXCESS BLDV CALC-SCNC: -1 MMOL/L — SIGNIFICANT CHANGE UP (ref -2–3)
BASOPHILS # BLD AUTO: 0.04 K/UL — SIGNIFICANT CHANGE UP (ref 0–0.2)
BASOPHILS NFR BLD AUTO: 0.7 % — SIGNIFICANT CHANGE UP (ref 0–2)
BILIRUB SERPL-MCNC: 0.4 MG/DL — SIGNIFICANT CHANGE UP (ref 0.2–1.2)
BUN SERPL-MCNC: 14 MG/DL — SIGNIFICANT CHANGE UP (ref 7–23)
CA-I SERPL-SCNC: 1.35 MMOL/L — HIGH (ref 1.15–1.33)
CALCIUM SERPL-MCNC: 11 MG/DL — HIGH (ref 8.4–10.5)
CHLORIDE BLDV-SCNC: 100 MMOL/L — SIGNIFICANT CHANGE UP (ref 96–108)
CHLORIDE SERPL-SCNC: 97 MMOL/L — SIGNIFICANT CHANGE UP (ref 96–108)
CO2 BLDV-SCNC: 27 MMOL/L — HIGH (ref 22–26)
CO2 SERPL-SCNC: 23 MMOL/L — SIGNIFICANT CHANGE UP (ref 22–31)
CREAT SERPL-MCNC: 1.14 MG/DL — SIGNIFICANT CHANGE UP (ref 0.5–1.3)
EGFR: 51 ML/MIN/1.73M2 — LOW
EOSINOPHIL # BLD AUTO: 0.01 K/UL — SIGNIFICANT CHANGE UP (ref 0–0.5)
EOSINOPHIL NFR BLD AUTO: 0.2 % — SIGNIFICANT CHANGE UP (ref 0–6)
FLUAV AG NPH QL: SIGNIFICANT CHANGE UP
FLUBV AG NPH QL: SIGNIFICANT CHANGE UP
GAS PNL BLDV: 133 MMOL/L — LOW (ref 136–145)
GAS PNL BLDV: SIGNIFICANT CHANGE UP
GAS PNL BLDV: SIGNIFICANT CHANGE UP
GLUCOSE BLDV-MCNC: 95 MG/DL — SIGNIFICANT CHANGE UP (ref 70–99)
GLUCOSE SERPL-MCNC: 96 MG/DL — SIGNIFICANT CHANGE UP (ref 70–99)
HCO3 BLDV-SCNC: 26 MMOL/L — SIGNIFICANT CHANGE UP (ref 22–29)
HCT VFR BLD CALC: 41.4 % — SIGNIFICANT CHANGE UP (ref 34.5–45)
HCT VFR BLDA CALC: 41 % — SIGNIFICANT CHANGE UP (ref 34.5–46.5)
HGB BLD CALC-MCNC: 13.8 G/DL — SIGNIFICANT CHANGE UP (ref 11.7–16.1)
HGB BLD-MCNC: 13.6 G/DL — SIGNIFICANT CHANGE UP (ref 11.5–15.5)
IMM GRANULOCYTES NFR BLD AUTO: 0.9 % — SIGNIFICANT CHANGE UP (ref 0–0.9)
LACTATE BLDV-MCNC: 1.6 MMOL/L — SIGNIFICANT CHANGE UP (ref 0.5–2)
LIDOCAIN IGE QN: 19 U/L — SIGNIFICANT CHANGE UP (ref 7–60)
LYMPHOCYTES # BLD AUTO: 0.97 K/UL — LOW (ref 1–3.3)
LYMPHOCYTES # BLD AUTO: 18 % — SIGNIFICANT CHANGE UP (ref 13–44)
MCHC RBC-ENTMCNC: 32.2 PG — SIGNIFICANT CHANGE UP (ref 27–34)
MCHC RBC-ENTMCNC: 32.9 GM/DL — SIGNIFICANT CHANGE UP (ref 32–36)
MCV RBC AUTO: 97.9 FL — SIGNIFICANT CHANGE UP (ref 80–100)
MONOCYTES # BLD AUTO: 0.62 K/UL — SIGNIFICANT CHANGE UP (ref 0–0.9)
MONOCYTES NFR BLD AUTO: 11.5 % — SIGNIFICANT CHANGE UP (ref 2–14)
NEUTROPHILS # BLD AUTO: 3.7 K/UL — SIGNIFICANT CHANGE UP (ref 1.8–7.4)
NEUTROPHILS NFR BLD AUTO: 68.7 % — SIGNIFICANT CHANGE UP (ref 43–77)
NRBC # BLD: 0 /100 WBCS — SIGNIFICANT CHANGE UP (ref 0–0)
PCO2 BLDV: 50 MMHG — HIGH (ref 39–42)
PH BLDV: 7.32 — SIGNIFICANT CHANGE UP (ref 7.32–7.43)
PLATELET # BLD AUTO: 383 K/UL — SIGNIFICANT CHANGE UP (ref 150–400)
PO2 BLDV: 20 MMHG — LOW (ref 25–45)
POTASSIUM BLDV-SCNC: 3.6 MMOL/L — SIGNIFICANT CHANGE UP (ref 3.5–5.1)
POTASSIUM SERPL-MCNC: 3.5 MMOL/L — SIGNIFICANT CHANGE UP (ref 3.5–5.3)
POTASSIUM SERPL-SCNC: 3.5 MMOL/L — SIGNIFICANT CHANGE UP (ref 3.5–5.3)
PROT SERPL-MCNC: 8.2 G/DL — SIGNIFICANT CHANGE UP (ref 6–8.3)
RBC # BLD: 4.23 M/UL — SIGNIFICANT CHANGE UP (ref 3.8–5.2)
RBC # FLD: 14.1 % — SIGNIFICANT CHANGE UP (ref 10.3–14.5)
RSV RNA NPH QL NAA+NON-PROBE: SIGNIFICANT CHANGE UP
SAO2 % BLDV: 28.6 % — LOW (ref 67–88)
SARS-COV-2 RNA SPEC QL NAA+PROBE: SIGNIFICANT CHANGE UP
SODIUM SERPL-SCNC: 137 MMOL/L — SIGNIFICANT CHANGE UP (ref 135–145)
TROPONIN T, HIGH SENSITIVITY RESULT: 14 NG/L — SIGNIFICANT CHANGE UP (ref 0–51)
WBC # BLD: 5.39 K/UL — SIGNIFICANT CHANGE UP (ref 3.8–10.5)
WBC # FLD AUTO: 5.39 K/UL — SIGNIFICANT CHANGE UP (ref 3.8–10.5)

## 2023-03-16 PROCEDURE — 99285 EMERGENCY DEPT VISIT HI MDM: CPT | Mod: 25

## 2023-03-16 PROCEDURE — 36000 PLACE NEEDLE IN VEIN: CPT

## 2023-03-16 PROCEDURE — 71045 X-RAY EXAM CHEST 1 VIEW: CPT | Mod: 26

## 2023-03-16 RX ORDER — ONDANSETRON 8 MG/1
4 TABLET, FILM COATED ORAL ONCE
Refills: 0 | Status: COMPLETED | OUTPATIENT
Start: 2023-03-16 | End: 2023-03-16

## 2023-03-16 RX ORDER — MORPHINE SULFATE 50 MG/1
4 CAPSULE, EXTENDED RELEASE ORAL ONCE
Refills: 0 | Status: DISCONTINUED | OUTPATIENT
Start: 2023-03-16 | End: 2023-03-16

## 2023-03-16 RX ORDER — SODIUM CHLORIDE 9 MG/ML
1000 INJECTION INTRAMUSCULAR; INTRAVENOUS; SUBCUTANEOUS ONCE
Refills: 0 | Status: COMPLETED | OUTPATIENT
Start: 2023-03-16 | End: 2023-03-16

## 2023-03-16 RX ADMIN — SODIUM CHLORIDE 1000 MILLILITER(S): 9 INJECTION INTRAMUSCULAR; INTRAVENOUS; SUBCUTANEOUS at 22:11

## 2023-03-16 RX ADMIN — ONDANSETRON 4 MILLIGRAM(S): 8 TABLET, FILM COATED ORAL at 22:28

## 2023-03-16 RX ADMIN — MORPHINE SULFATE 4 MILLIGRAM(S): 50 CAPSULE, EXTENDED RELEASE ORAL at 22:27

## 2023-03-16 NOTE — ED PROVIDER NOTE - OBJECTIVE STATEMENT
Private Physician Christine Lagunas, DO GI, Bk You pcp, Last Admit Jason James  72y female pmh Anxiety, CAD, SP ptca #2 stent, CVA, CHF,COPD, HLD , Osteoporosis ,Ventricular arrhythmia /AIDC,Colitis, AAA, Rheuma arthritis. Pt was admitted approx two weeks ago. PT was dc home and now returns with c/o abd pain nausea vomiting not tolerating po. Pt was seen at GI office and referred to ed. NO fever. Abd pain across lower abd, no gi bleeding, no cough, fever,chills.

## 2023-03-16 NOTE — ED PROVIDER NOTE - CLINICAL SUMMARY MEDICAL DECISION MAKING FREE TEXT BOX
Eldelry female w multiple medical problems as above pt nausea and vomiting abd pain hx of colitis. Concerns for same not tolerating po and meds, Plan ct abd pain meds, labs, ivf and probable admit  Dave Arita MD, Facep

## 2023-03-16 NOTE — ED PROVIDER NOTE - NSICDXPASTMEDICALHX_GEN_ALL_CORE_FT
PAST MEDICAL HISTORY:  Anxiety     CAD (coronary artery disease)     Congestive heart failure     COPD (chronic obstructive pulmonary disease)     COVID-19 vaccine series completed     CVA (cerebral vascular accident) X2, right sided weakness     Hypercholesteremia     Osteoporosis     Pacemaker     Rheumatoid arthritis     Ventricular arrhythmia

## 2023-03-16 NOTE — ED ADULT NURSE NOTE - OBJECTIVE STATEMENT
72y Female PMHx anxiety, stents x2, CVA, CHF, COPD, HLD, OA, CAD, AAA AIDC, and RA presenting to ED via walk-in triage for abd pain. Pt was recently admitted ~2w ago, pt d/c'd home and returning with persistent abd pain, N/V/D and inability to tolerate PO. Pt seen by outpatient GI and sent into ED. Upon exam, pt A&Ox3, breathing spontaneously & unlabored w/o distress on room air, abdomen soft, non-distended, tender in b/l lower quadrants upon palpation, neg CVA. Denies fever, chills, dysuria, hematuria, melena and cough. VSS, seen and eval by MD. Adrián in lowest position and locked.

## 2023-03-16 NOTE — ED CLERICAL - NS ED CLERK NOTE PRE-ARRIVAL INFORMATION; ADDITIONAL PRE-ARRIVAL INFORMATION
CC/Reason For referral: severe abdominal pain, vomiting, and unable to PO  Preferred Consultant(if applicable): na  Who admits for you (if needed): na  Do you have documents you would like to fax over? na  Would you still like to speak to an ED attending? no

## 2023-03-17 DIAGNOSIS — J44.9 CHRONIC OBSTRUCTIVE PULMONARY DISEASE, UNSPECIFIED: ICD-10-CM

## 2023-03-17 DIAGNOSIS — R11.2 NAUSEA WITH VOMITING, UNSPECIFIED: ICD-10-CM

## 2023-03-17 DIAGNOSIS — Z02.9 ENCOUNTER FOR ADMINISTRATIVE EXAMINATIONS, UNSPECIFIED: ICD-10-CM

## 2023-03-17 DIAGNOSIS — I71.40 ABDOMINAL AORTIC ANEURYSM, WITHOUT RUPTURE, UNSPECIFIED: ICD-10-CM

## 2023-03-17 DIAGNOSIS — E83.52 HYPERCALCEMIA: ICD-10-CM

## 2023-03-17 DIAGNOSIS — I50.22 CHRONIC SYSTOLIC (CONGESTIVE) HEART FAILURE: ICD-10-CM

## 2023-03-17 PROBLEM — M06.9 RHEUMATOID ARTHRITIS, UNSPECIFIED: Chronic | Status: ACTIVE | Noted: 2023-03-09

## 2023-03-17 LAB
APPEARANCE UR: CLEAR — SIGNIFICANT CHANGE UP
BILIRUB UR-MCNC: NEGATIVE — SIGNIFICANT CHANGE UP
COLOR SPEC: COLORLESS — SIGNIFICANT CHANGE UP
DIFF PNL FLD: NEGATIVE — SIGNIFICANT CHANGE UP
GLUCOSE UR QL: NEGATIVE — SIGNIFICANT CHANGE UP
KETONES UR-MCNC: SIGNIFICANT CHANGE UP
LEUKOCYTE ESTERASE UR-ACNC: NEGATIVE — SIGNIFICANT CHANGE UP
NITRITE UR-MCNC: NEGATIVE — SIGNIFICANT CHANGE UP
PH UR: 5.5 — SIGNIFICANT CHANGE UP (ref 5–8)
PROT UR-MCNC: NEGATIVE — SIGNIFICANT CHANGE UP
SP GR SPEC: 1 — LOW (ref 1.01–1.02)
UROBILINOGEN FLD QL: NEGATIVE — SIGNIFICANT CHANGE UP

## 2023-03-17 PROCEDURE — 93010 ELECTROCARDIOGRAM REPORT: CPT

## 2023-03-17 PROCEDURE — 99222 1ST HOSP IP/OBS MODERATE 55: CPT | Mod: GC

## 2023-03-17 PROCEDURE — 74177 CT ABD & PELVIS W/CONTRAST: CPT | Mod: 26,MA

## 2023-03-17 PROCEDURE — 99223 1ST HOSP IP/OBS HIGH 75: CPT

## 2023-03-17 RX ORDER — LANOLIN ALCOHOL/MO/W.PET/CERES
3 CREAM (GRAM) TOPICAL AT BEDTIME
Refills: 0 | Status: DISCONTINUED | OUTPATIENT
Start: 2023-03-17 | End: 2023-03-21

## 2023-03-17 RX ORDER — CLOPIDOGREL BISULFATE 75 MG/1
75 TABLET, FILM COATED ORAL DAILY
Refills: 0 | Status: DISCONTINUED | OUTPATIENT
Start: 2023-03-17 | End: 2023-03-21

## 2023-03-17 RX ORDER — NYSTATIN 500MM UNIT
500000 POWDER (EA) MISCELLANEOUS
Refills: 0 | Status: DISCONTINUED | OUTPATIENT
Start: 2023-03-17 | End: 2023-03-21

## 2023-03-17 RX ORDER — PANTOPRAZOLE SODIUM 20 MG/1
40 TABLET, DELAYED RELEASE ORAL
Refills: 0 | Status: DISCONTINUED | OUTPATIENT
Start: 2023-03-17 | End: 2023-03-19

## 2023-03-17 RX ORDER — ZOLPIDEM TARTRATE 10 MG/1
5 TABLET ORAL ONCE
Refills: 0 | Status: DISCONTINUED | OUTPATIENT
Start: 2023-03-17 | End: 2023-03-17

## 2023-03-17 RX ORDER — SPIRONOLACTONE 25 MG/1
25 TABLET, FILM COATED ORAL DAILY
Refills: 0 | Status: DISCONTINUED | OUTPATIENT
Start: 2023-03-17 | End: 2023-03-21

## 2023-03-17 RX ORDER — ATORVASTATIN CALCIUM 80 MG/1
80 TABLET, FILM COATED ORAL AT BEDTIME
Refills: 0 | Status: DISCONTINUED | OUTPATIENT
Start: 2023-03-17 | End: 2023-03-21

## 2023-03-17 RX ORDER — MONTELUKAST 4 MG/1
10 TABLET, CHEWABLE ORAL DAILY
Refills: 0 | Status: DISCONTINUED | OUTPATIENT
Start: 2023-03-17 | End: 2023-03-21

## 2023-03-17 RX ORDER — SUCRALFATE 1 G
1 TABLET ORAL
Refills: 0 | Status: DISCONTINUED | OUTPATIENT
Start: 2023-03-17 | End: 2023-03-21

## 2023-03-17 RX ORDER — ACETAMINOPHEN 500 MG
650 TABLET ORAL EVERY 6 HOURS
Refills: 0 | Status: DISCONTINUED | OUTPATIENT
Start: 2023-03-17 | End: 2023-03-17

## 2023-03-17 RX ORDER — ONDANSETRON 8 MG/1
4 TABLET, FILM COATED ORAL EVERY 8 HOURS
Refills: 0 | Status: DISCONTINUED | OUTPATIENT
Start: 2023-03-17 | End: 2023-03-21

## 2023-03-17 RX ORDER — ALPRAZOLAM 0.25 MG
0.5 TABLET ORAL AT BEDTIME
Refills: 0 | Status: DISCONTINUED | OUTPATIENT
Start: 2023-03-17 | End: 2023-03-21

## 2023-03-17 RX ORDER — SODIUM CHLORIDE 9 MG/ML
1000 INJECTION, SOLUTION INTRAVENOUS ONCE
Refills: 0 | Status: COMPLETED | OUTPATIENT
Start: 2023-03-17 | End: 2023-03-17

## 2023-03-17 RX ORDER — FAMOTIDINE 10 MG/ML
20 INJECTION INTRAVENOUS DAILY
Refills: 0 | Status: DISCONTINUED | OUTPATIENT
Start: 2023-03-17 | End: 2023-03-18

## 2023-03-17 RX ORDER — ENOXAPARIN SODIUM 100 MG/ML
40 INJECTION SUBCUTANEOUS EVERY 24 HOURS
Refills: 0 | Status: DISCONTINUED | OUTPATIENT
Start: 2023-03-17 | End: 2023-03-21

## 2023-03-17 RX ORDER — SACUBITRIL AND VALSARTAN 24; 26 MG/1; MG/1
1 TABLET, FILM COATED ORAL
Refills: 0 | Status: DISCONTINUED | OUTPATIENT
Start: 2023-03-18 | End: 2023-03-21

## 2023-03-17 RX ORDER — TIOTROPIUM BROMIDE 18 UG/1
2 CAPSULE ORAL; RESPIRATORY (INHALATION) DAILY
Refills: 0 | Status: DISCONTINUED | OUTPATIENT
Start: 2023-03-17 | End: 2023-03-21

## 2023-03-17 RX ORDER — BUDESONIDE AND FORMOTEROL FUMARATE DIHYDRATE 160; 4.5 UG/1; UG/1
2 AEROSOL RESPIRATORY (INHALATION)
Refills: 0 | Status: DISCONTINUED | OUTPATIENT
Start: 2023-03-17 | End: 2023-03-21

## 2023-03-17 RX ORDER — ONDANSETRON 8 MG/1
4 TABLET, FILM COATED ORAL ONCE
Refills: 0 | Status: DISCONTINUED | OUTPATIENT
Start: 2023-03-17 | End: 2023-03-21

## 2023-03-17 RX ORDER — ALBUTEROL 90 UG/1
2 AEROSOL, METERED ORAL EVERY 6 HOURS
Refills: 0 | Status: DISCONTINUED | OUTPATIENT
Start: 2023-03-17 | End: 2023-03-21

## 2023-03-17 RX ORDER — KETOROLAC TROMETHAMINE 30 MG/ML
15 SYRINGE (ML) INJECTION EVERY 8 HOURS
Refills: 0 | Status: DISCONTINUED | OUTPATIENT
Start: 2023-03-17 | End: 2023-03-19

## 2023-03-17 RX ORDER — METOPROLOL TARTRATE 50 MG
25 TABLET ORAL DAILY
Refills: 0 | Status: DISCONTINUED | OUTPATIENT
Start: 2023-03-17 | End: 2023-03-21

## 2023-03-17 RX ORDER — POLYETHYLENE GLYCOL 3350 17 G/17G
17 POWDER, FOR SOLUTION ORAL DAILY
Refills: 0 | Status: DISCONTINUED | OUTPATIENT
Start: 2023-03-17 | End: 2023-03-20

## 2023-03-17 RX ADMIN — TIOTROPIUM BROMIDE 2 PUFF(S): 18 CAPSULE ORAL; RESPIRATORY (INHALATION) at 12:20

## 2023-03-17 RX ADMIN — MONTELUKAST 10 MILLIGRAM(S): 4 TABLET, CHEWABLE ORAL at 12:19

## 2023-03-17 RX ADMIN — BUDESONIDE AND FORMOTEROL FUMARATE DIHYDRATE 2 PUFF(S): 160; 4.5 AEROSOL RESPIRATORY (INHALATION) at 17:52

## 2023-03-17 RX ADMIN — SODIUM CHLORIDE 1000 MILLILITER(S): 9 INJECTION, SOLUTION INTRAVENOUS at 22:15

## 2023-03-17 RX ADMIN — FAMOTIDINE 20 MILLIGRAM(S): 10 INJECTION INTRAVENOUS at 12:19

## 2023-03-17 RX ADMIN — ATORVASTATIN CALCIUM 80 MILLIGRAM(S): 80 TABLET, FILM COATED ORAL at 21:39

## 2023-03-17 RX ADMIN — CLOPIDOGREL BISULFATE 75 MILLIGRAM(S): 75 TABLET, FILM COATED ORAL at 12:20

## 2023-03-17 RX ADMIN — Medication 30 MILLILITER(S): at 21:39

## 2023-03-17 RX ADMIN — ZOLPIDEM TARTRATE 5 MILLIGRAM(S): 10 TABLET ORAL at 23:00

## 2023-03-17 RX ADMIN — SODIUM CHLORIDE 1000 MILLILITER(S): 9 INJECTION, SOLUTION INTRAVENOUS at 02:05

## 2023-03-17 RX ADMIN — Medication 1 GRAM(S): at 13:24

## 2023-03-17 RX ADMIN — ALBUTEROL 2 PUFF(S): 90 AEROSOL, METERED ORAL at 21:43

## 2023-03-17 RX ADMIN — Medication 500000 UNIT(S): at 12:21

## 2023-03-17 RX ADMIN — Medication 0.5 MILLIGRAM(S): at 17:50

## 2023-03-17 RX ADMIN — Medication 500000 UNIT(S): at 17:50

## 2023-03-17 RX ADMIN — Medication 500000 UNIT(S): at 23:08

## 2023-03-17 RX ADMIN — SPIRONOLACTONE 25 MILLIGRAM(S): 25 TABLET, FILM COATED ORAL at 17:51

## 2023-03-17 NOTE — H&P ADULT - PROBLEM SELECTOR PLAN 1
Ddx: viral gastroenteritis, intolerance to tylenol (per daughter this often happens), 2/2 thrush vs worsened GERD vs IBS  CTAP without acute findings besides stable AAA  - zofran iv prn - check EKG for QTC  - adv diet as gabbie - liquid > full liquid > regular  - toradol iv x 3 doses prn pain  - avoid tylenol  - c/w pepcid, added pantoprazole  - will need outpt follow up with GI for eventual EGD/cscope given hx of 30 lb wt loss. Ddx: viral gastroenteritis, intolerance to tylenol (per daughter this often happens), 2/2 thrush vs worsened GERD vs IBS  CTAP without acute findings besides stable AAA  - zofran iv prn - check EKG for QTC  - adv diet as gabbie - liquid > full liquid > regular  - toradol iv x 3 doses prn pain  - avoid tylenol  - c/w pepcid, added pantoprazole  - c/w nystatin  - will need outpt follow up with GI for eventual EGD/cscope given hx of 30 lb wt loss.

## 2023-03-17 NOTE — CHART NOTE - NSCHARTNOTEFT_GEN_A_CORE
Data Detail Level: Printer-Friendly View Extended View  Confidential Drug Utilization Report  Search Terms: mary gordon, 1950Search Date: 03/17/2023 10:14:12 AM  The Drug Utilization Report below displays all of the controlled substance prescriptions, if any, that your patient has filled in the last twelve months. The information displayed on this report is compiled from pharmacy submissions to the Department, and accurately reflects the information as submitted by the pharmacies.    This report was requested by: Annia Madden | Reference #: [id number]    You have not added a MERLINE number. Keeping your MERLINE number(s) up to date on the My MERLINE # page will enable the separation of your prescriptions from others in the search results.    Others' Prescriptions  Patient Name: Mary GordonBirth Date: 1950  Address: [address] [place], [place] [address]Ptr: Female  Rx Written	Rx Dispensed	Drug	Quantity	Days Supply	Prescriber Name	Prescriber Merline #	Payment Method	Dispenser  03/15/2023	03/16/2023	oxycodone hcl (ir) 20 mg tab	90	30	Oleg Mast MD	[id number]	Medicare	[facility]  03/15/2023	03/16/2023	alprazolam 1 mg tablet	90	30	ProOleg cardenas MD	[id number]	Medicare	[facility]  03/15/2023	03/16/2023	zolpidem tartrate 10 mg tablet	30	30	ProOleg MD	[id number]	Medicare	[facility]  02/09/2023	02/09/2023	alprazolam 1 mg tablet	90	30	ProOleg cardenas MD	[id number]	Medicare	[facility]  02/09/2023	02/09/2023	oxycodone hcl (ir) 20 mg tab	90	30	ProOleg cardenas MD	[id number]	Medicare	[facility]  02/09/2023	02/09/2023	zolpidem tartrate 10 mg tablet	30	30	ProOleg cardenas MD	[id number]	Medicare	[facility]  01/11/2023	01/11/2023	zolpidem tartrate 10 mg tablet	30	30	ProOleg cardenas MD	[id number]	Medicare	[facility]  01/11/2023	01/11/2023	alprazolam 1 mg tablet	90	30	Oleg Mast MD	[id number]	Medicare	[facility]  01/11/2023	01/11/2023	oxycodone hcl (ir) 20 mg tab	90	30	Oleg Mast MD	[id number]	Medicare	[facility]  11/23/2022	12/30/2022	promethazine-codeine solution	473ml	30	Henry Elliott MD	[id number]	[place]	[facility]  12/06/2022	12/12/2022	oxycodone hcl (ir) 20 mg tab	90	30	Oleg Mast MD	[id number]	Medicare	[facility]  12/06/2022	12/12/2022	zolpidem tartrate 10 mg tablet	30	30	Oleg Mast MD	[id number]	Medicare	[facility]  12/06/2022	12/12/2022	alprazolam 1 mg tablet	90	30	Oleg Mast MD	[id number]	Medicare	[facility]  11/23/2022	12/06/2022	promethazine-codeine solution	473ml	30	Henry Elliott MD	[id number]	[place]	[facility]  11/16/2022	11/16/2022	alprazolam 1 mg tablet	90	30	Oleg Mast MD	[id number]	Medicare	[facility]  11/16/2022	11/16/2022	oxycodone hcl (ir) 20 mg tab	90	30	Oleg Mast MD	[id number]	Medicare	[facility]  11/16/2022	11/16/2022	zolpidem tartrate 10 mg tablet	30	30	Oleg Mast MD	[id number]	Medicare	[facility]  08/12/2022	11/08/2022	promethazine-codeine syrup	473ml	20	Henry Elliott MD	[id number]	[place]	[facility]  10/19/2022	10/20/2022	oxycodone hcl (ir) 20 mg tab	90	30	Oleg Mast MD	[id number]	[facility]  10/19/2022	10/20/2022	zolpidem tartrate 10 mg tablet	30	30	Oleg Mast MD	[id number]	Insurance	[facility]  10/19/2022	10/20/2022	alprazolam 1 mg tablet	90	30	Oleg Mast MD	[id number]	Insurance	[facility]  08/12/2022	10/06/2022	promethazine-codeine syrup	473ml	20	Henry Elliott MD	[id number]	[place]	[facility]  09/21/2022	09/22/2022	alprazolam 1 mg tablet	90	30	Oleg Mast MD	[id number]	Insurance	[facility]  09/21/2022	09/22/2022	oxycodone hcl (ir) 20 mg tab	90	30	Oleg Mast MD	[id number]	Insurance	[facility]  09/21/2022	09/22/2022	zolpidem tartrate 10 mg tablet	30	30	Oleg Mast MD	[id number]	Insurance	[facility]  08/12/2022	09/06/2022	promethazine-codeine syrup	473ml	20	Henry Elliott MD	[id number]	[place]	[facility]  08/17/2022	08/20/2022	zolpidem tartrate 10 mg tablet	30	30	Oleg Mast MD	[id number]	Insurance	[facility]  08/17/2022	08/20/2022	oxycodone hcl (ir) 20 mg tab	90	30	Oleg Mast MD	[id number]	Insurance	[facility]  08/17/2022	08/20/2022	alprazolam 1 mg tablet	90	30	Oleg Mast MD	[id number]	Insurance	[facility]  05/25/2022	08/05/2022	promethazine-codeine syrup	473ml	8	Henry Elliott MD	[id number]	[place]	[facility]  05/25/2022	07/13/2022	promethazine-codeine syrup	473ml	8	Henry Elliott MD	[id number]	[place]	[facility]  07/13/2022	07/13/2022	zolpidem tartrate 10 mg tablet	30	30	Oleg Mast MD	[id number]	Insurance	[facility]  07/13/2022	07/13/2022	alprazolam 1 mg tablet	90	30	Oleg Mast MD	[id number]	Insurance	[facility]  07/13/2022	07/13/2022	oxycodone hcl (ir) 20 mg tab	90	30	Oleg Mast MD	[id number]	Insurance	[facility]  05/25/2022	06/16/2022	promethazine-codeine syrup	473ml	8	Henry Elliott MD	[id number]	[place]	[facility]  06/16/2022	06/16/2022	oxycodone hcl 20 mg tablet	90	30	Oleg Mast MD	[id number]	Insurance	[facility]  06/16/2022	06/16/2022	alprazolam 1 mg tablet	90	30	ProOleg cardenas MD	[id number]	Insurance	[facility]  06/16/2022	06/16/2022	zolpidem tartrate 10 mg tablet	30	30	ProOleg cardenas MD	[id number]	Insurance	[facility]  05/25/2022	06/01/2022	promethazine-codeine syrup	473ml	8	Henry Elliott MD	[id number]	[place]	[facility]  03/30/2022	05/18/2022	promethazine-codeine solution	473ml	8	Henry Elliott MD	[id number]	[place]	[facility]  05/18/2022	05/18/2022	alprazolam 1 mg tablet	90	30	Oleg Mast MD	[id number]	Insurance	[facility]  05/17/2022	05/17/2022	zolpidem tartrate 10 mg tablet	30	30	ProOleg cardenas MD	[id number]	Insurance	[facility]  05/17/2022	05/17/2022	oxycodone hcl (ir) 20 mg tab	90	30	Oleg Mast MD	[id number]	Insurance	[facility]  03/30/2022	05/03/2022	promethazine-codeine solution	473ml	8	Henry Elliott MD	[id number]	[place]	[facility]  04/16/2022	04/16/2022	alprazolam 1 mg tablet	90	30	Oleg Mast MD	[id number]	Insurance	[facility]  04/16/2022	04/16/2022	oxycodone hcl (ir) 20 mg tab	90	30	Oleg Mast MD	[id number]	Insurance	[facility]  04/16/2022	04/16/2022	zolpidem tartrate 10 mg tablet	30	30	Oleg Mast MD	[id number]	Insurance	[facility]  03/30/2022	04/15/2022	promethazine-codeine solution	473ml	8	Henry Elliott MD	[id number]	[place]	[facility]  03/30/2022	03/31/2022	promethazine-codeine solution	473ml	8	Henry Elliott MD	[id number]	[place]	[facility]  03/15/2022	03/25/2022	zolpidem tartrate 10 mg tablet	30	30	ProOleg cardenas MD	[id number]	[facility]  * - Drugs marked with an asterisk are compound drugs. If the compound drug is made up of more than one controlled substance, then each controlled substance will be a separate row in the table.

## 2023-03-17 NOTE — H&P ADULT - NSHPLABSRESULTS_GEN_ALL_CORE
LABS:                         13.6   5.39  )-----------( 383      ( 16 Mar 2023 22:41 )             41.4     03-    137  |  97  |  14  ----------------------------<  96  3.5   |  23  |  1.14    Ca    11.0<H>      16 Mar 2023 22:41    TPro  8.2  /  Alb  4.3  /  TBili  0.4  /  DBili  x   /  AST  19  /  ALT  16  /  AlkPhos  64  03-16      Urinalysis Basic - ( 16 Mar 2023 23:56 )    Color: Colorless / Appearance: Clear / S.004 / pH: x  Gluc: x / Ketone: Trace  / Bili: Negative / Urobili: Negative   Blood: x / Protein: Negative / Nitrite: Negative   Leuk Esterase: Negative / RBC: x / WBC x   Sq Epi: x / Non Sq Epi: x / Bacteria: x      Records reviewed from prior hospitalization as above  EKG ordered  CT IMPRESSION:  Stable 4.6 x 4.3 (AP x transverse dimensions ) infrarenal abdominal   aortic aneurysm containing large amount of mural thrombus. No acute   dissection, leak or rupture. 1.9 cm right common iliac artery aneurysm.   Extensive atherosclerotic vascular calcification abdominal aorta and   iliac arteries. Given size of the aneurysm vascular surgery consultation   recommended for further evaluation and treatment.    No other acute findings.    CXR personally reviewed AICD, no pleural effusions or infiltrates

## 2023-03-17 NOTE — H&P ADULT - NSHPREVIEWOFSYSTEMS_GEN_ALL_CORE
Review of Systems:   CONSTITUTIONAL: No fever, weight loss  EYES: No eye pain, visual disturbances, or discharge  ENMT:  No difficulty hearing, tinnitus, vertigo; No sinus or throat pain  RESPIRATORY: No SOB. No cough, wheezing, chills or hemoptysis  CARDIOVASCULAR: No chest pain, palpitations, or leg swelling  GASTROINTESTINAL: +abd pain, +n/v No diarrhea or constipation. No melena or hematochezia.  GENITOURINARY: No dysuria, frequency, hematuria, or incontinence  NEUROLOGICAL: No headaches, memory loss, loss of strength, numbness, or tremors  SKIN: No itching, burning, rashes, or lesions   PSYCHIATRIC: No depression, anxiety, mood swings, or difficulty sleeping  HEME/LYMPH: No easy bruising, or bleeding gums

## 2023-03-17 NOTE — PHYSICAL THERAPY INITIAL EVALUATION ADULT - ACTIVE RANGE OF MOTION EXAMINATION, REHAB EVAL
bilateral upper extremity Active ROM was WFL (within functional limits)/bilateral  lower extremity Active ROM was WFL (within functional limits) yes

## 2023-03-17 NOTE — PHYSICAL THERAPY INITIAL EVALUATION ADULT - PERTINENT HX OF CURRENT PROBLEM, REHAB EVAL
72Y  female pmh Anxiety, CAD, SP ptca #2 stent, CVA, CHF,COPD, HLD , Osteoporosis ,Ventricular arrhythmia /AIDC,Colitis, AAA, Rheuma arthritis. Pt was admitted approx two weeks ago. PT was dc home and now returns with c/o abd pain nausea vomiting not tolerating po. Pt was seen at GI office and referred to ed. NO fever. Abd pain across lower abd, no gi bleeding, no cough, fever,chills. 72Y  female pmh Anxiety, CAD, SP ptca #2 stent, CVA, CHF,COPD, HLD , Osteoporosis ,Ventricular arrhythmia /AIDC,Colitis, AAA, Rheuma arthritis. Pt was admitted approx two weeks ago. PT was dc home and now returns with c/o abd pain nausea vomiting not tolerating po. Pt was seen at GI office and referred to ed. NO fever. Abd pain across lower abd, no gi bleeding, no cough, fever,chills. CT Abdomen and Pelvis: Stable 4.6 x 4.3 (AP x transverse dimensions ) infrarenal abdominal aortic aneurysm containing large amount of mural thrombus. No acute dissection, leak or rupture. 1.9 cm right common iliac artery aneurysm. Extensive atherosclerotic vascular calcification abdominal aorta and iliac arteries.

## 2023-03-17 NOTE — H&P ADULT - ASSESSMENT
73 yo F PMH anxiety, CAD, HFrEF ED 29%, ICD, COPD, CVA w/ right sided weakness, AAA, RA, recent DC from [facility] 3/11/23 for orthostatic hypotension p/w nausea and vomiting for 1 day, CTAP nonrevealing, admitted for symptom support.

## 2023-03-17 NOTE — H&P ADULT - HISTORY OF PRESENT ILLNESS
73 yo F PMH anxiety, CAD, HFrEF ED 29%, ICD, COPD, CVA w/ right sided weakness , AAA, RA, recent DC from [facility] 3/11/23 for orthostatic hypotension 2/2 polypharmacy and CP s/p diagnostic LHC/RHC p/w nausea and vomiting for 1 day. History obtained from patient's daughter Merlyn over phone as well as patient at the bedside. She has been experiencing 1 month of lower diffuse abdominal pain, nonradiating, comes and goes, with decreased appetite and reported weight loss of 30 lbs. She takes tylenol for the pain, but per daughter she typically becomes nauseous and vomits every time after she takes tylenol. She is currently taking nystatin for thrush and reports increased acid reflux during this time. No changes in bowel movements -  no diarrhea. She presented to outpt GI yesterday but was not seen as her insurance was not covered and told to come to the ED for eval.    In the ED VSS afebrile  Meds received: morphine 4 mg iv, zofran 4 mg iv, 1 L LR, 1 L NS IVF

## 2023-03-17 NOTE — PATIENT PROFILE ADULT - FALL HARM RISK - HARM RISK INTERVENTIONS

## 2023-03-17 NOTE — H&P ADULT - PROBLEM SELECTOR PLAN 6
dvt ppx: lovenox  d/w daughter Merlyn over phone, all ques answered  Dispo: DC planning when able to tolerate PO, f/u PT recs

## 2023-03-17 NOTE — H&P ADULT - NSHPSOCIALHISTORY_GEN_ALL_CORE
Lives at home alone  Daughter is CDPAP - HHA for 5 hours daily   Ambulates w/ cane   Denies smoking, ETOH, illicit drug use

## 2023-03-17 NOTE — CONSULT NOTE ADULT - SUBJECTIVE AND OBJECTIVE BOX
GENERAL SURGERY CONSULT NOTE  Consulting surgical team: Vascular Surgery   Consulting attending: Dr. Hernandez     HPI:  HPI: 72y female pmh Anxiety, CAD, SP ptca #2 stent, CVA, CHF,COPD, HLD , Osteoporosis ,Ventricular arrhythmia/AIDC, colitis and 4.6 cm AAA presents with abdominal pain. Patient reports pain is associated with nausea vomiting and multiple episodes of diarrhea. She was sent in from her GI office. Patient previously seen on 3/7 and found to have 4.6 cm AAA with mural thrombus and 1.9 cm right internal iliac aneurysm, stable on today's scan.        PAST MEDICAL HISTORY:  Anxiety    Hypercholesteremia    Osteoporosis    Pacemaker    CAD (coronary artery disease)    COPD (chronic obstructive pulmonary disease)    Congestive heart failure    Ventricular arrhythmia    CVA (cerebral vascular accident) X2, right sided weakness    Rheumatoid arthritis    COVID-19 vaccine series completed        PAST SURGICAL HISTORY:  Cataract Surgery    Hand Surgery        SOCIAL HISTORY:  - Denies EtOH abuse, smoking, IVDA    MEDICATIONS:      ALLERGIES:  lactose (Rash)  latex (Unknown)  No Known Drug Allergies      VITALS & I/Os:  Vital Signs Last 24 Hrs  T(C): 36.4 (16 Mar 2023 21:11), Max: 36.8 (16 Mar 2023 18:37)  T(F): 97.5 (16 Mar 2023 21:11), Max: 98.2 (16 Mar 2023 18:37)  HR: 86 (17 Mar 2023 02:05) (75 - 97)  BP: 101/70 (17 Mar 2023 02:05) (86/58 - 143/88)  BP(mean): 82 (17 Mar 2023 02:05) (68 - 98)  RR: 16 (17 Mar 2023 02:05) (16 - 22)  SpO2: 98% (17 Mar 2023 02:05) (98% - 99%)    Parameters below as of 17 Mar 2023 02:05  Patient On (Oxygen Delivery Method): room air        I&O's Summary      PHYSICAL EXAM:  GEN: resting comfortably in bed, in NAD  RESP: no acute respiratory distress, breathing comfortably   ABD: soft, non-distended, nontender    EXT:  b/l palpable femoral/dp/pt pulses   NEURO:  no focal neuro deficits     LABS:                        13.6   5.39  )-----------( 383      ( 16 Mar 2023 22:41 )             41.4     03-16    137  |  97  |  14  ----------------------------<  96  3.5   |  23  |  1.14    Ca    11.0<H>      16 Mar 2023 22:41    TPro  8.2  /  Alb  4.3  /  TBili  0.4  /  DBili  x   /  AST  19  /  ALT  16  /  AlkPhos  64      Lactate:   @ 22:40  1.6              Urinalysis Basic - ( 16 Mar 2023 23:56 )    Color: Colorless / Appearance: Clear / S.004 / pH: x  Gluc: x / Ketone: Trace  / Bili: Negative / Urobili: Negative   Blood: x / Protein: Negative / Nitrite: Negative   Leuk Esterase: Negative / RBC: x / WBC x   Sq Epi: x / Non Sq Epi: x / Bacteria: x        IMAGING:

## 2023-03-17 NOTE — CONSULT NOTE ADULT - ASSESSMENT
72y female pmh Anxiety, CAD, SP ptca #2 stent, CVA, CHF,COPD, HLD , Osteoporosis ,Ventricular arrhythmia /AIDC, colitis presents with abdominal pain, nausea, vomiting. Vascular consulted for stable 4.6 cm AAA and 1.9 cm PREETHI aneurysm.     Plan:  - No acute vascular surgery intervention  - Outpatient follow to monitor AAA  - Rest of care per primary team     Plan discussed with Dr. Mary on behalf of Dr. Hernandez.     Christine Vila PGY-2   Vascular Surgery  [phone]

## 2023-03-17 NOTE — PHYSICAL THERAPY INITIAL EVALUATION ADULT - ADDITIONAL COMMENTS
Pt lives in senior living facility with in home aide present 7 days a week for 5 hours. Pt has no stairs to negotiate in facility. Pt reports using straight cane for mobility prior to admission and owns rolling walker.

## 2023-03-17 NOTE — H&P ADULT - PROBLEM SELECTOR PLAN 4
slightly hypovolemic to euvolemic  - resume entresto tomorrow  - c/w aldactone, toprol  - monitor bp, fluid status, wts

## 2023-03-17 NOTE — H&P ADULT - NSHPPHYSICALEXAM_GEN_ALL_CORE
Vital Signs Last 24 Hrs  T(C): 36.2 (17 Mar 2023 10:33), Max: 36.8 (16 Mar 2023 18:37)  T(F): 97.2 (17 Mar 2023 10:33), Max: 98.2 (16 Mar 2023 18:37)  HR: 73 (17 Mar 2023 10:33) (73 - 97)  BP: 101/60 (17 Mar 2023 05:55) (86/58 - 143/88)  BP(mean): 82 (17 Mar 2023 02:05) (68 - 98)  RR: 18 (17 Mar 2023 10:33) (16 - 22)  SpO2: 94% (17 Mar 2023 10:33) (94% - 100%)    Parameters below as of 17 Mar 2023 10:33  Patient On (Oxygen Delivery Method): room air        PHYSICAL EXAM:  GENERAL:  Well appearing, in NAD, tongue w/ some thrush  HEAD:  NCAT  EYES: conjunctiva clear  NECK: Supple, No JVD  CHEST/LUNG: CTA B/L. No w/r/r.  HEART: Reg rate. Normal S1, S2. No m/r/g.   ABDOMEN: SNTND  EXTREMITIES:  2+ Peripheral Pulses, No clubbing, cyanosis, edema.  PSYCH: AAOx3, appropriate affect  SKIN: No rashes or lesions
PAST MEDICAL HISTORY:  Diabetes mellitus

## 2023-03-17 NOTE — H&P ADULT - PROBLEM SELECTOR PLAN 2
likely 2/2 hypovolemia  - s/p 2 L IVF in the ED - hold further IVF given CHF hx  - hold entresto given borderline BP - ordered to resume tomorrow 3/18  - monitor repeat Ca in am, if persistently high, check ionized ca, PTH, 25-OH vit d, 1, 25 vit d

## 2023-03-18 LAB
ALBUMIN SERPL ELPH-MCNC: 3.1 G/DL — LOW (ref 3.3–5)
ALP SERPL-CCNC: 41 U/L — SIGNIFICANT CHANGE UP (ref 40–120)
ALT FLD-CCNC: 10 U/L — SIGNIFICANT CHANGE UP (ref 10–45)
ANION GAP SERPL CALC-SCNC: 10 MMOL/L — SIGNIFICANT CHANGE UP (ref 5–17)
AST SERPL-CCNC: 14 U/L — SIGNIFICANT CHANGE UP (ref 10–40)
BILIRUB SERPL-MCNC: 0.3 MG/DL — SIGNIFICANT CHANGE UP (ref 0.2–1.2)
BUN SERPL-MCNC: 8 MG/DL — SIGNIFICANT CHANGE UP (ref 7–23)
CALCIUM SERPL-MCNC: 9.3 MG/DL — SIGNIFICANT CHANGE UP (ref 8.4–10.5)
CHLORIDE SERPL-SCNC: 104 MMOL/L — SIGNIFICANT CHANGE UP (ref 96–108)
CO2 SERPL-SCNC: 26 MMOL/L — SIGNIFICANT CHANGE UP (ref 22–31)
CREAT SERPL-MCNC: 0.9 MG/DL — SIGNIFICANT CHANGE UP (ref 0.5–1.3)
CULTURE RESULTS: SIGNIFICANT CHANGE UP
EGFR: 67 ML/MIN/1.73M2 — SIGNIFICANT CHANGE UP
GLUCOSE SERPL-MCNC: 100 MG/DL — HIGH (ref 70–99)
HCT VFR BLD CALC: 30.8 % — LOW (ref 34.5–45)
HGB BLD-MCNC: 9.8 G/DL — LOW (ref 11.5–15.5)
MAGNESIUM SERPL-MCNC: 1.3 MG/DL — LOW (ref 1.6–2.6)
MCHC RBC-ENTMCNC: 31.8 GM/DL — LOW (ref 32–36)
MCHC RBC-ENTMCNC: 31.9 PG — SIGNIFICANT CHANGE UP (ref 27–34)
MCV RBC AUTO: 100.3 FL — HIGH (ref 80–100)
NRBC # BLD: 0 /100 WBCS — SIGNIFICANT CHANGE UP (ref 0–0)
PLATELET # BLD AUTO: 286 K/UL — SIGNIFICANT CHANGE UP (ref 150–400)
POTASSIUM SERPL-MCNC: 3.3 MMOL/L — LOW (ref 3.5–5.3)
POTASSIUM SERPL-SCNC: 3.3 MMOL/L — LOW (ref 3.5–5.3)
PROT SERPL-MCNC: 5.8 G/DL — LOW (ref 6–8.3)
RBC # BLD: 3.07 M/UL — LOW (ref 3.8–5.2)
RBC # FLD: 14.6 % — HIGH (ref 10.3–14.5)
SODIUM SERPL-SCNC: 140 MMOL/L — SIGNIFICANT CHANGE UP (ref 135–145)
SPECIMEN SOURCE: SIGNIFICANT CHANGE UP
WBC # BLD: 4.55 K/UL — SIGNIFICANT CHANGE UP (ref 3.8–10.5)
WBC # FLD AUTO: 4.55 K/UL — SIGNIFICANT CHANGE UP (ref 3.8–10.5)

## 2023-03-18 PROCEDURE — 99233 SBSQ HOSP IP/OBS HIGH 50: CPT

## 2023-03-18 RX ORDER — SENNA PLUS 8.6 MG/1
1 TABLET ORAL ONCE
Refills: 0 | Status: COMPLETED | OUTPATIENT
Start: 2023-03-18 | End: 2023-03-18

## 2023-03-18 RX ORDER — ZOLPIDEM TARTRATE 10 MG/1
5 TABLET ORAL AT BEDTIME
Refills: 0 | Status: DISCONTINUED | OUTPATIENT
Start: 2023-03-18 | End: 2023-03-21

## 2023-03-18 RX ORDER — POTASSIUM CHLORIDE 20 MEQ
20 PACKET (EA) ORAL ONCE
Refills: 0 | Status: COMPLETED | OUTPATIENT
Start: 2023-03-18 | End: 2023-03-18

## 2023-03-18 RX ORDER — FAMOTIDINE 10 MG/ML
20 INJECTION INTRAVENOUS DAILY
Refills: 0 | Status: DISCONTINUED | OUTPATIENT
Start: 2023-03-18 | End: 2023-03-18

## 2023-03-18 RX ORDER — MAGNESIUM SULFATE 500 MG/ML
2 VIAL (ML) INJECTION ONCE
Refills: 0 | Status: COMPLETED | OUTPATIENT
Start: 2023-03-18 | End: 2023-03-18

## 2023-03-18 RX ORDER — FAMOTIDINE 10 MG/ML
40 INJECTION INTRAVENOUS DAILY
Refills: 0 | Status: DISCONTINUED | OUTPATIENT
Start: 2023-03-19 | End: 2023-03-21

## 2023-03-18 RX ORDER — SENNA PLUS 8.6 MG/1
2 TABLET ORAL AT BEDTIME
Refills: 0 | Status: DISCONTINUED | OUTPATIENT
Start: 2023-03-19 | End: 2023-03-21

## 2023-03-18 RX ORDER — FAMOTIDINE 10 MG/ML
20 INJECTION INTRAVENOUS DAILY
Refills: 0 | Status: COMPLETED | OUTPATIENT
Start: 2023-03-18 | End: 2023-03-18

## 2023-03-18 RX ADMIN — Medication 20 MILLIEQUIVALENT(S): at 12:29

## 2023-03-18 RX ADMIN — Medication 15 MILLIGRAM(S): at 18:26

## 2023-03-18 RX ADMIN — Medication 500000 UNIT(S): at 17:57

## 2023-03-18 RX ADMIN — Medication 0.5 MILLIGRAM(S): at 18:26

## 2023-03-18 RX ADMIN — Medication 10 MILLIGRAM(S): at 23:48

## 2023-03-18 RX ADMIN — Medication 15 MILLIGRAM(S): at 19:00

## 2023-03-18 RX ADMIN — SACUBITRIL AND VALSARTAN 1 TABLET(S): 24; 26 TABLET, FILM COATED ORAL at 17:59

## 2023-03-18 RX ADMIN — FAMOTIDINE 20 MILLIGRAM(S): 10 INJECTION INTRAVENOUS at 17:54

## 2023-03-18 RX ADMIN — Medication 1 GRAM(S): at 05:40

## 2023-03-18 RX ADMIN — BUDESONIDE AND FORMOTEROL FUMARATE DIHYDRATE 2 PUFF(S): 160; 4.5 AEROSOL RESPIRATORY (INHALATION) at 17:58

## 2023-03-18 RX ADMIN — SACUBITRIL AND VALSARTAN 1 TABLET(S): 24; 26 TABLET, FILM COATED ORAL at 05:41

## 2023-03-18 RX ADMIN — MONTELUKAST 10 MILLIGRAM(S): 4 TABLET, CHEWABLE ORAL at 12:26

## 2023-03-18 RX ADMIN — ZOLPIDEM TARTRATE 5 MILLIGRAM(S): 10 TABLET ORAL at 22:13

## 2023-03-18 RX ADMIN — TIOTROPIUM BROMIDE 2 PUFF(S): 18 CAPSULE ORAL; RESPIRATORY (INHALATION) at 12:25

## 2023-03-18 RX ADMIN — Medication 15 MILLIGRAM(S): at 11:10

## 2023-03-18 RX ADMIN — SPIRONOLACTONE 25 MILLIGRAM(S): 25 TABLET, FILM COATED ORAL at 05:41

## 2023-03-18 RX ADMIN — Medication 15 MILLIGRAM(S): at 10:35

## 2023-03-18 RX ADMIN — FAMOTIDINE 20 MILLIGRAM(S): 10 INJECTION INTRAVENOUS at 12:27

## 2023-03-18 RX ADMIN — BUDESONIDE AND FORMOTEROL FUMARATE DIHYDRATE 2 PUFF(S): 160; 4.5 AEROSOL RESPIRATORY (INHALATION) at 05:42

## 2023-03-18 RX ADMIN — POLYETHYLENE GLYCOL 3350 17 GRAM(S): 17 POWDER, FOR SOLUTION ORAL at 05:40

## 2023-03-18 RX ADMIN — Medication 500000 UNIT(S): at 12:27

## 2023-03-18 RX ADMIN — SENNA PLUS 1 TABLET(S): 8.6 TABLET ORAL at 15:12

## 2023-03-18 RX ADMIN — Medication 50 GRAM(S): at 22:12

## 2023-03-18 RX ADMIN — CLOPIDOGREL BISULFATE 75 MILLIGRAM(S): 75 TABLET, FILM COATED ORAL at 12:26

## 2023-03-18 RX ADMIN — Medication 1 GRAM(S): at 17:58

## 2023-03-18 RX ADMIN — Medication 500000 UNIT(S): at 05:40

## 2023-03-18 RX ADMIN — PANTOPRAZOLE SODIUM 40 MILLIGRAM(S): 20 TABLET, DELAYED RELEASE ORAL at 05:41

## 2023-03-18 RX ADMIN — Medication 25 MILLIGRAM(S): at 05:41

## 2023-03-18 RX ADMIN — ATORVASTATIN CALCIUM 80 MILLIGRAM(S): 80 TABLET, FILM COATED ORAL at 21:54

## 2023-03-18 NOTE — DIETITIAN INITIAL EVALUATION ADULT - NSFNSGIIOFT_GEN_A_CORE
--Last bowel movement on 3/16/23 per flow sheets, ordered for senna & polythene glycol for bowel regimen

## 2023-03-18 NOTE — DIETITIAN INITIAL EVALUATION ADULT - OTHER CALCULATIONS
-- Defer fluid needs to medical team  -- Estimated Calorie/Protein needs are based on her current dosing weight of 149.9 pounds/ 68 kg

## 2023-03-18 NOTE — DIETITIAN NUTRITION RISK NOTIFICATION - TREATMENT: THE FOLLOWING DIET HAS BEEN RECOMMENDED
Diet, Regular:   DASH/TLC {Sodium & Cholesterol Restricted} (DASH)  No Lactose (03-17-23 @ 13:17) [Active]

## 2023-03-18 NOTE — DIETITIAN INITIAL EVALUATION ADULT - DIET TYPE
Recommend discontinue DASH/TLC Restriction, provide low sodium instead. Defer diet/texture modification  to medical team/SLP as indicated/low sodium

## 2023-03-18 NOTE — DIETITIAN INITIAL EVALUATION ADULT - REASON FOR ADMISSION
Chart Reviewed, Events Noted  " 71 yo F PMH anxiety, CAD, HFrEF ED 29%, ICD, COPD, CVA w/ right sided weakness , AAA, RA, recent DC from [facility] 3/11/23 for orthostatic hypotension 2/2 polypharmacy and CP s/p diagnostic LHC/RHC p/w nausea and vomiting for 1 day. History obtained from patient's daughter Merlyn over phone as well as patient at the bedside. She has been experiencing 1 month of lower diffuse abdominal pain, nonradiating, comes and goes, with decreased appetite and reported weight loss of 30 lbs. She takes tylenol for the pain, but per daughter she typically becomes nauseous and vomits every time after she takes tylenol. She is currently taking nystatin for thrush and reports increased acid reflux during this time. No changes in bowel movements -  no diarrhea. She presented to outpt GI yesterday but was not seen as her insurance was not covered and told to come to the ED for eval."

## 2023-03-18 NOTE — DIETITIAN INITIAL EVALUATION ADULT - PERTINENT MEDS FT
MEDICATIONS  (STANDING):  atorvastatin 80 milliGRAM(s) Oral at bedtime  budesonide 160 MICROgram(s)/formoterol 4.5 MICROgram(s) Inhaler 2 Puff(s) Inhalation two times a day  clopidogrel Tablet 75 milliGRAM(s) Oral daily  enoxaparin Injectable 40 milliGRAM(s) SubCutaneous every 24 hours  famotidine   Suspension 20 milliGRAM(s) Oral daily  metoprolol succinate ER 25 milliGRAM(s) Oral daily  montelukast 10 milliGRAM(s) Oral daily  nystatin    Suspension 150122 Unit(s) Oral four times a day  pantoprazole    Tablet 40 milliGRAM(s) Oral before breakfast  sacubitril 24 mG/valsartan 26 mG 1 Tablet(s) Oral two times a day  senna 1 Tablet(s) Oral once  spironolactone 25 milliGRAM(s) Oral daily  sucralfate suspension 1 Gram(s) Oral two times a day  tiotropium 2.5 MICROgram(s) Inhaler 2 Puff(s) Inhalation daily    MEDICATIONS  (PRN):  albuterol    90 MICROgram(s) HFA Inhaler 2 Puff(s) Inhalation every 6 hours PRN Shortness of Breath and/or Wheezing  ALPRAZolam 0.5 milliGRAM(s) Oral at bedtime PRN anxiety  aluminum hydroxide/magnesium hydroxide/simethicone Suspension 30 milliLiter(s) Oral every 4 hours PRN Dyspepsia  ketorolac   Injectable 15 milliGRAM(s) IV Push every 8 hours PRN Severe Pain (7 - 10)  melatonin 3 milliGRAM(s) Oral at bedtime PRN Insomnia  ondansetron Injectable 4 milliGRAM(s) IV Push once PRN Nausea and/or Vomiting  ondansetron Injectable 4 milliGRAM(s) IV Push every 8 hours PRN Nausea and/or Vomiting  polyethylene glycol 3350 17 Gram(s) Oral daily PRN Constipation

## 2023-03-18 NOTE — DIETITIAN INITIAL EVALUATION ADULT - ADD RECOMMEND
1. Consider multivitamin if no medical contraindications to deter micronutrient deficiencies  2. Monitor PO intake, GI tolerance, skin integrity and labs. RD remains available if needed, pt is aware.

## 2023-03-18 NOTE — DIETITIAN INITIAL EVALUATION ADULT - REASON INDICATOR FOR ASSESSMENT
Unintentional weight loss PTA  Information obtained from: Review of pt's current medical record, interview with pt in her assigned room on [id number]

## 2023-03-18 NOTE — DIETITIAN INITIAL EVALUATION ADULT - OTHER INFO
Weights:  --Drug Dosing Weight: 68 kg/ 149.9 pounds  (3/16/23)  -- Pt reported a recent weight loss of 30 pounds. She reports her UBW to be 149 pounds  -- Bed Scale Weight obtained by RD: 61.5 kg/ 135.6 pounds  -- Pt noted with a 13.4 pounds/ 9.0% weight loss in an unknown time period from her reported UBW  -- IBW: 135 pounds, %IBW: 100%

## 2023-03-18 NOTE — DIETITIAN INITIAL EVALUATION ADULT - PERTINENT LABORATORY DATA
03-18    140  |  104  |  8   ----------------------------<  100<H>  3.3<L>   |  26  |  0.90    Ca    9.3      18 Mar 2023 09:49  Mg     1.3     03-18    TPro  5.8<L>  /  Alb  3.1<L>  /  TBili  0.3  /  DBili  x   /  AST  14  /  ALT  10  /  AlkPhos  41  03-18

## 2023-03-18 NOTE — DIETITIAN INITIAL EVALUATION ADULT - ENERGY INTAKE
Poor (<50%) -- Pt reports her vomiting has subsided during present admission, noted ordered for ondansetron. Pt observed eating bread and banana during RD visit.

## 2023-03-18 NOTE — DIETITIAN INITIAL EVALUATION ADULT - ORAL INTAKE PTA/DIET HISTORY
Pt reports no known food allergies, confirms lactose intolerance (remains on file in pt's menu profile). Denies difficulty chewing/swallowing. Reports having vomiting and nausea PTA, denies any constipation or diarrhea. Had a poor appetite PTA related to her GI distress. Was not following any specific restrictions at home. Did not take any oral nutritional supplements. She reports to taking Vitamin D,B,C at home PTA

## 2023-03-18 NOTE — PROGRESS NOTE ADULT - PROBLEM SELECTOR PLAN 1
Ddx: viral gastroenteritis, intolerance to tylenol (per daughter this often happens), 2/2 thrush vs worsened GERD vs IBS  CTAP without acute findings besides stable AAA  - zofran iv prn - check EKG for QTC  - adv diet as gabbie - liquid > full liquid > regular  - toradol iv x 3 doses prn pain  - avoid tylenol  - c/w pepcid, added pantoprazole  - c/w nystatin  - will need outpt follow up with GI for eventual EGD/cscope given hx of 30 lb wt loss.

## 2023-03-19 DIAGNOSIS — K59.00 CONSTIPATION, UNSPECIFIED: ICD-10-CM

## 2023-03-19 LAB
ANION GAP SERPL CALC-SCNC: 8 MMOL/L — SIGNIFICANT CHANGE UP (ref 5–17)
BUN SERPL-MCNC: 6 MG/DL — LOW (ref 7–23)
CALCIUM SERPL-MCNC: 9.3 MG/DL — SIGNIFICANT CHANGE UP (ref 8.4–10.5)
CHLORIDE SERPL-SCNC: 106 MMOL/L — SIGNIFICANT CHANGE UP (ref 96–108)
CO2 SERPL-SCNC: 26 MMOL/L — SIGNIFICANT CHANGE UP (ref 22–31)
CREAT SERPL-MCNC: 0.97 MG/DL — SIGNIFICANT CHANGE UP (ref 0.5–1.3)
EGFR: 61 ML/MIN/1.73M2 — SIGNIFICANT CHANGE UP
GLUCOSE SERPL-MCNC: 99 MG/DL — SIGNIFICANT CHANGE UP (ref 70–99)
HCT VFR BLD CALC: 30.9 % — LOW (ref 34.5–45)
HGB BLD-MCNC: 9.9 G/DL — LOW (ref 11.5–15.5)
MCHC RBC-ENTMCNC: 32 GM/DL — SIGNIFICANT CHANGE UP (ref 32–36)
MCHC RBC-ENTMCNC: 32.5 PG — SIGNIFICANT CHANGE UP (ref 27–34)
MCV RBC AUTO: 101.3 FL — HIGH (ref 80–100)
NRBC # BLD: 0 /100 WBCS — SIGNIFICANT CHANGE UP (ref 0–0)
OB PNL STL: NEGATIVE — SIGNIFICANT CHANGE UP
PLATELET # BLD AUTO: 265 K/UL — SIGNIFICANT CHANGE UP (ref 150–400)
POTASSIUM SERPL-MCNC: 4.2 MMOL/L — SIGNIFICANT CHANGE UP (ref 3.5–5.3)
POTASSIUM SERPL-SCNC: 4.2 MMOL/L — SIGNIFICANT CHANGE UP (ref 3.5–5.3)
RBC # BLD: 3.05 M/UL — LOW (ref 3.8–5.2)
RBC # FLD: 14.6 % — HIGH (ref 10.3–14.5)
SODIUM SERPL-SCNC: 140 MMOL/L — SIGNIFICANT CHANGE UP (ref 135–145)
WBC # BLD: 3.9 K/UL — SIGNIFICANT CHANGE UP (ref 3.8–10.5)
WBC # FLD AUTO: 3.9 K/UL — SIGNIFICANT CHANGE UP (ref 3.8–10.5)

## 2023-03-19 PROCEDURE — 99233 SBSQ HOSP IP/OBS HIGH 50: CPT

## 2023-03-19 RX ORDER — SODIUM CHLORIDE 0.65 %
1 AEROSOL, SPRAY (ML) NASAL
Refills: 0 | Status: DISCONTINUED | OUTPATIENT
Start: 2023-03-19 | End: 2023-03-21

## 2023-03-19 RX ORDER — LACTULOSE 10 G/15ML
200 SOLUTION ORAL ONCE
Refills: 0 | Status: DISCONTINUED | OUTPATIENT
Start: 2023-03-19 | End: 2023-03-21

## 2023-03-19 RX ORDER — LACTULOSE 10 G/15ML
200 SOLUTION ORAL ONCE
Refills: 0 | Status: DISCONTINUED | OUTPATIENT
Start: 2023-03-19 | End: 2023-03-19

## 2023-03-19 RX ADMIN — CLOPIDOGREL BISULFATE 75 MILLIGRAM(S): 75 TABLET, FILM COATED ORAL at 12:33

## 2023-03-19 RX ADMIN — Medication 500000 UNIT(S): at 05:08

## 2023-03-19 RX ADMIN — BUDESONIDE AND FORMOTEROL FUMARATE DIHYDRATE 2 PUFF(S): 160; 4.5 AEROSOL RESPIRATORY (INHALATION) at 05:09

## 2023-03-19 RX ADMIN — ZOLPIDEM TARTRATE 5 MILLIGRAM(S): 10 TABLET ORAL at 22:16

## 2023-03-19 RX ADMIN — Medication 15 MILLIGRAM(S): at 18:30

## 2023-03-19 RX ADMIN — Medication 500000 UNIT(S): at 17:50

## 2023-03-19 RX ADMIN — SPIRONOLACTONE 25 MILLIGRAM(S): 25 TABLET, FILM COATED ORAL at 05:08

## 2023-03-19 RX ADMIN — Medication 1 GRAM(S): at 17:51

## 2023-03-19 RX ADMIN — ATORVASTATIN CALCIUM 80 MILLIGRAM(S): 80 TABLET, FILM COATED ORAL at 21:26

## 2023-03-19 RX ADMIN — TIOTROPIUM BROMIDE 2 PUFF(S): 18 CAPSULE ORAL; RESPIRATORY (INHALATION) at 12:30

## 2023-03-19 RX ADMIN — Medication 30 MILLILITER(S): at 22:16

## 2023-03-19 RX ADMIN — Medication 30 MILLILITER(S): at 05:26

## 2023-03-19 RX ADMIN — ALBUTEROL 2 PUFF(S): 90 AEROSOL, METERED ORAL at 20:27

## 2023-03-19 RX ADMIN — Medication 500000 UNIT(S): at 12:30

## 2023-03-19 RX ADMIN — SACUBITRIL AND VALSARTAN 1 TABLET(S): 24; 26 TABLET, FILM COATED ORAL at 05:09

## 2023-03-19 RX ADMIN — Medication 15 MILLIGRAM(S): at 17:49

## 2023-03-19 RX ADMIN — FAMOTIDINE 40 MILLIGRAM(S): 10 INJECTION INTRAVENOUS at 14:55

## 2023-03-19 RX ADMIN — Medication 1 SPRAY(S): at 21:28

## 2023-03-19 RX ADMIN — PANTOPRAZOLE SODIUM 40 MILLIGRAM(S): 20 TABLET, DELAYED RELEASE ORAL at 05:09

## 2023-03-19 RX ADMIN — Medication 10 MILLIGRAM(S): at 13:56

## 2023-03-19 RX ADMIN — Medication 500000 UNIT(S): at 23:42

## 2023-03-19 RX ADMIN — Medication 1 GRAM(S): at 05:08

## 2023-03-19 RX ADMIN — Medication 1 ENEMA: at 16:10

## 2023-03-19 RX ADMIN — SACUBITRIL AND VALSARTAN 1 TABLET(S): 24; 26 TABLET, FILM COATED ORAL at 17:49

## 2023-03-19 RX ADMIN — MONTELUKAST 10 MILLIGRAM(S): 4 TABLET, CHEWABLE ORAL at 12:33

## 2023-03-19 RX ADMIN — Medication 500000 UNIT(S): at 00:50

## 2023-03-19 RX ADMIN — Medication 25 MILLIGRAM(S): at 05:09

## 2023-03-19 RX ADMIN — BUDESONIDE AND FORMOTEROL FUMARATE DIHYDRATE 2 PUFF(S): 160; 4.5 AEROSOL RESPIRATORY (INHALATION) at 17:50

## 2023-03-19 RX ADMIN — SENNA PLUS 2 TABLET(S): 8.6 TABLET ORAL at 21:26

## 2023-03-19 NOTE — PROGRESS NOTE ADULT - PROBLEM SELECTOR PLAN 1
Reports ongoing constipation  -Started Senna, Miralax, Dulcolax with no improvement   -Will give 1x of Lactulose  -Increased diet to high fiber   -Recommend increasing PO intake/hydration/ambulating as tolerated   -Can DC patient once she has a BM Reports ongoing constipation  -Started Senna, Miralax, Dulcolax with no improvement   -Will give 1x of fleet enema   -Increased diet to high fiber   -Recommend increasing PO intake/hydration/ambulating as tolerated   -Can DC patient once she has a BM

## 2023-03-19 NOTE — CHART NOTE - NSCHARTNOTEFT_GEN_A_CORE
SHAE HANEY    Notified by RN patient with no BM since this Tuesday even after use Lactulose during the day. Pt refused tap water enema.      Interventions taken     Another Lactulose enema x1 for constipation  cont assess and monitor  f/u with am team.                    Remedios Zuniga ANP-BC  Spectralink #[id number]

## 2023-03-19 NOTE — PROGRESS NOTE ADULT - TIME BILLING
chart reviewing, history taking, physical exam, assessment and documentation, including speaking to specialist/SW/CM regarding the management.
chart reviewing, history taking, physical exam, assessment and documentation, including speaking to specialist/SW/CM regarding the management.

## 2023-03-20 DIAGNOSIS — D64.9 ANEMIA, UNSPECIFIED: ICD-10-CM

## 2023-03-20 LAB
ANION GAP SERPL CALC-SCNC: 8 MMOL/L — SIGNIFICANT CHANGE UP (ref 5–17)
BUN SERPL-MCNC: 4 MG/DL — LOW (ref 7–23)
CALCIUM SERPL-MCNC: 10.1 MG/DL — SIGNIFICANT CHANGE UP (ref 8.4–10.5)
CHLORIDE SERPL-SCNC: 103 MMOL/L — SIGNIFICANT CHANGE UP (ref 96–108)
CO2 SERPL-SCNC: 28 MMOL/L — SIGNIFICANT CHANGE UP (ref 22–31)
CREAT SERPL-MCNC: 0.97 MG/DL — SIGNIFICANT CHANGE UP (ref 0.5–1.3)
EGFR: 61 ML/MIN/1.73M2 — SIGNIFICANT CHANGE UP
FERRITIN SERPL-MCNC: 303 NG/ML — HIGH (ref 15–150)
FOLATE SERPL-MCNC: 11.2 NG/ML — SIGNIFICANT CHANGE UP
GLUCOSE SERPL-MCNC: 93 MG/DL — SIGNIFICANT CHANGE UP (ref 70–99)
HCT VFR BLD CALC: 33.7 % — LOW (ref 34.5–45)
HGB BLD-MCNC: 10.8 G/DL — LOW (ref 11.5–15.5)
IRON SATN MFR SERPL: 26 % — SIGNIFICANT CHANGE UP (ref 14–50)
IRON SATN MFR SERPL: 60 UG/DL — SIGNIFICANT CHANGE UP (ref 30–160)
MAGNESIUM SERPL-MCNC: 1.6 MG/DL — SIGNIFICANT CHANGE UP (ref 1.6–2.6)
MCHC RBC-ENTMCNC: 32 GM/DL — SIGNIFICANT CHANGE UP (ref 32–36)
MCHC RBC-ENTMCNC: 32.5 PG — SIGNIFICANT CHANGE UP (ref 27–34)
MCV RBC AUTO: 101.5 FL — HIGH (ref 80–100)
NRBC # BLD: 0 /100 WBCS — SIGNIFICANT CHANGE UP (ref 0–0)
PLATELET # BLD AUTO: 327 K/UL — SIGNIFICANT CHANGE UP (ref 150–400)
POTASSIUM SERPL-MCNC: 4 MMOL/L — SIGNIFICANT CHANGE UP (ref 3.5–5.3)
POTASSIUM SERPL-SCNC: 4 MMOL/L — SIGNIFICANT CHANGE UP (ref 3.5–5.3)
RBC # BLD: 3.32 M/UL — LOW (ref 3.8–5.2)
RBC # FLD: 14.6 % — HIGH (ref 10.3–14.5)
SODIUM SERPL-SCNC: 139 MMOL/L — SIGNIFICANT CHANGE UP (ref 135–145)
TIBC SERPL-MCNC: 226 UG/DL — SIGNIFICANT CHANGE UP (ref 220–430)
UIBC SERPL-MCNC: 167 UG/DL — SIGNIFICANT CHANGE UP (ref 110–370)
VIT B12 SERPL-MCNC: 783 PG/ML — SIGNIFICANT CHANGE UP (ref 232–1245)
WBC # BLD: 4.85 K/UL — SIGNIFICANT CHANGE UP (ref 3.8–10.5)
WBC # FLD AUTO: 4.85 K/UL — SIGNIFICANT CHANGE UP (ref 3.8–10.5)

## 2023-03-20 PROCEDURE — 99233 SBSQ HOSP IP/OBS HIGH 50: CPT

## 2023-03-20 RX ORDER — POLYETHYLENE GLYCOL 3350 17 G/17G
17 POWDER, FOR SOLUTION ORAL
Refills: 0 | Status: DISCONTINUED | OUTPATIENT
Start: 2023-03-20 | End: 2023-03-21

## 2023-03-20 RX ORDER — PHENYLEPHRINE-SHARK LIVER OIL-MINERAL OIL-PETROLATUM RECTAL OINTMENT
1 OINTMENT (GRAM) RECTAL DAILY
Refills: 0 | Status: DISCONTINUED | OUTPATIENT
Start: 2023-03-20 | End: 2023-03-21

## 2023-03-20 RX ORDER — MORPHINE SULFATE 50 MG/1
2 CAPSULE, EXTENDED RELEASE ORAL ONCE
Refills: 0 | Status: DISCONTINUED | OUTPATIENT
Start: 2023-03-20 | End: 2023-03-20

## 2023-03-20 RX ADMIN — SPIRONOLACTONE 25 MILLIGRAM(S): 25 TABLET, FILM COATED ORAL at 05:37

## 2023-03-20 RX ADMIN — ZOLPIDEM TARTRATE 5 MILLIGRAM(S): 10 TABLET ORAL at 21:50

## 2023-03-20 RX ADMIN — SACUBITRIL AND VALSARTAN 1 TABLET(S): 24; 26 TABLET, FILM COATED ORAL at 17:46

## 2023-03-20 RX ADMIN — Medication 25 MILLIGRAM(S): at 05:37

## 2023-03-20 RX ADMIN — MONTELUKAST 10 MILLIGRAM(S): 4 TABLET, CHEWABLE ORAL at 11:23

## 2023-03-20 RX ADMIN — Medication 500000 UNIT(S): at 17:46

## 2023-03-20 RX ADMIN — BUDESONIDE AND FORMOTEROL FUMARATE DIHYDRATE 2 PUFF(S): 160; 4.5 AEROSOL RESPIRATORY (INHALATION) at 17:47

## 2023-03-20 RX ADMIN — BUDESONIDE AND FORMOTEROL FUMARATE DIHYDRATE 2 PUFF(S): 160; 4.5 AEROSOL RESPIRATORY (INHALATION) at 05:36

## 2023-03-20 RX ADMIN — MORPHINE SULFATE 2 MILLIGRAM(S): 50 CAPSULE, EXTENDED RELEASE ORAL at 14:00

## 2023-03-20 RX ADMIN — SACUBITRIL AND VALSARTAN 1 TABLET(S): 24; 26 TABLET, FILM COATED ORAL at 05:37

## 2023-03-20 RX ADMIN — Medication 1 GRAM(S): at 17:46

## 2023-03-20 RX ADMIN — PHENYLEPHRINE-SHARK LIVER OIL-MINERAL OIL-PETROLATUM RECTAL OINTMENT 1 APPLICATION(S): at 21:44

## 2023-03-20 RX ADMIN — MORPHINE SULFATE 2 MILLIGRAM(S): 50 CAPSULE, EXTENDED RELEASE ORAL at 13:03

## 2023-03-20 RX ADMIN — CLOPIDOGREL BISULFATE 75 MILLIGRAM(S): 75 TABLET, FILM COATED ORAL at 11:24

## 2023-03-20 RX ADMIN — ATORVASTATIN CALCIUM 80 MILLIGRAM(S): 80 TABLET, FILM COATED ORAL at 21:44

## 2023-03-20 RX ADMIN — Medication 500000 UNIT(S): at 05:36

## 2023-03-20 RX ADMIN — FAMOTIDINE 40 MILLIGRAM(S): 10 INJECTION INTRAVENOUS at 13:58

## 2023-03-20 RX ADMIN — Medication 500000 UNIT(S): at 11:23

## 2023-03-20 RX ADMIN — Medication 500000 UNIT(S): at 21:50

## 2023-03-20 RX ADMIN — TIOTROPIUM BROMIDE 2 PUFF(S): 18 CAPSULE ORAL; RESPIRATORY (INHALATION) at 11:25

## 2023-03-20 RX ADMIN — Medication 1 GRAM(S): at 05:37

## 2023-03-20 NOTE — PROGRESS NOTE ADULT - PROBLEM SELECTOR PROBLEM 4
Chronic systolic congestive heart failure
Hypercalcemia
Abdominal aortic aneurysm (AAA) 3.0 cm to 5.0 cm in diameter in female

## 2023-03-20 NOTE — PROGRESS NOTE ADULT - SUBJECTIVE AND OBJECTIVE BOX
[facility] Division of Hospital Medicine  Liz Warren MD  Available via MS Teams  Pager: [phone]    SUBJECTIVE / OVERNIGHT EVENTS: Patient seen and examined at bedside. No acute overnight events. Pt denies dyspnea, chest pain fevers, chills, cough, HA, dizziness, syncope, chest palpitations, abd pain, n/v/d, urinary or bowel changes, active sites of bleeding or any other symptoms at this time.    ADDITIONAL REVIEW OF SYSTEMS: negative     MEDICATIONS  (STANDING):  atorvastatin 80 milliGRAM(s) Oral at bedtime  budesonide 160 MICROgram(s)/formoterol 4.5 MICROgram(s) Inhaler 2 Puff(s) Inhalation two times a day  clopidogrel Tablet 75 milliGRAM(s) Oral daily  enoxaparin Injectable 40 milliGRAM(s) SubCutaneous every 24 hours  famotidine   Suspension 20 milliGRAM(s) Oral daily  metoprolol succinate ER 25 milliGRAM(s) Oral daily  montelukast 10 milliGRAM(s) Oral daily  nystatin    Suspension 682469 Unit(s) Oral four times a day  pantoprazole    Tablet 40 milliGRAM(s) Oral before breakfast  sacubitril 24 mG/valsartan 26 mG 1 Tablet(s) Oral two times a day  spironolactone 25 milliGRAM(s) Oral daily  sucralfate suspension 1 Gram(s) Oral two times a day  tiotropium 2.5 MICROgram(s) Inhaler 2 Puff(s) Inhalation daily    MEDICATIONS  (PRN):  albuterol    90 MICROgram(s) HFA Inhaler 2 Puff(s) Inhalation every 6 hours PRN Shortness of Breath and/or Wheezing  ALPRAZolam 0.5 milliGRAM(s) Oral at bedtime PRN anxiety  aluminum hydroxide/magnesium hydroxide/simethicone Suspension 30 milliLiter(s) Oral every 4 hours PRN Dyspepsia  ketorolac   Injectable 15 milliGRAM(s) IV Push every 8 hours PRN Severe Pain (7 - 10)  melatonin 3 milliGRAM(s) Oral at bedtime PRN Insomnia  ondansetron Injectable 4 milliGRAM(s) IV Push once PRN Nausea and/or Vomiting  ondansetron Injectable 4 milliGRAM(s) IV Push every 8 hours PRN Nausea and/or Vomiting  polyethylene glycol 3350 17 Gram(s) Oral daily PRN Constipation      I&O's Summary    17 Mar 2023 07:01  -  18 Mar 2023 07:00  --------------------------------------------------------  IN: 1100 mL / OUT: 0 mL / NET: 1100 mL    18 Mar 2023 07:01  -  18 Mar 2023 15:42  --------------------------------------------------------  IN: 400 mL / OUT: 0 mL / NET: 400 mL        PHYSICAL EXAM:  Vital Signs Last 24 Hrs  T(C): 36.4 (18 Mar 2023 13:34), Max: 36.8 (17 Mar 2023 17:05)  T(F): 97.5 (18 Mar 2023 13:34), Max: 98.3 (17 Mar 2023 21:13)  HR: 64 (18 Mar 2023 13:34) (64 - 75)  BP: 106/66 (18 Mar 2023 13:34) (84/50 - 106/66)  BP(mean): --  RR: 18 (18 Mar 2023 13:34) (18 - 18)  SpO2: 100% (18 Mar 2023 13:34) (95% - 100%)    Parameters below as of 18 Mar 2023 13:34  Patient On (Oxygen Delivery Method): room air      CONSTITUTIONAL: NAD, well-developed, well-groomed  EYES: PERRLA; conjunctiva and sclera clear  ENMT: Moist oral mucosa, no pharyngeal injection or exudates; normal dentition  NECK: Supple, no palpable masses; no thyromegaly  RESPIRATORY: Normal respiratory effort; lungs are clear to auscultation bilaterally  CARDIOVASCULAR: Regular rate and rhythm, normal S1 and S2, no murmur/rub/gallop; No lower extremity edema; Peripheral pulses are 2+ bilaterally  ABDOMEN: Nontender to palpation, normoactive bowel sounds, no rebound/guarding; No hepatosplenomegaly  MUSCULOSKELETAL:   no clubbing or cyanosis of digits; no joint swelling or tenderness to palpation  PSYCH: A+O to person, place, and time; affect appropriate  NEUROLOGY: CN 2-12 are intact and symmetric; no gross sensory deficits   SKIN: No rashes; no palpable lesions    LABS:                        9.8    4.55  )-----------( 286      ( 18 Mar 2023 09:56 )             30.8     03-18    140  |  104  |  8   ----------------------------<  100<H>  3.3<L>   |  26  |  0.90    Ca    9.3      18 Mar 2023 09:49  Mg     1.3     -18    TPro  5.8<L>  /  Alb  3.1<L>  /  TBili  0.3  /  DBili  x   /  AST  14  /  ALT  10  /  AlkPhos  41  -18          Urinalysis Basic - ( 16 Mar 2023 23:56 )    Color: Colorless / Appearance: Clear / S.004 / pH: x  Gluc: x / Ketone: Trace  / Bili: Negative / Urobili: Negative   Blood: x / Protein: Negative / Nitrite: Negative   Leuk Esterase: Negative / RBC: x / WBC x   Sq Epi: x / Non Sq Epi: x / Bacteria: x        Culture - Urine (collected 16 Mar 2023 23:56)  Source: Clean Catch Clean Catch (Midstream)  Final Report (18 Mar 2023 11:01):    <10,000 CFU/mL Normal Urogenital Fatoumata      COVID-19 PCR: NotDetec (09 Mar 2023 13:44)      RADIOLOGY & ADDITIONAL TESTS:  New Results Reviewed Today:   New Imaging Personally Reviewed Today:  New Electrocardiogram Personally Reviewed Today:  Prior or Outpatient Records Reviewed Today:    COMMUNICATION:  Care Discussed with Consultants/Other Providers and Details of Discussion:  Discussions with Patient/Family:  PCP Communication:    
[facility] Division of Hospital Medicine  Liz Warren MD  Available via MS Teams  Pager: [phone]    SUBJECTIVE / OVERNIGHT EVENTS: Patient seen and examined at bedside. No acute overnight events. Reports constipation, patient states she has not had a full BM within 4-5 days. Otherwise denies dyspnea, chest pain fevers, chills, cough, HA, dizziness, syncope, chest palpitations, abd pain, n/v/d, urinary or bowel changes, active sites of bleeding or any other symptoms at this time.    ADDITIONAL REVIEW OF SYSTEMS: + constipation     MEDICATIONS  (STANDING):  atorvastatin 80 milliGRAM(s) Oral at bedtime  budesonide 160 MICROgram(s)/formoterol 4.5 MICROgram(s) Inhaler 2 Puff(s) Inhalation two times a day  clopidogrel Tablet 75 milliGRAM(s) Oral daily  enoxaparin Injectable 40 milliGRAM(s) SubCutaneous every 24 hours  famotidine   Suspension 40 milliGRAM(s) Oral daily  metoprolol succinate ER 25 milliGRAM(s) Oral daily  montelukast 10 milliGRAM(s) Oral daily  nystatin    Suspension 526856 Unit(s) Oral four times a day  pantoprazole    Tablet 40 milliGRAM(s) Oral before breakfast  sacubitril 24 mG/valsartan 26 mG 1 Tablet(s) Oral two times a day  senna 2 Tablet(s) Oral at bedtime  spironolactone 25 milliGRAM(s) Oral daily  sucralfate suspension 1 Gram(s) Oral two times a day  tiotropium 2.5 MICROgram(s) Inhaler 2 Puff(s) Inhalation daily    MEDICATIONS  (PRN):  albuterol    90 MICROgram(s) HFA Inhaler 2 Puff(s) Inhalation every 6 hours PRN Shortness of Breath and/or Wheezing  ALPRAZolam 0.5 milliGRAM(s) Oral at bedtime PRN anxiety  aluminum hydroxide/magnesium hydroxide/simethicone Suspension 30 milliLiter(s) Oral every 4 hours PRN Dyspepsia  bisacodyl Suppository 10 milliGRAM(s) Rectal daily PRN Constipation  ketorolac   Injectable 15 milliGRAM(s) IV Push every 8 hours PRN Severe Pain (7 - 10)  lactulose Retention Enema 200 Gram(s) Rectal once PRN Constipation  melatonin 3 milliGRAM(s) Oral at bedtime PRN Insomnia  ondansetron Injectable 4 milliGRAM(s) IV Push once PRN Nausea and/or Vomiting  ondansetron Injectable 4 milliGRAM(s) IV Push every 8 hours PRN Nausea and/or Vomiting  polyethylene glycol 3350 17 Gram(s) Oral daily PRN Constipation  zolpidem 5 milliGRAM(s) Oral at bedtime PRN Insomnia      I&O's Summary    18 Mar 2023 07:01  -  19 Mar 2023 07:00  --------------------------------------------------------  IN: 1280 mL / OUT: 250 mL / NET: 1030 mL    19 Mar 2023 07:01  -  19 Mar 2023 12:00  --------------------------------------------------------  IN: 240 mL / OUT: 0 mL / NET: 240 mL        PHYSICAL EXAM:  Vital Signs Last 24 Hrs  T(C): 36.3 (19 Mar 2023 10:03), Max: 36.8 (19 Mar 2023 05:07)  T(F): 97.3 (19 Mar 2023 10:03), Max: 98.2 (19 Mar 2023 05:07)  HR: 78 (19 Mar 2023 10:03) (64 - 78)  BP: 113/72 (19 Mar 2023 10:03) (101/56 - 113/72)  BP(mean): --  RR: 18 (19 Mar 2023 10:03) (16 - 18)  SpO2: 100% (19 Mar 2023 10:03) (98% - 100%)    Parameters below as of 19 Mar 2023 10:03  Patient On (Oxygen Delivery Method): room air      CONSTITUTIONAL: NAD, well-developed, well-groomed  EYES: PERRLA; conjunctiva and sclera clear  ENMT: Moist oral mucosa, no pharyngeal injection or exudates; normal dentition  NECK: Supple, no palpable masses; no thyromegaly  RESPIRATORY: Normal respiratory effort; lungs are clear to auscultation bilaterally  CARDIOVASCULAR: Regular rate and rhythm, normal S1 and S2, no murmur/rub/gallop; No lower extremity edema; Peripheral pulses are 2+ bilaterally  ABDOMEN: Nontender to palpation, normoactive bowel sounds, no rebound/guarding; No hepatosplenomegaly; + firmness   MUSCULOSKELETAL:   no clubbing or cyanosis of digits; no joint swelling or tenderness to palpation  PSYCH: A+O to person, place, and time; affect appropriate  NEUROLOGY: CN 2-12 are intact and symmetric; no gross sensory deficits   SKIN: No rashes; no palpable lesions    LABS:                        9.9    3.90  )-----------( 265      ( 19 Mar 2023 08:27 )             30.9     03-19    140  |  106  |  6<L>  ----------------------------<  99  4.2   |  26  |  0.97    Ca    9.3      19 Mar 2023 08:27  Mg     1.3     03-18    TPro  5.8<L>  /  Alb  3.1<L>  /  TBili  0.3  /  DBili  x   /  AST  14  /  ALT  10  /  AlkPhos  41  03-18              Culture - Urine (collected 16 Mar 2023 23:56)  Source: Clean Catch Clean Catch (Midstream)  Final Report (18 Mar 2023 11:01):    <10,000 CFU/mL Normal Urogenital Fatoumata      COVID-19 PCR: NotDetec (09 Mar 2023 13:44)      RADIOLOGY & ADDITIONAL TESTS:  New Results Reviewed Today:   New Imaging Personally Reviewed Today:  New Electrocardiogram Personally Reviewed Today:  Prior or Outpatient Records Reviewed Today:    COMMUNICATION:  Care Discussed with Consultants/Other Providers and Details of Discussion:  Discussions with Patient/Family:  PCP Communication:    
[facility] Division of Hospital Medicine  Henrry Smith MD M-F, 8A-5P: MS Teams, Pager: [phone]  Other Times: Ext: [phone], Pager: [phone]      Patient is a 72y old  Female who presents with a chief complaint of n/v/abd pain (19 Mar 2023 11:59)      SUBJECTIVE / OVERNIGHT EVENTS:  Patient was examined this morning. She is stating that she still has abdominal discomfort despite having had a formed bowel movement last night. She does not feel constipated. Denies any blood in stool or urine or any other symptom of bleed. Rest of the 10 point ROS neg.       ADDITIONAL REVIEW OF SYSTEMS:    MEDICATIONS  (STANDING):  atorvastatin 80 milliGRAM(s) Oral at bedtime  budesonide 160 MICROgram(s)/formoterol 4.5 MICROgram(s) Inhaler 2 Puff(s) Inhalation two times a day  clopidogrel Tablet 75 milliGRAM(s) Oral daily  enoxaparin Injectable 40 milliGRAM(s) SubCutaneous every 24 hours  famotidine   Suspension 40 milliGRAM(s) Oral daily  metoprolol succinate ER 25 milliGRAM(s) Oral daily  montelukast 10 milliGRAM(s) Oral daily  nystatin    Suspension 116968 Unit(s) Oral four times a day  sacubitril 24 mG/valsartan 26 mG 1 Tablet(s) Oral two times a day  senna 2 Tablet(s) Oral at bedtime  spironolactone 25 milliGRAM(s) Oral daily  sucralfate suspension 1 Gram(s) Oral two times a day  tiotropium 2.5 MICROgram(s) Inhaler 2 Puff(s) Inhalation daily    MEDICATIONS  (PRN):  albuterol    90 MICROgram(s) HFA Inhaler 2 Puff(s) Inhalation every 6 hours PRN Shortness of Breath and/or Wheezing  ALPRAZolam 0.5 milliGRAM(s) Oral at bedtime PRN anxiety  aluminum hydroxide/magnesium hydroxide/simethicone Suspension 30 milliLiter(s) Oral every 4 hours PRN Dyspepsia  bisacodyl Suppository 10 milliGRAM(s) Rectal daily PRN Constipation  lactulose Retention Enema 200 Gram(s) Rectal once PRN Constipation  melatonin 3 milliGRAM(s) Oral at bedtime PRN Insomnia  ondansetron Injectable 4 milliGRAM(s) IV Push once PRN Nausea and/or Vomiting  ondansetron Injectable 4 milliGRAM(s) IV Push every 8 hours PRN Nausea and/or Vomiting  polyethylene glycol 3350 17 Gram(s) Oral daily PRN Constipation  sodium chloride 0.65% Nasal 1 Spray(s) Both Nostrils four times a day PRN Nasal Congestion  zolpidem 5 milliGRAM(s) Oral at bedtime PRN Insomnia      CAPILLARY BLOOD GLUCOSE          I&O's Summary    19 Mar 2023 07:01  -  20 Mar 2023 07:00  --------------------------------------------------------  IN: 1020 mL / OUT: 0 mL / NET: 1020 mL    20 Mar 2023 07:01  -  20 Mar 2023 15:53  --------------------------------------------------------  IN: 480 mL / OUT: 0 mL / NET: 480 mL        Daily     Daily Weight in k (20 Mar 2023 13:00)    PHYSICAL EXAM:  Vital Signs Last 24 Hrs  T(C): 36.4 (20 Mar 2023 13:00), Max: 36.8 (19 Mar 2023 20:55)  T(F): 97.6 (20 Mar 2023 13:00), Max: 98.3 (20 Mar 2023 05:12)  HR: 73 (20 Mar 2023 13:00) (67 - 73)  BP: 105/69 (20 Mar 2023 13:00) (103/64 - 117/74)  BP(mean): --  RR: 18 (20 Mar 2023 13:00) (18 - 18)  SpO2: 100% (20 Mar 2023 13:00) (98% - 100%)    Parameters below as of 20 Mar 2023 13:00  Patient On (Oxygen Delivery Method): room air      CONSTITUTIONAL: NAD, well-developed, well-groomed  EYES: PERRLA; conjunctiva and sclera clear  ENMT: Moist oral mucosa, no pharyngeal injection or exudates; normal dentition  NECK: Supple, no palpable masses; no thyromegaly  RESPIRATORY: Normal respiratory effort; lungs are clear to auscultation bilaterally  CARDIOVASCULAR: Regular rate and rhythm, normal S1 and S2, no murmur/rub/gallop; No lower extremity edema; Peripheral pulses are 2+ bilaterally  ABDOMEN: mild tenderness in RUQ, LLQ; normoactive bowel sounds, no rebound/guarding; No hepatosplenomegaly;  MUSCULOSKELETAL:   no clubbing or cyanosis of digits; no joint swelling or tenderness to palpation  PSYCH: A+O to person, place, and time; affect appropriate  NEUROLOGY: CN 2-12 are intact and symmetric; no gross sensory deficits   SKIN: No rashes; no palpable lesions      LABS:                        10.8   4.85  )-----------( 327      ( 20 Mar 2023 11:00 )             33.7     03-20    139  |  103  |  4<L>  ----------------------------<  93  4.0   |  28  |  0.97    Ca    10.1      20 Mar 2023 11:00  Mg     1.6     03-20                  COVID-19 PCR: NotDetec (09 Mar 2023 13:44)      RADIOLOGY & ADDITIONAL TESTS:  Results Reviewed:   Imaging Personally Reviewed:  Electrocardiogram Personally Reviewed:    COORDINATION OF CARE:  Care Discussed with Consultants/Other Providers [Y/N]:  Prior or Outpatient Records Reviewed [Y/N]:

## 2023-03-20 NOTE — PROGRESS NOTE ADULT - PROBLEM SELECTOR PLAN 4
seen by vascular, no intervention  - follow up with Dr. Valle outpt
slightly hypovolemic to euvolemic  - resume entresto tomorrow  - c/w aldactone, toprol  - monitor bp, fluid status, wts
RESOLVED, likely 2/2 hypovolemia  - s/p 2 L IVF in the ED - hold further IVF given CHF hx  - resumed entresto

## 2023-03-20 NOTE — PROGRESS NOTE ADULT - PROBLEM SELECTOR PLAN 6
dvt ppx: lovenox  d/w daughter Merlyn over phone, all ques answered  Dispo: DC planning when able to tolerate PO, f/u PT recs
slightly hypovolemic to euvolemic  - resumed entresto   - c/w aldactone, toprol  - monitor bp, fluid status, wts
not in exacerbation  - c/w albuterol  - ellipta nonformulary - c/w spiriva, symbicort

## 2023-03-20 NOTE — PROGRESS NOTE ADULT - PROBLEM SELECTOR PROBLEM 3
Hypercalcemia
Abdominal aortic aneurysm (AAA) 3.0 cm to 5.0 cm in diameter in female
Nausea and vomiting

## 2023-03-20 NOTE — PROGRESS NOTE ADULT - PROBLEM SELECTOR PLAN 1
Unclear baseline, patient denies any history of anemia  ?2/2 dilutional effect    - No s/s of bleed so far  - Check FOB  - Check iron panel, B12, folate, ferritin levels  - Will need close outpatient follow up with PCP, repeat CBC outpatient   - Outpatient GI eval for EGD/ colonoscopy.

## 2023-03-20 NOTE — PROGRESS NOTE ADULT - PROBLEM SELECTOR PLAN 3
REESOLVED, likely 2/2 hypovolemia  - s/p 2 L IVF in the ED - hold further IVF given CHF hx  - hold entresto given borderline BP - ordered to resume with parameters
Improved, likely viral gastroenteritis, intolerance to tylenol (per daughter this often happens), 2/2 thrush vs worsened GERD vs IBS.   CTAP without acute findings besides stable AAA  - zofran iv prn   - adv diet as gabbie - liquid > full liquid > regular (increased to high fiber as patient reports constipation)   - toradol iv x 3 doses prn pain  - avoid tylenol  - c/w pepcid   - c/w nystatin  - will need outpt follow up with GI for eventual EGD/cscope given hx of 30 lb wt loss.
seen by vascular, no intervention  - follow up with Dr. Valle outpt

## 2023-03-20 NOTE — PROGRESS NOTE ADULT - PROBLEM SELECTOR PROBLEM 5
Patient's last menstrual period was 09/14/2020.   Please reference prenatal and OB flow chart for further information  PT here today for routine prenatal care  Pt denies vaginal bleeding  Pt without complaints  ROS:  Pt denies headache, dysuria, nausea/vomiting  PE:  /80   Pulse 94   Wt 197 lb (89.4 kg)   LMP 09/14/2020   BMI 34.90 kg/m²   Gen - Alert and oriented x 3  HEENT- NC/AT, CVS - RRR, Lungs - CTAB  Abd - FH below umb  Appropriate fetal growth  Pap NILM HPV neg  GC/chlam neg  PN labs pending  US for anatomy pending
COPD, moderate
Chronic systolic congestive heart failure
Abdominal aortic aneurysm (AAA) 3.0 cm to 5.0 cm in diameter in female

## 2023-03-20 NOTE — PROGRESS NOTE ADULT - PROBLEM SELECTOR PLAN 5
slightly hypovolemic to euvolemic  - resume entresto tomorrow  - c/w aldactone, toprol  - monitor bp, fluid status, wts
seen by vascular, no intervention  - follow up with Dr. Valle outpt
not in exacerbation  - c/w albuterol  - ellipta nonformulary - c/w spiriva, symbicort

## 2023-03-20 NOTE — PROGRESS NOTE ADULT - PROBLEM SELECTOR PLAN 2
-Constipation resolved     - Continue Senna, Miralax. Dulcolax PRN  - Increased diet to high fiber   - Recommend increasing PO intake/hydration/ambulating as tolerated
likely 2/2 hypovolemia  - s/p 2 L IVF in the ED - hold further IVF given CHF hx  - hold entresto given borderline BP - ordered to resume tomorrow 3/18  - monitor repeat Ca in am, if persistently high, check ionized ca, PTH, 25-OH vit d, 1, 25 vit d
Improved, likely viral gastroenteritis, intolerance to tylenol (per daughter this often happens), 2/2 thrush vs worsened GERD vs IBS.   CTAP without acute findings besides stable AAA  - zofran iv prn   - adv diet as gabbie - liquid > full liquid > regular (increased to high fiber as patient reports constipation)   - toradol iv x 3 doses prn pain  - avoid tylenol  - c/w pepcid   - c/w nystatin  - will need outpt follow up with GI for eventual EGD/cscope given hx of 30 lb wt loss.

## 2023-03-20 NOTE — PROGRESS NOTE ADULT - ASSESSMENT
73 yo F PMH anxiety, CAD, HFrEF ED 29%, ICD, COPD, CVA w/ right sided weakness, AAA, RA, recent DC from [facility] 3/11/23 for orthostatic hypotension p/w nausea and vomiting for 1 day, CTAP nonrevealing, admitted for symptom support.
71 yo F PMH anxiety, CAD, HFrEF ED 29%, ICD, COPD, CVA w/ right sided weakness, AAA, RA, recent DC from [facility] 3/11/23 for orthostatic hypotension p/w nausea and vomiting for 1 day, CTAP nonrevealing, admitted for symptom support.
71 yo F PMH anxiety, CAD, HFrEF ED 29%, ICD, COPD, CVA w/ right sided weakness, AAA, RA, recent DC from [facility] 3/11/23 for orthostatic hypotension p/w nausea and vomiting for 1 day, CTAP nonrevealing, admitted for symptom support.

## 2023-03-20 NOTE — PROGRESS NOTE ADULT - NUTRITIONAL ASSESSMENT
This patient has been assessed with a concern for Malnutrition and has been determined to have a diagnosis/diagnoses of Moderate protein-calorie malnutrition.    This patient is being managed with:   Diet Regular-  High Fiber (HIFIBER)  DASH/TLC {Sodium & Cholesterol Restricted} (DASH)  No Lactose  Entered: Mar 19 2023 11:58AM    Diet Regular-  Low Sodium  No Lactose  Entered: Mar 18 2023  2:11PM    The following pending diet order is being considered for treatment of Moderate protein-calorie malnutrition:null
This patient has been assessed with a concern for Malnutrition and has been determined to have a diagnosis/diagnoses of Moderate protein-calorie malnutrition.    This patient is being managed with:   Diet Regular-  High Fiber (HIFIBER)  DASH/TLC {Sodium & Cholesterol Restricted} (DASH)  No Lactose  Entered: Mar 19 2023 11:58AM    Diet Regular-  Low Sodium  No Lactose  Entered: Mar 18 2023  2:11PM    The following pending diet order is being considered for treatment of Moderate protein-calorie malnutrition:null
This patient has been assessed with a concern for Malnutrition and has been determined to have a diagnosis/diagnoses of Moderate protein-calorie malnutrition.    This patient is being managed with:   Diet Regular-  Low Sodium  No Lactose  Entered: Mar 18 2023  2:11PM    Diet Regular-  DASH/TLC {Sodium & Cholesterol Restricted} (DASH)  No Lactose  Entered: Mar 17 2023  1:16PM    The following pending diet order is being considered for treatment of Moderate protein-calorie malnutrition:null

## 2023-03-20 NOTE — PROGRESS NOTE ADULT - PROBLEM SELECTOR PLAN 7
not in exacerbation  - c/w albuterol  - ellipta nonformulary - c/w spiriva, symbicort
dvt ppx: lovenox  Dispo: DC planning when patient has BM

## 2023-03-21 ENCOUNTER — TRANSCRIPTION ENCOUNTER (OUTPATIENT)
Age: 73
End: 2023-03-21

## 2023-03-21 VITALS
RESPIRATION RATE: 18 BRPM | TEMPERATURE: 98 F | HEART RATE: 66 BPM | OXYGEN SATURATION: 100 % | SYSTOLIC BLOOD PRESSURE: 98 MMHG | DIASTOLIC BLOOD PRESSURE: 66 MMHG

## 2023-03-21 LAB
ANION GAP SERPL CALC-SCNC: 10 MMOL/L — SIGNIFICANT CHANGE UP (ref 5–17)
BUN SERPL-MCNC: 6 MG/DL — LOW (ref 7–23)
CALCIUM SERPL-MCNC: 10.1 MG/DL — SIGNIFICANT CHANGE UP (ref 8.4–10.5)
CHLORIDE SERPL-SCNC: 101 MMOL/L — SIGNIFICANT CHANGE UP (ref 96–108)
CO2 SERPL-SCNC: 29 MMOL/L — SIGNIFICANT CHANGE UP (ref 22–31)
CREAT SERPL-MCNC: 1.04 MG/DL — SIGNIFICANT CHANGE UP (ref 0.5–1.3)
EGFR: 56 ML/MIN/1.73M2 — LOW
GLUCOSE SERPL-MCNC: 88 MG/DL — SIGNIFICANT CHANGE UP (ref 70–99)
HCT VFR BLD CALC: 33.4 % — LOW (ref 34.5–45)
HGB BLD-MCNC: 10.4 G/DL — LOW (ref 11.5–15.5)
MCHC RBC-ENTMCNC: 31.1 GM/DL — LOW (ref 32–36)
MCHC RBC-ENTMCNC: 31.6 PG — SIGNIFICANT CHANGE UP (ref 27–34)
MCV RBC AUTO: 101.5 FL — HIGH (ref 80–100)
NRBC # BLD: 0 /100 WBCS — SIGNIFICANT CHANGE UP (ref 0–0)
PLATELET # BLD AUTO: 313 K/UL — SIGNIFICANT CHANGE UP (ref 150–400)
POTASSIUM SERPL-MCNC: 4.3 MMOL/L — SIGNIFICANT CHANGE UP (ref 3.5–5.3)
POTASSIUM SERPL-SCNC: 4.3 MMOL/L — SIGNIFICANT CHANGE UP (ref 3.5–5.3)
RBC # BLD: 3.29 M/UL — LOW (ref 3.8–5.2)
RBC # FLD: 14.6 % — HIGH (ref 10.3–14.5)
SODIUM SERPL-SCNC: 140 MMOL/L — SIGNIFICANT CHANGE UP (ref 135–145)
WBC # BLD: 5.19 K/UL — SIGNIFICANT CHANGE UP (ref 3.8–10.5)
WBC # FLD AUTO: 5.19 K/UL — SIGNIFICANT CHANGE UP (ref 3.8–10.5)

## 2023-03-21 PROCEDURE — 82272 OCCULT BLD FECES 1-3 TESTS: CPT

## 2023-03-21 PROCEDURE — 82728 ASSAY OF FERRITIN: CPT

## 2023-03-21 PROCEDURE — 82565 ASSAY OF CREATININE: CPT

## 2023-03-21 PROCEDURE — 84484 ASSAY OF TROPONIN QUANT: CPT

## 2023-03-21 PROCEDURE — 82435 ASSAY OF BLOOD CHLORIDE: CPT

## 2023-03-21 PROCEDURE — 83605 ASSAY OF LACTIC ACID: CPT

## 2023-03-21 PROCEDURE — 85027 COMPLETE CBC AUTOMATED: CPT

## 2023-03-21 PROCEDURE — 87086 URINE CULTURE/COLONY COUNT: CPT

## 2023-03-21 PROCEDURE — 80053 COMPREHEN METABOLIC PANEL: CPT

## 2023-03-21 PROCEDURE — 97161 PT EVAL LOW COMPLEX 20 MIN: CPT

## 2023-03-21 PROCEDURE — 71045 X-RAY EXAM CHEST 1 VIEW: CPT

## 2023-03-21 PROCEDURE — 80048 BASIC METABOLIC PNL TOTAL CA: CPT

## 2023-03-21 PROCEDURE — 82803 BLOOD GASES ANY COMBINATION: CPT

## 2023-03-21 PROCEDURE — 83735 ASSAY OF MAGNESIUM: CPT

## 2023-03-21 PROCEDURE — 85014 HEMATOCRIT: CPT

## 2023-03-21 PROCEDURE — 74177 CT ABD & PELVIS W/CONTRAST: CPT | Mod: MA

## 2023-03-21 PROCEDURE — 96374 THER/PROPH/DIAG INJ IV PUSH: CPT

## 2023-03-21 PROCEDURE — 82947 ASSAY GLUCOSE BLOOD QUANT: CPT

## 2023-03-21 PROCEDURE — 82746 ASSAY OF FOLIC ACID SERUM: CPT

## 2023-03-21 PROCEDURE — 85018 HEMOGLOBIN: CPT

## 2023-03-21 PROCEDURE — 96375 TX/PRO/DX INJ NEW DRUG ADDON: CPT

## 2023-03-21 PROCEDURE — 84132 ASSAY OF SERUM POTASSIUM: CPT

## 2023-03-21 PROCEDURE — 84295 ASSAY OF SERUM SODIUM: CPT

## 2023-03-21 PROCEDURE — 99239 HOSP IP/OBS DSCHRG MGMT >30: CPT

## 2023-03-21 PROCEDURE — 82330 ASSAY OF CALCIUM: CPT

## 2023-03-21 PROCEDURE — 83540 ASSAY OF IRON: CPT

## 2023-03-21 PROCEDURE — 87637 SARSCOV2&INF A&B&RSV AMP PRB: CPT

## 2023-03-21 PROCEDURE — 36415 COLL VENOUS BLD VENIPUNCTURE: CPT

## 2023-03-21 PROCEDURE — 82607 VITAMIN B-12: CPT

## 2023-03-21 PROCEDURE — 81003 URINALYSIS AUTO W/O SCOPE: CPT

## 2023-03-21 PROCEDURE — 99285 EMERGENCY DEPT VISIT HI MDM: CPT | Mod: 25

## 2023-03-21 PROCEDURE — 85025 COMPLETE CBC W/AUTO DIFF WBC: CPT

## 2023-03-21 PROCEDURE — 83550 IRON BINDING TEST: CPT

## 2023-03-21 PROCEDURE — 83690 ASSAY OF LIPASE: CPT

## 2023-03-21 PROCEDURE — 93005 ELECTROCARDIOGRAM TRACING: CPT

## 2023-03-21 PROCEDURE — 94640 AIRWAY INHALATION TREATMENT: CPT

## 2023-03-21 RX ORDER — FAMOTIDINE 10 MG/ML
1 INJECTION INTRAVENOUS
Qty: 60 | Refills: 0
Start: 2023-03-21 | End: 2023-04-19

## 2023-03-21 RX ORDER — ONDANSETRON 8 MG/1
1 TABLET, FILM COATED ORAL
Qty: 60 | Refills: 0
Start: 2023-03-21 | End: 2023-04-04

## 2023-03-21 RX ORDER — POLYETHYLENE GLYCOL 3350 17 G/17G
17 POWDER, FOR SOLUTION ORAL
Qty: 510 | Refills: 0
Start: 2023-03-21 | End: 2023-04-04

## 2023-03-21 RX ORDER — SPIRONOLACTONE 25 MG/1
1 TABLET, FILM COATED ORAL
Qty: 30 | Refills: 0
Start: 2023-03-21 | End: 2023-04-19

## 2023-03-21 RX ORDER — SENNA PLUS 8.6 MG/1
2 TABLET ORAL
Qty: 30 | Refills: 0
Start: 2023-03-21 | End: 2023-04-04

## 2023-03-21 RX ADMIN — SACUBITRIL AND VALSARTAN 1 TABLET(S): 24; 26 TABLET, FILM COATED ORAL at 05:47

## 2023-03-21 RX ADMIN — FAMOTIDINE 40 MILLIGRAM(S): 10 INJECTION INTRAVENOUS at 05:47

## 2023-03-21 RX ADMIN — MONTELUKAST 10 MILLIGRAM(S): 4 TABLET, CHEWABLE ORAL at 11:19

## 2023-03-21 RX ADMIN — TIOTROPIUM BROMIDE 2 PUFF(S): 18 CAPSULE ORAL; RESPIRATORY (INHALATION) at 11:21

## 2023-03-21 RX ADMIN — BUDESONIDE AND FORMOTEROL FUMARATE DIHYDRATE 2 PUFF(S): 160; 4.5 AEROSOL RESPIRATORY (INHALATION) at 05:48

## 2023-03-21 RX ADMIN — Medication 500000 UNIT(S): at 11:19

## 2023-03-21 RX ADMIN — Medication 500000 UNIT(S): at 05:49

## 2023-03-21 RX ADMIN — Medication 1 GRAM(S): at 05:48

## 2023-03-21 RX ADMIN — CLOPIDOGREL BISULFATE 75 MILLIGRAM(S): 75 TABLET, FILM COATED ORAL at 11:20

## 2023-03-21 RX ADMIN — SPIRONOLACTONE 25 MILLIGRAM(S): 25 TABLET, FILM COATED ORAL at 05:47

## 2023-03-21 RX ADMIN — Medication 25 MILLIGRAM(S): at 05:47

## 2023-03-21 NOTE — DISCHARGE NOTE PROVIDER - CARE PROVIDER_API CALL
Bannazadeh, Mohsen (MD)  Surgery; Vascular Surgery  [address] [place] [address]  Phone: [phone]  Fax: [phone]  Follow Up Time: 1 week    [place] [address]  Phone: [phone]  Fax: ()-  Follow Up Time: 1 week

## 2023-03-21 NOTE — DISCHARGE NOTE PROVIDER - NSDCFUADDAPPT_GEN_ALL_CORE_FT
APPTS ARE READY TO BE MADE: [ ] YES    Best Family or Patient Contact (if needed):    Additional Information about above appointments (if needed):    1: Gastroenterology in 1 week  2: Primary Care  3: Vascular Surgery     Other comments or requests:

## 2023-03-21 NOTE — DISCHARGE NOTE PROVIDER - NSDCFUSCHEDAPPT_GEN_ALL_CORE_FT
[facility]  ELECTROPH [address]  Scheduled Appointment: 03/28/2023    Claire Sanchez  [facility]  CARDIOLOGY [address]  Scheduled Appointment: 03/28/2023    Randall Lofton  [facility]  CARDIOLOGY [address]  Scheduled Appointment: 03/28/2023    Blanka Colon  [facility]  HEARTFAIL [address]  Scheduled Appointment: 03/29/2023

## 2023-03-21 NOTE — DISCHARGE NOTE PROVIDER - NSDCFUADDINST_GEN_ALL_CORE_FT
High fiber diet, no lactose.  .      >>High fiber diet, no lactose. Monitor your bowel movents at home and take medication prescribed Miralax/Senna as needed to prevent constipation.     > Please follow up with gastroenterologist outpatient. Please call the above clinic number provided to make an appointment if you are unable to see your regular GI doctor.   Outpatient eval for EGD/ colonoscopy.    > Follow up with Dr. Valle regarding stable 4.6 cm AAA and 1.9 cm PREETHI aneurysm.     > Follow up with primary doctor for CBC lab check.     . .      >>High fiber diet, no lactose. Monitor your bowel movements at home and take medication prescribed Miralax/Senna as needed to prevent constipation.     > Please follow up with gastroenterologist outpatient. Please call the above clinic number provided to make an appointment if you are unable to see your regular GI doctor.   Outpatient eval for EGD/ colonoscopy.    > Follow up with Dr. Valle regarding stable 4.6 cm AAA and 1.9 cm PREETHI aneurysm.     > Follow up with primary doctor for CBC lab check.     .

## 2023-03-21 NOTE — DISCHARGE NOTE PROVIDER - PROVIDER TOKENS
PROVIDER:[TOKEN:[[id number]],FOLLOWUP:[1 week]],PROVIDER:[TOKEN:[[id number]],FOLLOWUP:[1 week]]

## 2023-03-21 NOTE — DISCHARGE NOTE PROVIDER - HOSPITAL COURSE
71 yo F PMH anxiety, CAD, HFrEF ED 29%, ICD, COPD, CVA w/ right sided weakness, AAA, RA, recent DC from [facility] 3/11/23 for orthostatic hypotension p/w nausea and vomiting for 1 day, CTAP nonrevealing, admitted for symptom support.     Problem/Plan - 1:  ·  Problem: Anemia.   ·  Plan: Unclear baseline, patient denies any history of anemia  ?2/2 dilutional effect    - No s/s of bleed so far  - Check FOB  - Check iron panel, B12, folate, ferritin levels  - Will need close outpatient follow up with PCP, repeat CBC outpatient   - Outpatient GI eval for EGD/ colonoscopy.     Problem/Plan - 2:  ·  Problem: Constipation.   ·  Plan: -Constipation resolved     - Continue Senna, Miralax. Dulcolax PRN  - Increased diet to high fiber   - Recommend increasing PO intake/hydration/ambulating as tolerated.     Problem/Plan - 3:  ·  Problem: Nausea and vomiting.   ·  Plan: Improved, likely viral gastroenteritis, intolerance to tylenol (per daughter this often happens), 2/2 thrush vs worsened GERD vs IBS.   CTAP without acute findings besides stable AAA  - zofran iv prn   - adv diet as gabbie - liquid > full liquid > regular (increased to high fiber as patient reports constipation)   - toradol iv x 3 doses prn pain  - avoid tylenol  - c/w pepcid   - c/w nystatin  - will need outpt follow up with GI for eventual EGD/cscope given hx of 30 lb wt loss.     Problem/Plan - 4:  ·  Problem: Hypercalcemia.   ·  Plan: RESOLVED, likely 2/2 hypovolemia  - s/p 2 L IVF in the ED - hold further IVF given CHF hx  - resumed entresto.     Problem/Plan - 5:  ·  Problem: Abdominal aortic aneurysm (AAA) 3.0 cm to 5.0 cm in diameter in female.   ·  Plan: seen by vascular, no intervention  - follow up with Dr. Valle outpt.     Problem/Plan - 6:  ·  Problem: Chronic systolic congestive heart failure.   ·  Plan: slightly hypovolemic to euvolemic  - resumed entresto   - c/w aldactone, toprol  - monitor bp, fluid status, wts.     Problem/Plan - 7:  ·  Problem: COPD, moderate.   ·  Plan: not in exacerbation  - c/w albuterol  - ellipta nonformulary - c/w spiriva, symbicort.     Problem/Plan - 8:  ·  Problem: Discharge planning issues.   ·  Plan: dvt ppx: lovenox      Medically cleared by Dr. Smith for discharge home with home PT. 73 yo F PMH anxiety, CAD, HFrEF ED 29%, ICD, COPD, CVA w/ right sided weakness, AAA, RA, recent DC from [facility] 3/11/23 for orthostatic hypotension p/w nausea and vomiting for 1 day, CTAP nonrevealing, admitted for symptom support.    ·  Problem: Anemia.   ·  Plan: Unclear baseline, patient denies any history of anemia  ?2/2 dilutional effect  - No s/s of bleed so far  - Checked FOB: negative  - Checked iron panel, B12, folate wnl, ferritin 303  - Will need close outpatient follow up with PCP, repeat CBC outpatient   - Outpatient GI eval for EGD/ colonoscopy.    ·  Problem: Constipation.   ·  Plan: -Constipation resolved   - Continue Senna, Miralax. Dulcolax PRN  - Increased diet to high fiber   - Recommend increasing PO intake/hydration/ambulating as tolerated.    ·  Problem: Nausea and vomiting.   ·  Plan: Improved, likely viral gastroenteritis, intolerance to tylenol (per daughter this often happens), 2/2 thrush vs worsened GERD vs IBS.   CTAP without acute findings besides stable AAA  - zofran iv prn   - adv diet as gabbie - liquid > full liquid > regular (increased to high fiber as patient reports constipation)   - toradol iv x 3 doses prn pain  - avoid tylenol  - c/w pepcid   - c/w nystatin  - will need outpt follow up with GI for eventual EGD/cscope given hx of 30 lb wt loss.    ·  Problem: Hypercalcemia.   ·  Plan: RESOLVED, likely 2/2 hypovolemia  - s/p 2 L IVF in the ED - hold further IVF given CHF hx  - resumed entresto.    ·  Problem: Abdominal aortic aneurysm (AAA) 3.0 cm to 5.0 cm in diameter in female.   ·  Plan: seen by vascular, no intervention -stable 4.6 cm AAA and 1.9 cm PREETHI aneurysm.   - follow up with Dr. Valle outpt.    ·  Problem: Chronic systolic congestive heart failure.   ·  Plan: slightly hypovolemic to euvolemic  - resumed entresto   - c/w aldactone, toprol    ·  Problem: COPD, moderate.   ·  Plan: not in exacerbation  - c/w albuterol  - ellipta nonformulary - c/w spiriva, symbicort.      Case and plan discussed with ACP: Cassandra.       Medically cleared by Dr. Smith for discharge home with home PT.

## 2023-03-21 NOTE — DISCHARGE NOTE PROVIDER - DETAILS OF MALNUTRITION DIAGNOSIS/DIAGNOSES
This patient has been assessed with a concern for Malnutrition and was treated during this hospitalization for the following Nutrition diagnosis/diagnoses:     -  03/18/2023: Moderate protein-calorie malnutrition

## 2023-03-21 NOTE — DISCHARGE NOTE PROVIDER - NSDCMRMEDTOKEN_GEN_ALL_CORE_FT
ALPRAZolam 0.5 mg oral tablet: 1 tab(s) orally once a day (at bedtime), As Needed  Breo Ellipta 200 mcg-25 mcg/inh inhalation powder: 1 puff(s) inhaled once a day  Crestor 20 mg oral tablet: 1 tab(s) orally once a day (at bedtime)  Entresto 24 mg-26 mg oral tablet: 1 tab(s) orally 2 times a day  famotidine 20 mg oral tablet: 1 tab(s) orally once a day  Incruse Ellipta 62.5 mcg/inh inhalation powder: 1 puff(s) inhaled every 24 hours  montelukast 10 mg oral tablet: 1 tab(s) orally once a day  nystatin 100,000 units/mL oral suspension: 5 milliliter(s) orally 4 times a day  Plavix 75 mg oral tablet: 1 tab(s) orally once a day  polyethylene glycol 3350 oral powder for reconstitution: 17 gram(s) orally once a day, As needed, Constipation  ProAir HFA 90 mcg/inh inhalation aerosol: 2 puff(s) inhaled 4 times a day, As Needed  spironolactone 25 mg oral tablet: 1 tab(s) orally once a day    sucralfate 1 g/10 mL oral suspension: 10 milliliter(s) orally 3 times a day  Toprol-XL 25 mg oral tablet, extended release: 1 tab(s) orally once a day   ALPRAZolam 0.5 mg oral tablet: 1 tab(s) orally once a day (at bedtime), As Needed  Breo Ellipta 200 mcg-25 mcg/inh inhalation powder: 1 puff(s) inhaled once a day  Crestor 20 mg oral tablet: 1 tab(s) orally once a day (at bedtime)  Entresto 24 mg-26 mg oral tablet: 1 tab(s) orally 2 times a day  famotidine 20 mg oral tablet: 1 tab(s) orally 2 times a day   Incruse Ellipta 62.5 mcg/inh inhalation powder: 1 puff(s) inhaled every 24 hours  montelukast 10 mg oral tablet: 1 tab(s) orally once a day  nystatin 100,000 units/mL oral suspension: 5 milliliter(s) orally 4 times a day  Plavix 75 mg oral tablet: 1 tab(s) orally once a day  polyethylene glycol 3350 oral powder for reconstitution: 17 gram(s) orally 2 times a day, As Needed  ProAir HFA 90 mcg/inh inhalation aerosol: 2 puff(s) inhaled 4 times a day, As Needed  spironolactone 25 mg oral tablet: 1 tab(s) orally once a day    sucralfate 1 g/10 mL oral suspension: 10 milliliter(s) orally 3 times a day  Toprol-XL 25 mg oral tablet, extended release: 1 tab(s) orally once a day   ALPRAZolam 0.5 mg oral tablet: 1 tab(s) orally once a day (at bedtime), As Needed  Breo Ellipta 200 mcg-25 mcg/inh inhalation powder: 1 puff(s) inhaled once a day  Crestor 20 mg oral tablet: 1 tab(s) orally once a day (at bedtime)  Entresto 24 mg-26 mg oral tablet: 1 tab(s) orally 2 times a day  famotidine 20 mg oral tablet: 1 tab(s) orally 2 times a day   Incruse Ellipta 62.5 mcg/inh inhalation powder: 1 puff(s) inhaled every 24 hours  montelukast 10 mg oral tablet: 1 tab(s) orally once a day  nystatin 100,000 units/mL oral suspension: 5 milliliter(s) orally 4 times a day  ondansetron 4 mg oral tablet: 1 tab(s) orally every 6 hours, As Needed for nausea  Plavix 75 mg oral tablet: 1 tab(s) orally once a day  polyethylene glycol 3350 oral powder for reconstitution: 17 gram(s) orally 2 times a day, As Needed  polyethylene glycol 3350 oral powder for reconstitution: 17 gram(s) orally 2 times a day, As Needed  ProAir HFA 90 mcg/inh inhalation aerosol: 2 puff(s) inhaled 4 times a day, As Needed  senna leaf extract oral tablet: 2 tab(s) orally once a day (at bedtime), As Needed   spironolactone 25 mg oral tablet: 1 tab(s) orally once a day    sucralfate 1 g/10 mL oral suspension: 10 milliliter(s) orally 3 times a day  Toprol-XL 25 mg oral tablet, extended release: 1 tab(s) orally once a day   ALPRAZolam 0.5 mg oral tablet: 1 tab(s) orally once a day (at bedtime), As Needed  Breo Ellipta 200 mcg-25 mcg/inh inhalation powder: 1 puff(s) inhaled once a day  Complete Blood Count.  Send results to Dr Bk You [phone]:   Crestor 20 mg oral tablet: 1 tab(s) orally once a day (at bedtime)  Entresto 24 mg-26 mg oral tablet: 1 tab(s) orally 2 times a day  famotidine 20 mg oral tablet: 1 tab(s) orally 2 times a day   Incruse Ellipta 62.5 mcg/inh inhalation powder: 1 puff(s) inhaled every 24 hours  montelukast 10 mg oral tablet: 1 tab(s) orally once a day  nystatin 100,000 units/mL oral suspension: 5 milliliter(s) orally 4 times a day  ondansetron 4 mg oral tablet: 1 tab(s) orally every 6 hours, As Needed for nausea  Plavix 75 mg oral tablet: 1 tab(s) orally once a day  polyethylene glycol 3350 oral powder for reconstitution: 17 gram(s) orally 2 times a day, As Needed  polyethylene glycol 3350 oral powder for reconstitution: 17 gram(s) orally 2 times a day, As Needed  ProAir HFA 90 mcg/inh inhalation aerosol: 2 puff(s) inhaled 4 times a day, As Needed  senna leaf extract oral tablet: 2 tab(s) orally once a day (at bedtime), As Needed   spironolactone 25 mg oral tablet: 1 tab(s) orally once a day    sucralfate 1 g/10 mL oral suspension: 10 milliliter(s) orally 3 times a day  Toprol-XL 25 mg oral tablet, extended release: 1 tab(s) orally once a day   ALPRAZolam 0.5 mg oral tablet: 1 tab(s) orally once a day (at bedtime), As Needed  Breo Ellipta 200 mcg-25 mcg/inh inhalation powder: 1 puff(s) inhaled once a day  Complete Blood Count.  Send results to Dr Bk You [phone]: once a day   Crestor 20 mg oral tablet: 1 tab(s) orally once a day (at bedtime)  Entresto 24 mg-26 mg oral tablet: 1 tab(s) orally 2 times a day  famotidine 20 mg oral tablet: 1 tab(s) orally 2 times a day   Incruse Ellipta 62.5 mcg/inh inhalation powder: 1 puff(s) inhaled every 24 hours  montelukast 10 mg oral tablet: 1 tab(s) orally once a day  nystatin 100,000 units/mL oral suspension: 5 milliliter(s) orally 4 times a day  ondansetron 4 mg oral tablet: 1 tab(s) orally every 6 hours, As Needed for nausea  Plavix 75 mg oral tablet: 1 tab(s) orally once a day  polyethylene glycol 3350 oral powder for reconstitution: 17 gram(s) orally 2 times a day, As Needed  ProAir HFA 90 mcg/inh inhalation aerosol: 2 puff(s) inhaled 4 times a day, As Needed  senna leaf extract oral tablet: 2 tab(s) orally once a day (at bedtime), As Needed   spironolactone 25 mg oral tablet: 1 tab(s) orally once a day    sucralfate 1 g/10 mL oral suspension: 10 milliliter(s) orally 3 times a day  Toprol-XL 25 mg oral tablet, extended release: 1 tab(s) orally once a day

## 2023-03-21 NOTE — DISCHARGE NOTE PROVIDER - ATTENDING DISCHARGE PHYSICAL EXAMINATION:
CONSTITUTIONAL: NAD, well-developed, well-groomed  EYES: PERRLA; conjunctiva and sclera clear  ENMT: Moist oral mucosa, no pharyngeal injection or exudates; normal dentition  NECK: Supple, no palpable masses; no thyromegaly  RESPIRATORY: Normal respiratory effort; lungs are clear to auscultation bilaterally  CARDIOVASCULAR: Regular rate and rhythm, normal S1 and S2, no murmur/rub/gallop; No lower extremity edema; Peripheral pulses are 2+ bilaterally  ABDOMEN: non tender; normoactive bowel sounds, no rebound/guarding;   MUSCULOSKELETAL:   no clubbing or cyanosis of digits; no joint swelling or tenderness to palpation  PSYCH: A+O to person, place, and time; affect appropriate  NEUROLOGY: CN 2-12 are intact and symmetric; no gross sensory deficits   SKIN: No rashes; no palpable lesions

## 2023-03-21 NOTE — DISCHARGE NOTE PROVIDER - NSFOLLOWUPCLINICS_GEN_ALL_ED_FT
Gastroenterology at [facility]  Gastroenterology  [address] [place] [id number]  Phone: [phone]  Fax:

## 2023-03-21 NOTE — DISCHARGE NOTE PROVIDER - NSDCCPCAREPLAN_GEN_ALL_CORE_FT
PRINCIPAL DISCHARGE DIAGNOSIS  Diagnosis: Nausea and vomiting  Assessment and Plan of Treatment: Improved, likely viral gastroenteritis, intolerance to tylenol.    CT abdomen and pelvis  without acute findings besides stable abdominal aortic aneurys.    Regular, high fiber diet with no lactose. Continue with Pepcid and Nystatin   Outpatient follow up with gastroenterology for eventual endoscopy/colonoscopy given history of 30 lb wt loss.      SECONDARY DISCHARGE DIAGNOSES  Diagnosis: Chronic systolic congestive heart failure  Assessment and Plan of Treatment: Weigh yourself daily.  If you gain 3lbs in 3 days, or 5lbs in a week call your Health Care Provider.  Continue with entresto, aldactone, and toprol.    Do not eat or drink foods containing more than 2000mg of salt (sodium) in your diet every day.  Call your Health Care Provider if you have any swelling or increased swelling in your feet, ankles, and/or stomach.  Take all of your medication as directed.  If you become dizzy call your Health Care Provider.      Diagnosis: Anemia  Assessment and Plan of Treatment: Will need close outpatient follow up with primary care., Repeat labwork CBC outpatient   Gastroenterology evaluation for endoscopy/colonoscopy.    Diagnosis: Abdominal aortic aneurysm (AAA) 3.0 cm to 5.0 cm in diameter in female  Assessment and Plan of Treatment: Seen by vascular, no intervention  Follow up with Dr. Valle.    Diagnosis: COPD, moderate  Assessment and Plan of Treatment: Call your Health Care provider upon arrival home to make a follow up appointment within one week.  Take all inhalers as prescribed by your Health Care Provider.  Take steroids as prescribed by your Health Care Provider.  If your cough increases infrequency and severity and/or you have shortness of breath or increased shortness of breath call your Health Care Provider.  If you develop fever, chills, night sweats, malaise, and/or change in mental status call your Health care Provider.  Nutrition is very important.  Eat small frequent meals.  Increase your activity as tolerated.  Do not stay in bed all day      Diagnosis: Hypercalcemia  Assessment and Plan of Treatment: Resolved

## 2023-03-22 ENCOUNTER — NON-APPOINTMENT (OUTPATIENT)
Age: 73
End: 2023-03-22

## 2023-03-22 PROBLEM — Z92.29 PERSONAL HISTORY OF OTHER DRUG THERAPY: Chronic | Status: ACTIVE | Noted: 2023-03-16

## 2023-03-28 ENCOUNTER — APPOINTMENT (OUTPATIENT)
Dept: HEART FAILURE | Facility: CLINIC | Age: 73
End: 2023-03-28
Payer: MEDICARE

## 2023-03-28 ENCOUNTER — APPOINTMENT (OUTPATIENT)
Dept: CARDIOLOGY | Facility: CLINIC | Age: 73
End: 2023-03-28
Payer: MEDICARE

## 2023-03-28 ENCOUNTER — APPOINTMENT (OUTPATIENT)
Dept: ELECTROPHYSIOLOGY | Facility: CLINIC | Age: 73
End: 2023-03-28
Payer: MEDICARE

## 2023-03-28 ENCOUNTER — NON-APPOINTMENT (OUTPATIENT)
Age: 73
End: 2023-03-28

## 2023-03-28 VITALS
HEIGHT: 64 IN | SYSTOLIC BLOOD PRESSURE: 91 MMHG | WEIGHT: 130 LBS | BODY MASS INDEX: 22.2 KG/M2 | OXYGEN SATURATION: 99 % | DIASTOLIC BLOOD PRESSURE: 60 MMHG | HEART RATE: 85 BPM

## 2023-03-28 VITALS
DIASTOLIC BLOOD PRESSURE: 66 MMHG | HEIGHT: 64 IN | BODY MASS INDEX: 22.2 KG/M2 | TEMPERATURE: 97.7 F | OXYGEN SATURATION: 97 % | SYSTOLIC BLOOD PRESSURE: 98 MMHG | WEIGHT: 130 LBS | HEART RATE: 82 BPM

## 2023-03-28 PROCEDURE — 99214 OFFICE O/P EST MOD 30 MIN: CPT

## 2023-03-28 PROCEDURE — 93000 ELECTROCARDIOGRAM COMPLETE: CPT

## 2023-03-28 PROCEDURE — 93283 PRGRMG EVAL IMPLANTABLE DFB: CPT

## 2023-03-28 PROCEDURE — 99214 OFFICE O/P EST MOD 30 MIN: CPT | Mod: 25

## 2023-03-28 RX ORDER — FAMOTIDINE 40 MG/5ML
40 POWDER, FOR SUSPENSION ORAL DAILY
Qty: 450 | Refills: 3 | Status: ACTIVE | COMMUNITY
Start: 2022-01-19

## 2023-03-28 RX ORDER — DAPAGLIFLOZIN 5 MG/1
5 TABLET, FILM COATED ORAL
Qty: 90 | Refills: 1 | Status: DISCONTINUED | COMMUNITY
Start: 2022-12-14 | End: 2023-03-28
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
## 2023-03-28 NOTE — DISCUSSION/SUMMARY
[Patient] : the patient [FreeTextEntry1] : 72 year old AA woman with a longstanding history of ICM with recent decline in LVEF to 33% from 40-45%, initially 20%, s/p dual chamber ICD, CAD s/p PCIs, CVA with residual R sided weakness and chronic pain on opioids who presents today for follow up of her cardiomyopathy post recent hospitalization. She is ACC/AHA Stage C, describing stable NYHA Class II-III HF symptoms. Her BP is marginal with low filling pressures on exam. The following recommendations have been made:\par \par 1. Chronic systolic HF - Stable and well compensated but with room for escalation of GDMT. Marginal BP likely in the setting of low right sided pressure. Will increase Toproll XL to 50mg in PM and continue 25mg in AM. Will continue Entresto 24-26mg BID and spironolactone 25mg daily. Further uptitration of Entresto limited by BP currently. Will repeat labs to reassess renal function and electrolytes post hospital discharge. Will defer resuming farxiga at this time as she appears to be on the drier side without loop diuretic requirement and borderline BP. Encouraged her to drink 2L/day. Asked that she purchase a new BP cuff and monitor her BP, HR and weight daily and keep a log to bring to her visits. Will refer to cardiac rehab at [facility] per pt's request. She's s/p DC-ICD. \par \par 2. CAD - Underwent PCI in 2009 and 2011. No signs of ischemia. Continue Plavix, Statin, BB, ARB and MRA. Reportedly off ASA due to significant bruising. \par \par 3. Hypertension - Well controlled on current therapies.\par \par 4. AAA - Seen today by Dr. Lofton with routine surveillance. Stable in size with distal AAA measuring 4.4 x 4.6cm, mild aneurysmal enlargement of prox right common iliac artery noted on abdominal aortic ultrasound 12/2022. \par \par 5. Recent gastroenteritis - Recommended she follow with GI post hospital discharge. Will refer to GI at [facility] per pt's request as she'd like to transfer her care here. Continue famotidine and sucralfate. \par \par 6. COPD/emphysema - Continue inhalers. Ongoing follow up with pulm. \par \par 5. Follow-up with Dr. Colon in 3/29/2023 as she was previously scheduled as she's not seen Dr. Colon since 12/2022.

## 2023-03-28 NOTE — PHYSICAL EXAM
[No Xanthelasma] : no xanthelasma [Normal Venous Pressure] : normal venous pressure [Normal Radial B/L] : normal radial B/L [Normal] : alert and oriented, normal memory [Soft] : abdomen soft [Non Tender] : non-tender [No Masses/organomegaly] : no masses/organomegaly [Normal Bowel Sounds] : normal bowel sounds [de-identified] : JVP <6 cm H2O, no HJR [de-identified] : Pt arrived to the office via wheelchair, slow but steady gait with use of cane [de-identified] : clubbing of fingernails [de-identified] : warm peripherally  [de-identified] : mild right sided weakness and slight slurred speech s/p prior stroke

## 2023-03-28 NOTE — REASON FOR VISIT
[Other: ____] : [unfilled] [FreeTextEntry1] : PATIENT INSTRUCTIONS:\par \par 2. INCREASE METOPROLOL SUCCINATE 25mg ONE TABLET in MORNING and 50mg (2 tablets) IN EVENING\par \par 3. Please get a blood pressure cuff and keep a log of your blood pressure and heart rate for you next visit. This will allow us to adjust your medications for your heart failure.\par \par 3. Increase your fluid intake to 2L/day\par \par 4. Continue to weight yourself daily and keep a log.\par \par 5. Continue your other medications as you're currently taking them\par \par 6. Labs today. We will follow up with you regarding the results.\par \par 7. Follow up with Dr. Colon 3/29

## 2023-03-28 NOTE — CARDIOLOGY SUMMARY
[de-identified] : 3/28/23 ECG: SR HR 89bpm\par \par 09/27/22 ECG: SR at 71 bpm, BETTE, negative Tw. [de-identified] : 2/21/23 TTE (at [facility]) LVEF 33%, LVEDd 5.1cm, E/e' ratio is suggestive of elevated LAP, RV function is normal, mild MS, trace MR, trace TR, no pericardial effusion\par \par 01/11/22 TTE: LVIDd 5.5 cm, LVEF 40-45% (segmental), mod DD, normal RV size and function, LA/RA not well visualized, min MR, mod TR, est RVSP 48 mmHg, trace pericardial effusion\par \par 11/26/21 TTE: LVIDd 5.4 cm, LVEF 20% (regional variation), mild DD, normal RV size and function, normal biatria, min MR, mild-mod TR, est RVSP 37 mmHg\par \par 12/12/08 TTE: LVIDd 4.7 cm, mod segmental LVSD, normal RV size and function, mod MR, est RVSP 34 mmHg\par \par  [de-identified] : \par 06/18/12 LHC: 30% pLAD, 45% OM1 at site of prior stent, otherwise minor luminal irregularities of LM and RCA\par

## 2023-03-29 ENCOUNTER — APPOINTMENT (OUTPATIENT)
Dept: HEART FAILURE | Facility: CLINIC | Age: 73
End: 2023-03-29
Payer: MEDICARE

## 2023-03-29 VITALS
BODY MASS INDEX: 22.71 KG/M2 | DIASTOLIC BLOOD PRESSURE: 70 MMHG | TEMPERATURE: 97.6 F | HEART RATE: 81 BPM | SYSTOLIC BLOOD PRESSURE: 106 MMHG | HEIGHT: 64 IN | OXYGEN SATURATION: 100 % | WEIGHT: 133 LBS

## 2023-03-29 LAB
ANION GAP SERPL CALC-SCNC: 15 MMOL/L
BUN SERPL-MCNC: 27 MG/DL
CALCIUM SERPL-MCNC: 9.9 MG/DL
CHLORIDE SERPL-SCNC: 106 MMOL/L
CO2 SERPL-SCNC: 18 MMOL/L
CREAT SERPL-MCNC: 1.34 MG/DL
EGFR: 42 ML/MIN/1.73M2
GLUCOSE SERPL-MCNC: 115 MG/DL
NT-PROBNP SERPL-MCNC: 702 PG/ML
POTASSIUM SERPL-SCNC: 4.4 MMOL/L
SODIUM SERPL-SCNC: 139 MMOL/L

## 2023-03-29 PROCEDURE — 99215 OFFICE O/P EST HI 40 MIN: CPT

## 2023-03-29 NOTE — HISTORY OF PRESENT ILLNESS
[FreeTextEntry1] : Mary was recently hospitalized.  Records reviewed.  Discharge medications up dated.  She was not restarted on Farxiga pending her heart failure appointment this week.  She was at [facility] and signed herself out.

## 2023-03-29 NOTE — DISCUSSION/SUMMARY
[FreeTextEntry1] : The patient is a 72-year-old female COPD, CAD, AAA, ICD, CKD s/p hospitalization who is feeling better\par #1 COPD- on breo, incruse, and ventolin\par #2 CAD- stent 2009 and 2011 [place] and [place], no angina, continue plavix and off ASA, initial ECHO EF=20% but repeat 1/2022 EF=40-45%, c/w spironolactone 25, Entresto, Toprol, Dr. Colon to decide on restarting Farxiga\par #3 CMP - [employer] ICD interrogation negative, f/u Dr. Parker\par #4 HTN- c/w Entresto, Toprol, spironolactone\par #5 HLD- c/wrosuvastatin\par #6 AAA- 4.5cm, f/u  Dr. Lofton , Doppler of aorta pending\par #7 Renal- normal renal evaluation\par #8 GI- ? Intestinal bleed, f/u GI\par #9 Derm- keloids, gets injections, encouraged to increase walking for exercise, received Moderna vaccines. [EKG obtained to assist in diagnosis and management of assessed problem(s)] : EKG obtained to assist in diagnosis and management of assessed problem(s)

## 2023-03-30 NOTE — ASSESSMENT
[FreeTextEntry1] : Assessment:\par 1. AAA\par Dec 2022: 4.6cm\par Nov 2021: 4.2cm\par Nov 2020 4.1 cm\par Feb 2019 4.1cm \par Oct 2018: 3.65\par March 2018 3.93\par Aug 2017 - 3.61\par Oct 2017 - CT scan AAA 3.8cm, R VIRIDIANA 1.6cm\par  November 2020 - 4.1 x 3.6 - fusiform \par 2. CHF\par 3. ICD\par \par Plan:\par 1. Repeat US aorta in 2 months for surveillance of aorta, seeing vascular surgery on 5/18 with same day US\par 2. BP and HR well controlled\par 3. No heavy lifting\par 4. Return in 6 months. \par

## 2023-03-30 NOTE — HISTORY OF PRESENT ILLNESS
[FreeTextEntry1] : Doing well, no abdominal pain. \par Recently seen by  and now today by Dr. Sanchez. \par Last Doppler to abdomen on 12/2022 with a 4.4 x 4.6cm AAA.  Yes

## 2023-03-30 NOTE — PHYSICAL EXAM
[General Appearance - Well Developed] : well developed [Normal Appearance] : normal appearance [Well Groomed] : well groomed [General Appearance - Well Nourished] : well nourished [No Deformities] : no deformities [General Appearance - In No Acute Distress] : no acute distress [Normal Conjunctiva] : the conjunctiva exhibited no abnormalities [Eyelids - No Xanthelasma] : the eyelids demonstrated no xanthelasmas [Normal Oral Mucosa] : normal oral mucosa [No Oral Pallor] : no oral pallor [No Oral Cyanosis] : no oral cyanosis [Normal Jugular Venous A Waves Present] : normal jugular venous A waves present [Normal Jugular Venous V Waves Present] : normal jugular venous V waves present [No Jugular Venous Chinchilla A Waves] : no jugular venous chinchilla A waves [Respiration, Rhythm And Depth] : normal respiratory rhythm and effort [Exaggerated Use Of Accessory Muscles For Inspiration] : no accessory muscle use [Auscultation Breath Sounds / Voice Sounds] : lungs were clear to auscultation bilaterally [Abdomen Soft] : soft [Abdomen Tenderness] : non-tender [Abdomen Mass (___ Cm)] : no abdominal mass palpated [Abnormal Walk] : normal gait [Gait - Sufficient For Exercise Testing] : the gait was sufficient for exercise testing [Nail Clubbing] : no clubbing of the fingernails [Cyanosis, Localized] : no localized cyanosis [Petechial Hemorrhages (___cm)] : no petechial hemorrhages [] : no ischemic changes

## 2023-04-05 NOTE — DISCHARGE NOTE PROVIDER - NSDCCPCAREPLAN_GEN_ALL_CORE_FT
Follow up call made. Reporting no complications of redness, swelling or drainage at site. Reporting no complications of pain or fever post procedure. Reporting no complications with check in, procedure and post procedure process. PRINCIPAL DISCHARGE DIAGNOSIS  Diagnosis: Chest pain  Assessment and Plan of Treatment:   HOME CARE INSTRUCTIONS  For the next few days, avoid physical activities that bring on chest pain. Continue physical activities as directed.  Do not smoke.  Avoid drinking alcohol.   Only take over-the-counter or prescription medicine for pain, discomfort, or fever as directed by your caregiver.  Follow your caregiver's suggestions for further testing if your chest pain does not go away.  Keep any follow-up appointments you made. If you do not go to an appointment, you could develop lasting (chronic) problems with pain. If there is any problem keeping an appointment, you must call to reschedule.   SEEK MEDICAL CARE IF:  You think you are having problems from the medicine you are taking. Read your medicine instructions carefully.  Your chest pain does not go away, even after treatment.  You develop a rash with blisters on your chest.  SEEK IMMEDIATE MEDICAL CARE IF:  You have increased chest pain or pain that spreads to your arm, neck, jaw, back, or abdomen.   You develop shortness of breath, an increasing cough, or you are coughing up blood.  You have severe back or abdominal pain, feel nauseous, or vomit.  You develop severe weakness, fainting, or chills.  You have a fever.        SECONDARY DISCHARGE DIAGNOSES  Diagnosis: Abdominal pain  Assessment and Plan of Treatment: CT abdomen without findings    Diagnosis: AAA (abdominal aortic aneurysm)  Assessment and Plan of Treatment: Unchanged size of a 4.3 cm infrarenal abdominal aortic aneurysm, although limited evaluation of the vascular structures on this noncontrast examination.  Follow up with Surgery outpatient    Diagnosis: ESTEVAN (acute kidney injury)  Assessment and Plan of Treatment: Avoid taking (NSAIDs) - (ex: Ibuprofen, Advil, Celebrex, Naprosyn)  Avoid taking any nephrotoxic agents (can harm kidneys) - Intravenous contrast for diagnostic testing, combination cold medications.  Have all medications adjusted for your renal function by your Health Care Provider.  Blood pressure control is important.  Take all medication as prescribed.

## 2023-04-17 NOTE — ED ADULT NURSE NOTE - NS_NURSE_DISC_ED_ALL_ED_PROVIDEDBY
Detail Level: Detailed Depth Of Biopsy: dermis Was A Bandage Applied: Yes Size Of Lesion In Cm: 0 Biopsy Type: H and E Biopsy Method: Dermablade Anesthesia Type: 1% lidocaine with epinephrine Anesthesia Volume In Cc (Will Not Render If 0): 0.5 Hemostasis: Alyssa's Wound Care: Petrolatum Dressing: bandage Destruction After The Procedure: No Type Of Destruction Used: Curettage Curettage Text: The wound bed was treated with curettage after the biopsy was performed. Cryotherapy Text: The wound bed was treated with cryotherapy after the biopsy was performed. Electrodesiccation Text: The wound bed was treated with electrodesiccation after the biopsy was performed. Electrodesiccation And Curettage Text: The wound bed was treated with electrodesiccation and curettage after the biopsy was performed. Silver Nitrate Text: The wound bed was treated with silver nitrate after the biopsy was performed. Lab: 6 Consent: Written consent was obtained and risks were reviewed including but not limited to scarring, infection, bleeding, scabbing, incomplete removal, nerve damage and allergy to anesthesia. Post-Care Instructions: I reviewed with the patient in detail post-care instructions. Patient is to keep the biopsy site dry overnight, and then apply bacitracin twice daily until healed. Patient may apply hydrogen peroxide soaks to remove any crusting. Notification Instructions: Patient will be notified of biopsy results typically in 1-2 weeks. However, patient instructed to call the office if not contacted within 3-4 weeks. Billing Type: Third-Party Bill Information: Selecting Yes will display possible errors in your note based on the variables you have selected. This validation is only offered as a suggestion for you. PLEASE NOTE THAT THE VALIDATION TEXT WILL BE REMOVED WHEN YOU FINALIZE YOUR NOTE. IF YOU WANT TO FAX A PRELIMINARY NOTE YOU WILL NEED TO TOGGLE THIS TO 'NO' IF YOU DO NOT WANT IT IN YOUR FAXED NOTE. ED MD

## 2023-04-21 ENCOUNTER — APPOINTMENT (OUTPATIENT)
Dept: HEART FAILURE | Facility: CLINIC | Age: 73
End: 2023-04-21
Payer: MEDICARE

## 2023-04-21 VITALS
WEIGHT: 133 LBS | DIASTOLIC BLOOD PRESSURE: 73 MMHG | HEART RATE: 76 BPM | BODY MASS INDEX: 22.71 KG/M2 | SYSTOLIC BLOOD PRESSURE: 126 MMHG | HEIGHT: 64 IN | OXYGEN SATURATION: 100 % | TEMPERATURE: 98.2 F

## 2023-04-21 PROCEDURE — 99214 OFFICE O/P EST MOD 30 MIN: CPT

## 2023-04-21 NOTE — HISTORY OF PRESENT ILLNESS
[FreeTextEntry1] : Ms. Sprague is a 72 year old woman with longstanding history of ICM (LVIDd  3.7 cm, LVEF 29% from prior improved 40-45%) s/p primary prevention dual chamber ICD (2010), CAD c/b MI s/p PCIs (2009 and 2011), AAA, HTN, COPD, emphysema, CVA with residual R sided weakness, pre-DM (Ha1c 6.2%), anxiety, osteoarthritis and chronic pain on opioids who presents today for routine follow-up of her cardiomyopathy. \par \par Since she initiated care with our clinic in December 2022, has had recurrent hospitalizations for COPD exacerbation and ABD discomfort accompanied by N/V. Admitted to [facility] February 2023 for COPD exacerbation, given elevated D-dimer, ruled out for VTE via CTA and LE Doppler's. Re-hospitalized [facility] March 2023 for N/V and abdominal/chest pain. CT ABD unrevealing for acute process, thought to be viral gastroenteritis with plan for possible outpatient EGD/colonoscopy given unintentional weight loss (approx 15 lbs since December 2022). Was also found to be hypovolemic with orthostatic hypotension. TTE showed decline in LVEF along with segmental WMA, thus L/RHC was pursued revealing nonobstructive CAD, low filling pressure and preserved CO. She was discharged off [facility]. \par \par Last seen few weeks ago, stable from a HF perspective and was instructed to increase BB. She tolerated this well and reports feeling improved. While AT is still limited to 1/2 block, can now walk this distance more comfortably. She has yet to be contacted by cardiac rehab at [facility]. Otherwise, report improved BP. Systolics generally 110s after increasing fluid intake as instructed. No further N/V, is seeing GI, Dr. Paniagua and was told there was not need for colonoscopy/EGD at this time. She denies CP, SOB at rest, orthopnea, PND, LH/dizziness, abdominal discomfort, palpitations, and syncope. \par

## 2023-04-21 NOTE — PHYSICAL EXAM
[facility]          Progress Note and Procedure Note      Osmar Lewis RECORD NUMBER:  [id number]  AGE: [de-identified] y.o. GENDER: male  : 1939  EPISODE DATE:  2017    Subjective:     SUBJECTIVE     Chief Complaint   Patient presents with    Wound Check     lt lower leg           HISTORY OF PRESENT ILLNESS   He was seen for follow up visit. The lower leg wound has scabbed over.  The swelling is better  Wants to restart water therapy at the [address]         Diagnosis Date    Atrial fibrillation ([address]     Bell's palsy     CAD (coronary artery disease)     Cancer (HCC)     skin CA removed with dry ice    DDD (degenerative disc disease)     DVT (deep venous thrombosis) (HCC)     Gout     Hernia     Hx of blood clots     left leg DVT    Hyperlipidemia     Hypertension     Irregular cardiac rhythm 2016    Movement disorder     back pain    Myocardial infarct     Thyroid disease     UTI (urinary tract infection)          PAST SURGICAL HISTORY     Past Surgical History:   Procedure Laterality Date    BACK SURGERY      lower    CARDIAC CATHETERIZATION      ok    CARDIAC CATHETERIZATION      ok    CATARACT REMOVAL Bilateral     CHOLECYSTECTOMY      COLON SURGERY      6-8 in of descending colon removed    COLOSTOMY      COLOSTOMY      reversed    EYE SURGERY      band    HERNIA REPAIR      NECK SURGERY  2016    three   [address] OTHER SURGICAL HISTORY  2016    ACDF C4-5    OH COLSC FLX W/REMOVAL LESION BY HOT BX FORCEPS Left 2017    COLONOSCOPY POLYPECTOMY HOT BIOPSY performed by Chelita Pantoja MD at  [facility]    UPPER GASTROINTESTINAL ENDOSCOPY N/A 2017    EGD CONTROL HEMORRHAGE performed by Chelita Pantoja MD at  [facility]     FAMILY HISTORY         Family History   Problem Relation Age of Onset    Diabetes Mother     Cancer Father     Heart Disease Father     COPD Sister     Cancer Sister applied to wound bed. Leg wrapped with unna boot, cast padding and coban (start at base of toes and wrap up to 1-2 inches below knee). Change weekly in wound clinic. Right Leg: Wear compression hose to right lower leg. *If unna boot becomes too tight- raise/elevate legs above the level of the heart for 15-20 minutes if swelling does not go down then carefully cut off unna boot and call clinic or go to local ER or family physician. If unna boot becomes wet or starts to roll down then carefully remove unna boot and call clinic. Follow up visit: as needed. Keep next scheduled appointment. Please give 24 hour notice if unable to keep appointment. [phone]    If you experience any of the following, please call the Wound Care Service during business hours: [phone]. *Increase in pain   *Temperature over 101   *Increase in drainage from your wound or a foul odor   *Uncontrolled swelling   *Need for compression bandage changes due to slippage, breakthrough drainage    If you need medical attention outside of business hours, please contact your Primary Care Doctor or go to the nearest emergency room.                    Electronically signed by Leslee Viveros MD on 11/29/2017 at 1:02 PM [No Xanthelasma] : no xanthelasma [Normal Radial B/L] : normal radial B/L [Normal] : alert and oriented, normal memory [de-identified] : JVP 6 cm H2O, no HJR [de-identified] : warm peripherally

## 2023-04-21 NOTE — CARDIOLOGY SUMMARY
[de-identified] : \par 09/27/22 ECG: SR at 71 bpm, BETTE, negative Tw.\par  [de-identified] : \par 03/06/23 TTE: LVIDd 3.7 cm, LVEF 29% (segmental) with VTI 12 cm, concentric LVH (septum 0.9, PWT 1.2 cm), mild DD, normal RV size and function, normal LA, min MR, mild AS, mild-mod TR\par \par 01/11/22 TTE: LVIDd 5.5 cm, LVEF 40-45% (segmental), mod DD, normal RV size and function, LA/RA not well visualized, min MR, mod TR, est RVSP 48 mmHg, trace pericardial effusion\par \par 11/26/21 TTE: LVIDd 5.4 cm, LVEF 20% (regional variation), mild DD, normal RV size and function, normal biatria, min MR, mild-mod TR, est RVSP 37 mmHg\par \par 12/12/08 TTE: LVIDd 4.7 cm, mod segmental LVSD, normal RV size and function, mod MR, est RVSP 34 mmHg\par  [de-identified] : \par 03/10/23 LHC: LM mild atherosclerosis, oLAD 40% stenosis, LAD mild atherosclerosis, pCx 50% stenosis, 1st OM 60% ISR, RCA minor luminal irregularities\par \par 03/10/23 RHC: AO 95/60 (75), HR 93, RA 0, PA 25/9/14, PCWP 0, AO 98%, PA 70%, CO/CI (F) 4.83/2.96, SVR 1241 dsc, PVR 2.9 swanson\par \par 06/18/12 LHC: 30% pLAD, 45% OM1 at site of prior stent, otherwise minor luminal irregularities of LM and RCA\par

## 2023-04-21 NOTE — DISCUSSION/SUMMARY
[Patient] : the patient [FreeTextEntry1] : 72 year old AA woman with a longstanding ICM (LVIDd 3.7 cm, LVEF 29% from prior improved 40-45%) s/p dual chamber ICD, CAD s/p PCIs, CVA with residual R sided weakness and chronic pain on opioids who is doing well from a HF perspective with resolution of marginal BP since increasing fluid intake. She is ACC/AHA Stage C, NYHA Class III with room for optimization of medical therapies. I have recommended the following: \par \par 1. Chronic systolic HF - Stable and well compensated. Increase Entresto to 49-51 mg BID. Continue Toprol XL 50 mg BID and Spironolactone 25 mg QD. Deferring SGLT2-i at this time as recent documented RA and PCWP of 0 and recurrent GI issues. Encouraged to increase fluid intake, up to 2L daily. Previously referred to cardiac rehab at [facility] but not yet contacted, will reach out. Labs in one week. \par \par 2. CAD - Underwent PCI in 2009 and 2011. No signs of ischemia. Continue Plavix, Statin, BB, ARB and MRA. Reportedly off ASA due to significant bruising. \par \par 3. Hypertension - Well controlled on current therapies.\par \par 4. AAA - Follows with Dr. Lofton. CT ABD w/ IV contrast 3/17/23 with reported stable 4.6 x 4.3 (from 4.1 x 3.6 in 2020) infrarenal AAA containing large amount of mural thrombus. Pending aorta US in 2 months for surveillance. Scheduled to see vascular surgery in May. \par \par 5. Recent gastroenteritis - On famotidine and sucralfate. Given accompanying unintentional weight loss of approx 15 lbs, had discussed possible EGD/colonoscopy with medicine team during recent hospitalization. Following GI, Dr. Paniagua and was told no current need for EGD/colonoscopy. Weight stable recently and no further N/V. \par \par 6. COPD/emphysema - Followed by PulmDr. Randolph\par \par 7. RHCM - Will refer to geriatrics, Dr. Garcia's office\par \par 8. Follow-up with NP 2-3 weeks for optimization of medical therapies and Dr. Colon in 3 months\par

## 2023-04-24 LAB
CHOLEST SERPL-MCNC: 175 MG/DL
HDLC SERPL-MCNC: 43 MG/DL
LDLC SERPL CALC-MCNC: 114 MG/DL
NONHDLC SERPL-MCNC: 132 MG/DL
TRIGL SERPL-MCNC: 93 MG/DL

## 2023-05-02 NOTE — PRE-OP CHECKLIST - BSA (M2)
1.74 Low Dose Naltrexone Counseling- I discussed with the patient the potential risks and side effects of low dose naltrexone including but not limited to: more vivid dreams, headaches, nausea, vomiting, abdominal pain, fatigue, dizziness, and anxiety.

## 2023-05-02 NOTE — HISTORY OF PRESENT ILLNESS
[FreeTextEntry1] : Ms. Sprague is a 72 year old woman with longstanding history of ICM (LVIDd  3.7 cm, LVEF 29% from prior improved 40-45%) s/p primary prevention dual chamber ICD (2010), CAD c/b MI s/p PCIs (2009 and 2011), AAA, HTN, COPD, emphysema, CVA with residual R sided weakness, pre-DM (Ha1c 6.2%), anxiety, osteoarthritis and chronic pain on opioids who presents today for routine follow-up of her cardiomyopathy. \par \par Since she initiated care with our clinic in December 2022, has had recurrent hospitalizations for COPD exacerbation and ABD discomfort accompanied by N/V. Admitted to [facility] 2/8 - 2/21/23 for COPD exacerbation, given elevated D-dimer, ruled out for VTE via CTA and LE Doppler's. Re-hospitalized [facility] 3/17 - 3/21/23 for N/V and abdominal/chest pain. CT ABD unrevealing for acute process, thought to be viral gastroenteritis with plan for possible outpatient EGD/colonoscopy given unintentional weight loss (approx 15 lbs since December 2022). Was also found to be hypovolemic with orthostatic hypotension. TTE showed decline in LVEF along with segmental WMA, thus L/RHC was pursued revealing nonobstructive CAD, low filling pressure and preserved CO. She was discharged off Farxiga. \par \par [facility] at the request of Dr. Lofton for concern of elevated Optivol levels on device. She was thought to be euvolemic and BB was uptitrated and referred to cardiac rehab as well as GI. Endorses chronic ABD bloating which she attributes to AAA and emphysema but has not had further episodes of N/V. She was under the impression she had to restrict fluid and has been drinking approx 1.5L daily. Weight is unchanged since return home, 130-133 lbs. AT stable, limited to 1/2 block due to SOB. \par \par She denies CP, SOB at rest, radha orthopnea (uses 2 pillows for body comfort), PND, LH/dizziness, abdominal discomfort, palpitations, and syncope. Her appetite is normal and her bowel and bladder habits are unchanged and normal for her. She has not had an ICD discharge. She has been limiting fluid and sodium in her diet and taking her medications as directed. She does not use NSAIDs.

## 2023-05-02 NOTE — DISCUSSION/SUMMARY
[Patient] : the patient [FreeTextEntry1] : 72 year old AA woman with a longstanding ICM (LVIDd 3.7 cm, LVEF 29% from prior improved 40-45%) s/p dual chamber ICD, CAD s/p PCIs, CVA with residual R sided weakness and chronic pain on opioids who was recently hospitalized for hypovolemia in the setting of suspected gastroenteritis and N/V. She is ACC/AHA Stage C, NYHA Class III with room for optimization of medical therapies. I have recommended the following: \par \par 1. Chronic systolic HF - Stable and well compensated. Increase Toprol XL to 50 mg BID which will also be of benefit to AAA. Continue Entresto 24-26 mg BID and Spironolactone 25 mg QD. Further uptitration of Entresto limited by marginal BP. Deferring SGLT2-i given volume depletion with recent documented RA and PCWP of 0 and recurrent GI issues. Encouraged to increase fluid intake, up to 2L daily. She was referred to cardiac rehab yesterday at [facility] ER. \par \par 2. CAD - Underwent PCI in 2009 and 2011. No signs of ischemia. Continue Plavix, Statin, BB, ARB and MRA. Reportedly off ASA due to significant bruising. \par \par 3. Hypertension - Well controlled on current therapies.\par \par 4. AAA - Follows with Dr. Lofton. CT ABD w/ IV contrast 3/17/23 with reported stable 4.6 x 4.3 (from 4.1 x 3.6 in 2020) infrarenal AAA containing large amount of mural thrombus. Pending aorta US in 2 months for surveillance. Scheduled to see vascular surgery in May. \par \par 5. Recent gastroenteritis - On famotidine and sucralfate. Given accompanying unintentional weight loss of approx 15 lbs, had discussed possible EGD/colonoscopy with medicine team during recent hospitalization. Was referred to GI at [facility] at yesterday's visit. \par \par 6. COPD/emphysema - Followed by PulmDr. Randolph\par \par 7. RHCM - Discussed with daughter, will refer to geriatrics, Dr. Garcia's office\par \par 8. Follow-up with NP 2-3 weeks for optimization of medical therapies and Dr. Colon in 3 months.

## 2023-05-02 NOTE — END OF VISIT
[FreeTextEntry3] : I, Dr. Colon, personally performed the evaluation and management (E/M) services for this established patient who presents today with (a) new problem(s)/exacerbation of (an) existing condition(s).  That E/M includes conducting the examination, assessing all new/exacerbated conditions, and establishing a new plan of care.  Today, my SORAIDA, [email or web address], was here to observe my evaluation and management services for this new problem/exacerbated condition to be followed going forward.  [Time Spent: ___ minutes] : I have spent [unfilled] minutes of time on the encounter.

## 2023-05-02 NOTE — PHYSICAL EXAM
[No Xanthelasma] : no xanthelasma [Normal Radial B/L] : normal radial B/L [Normal] : normal gait [Normal Venous Pressure] : normal venous pressure [No Rash] : no rash [de-identified] : JVP < 6 cm H2O, no HJR [de-identified] : warm peripherally

## 2023-05-02 NOTE — CARDIOLOGY SUMMARY
[de-identified] : \par 03/28/23 ECG: SR at 89 bpm, nonspecific Tw abnormality.\par 09/27/22 ECG: SR at 71 bpm, BETTE, negative Tw.\par  [de-identified] : \par 03/06/23 TTE: LVIDd 3.7 cm, LVEF 29% (segmental) with VTI 12 cm, concentric LVH (septum 0.9, PWT 1.2 cm), mild DD, normal RV size and function, normal LA, min MR, mild AS, mild-mod TR\par \par 01/11/22 TTE: LVIDd 5.5 cm, LVEF 40-45% (segmental), mod DD, normal RV size and function, LA/RA not well visualized, min MR, mod TR, est RVSP 48 mmHg, trace pericardial effusion\par \par 11/26/21 TTE: LVIDd 5.4 cm, LVEF 20% (regional variation), mild DD, normal RV size and function, normal biatria, min MR, mild-mod TR, est RVSP 37 mmHg\par \par 12/12/08 TTE: LVIDd 4.7 cm, mod segmental LVSD, normal RV size and function, mod MR, est RVSP 34 mmHg\par  [de-identified] : \par 03/10/23 LHC: LM mild atherosclerosis, oLAD 40% stenosis, LAD mild atherosclerosis, pCx 50% stenosis, 1st OM 60% ISR, RCA minor luminal irregularities\par \par 03/10/23 RHC: AO 95/60 (75), HR 93, RA 0, PA 25/9/14, PCWP 0, AO 98%, PA 70%, CO/CI (F) 4.83/2.96, SVR 1241 dsc, PVR 2.9 swanson\par \par 06/18/12 LHC: 30% pLAD, 45% OM1 at site of prior stent, otherwise minor luminal irregularities of LM and RCA\par

## 2023-05-02 NOTE — REASON FOR VISIT
[Cardiac Failure] : cardiac failure [FreeTextEntry1] : PATIENT INSTRUCTIONS:\par \par 1. INCREASE the METOPROLOL SUCCINATE to 50 mg taken TWICE A DAY\par \par 2. Continue the remainder of your medications as prescribed\par \par 3. Follow-up with the Nurse Practitioner in 2-3 weeks\par \par 4. Return to Dr. Colon in 3 months

## 2023-05-08 ENCOUNTER — APPOINTMENT (OUTPATIENT)
Dept: HEART FAILURE | Facility: CLINIC | Age: 73
End: 2023-05-08

## 2023-05-18 ENCOUNTER — APPOINTMENT (OUTPATIENT)
Dept: VASCULAR SURGERY | Facility: CLINIC | Age: 73
End: 2023-05-18

## 2023-05-25 ENCOUNTER — APPOINTMENT (OUTPATIENT)
Dept: VASCULAR SURGERY | Facility: CLINIC | Age: 73
End: 2023-05-25
Payer: MEDICARE

## 2023-05-25 VITALS — DIASTOLIC BLOOD PRESSURE: 70 MMHG | SYSTOLIC BLOOD PRESSURE: 98 MMHG

## 2023-05-25 VITALS
BODY MASS INDEX: 22.71 KG/M2 | HEIGHT: 64 IN | WEIGHT: 133 LBS | DIASTOLIC BLOOD PRESSURE: 60 MMHG | HEART RATE: 61 BPM | SYSTOLIC BLOOD PRESSURE: 85 MMHG

## 2023-05-25 PROCEDURE — 99215 OFFICE O/P EST HI 40 MIN: CPT

## 2023-05-25 PROCEDURE — 93978 VASCULAR STUDY: CPT

## 2023-06-01 NOTE — PHYSICAL EXAM
[de-identified] : Head and neck within normal limit [de-identified] : No acute distress [de-identified] : Clear to auscultation bilaterally [de-identified] : Regular rate and rhythm [FreeTextEntry1] : Abdomen was soft nontender not distended\par \par Patient has palpable bilateral radial pulses.  Palpable bilateral femoral pulses.

## 2023-06-01 NOTE — ASSESSMENT
[FreeTextEntry1] : 73-year-old female with enlarging 4.8 cm abdominal aortic aneurysm increasing size more than 6 mm in last 6-month.  Given that the the patient is female and the aneurysm is enlarging endovascular repair was discussed with the patient.  All risk included but not limited to bleeding, infection, renal failure, respiratory failure, need for future intervention, stroke, life-threatening complication and death were discussed with the patient.  We will order CT angiogram of the chest abdomen pelvis to further delineate the aneurysm for endovascular repair.

## 2023-06-01 NOTE — HISTORY OF PRESENT ILLNESS
[FreeTextEntry1] : This is a 73-year-old female with complex past medical history including COPD and CHF with known abdominal aortic aneurysm.  The aneurysm is enlarging patient presented to the office today for consultation regarding endovascular repair.  She denies any abdominal pain or back pain.  She states that she is able to ambulate.  Denies any shortness of breath.

## 2023-06-02 ENCOUNTER — APPOINTMENT (OUTPATIENT)
Dept: CT IMAGING | Facility: CLINIC | Age: 73
End: 2023-06-02

## 2023-06-20 ENCOUNTER — APPOINTMENT (OUTPATIENT)
Dept: CT IMAGING | Facility: CLINIC | Age: 73
End: 2023-06-20
Payer: MEDICARE

## 2023-06-20 ENCOUNTER — RESULT REVIEW (OUTPATIENT)
Age: 73
End: 2023-06-20

## 2023-06-20 ENCOUNTER — OUTPATIENT (OUTPATIENT)
Dept: OUTPATIENT SERVICES | Facility: HOSPITAL | Age: 73
LOS: 1 days | End: 2023-06-20
Payer: MEDICARE

## 2023-06-20 DIAGNOSIS — Z00.00 ENCOUNTER FOR GENERAL ADULT MEDICAL EXAMINATION WITHOUT ABNORMAL FINDINGS: ICD-10-CM

## 2023-06-20 PROCEDURE — 74174 CTA ABD&PLVS W/CONTRAST: CPT

## 2023-06-20 PROCEDURE — 74174 CTA ABD&PLVS W/CONTRAST: CPT | Mod: 26

## 2023-06-27 ENCOUNTER — NON-APPOINTMENT (OUTPATIENT)
Age: 73
End: 2023-06-27

## 2023-06-27 ENCOUNTER — APPOINTMENT (OUTPATIENT)
Dept: ELECTROPHYSIOLOGY | Facility: CLINIC | Age: 73
End: 2023-06-27
Payer: MEDICARE

## 2023-06-27 PROCEDURE — 93295 DEV INTERROG REMOTE 1/2/MLT: CPT

## 2023-06-27 PROCEDURE — 93296 REM INTERROG EVL PM/IDS: CPT

## 2023-06-28 ENCOUNTER — APPOINTMENT (OUTPATIENT)
Dept: HEART FAILURE | Facility: CLINIC | Age: 73
End: 2023-06-28
Payer: MEDICARE

## 2023-06-28 ENCOUNTER — NON-APPOINTMENT (OUTPATIENT)
Age: 73
End: 2023-06-28

## 2023-06-28 VITALS
SYSTOLIC BLOOD PRESSURE: 99 MMHG | HEIGHT: 64 IN | OXYGEN SATURATION: 100 % | WEIGHT: 130 LBS | TEMPERATURE: 97.2 F | BODY MASS INDEX: 22.2 KG/M2 | DIASTOLIC BLOOD PRESSURE: 65 MMHG | HEART RATE: 65 BPM

## 2023-06-28 DIAGNOSIS — R63.4 ABNORMAL WEIGHT LOSS: ICD-10-CM

## 2023-06-28 DIAGNOSIS — Z01.818 ENCOUNTER FOR OTHER PREPROCEDURAL EXAMINATION: ICD-10-CM

## 2023-06-28 PROCEDURE — 93000 ELECTROCARDIOGRAM COMPLETE: CPT

## 2023-06-28 PROCEDURE — 99215 OFFICE O/P EST HI 40 MIN: CPT | Mod: 25

## 2023-06-28 RX ORDER — SUCRALFATE 1 G/10ML
1 SUSPENSION ORAL
Refills: 0 | Status: ACTIVE | COMMUNITY
Start: 2023-06-28

## 2023-06-28 RX ORDER — OXYCODONE 20 MG/1
20 TABLET ORAL
Refills: 0 | Status: ACTIVE | COMMUNITY
Start: 2017-06-29

## 2023-06-28 RX ORDER — CYPROHEPTADINE HYDROCHLORIDE 4 MG/1
4 TABLET ORAL DAILY
Refills: 0 | Status: ACTIVE | COMMUNITY
Start: 2023-06-28

## 2023-06-28 RX ORDER — ALBUTEROL SULFATE 90 UG/1
108 (90 BASE) AEROSOL, METERED RESPIRATORY (INHALATION) EVERY 6 HOURS
Qty: 17 | Refills: 0 | Status: DISCONTINUED | COMMUNITY
Start: 2017-06-09 | End: 2023-06-28

## 2023-06-30 ENCOUNTER — OUTPATIENT (OUTPATIENT)
Dept: OUTPATIENT SERVICES | Facility: HOSPITAL | Age: 73
LOS: 1 days | End: 2023-06-30

## 2023-06-30 VITALS
SYSTOLIC BLOOD PRESSURE: 114 MMHG | HEIGHT: 64 IN | RESPIRATION RATE: 16 BRPM | WEIGHT: 132.06 LBS | HEART RATE: 66 BPM | TEMPERATURE: 98 F | DIASTOLIC BLOOD PRESSURE: 70 MMHG | OXYGEN SATURATION: 97 %

## 2023-06-30 DIAGNOSIS — R39.9 UNSPECIFIED SYMPTOMS AND SIGNS INVOLVING THE GENITOURINARY SYSTEM: ICD-10-CM

## 2023-06-30 DIAGNOSIS — Z95.5 PRESENCE OF CORONARY ANGIOPLASTY IMPLANT AND GRAFT: ICD-10-CM

## 2023-06-30 DIAGNOSIS — Z95.810 PRESENCE OF AUTOMATIC (IMPLANTABLE) CARDIAC DEFIBRILLATOR: ICD-10-CM

## 2023-06-30 DIAGNOSIS — Z86.79 PERSONAL HISTORY OF OTHER DISEASES OF THE CIRCULATORY SYSTEM: ICD-10-CM

## 2023-06-30 DIAGNOSIS — I71.40 ABDOMINAL AORTIC ANEURYSM, WITHOUT RUPTURE, UNSPECIFIED: ICD-10-CM

## 2023-06-30 DIAGNOSIS — Z95.810 PRESENCE OF AUTOMATIC (IMPLANTABLE) CARDIAC DEFIBRILLATOR: Chronic | ICD-10-CM

## 2023-06-30 DIAGNOSIS — Z95.5 PRESENCE OF CORONARY ANGIOPLASTY IMPLANT AND GRAFT: Chronic | ICD-10-CM

## 2023-06-30 DIAGNOSIS — I10 ESSENTIAL (PRIMARY) HYPERTENSION: ICD-10-CM

## 2023-06-30 DIAGNOSIS — N83.8 OTHER NONINFLAMMATORY DISORDERS OF OVARY, FALLOPIAN TUBE AND BROAD LIGAMENT: Chronic | ICD-10-CM

## 2023-06-30 DIAGNOSIS — Z87.09 PERSONAL HISTORY OF OTHER DISEASES OF THE RESPIRATORY SYSTEM: Chronic | ICD-10-CM

## 2023-06-30 DIAGNOSIS — Z87.09 PERSONAL HISTORY OF OTHER DISEASES OF THE RESPIRATORY SYSTEM: ICD-10-CM

## 2023-06-30 LAB
A1C WITH ESTIMATED AVERAGE GLUCOSE RESULT: 6.4 % — HIGH (ref 4–5.6)
ALBUMIN SERPL ELPH-MCNC: 4.2 G/DL — SIGNIFICANT CHANGE UP (ref 3.3–5)
ALP SERPL-CCNC: 80 U/L — SIGNIFICANT CHANGE UP (ref 40–120)
ALT FLD-CCNC: 29 U/L — SIGNIFICANT CHANGE UP (ref 4–33)
ANION GAP SERPL CALC-SCNC: 16 MMOL/L — HIGH (ref 7–14)
AST SERPL-CCNC: 38 U/L — HIGH (ref 4–32)
BILIRUB SERPL-MCNC: 0.2 MG/DL — SIGNIFICANT CHANGE UP (ref 0.2–1.2)
BLD GP AB SCN SERPL QL: NEGATIVE — SIGNIFICANT CHANGE UP
BUN SERPL-MCNC: 37 MG/DL — HIGH (ref 7–23)
CALCIUM SERPL-MCNC: 10.1 MG/DL — SIGNIFICANT CHANGE UP (ref 8.4–10.5)
CHLORIDE SERPL-SCNC: 104 MMOL/L — SIGNIFICANT CHANGE UP (ref 98–107)
CO2 SERPL-SCNC: 16 MMOL/L — LOW (ref 22–31)
CREAT SERPL-MCNC: 1.09 MG/DL — SIGNIFICANT CHANGE UP (ref 0.5–1.3)
EGFR: 53 ML/MIN/1.73M2 — LOW
ESTIMATED AVERAGE GLUCOSE: 137 — SIGNIFICANT CHANGE UP
GLUCOSE SERPL-MCNC: 81 MG/DL — SIGNIFICANT CHANGE UP (ref 70–99)
HCT VFR BLD CALC: 44.3 % — SIGNIFICANT CHANGE UP (ref 34.5–45)
HGB BLD-MCNC: 13.5 G/DL — SIGNIFICANT CHANGE UP (ref 11.5–15.5)
MCHC RBC-ENTMCNC: 30.5 GM/DL — LOW (ref 32–36)
MCHC RBC-ENTMCNC: 31.8 PG — SIGNIFICANT CHANGE UP (ref 27–34)
MCV RBC AUTO: 104.2 FL — HIGH (ref 80–100)
NRBC # BLD: 0 /100 WBCS — SIGNIFICANT CHANGE UP (ref 0–0)
NRBC # FLD: 0.02 K/UL — HIGH (ref 0–0)
PLATELET # BLD AUTO: 266 K/UL — SIGNIFICANT CHANGE UP (ref 150–400)
POTASSIUM SERPL-MCNC: 4.7 MMOL/L — SIGNIFICANT CHANGE UP (ref 3.5–5.3)
POTASSIUM SERPL-SCNC: 4.7 MMOL/L — SIGNIFICANT CHANGE UP (ref 3.5–5.3)
PROT SERPL-MCNC: 8.1 G/DL — SIGNIFICANT CHANGE UP (ref 6–8.3)
RBC # BLD: 4.25 M/UL — SIGNIFICANT CHANGE UP (ref 3.8–5.2)
RBC # FLD: 14.6 % — HIGH (ref 10.3–14.5)
RH IG SCN BLD-IMP: POSITIVE — SIGNIFICANT CHANGE UP
SODIUM SERPL-SCNC: 136 MMOL/L — SIGNIFICANT CHANGE UP (ref 135–145)
WBC # BLD: 13.33 K/UL — HIGH (ref 3.8–10.5)
WBC # FLD AUTO: 13.33 K/UL — HIGH (ref 3.8–10.5)

## 2023-06-30 RX ORDER — SACUBITRIL AND VALSARTAN 24; 26 MG/1; MG/1
1 TABLET, FILM COATED ORAL
Qty: 0 | Refills: 0 | DISCHARGE

## 2023-06-30 RX ORDER — ALBUTEROL 90 UG/1
2 AEROSOL, METERED ORAL
Qty: 0 | Refills: 0 | DISCHARGE

## 2023-06-30 RX ORDER — ALPRAZOLAM 0.25 MG
1 TABLET ORAL
Qty: 0 | Refills: 0 | DISCHARGE

## 2023-06-30 RX ORDER — NYSTATIN 500MM UNIT
5 POWDER (EA) MISCELLANEOUS
Qty: 0 | Refills: 0 | DISCHARGE

## 2023-06-30 RX ORDER — CLOPIDOGREL BISULFATE 75 MG/1
1 TABLET, FILM COATED ORAL
Qty: 0 | Refills: 0 | DISCHARGE

## 2023-06-30 RX ORDER — METOPROLOL TARTRATE 50 MG
1 TABLET ORAL
Qty: 0 | Refills: 0 | DISCHARGE

## 2023-06-30 RX ORDER — CLOPIDOGREL BISULFATE 75 MG/1
1 TABLET, FILM COATED ORAL
Refills: 0 | DISCHARGE

## 2023-06-30 NOTE — H&P PST ADULT - ASSESSMENT
72 y/o female  PMH of AAA, CVA, CAD s/p MI, stents x2  2009, 2011, ischemic cardiomyopathy, chronic systolic heart failure ventricular arrhythmia s/p ICD/PPM 2010, then 1/10/2019. COPD, emphysema IBD.  Pt with AAA 4.8 cm which has been enlarging.  Scheduled for Endovascular repair of AAA.

## 2023-06-30 NOTE — H&P PST ADULT - PROBLEM SELECTOR PLAN 5
Pt reports he was seen by Pulmonologist for preop pulmonology clearance, requested on chart.  Pt advised to take her Ellipta, and Breo morning of surgery as Rx

## 2023-06-30 NOTE — H&P PST ADULT - OTHER CARE PROVIDERS
Dr. Colon, Cardiologist: [phone]; Dr. Levi, Pulmonologist  [phone]; Dr. Mast, Neuro [phone]

## 2023-06-30 NOTE — H&P PST ADULT - NS MD HP INPLANTS MED DEV
cardiac stents x2, Medtroic Evangelina [id number] model [id number] cardiac stents x2, [employer]tr[employer] [facility] Serial [id number] model [id number]

## 2023-06-30 NOTE — H&P PST ADULT - GENITOURINARY COMMENTS
Pt reports increase in urination, she seen by PMD 6/29/23 and started on course antibiotic for UTI. not examined Pt reports increase in urination, she was seen by PMD 6/29/23 and started on course a of Nitrofurantoin, urine  culture report pending per pt..  Pt advised to follow  up with  PMD for preop medical clearance

## 2023-06-30 NOTE — H&P PST ADULT - RESPIRATORY AND THORAX COMMENTS
Pt reports hx of COPD/ emphysema, hx of (L) pneumothorax" years ago" Pt reports hx of COPD/ emphysema, hx of (L) pneumothorax" years ago".  Followed by Pulmonologist.  Pt reports she was seen for preop  Pulmonary clearance

## 2023-06-30 NOTE — H&P PST ADULT - PROBLEM SELECTOR PLAN 1
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  Dr. Yoo informed by email

## 2023-06-30 NOTE — H&P PST ADULT - CARDIOVASCULAR COMMENTS
walks short distance with assistance (walker or cane) METS <4  hx MI s/p stents x2 2009, 2011. AICD implanted 2010, 2019. chronic Systolic heart failure walks short distances with assistance. Denies CP, no SOB no palpitations. CAD, MI s/p stents x2 2009, 2011, HTN, systolic heart failure, AICD implanted 2010, then changed 2019.  walks short distance with assistance (walker or cane) METS <4  hx MI s/p stents x2 2009, 2011. AICD implanted 2010,  2019. chronic Systolic heart failure

## 2023-06-30 NOTE — H&P PST ADULT - ATTENDING COMMENTS
pt with enlarging aortic aneurysm.  she is undergoing endovascular repair.    After discussion with her in pre op area, the patient AGREED  to receive blood product in life threatening condition. This was witnessed by the pre op nurse Gia Felton and anesthesia team.\    All risks including but not limited to bleeding, infection, renal failure, respiratory failure, stroke, PE, mesenteric ischemia,  keloid, life threatening conditions and death were discussed. She verbalized understanding and wish to proceed.

## 2023-06-30 NOTE — H&P PST ADULT - SKIN
keloid scar to left shoulder (pt covers with band aid to prevent irritation of skin)/warm and dry/color normal/no rashes left shoulder with band-aid (pt reports she has keloid that she covers with band aid to prevent irritation of skin)/warm and dry/color normal/no rashes

## 2023-06-30 NOTE — H&P PST ADULT - NSICDXPASTMEDICALHX_GEN_ALL_CORE_FT
PAST MEDICAL HISTORY:  Anxiety     CAD (coronary artery disease)     Cardiac defibrillator in place     Congestive heart failure     COPD (chronic obstructive pulmonary disease)     COPD (chronic obstructive pulmonary disease)     COVID-19 vaccine series completed     CVA (cerebral vascular accident) X2, right sided weakness     H/O emphysema     H/O pneumothorax     Hypercholesteremia     Osteoporosis     Pacemaker     Rheumatoid arthritis     Ventricular arrhythmia      PAST MEDICAL HISTORY:  Anxiety     CAD (coronary artery disease)     Cardiac defibrillator in place     Congestive heart failure     COPD (chronic obstructive pulmonary disease)     COPD (chronic obstructive pulmonary disease)     COVID-19 vaccine series completed     CVA (cerebral vascular accident) X2, right sided weakness     H/O emphysema     H/O pneumothorax     History of prediabetes     Hypercholesteremia     Insomnia     Keloid scar     Osteoporosis     Pacemaker     Rheumatoid arthritis     Ventricular arrhythmia

## 2023-06-30 NOTE — H&P PST ADULT - HISTORY OF PRESENT ILLNESS
73 y F PMH of AAA, CVA, CAD s/p MI,  stents x2  2009, 2011, ischemic cardiomyopathy, chronic systolic heart fialure, entricular arrhythmia s/p ICD/PPM 2010, then 1/10/2019.  HF, COPD, emphysema IBD.  Pt with AAA 4.8 cm which has been enlarging.  Scheduled for Endovascular repair of AAA.     Pt is a poor historian 73 y F PMH of AAA, CVA, CAD s/p MI,  stents x2  2009, 2011, ischemic cardiomyopathy, chronic systolic heart failure ventricular arrhythmia s/p ICD/PPM 2010, then 1/10/2019.  HF, COPD, emphysema IBD.  Pt with AAA 4.8 cm which has been enlarging.  Scheduled for Endovascular repair of AAA.     Pt is a poor historian 74 y/o female  PMH of AAA, CVA, CAD s/p MI, stents x2  2009, 2011, ischemic cardiomyopathy, chronic systolic heart failure ventricular arrhythmia s/p ICD/PPM 2010, then 1/10/2019. COPD, emphysema IBD.  Pt with AAA 4.8 cm which has been enlarging.  Scheduled for Endovascular repair of AAA.     Pt is a poor historian

## 2023-06-30 NOTE — H&P PST ADULT - PROBLEM SELECTOR PLAN 3
Pt advised by Cardiology to continue Plavix preop.  Dr. Yoo informed by email  Cardiology eval on chart.  Most cardiology recent test reports requested on chart

## 2023-06-30 NOTE — H&P PST ADULT - NSICDXPASTSURGICALHX_GEN_ALL_CORE_FT
PAST SURGICAL HISTORY:  Cataract Surgery     H/O pneumothorax     Hand Surgery     Mass, ovarian     Stented coronary artery      PAST SURGICAL HISTORY:  Cataract Surgery     H/O pneumothorax     Hand Surgery     History of implantable cardiac defibrillator (ICD)     Mass, ovarian     Stented coronary artery      Lab Facility: [id number]

## 2023-07-05 PROBLEM — Z95.810 PRESENCE OF AUTOMATIC (IMPLANTABLE) CARDIAC DEFIBRILLATOR: Chronic | Status: ACTIVE | Noted: 2023-06-30

## 2023-07-05 PROBLEM — G47.00 INSOMNIA, UNSPECIFIED: Chronic | Status: ACTIVE | Noted: 2023-06-30

## 2023-07-05 PROBLEM — L91.0 HYPERTROPHIC SCAR: Chronic | Status: ACTIVE | Noted: 2023-06-30

## 2023-07-05 PROBLEM — Z87.898 PERSONAL HISTORY OF OTHER SPECIFIED CONDITIONS: Chronic | Status: ACTIVE | Noted: 2023-06-30

## 2023-07-05 PROBLEM — Z87.09 PERSONAL HISTORY OF OTHER DISEASES OF THE RESPIRATORY SYSTEM: Chronic | Status: ACTIVE | Noted: 2023-06-30

## 2023-07-05 PROBLEM — J44.9 CHRONIC OBSTRUCTIVE PULMONARY DISEASE, UNSPECIFIED: Chronic | Status: ACTIVE | Noted: 2023-06-30

## 2023-07-06 PROBLEM — Z01.818 PREOPERATIVE EXAMINATION: Status: ACTIVE | Noted: 2023-07-06

## 2023-07-06 NOTE — HISTORY OF PRESENT ILLNESS
[FreeTextEntry1] : Ms. Sprague is a 73 year old woman with longstanding history of ICM (LVIDd  3.7 cm, LVEF 29% from prior improved 40-45%) s/p primary prevention dual chamber ICD (2010), CAD c/b MI s/p PCIs (2009 and 2011), AAA, HTN, COPD, emphysema, CVA with residual R sided weakness, pre-DM (Ha1c 6.2%), anxiety, osteoarthritis and chronic pain on opioids who presents today for follow-up of her cardiomyopathy and cardiac clearance for upcoming endovascular repair of abdominal aortic aneurysm. \par \par Since she initiated care with our clinic in December 2022, has had recurrent hospitalizations for COPD exacerbation and ABD discomfort accompanied by N/V. Admitted to [facility] February 2023 for COPD exacerbation, given elevated D-dimer, ruled out for VTE via CTA and LE Doppler's. Re-hospitalized [facility] March 2023 for N/V and abdominal/chest pain. CT ABD unrevealing for acute process, thought to be viral gastroenteritis with plan for possible outpatient EGD/colonoscopy given unintentional weight loss (approx 15 lbs since December 2022). Was also found to be hypovolemic with orthostatic hypotension. TTE showed decline in LVEF along with segmental WMA, thus L/RHC was pursued revealing nonobstructive CAD, low filling pressure and preserved CO. She was discharged off Farxiga. \par \par Since her last telehealth visit on 6/7 she reports doing well. She's been tolerating escalation of Toprol XL from 50 BID to 75mg BID without issue. Her activity tolerance is unchanged at 1/2 a block, however she's primarily limited by her chronic arthritic pain. She denies limitations due to SOB. She has yet to be contacted by cardiac rehab at [facility], however plans to follow up once she's recovered from her upcoming endovascular AAA repair. Her weight at home is up to 130lbs from 127lbs over the past month. She's been trying to gain some weight after notable unintentional weight loss, with evaluation as above. Her home BP has been ranging 107//70 with HR in 60s. She chronically sleeps with 2 pillows. She had an ICD interrogation on 6/27 which showed normal ICD function with brief NSVT ~2 seconds without shock therapy. She denies lightheadedness/dizziness, CP, palpitations, abdominal distention/pain and LE edema. She's not been hospitalized or presented to the ER for heart failure in the interim. Her bowel and bladder habits are unchanged. She's been taking her medications as prescribed.

## 2023-07-06 NOTE — REASON FOR VISIT
Continue: Refresh Optive (carboxymethylcellulose-glycern): drops: 0.5-0.9% [Cardiac Failure] : cardiac failure [FreeTextEntry1] : INSTRUCTIONS:\par \par 1. On the morning of your procedure DON'T take your MORNING dose of ENTRESTO or your SPIRONOLACTONE. You can resume post procedure once you're eating and drinking normally\par \par 2. Continue taking your METOPROLOL as you're currently taking. Please take the morning of your procedure\par \par 3. You can continue your plavix as you're currently taking. We will discuss with the Dr. oYo.\par \par 4. Follow up with nurse practitioner 3 weeks after your procedure and with Dr. Colon in 3 months

## 2023-07-06 NOTE — CARDIOLOGY SUMMARY
[de-identified] : \par 06/28/23 ECG: SR at 63 bpm, borderline low voltage in precordial leads, iRBBB, nonspecific Tw abnormality. Compared to prior 3/28/23, no significant change.\par 09/27/22 ECG: SR at 71 bpm, BETTE, negative Tw.\par  [de-identified] : \par 03/06/23 TTE: LVIDd 3.7 cm, LVEF 29% (segmental) with VTI 12 cm, concentric LVH (septum 0.9, PWT 1.2 cm), mild DD, normal RV size and function, normal LA, min MR, mild AS, mild-mod TR\par \par 01/11/22 TTE: LVIDd 5.5 cm, LVEF 40-45% (segmental), mod DD, normal RV size and function, LA/RA not well visualized, min MR, mod TR, est RVSP 48 mmHg, trace pericardial effusion\par \par 11/26/21 TTE: LVIDd 5.4 cm, LVEF 20% (regional variation), mild DD, normal RV size and function, normal biatria, min MR, mild-mod TR, est RVSP 37 mmHg\par \par 12/12/08 TTE: LVIDd 4.7 cm, mod segmental LVSD, normal RV size and function, mod MR, est RVSP 34 mmHg\par  [de-identified] : \par 03/10/23 LHC: LM mild atherosclerosis, oLAD 40% stenosis, LAD mild atherosclerosis, pCx 50% stenosis, 1st OM 60% ISR, RCA minor luminal irregularities\par \par 03/10/23 RHC: AO 95/60 (75), HR 93, RA 0, PA 25/9/14, PCWP 0, AO 98%, PA 70%, CO/CI (F) 4.83/2.96, SVR 1241 dsc, PVR 2.9 swanson\par \par 06/18/12 LHC: 30% pLAD, 45% OM1 at site of prior stent, otherwise minor luminal irregularities of LM and RCA\par

## 2023-07-06 NOTE — PHYSICAL EXAM
[No Xanthelasma] : no xanthelasma [Normal Radial B/L] : normal radial B/L [Normal] : alert and oriented, normal memory [Normal Venous Pressure] : normal venous pressure [No Rash] : no rash [de-identified] : no perioral cyanosis, MMM [de-identified] : JVP 6 cm H2O, no HJR [de-identified] : warm peripherally

## 2023-07-06 NOTE — END OF VISIT
[Time Spent: ___ minutes] : I have spent [unfilled] minutes of time on the encounter. [FreeTextEntry3] : I, Dr. Colon, personally performed the evaluation and management (E/M) services for this established patient who presents today with (a) new problem(s)/exacerbation of (an) existing condition(s).  That E/M includes conducting the examination, assessing all new/exacerbated conditions, and establishing a new plan of care.  Today, my SORAIDA, Vibease, was here to observe my evaluation and management services for this new problem/exacerbated condition to be followed going forward.

## 2023-07-06 NOTE — DISCUSSION/SUMMARY
[Patient] : the patient [EKG obtained to assist in diagnosis and management of assessed problem(s)] : EKG obtained to assist in diagnosis and management of assessed problem(s) [FreeTextEntry1] : 73 year old AA woman with a longstanding ICM (LVIDd 3.7 cm, LVEF 29% from prior improved 40-45%) s/p dual chamber ICD, CAD s/p PCIs, CVA with residual R sided weakness and chronic pain on opioids who is doing well from a HF perspective. She is ACC/AHA Stage C, NYHA Class III (limited by arthritis), euvolemic and well compensated, tolerating escalation of GDMT. Most recent labs from 5/11 show normal electrolytes and renal funciton. She's stable from a heart failure perspective to undergo scheduled endovascular repair of abdominal aortic aneurysm. I have recommended the following: \par \par 1. Chronic systolic HF - Stable and well compensated. Continue Entresto 49-51 mg BID, Toprol XL 75 mg BID. Deferring SGLT2-i at this time as recent documented RA and PCWP of 0 and recurrent GI issues. Encouraged to increase fluid intake, up to 2L daily. Previously referred to cardiac rehab at [facility] but not yet contacted, will reach out once she's recovered post endovascular procedure.\par \par 2. CAD/ICM - Underwent PCI in 2009 and 2011. No signs or symptoms of ischemia. Continue Plavix, Statin, BB, ARNI and MRA. Reportedly off ASA due to significant bruising. Defer to vascular surgery regarding holding plavix for endovascular AAA repair; can be held if needed.\par \par 3. Hypertension - Well controlled on current therapies.\par \par 4. AAA - Follows with Dr. Lofton. CT ABD w/ IV contrast 3/17/23 with reported stable 4.6 x 4.3 (from 4.1 x 3.6 in 2020) infrarenal AAA containing large amount of mural thrombus. She's scheduled for endovascular repair of 4.8cm abdominal aortic aneurysm with Dr. Yoo.\par \par 5. Recent gastroenteritis - On famotidine and sucralfate. Given accompanying unintentional weight loss of approx 15 lbs, had discussed possible EGD/colonoscopy with medicine team during recent hospitalization. Following GI, Dr. Paniagua and was told no current need for EGD/colonoscopy. Weight gradually uptrending recently and no further N/V. Unrelated to HF given low filling pressures and normal CO.\par \par 6. COPD/emphysema - Followed by Pulm, Dr. Randolph. Pulmonary clearance to be given by Dr. Randolph for upcoming procedure.\par \par 7. RHCM - Will follow up referral to geriatrics, Dr. Garcia's office.\par \par 8. Pre-operative assessment - She is a moderate risk for this moderate risk procedure, but can proceed without additional testing as she has had recent LHC/RHC without obstructive CAD and normal CO with low filling pressures. She should hold her morning dose of Entresto and spironolactone on the day of the EVAR, but she SHOULD take her usual Toprol XL. Resume her usual Entresto and spironolactone when tolerating normal oral intake. Please contact the EP service per routine to deactivate shock therapies pre-operatively if cautery will be utilized (and to turn therapies back on post-op). Consider obtaining pulmonary clearance, if indicated.\par \par 9. Follow up with nurse practitioner 3 weeks after your procedure and with Dr. Colon in 3 months.

## 2023-07-11 ENCOUNTER — TRANSCRIPTION ENCOUNTER (OUTPATIENT)
Age: 73
End: 2023-07-11

## 2023-07-11 NOTE — ASU PATIENT PROFILE, ADULT - NSICDXPASTSURGICALHX_GEN_ALL_CORE_FT
PAST SURGICAL HISTORY:  Cataract Surgery     H/O pneumothorax     Hand Surgery     History of implantable cardiac defibrillator (ICD)     Mass, ovarian     Stented coronary artery

## 2023-07-11 NOTE — ASU PATIENT PROFILE, ADULT - NSICDXPASTMEDICALHX_GEN_ALL_CORE_FT
PAST MEDICAL HISTORY:  Anxiety     CAD (coronary artery disease)     Cardiac defibrillator in place     Congestive heart failure     COPD (chronic obstructive pulmonary disease)     COPD (chronic obstructive pulmonary disease)     COVID-19 vaccine series completed     CVA (cerebral vascular accident) X2, right sided weakness     H/O emphysema     H/O pneumothorax     History of prediabetes     Hypercholesteremia     Insomnia     Keloid scar     Osteoporosis     Pacemaker     Rheumatoid arthritis     Ventricular arrhythmia

## 2023-07-11 NOTE — ASU PATIENT PROFILE, ADULT - FALL HARM RISK - HARM RISK INTERVENTIONS

## 2023-07-12 ENCOUNTER — APPOINTMENT (OUTPATIENT)
Dept: VASCULAR SURGERY | Facility: HOSPITAL | Age: 73
End: 2023-07-12

## 2023-07-12 ENCOUNTER — TRANSCRIPTION ENCOUNTER (OUTPATIENT)
Age: 73
End: 2023-07-12

## 2023-07-12 ENCOUNTER — INPATIENT (INPATIENT)
Facility: HOSPITAL | Age: 73
LOS: 0 days | Discharge: HOME | End: 2023-07-13
Attending: SURGERY | Admitting: SURGERY
Payer: MEDICARE

## 2023-07-12 VITALS
SYSTOLIC BLOOD PRESSURE: 105 MMHG | OXYGEN SATURATION: 99 % | HEIGHT: 64 IN | RESPIRATION RATE: 16 BRPM | HEART RATE: 71 BPM | TEMPERATURE: 98 F | DIASTOLIC BLOOD PRESSURE: 70 MMHG | WEIGHT: 132.06 LBS

## 2023-07-12 DIAGNOSIS — I71.40 ABDOMINAL AORTIC ANEURYSM, WITHOUT RUPTURE, UNSPECIFIED: ICD-10-CM

## 2023-07-12 DIAGNOSIS — Z95.5 PRESENCE OF CORONARY ANGIOPLASTY IMPLANT AND GRAFT: Chronic | ICD-10-CM

## 2023-07-12 DIAGNOSIS — N83.8 OTHER NONINFLAMMATORY DISORDERS OF OVARY, FALLOPIAN TUBE AND BROAD LIGAMENT: Chronic | ICD-10-CM

## 2023-07-12 DIAGNOSIS — Z87.09 PERSONAL HISTORY OF OTHER DISEASES OF THE RESPIRATORY SYSTEM: Chronic | ICD-10-CM

## 2023-07-12 DIAGNOSIS — Z95.810 PRESENCE OF AUTOMATIC (IMPLANTABLE) CARDIAC DEFIBRILLATOR: Chronic | ICD-10-CM

## 2023-07-12 LAB
ANION GAP SERPL CALC-SCNC: 14 MMOL/L — SIGNIFICANT CHANGE UP (ref 7–14)
BUN SERPL-MCNC: 26 MG/DL — HIGH (ref 7–23)
CALCIUM SERPL-MCNC: 9.3 MG/DL — SIGNIFICANT CHANGE UP (ref 8.4–10.5)
CHLORIDE SERPL-SCNC: 102 MMOL/L — SIGNIFICANT CHANGE UP (ref 98–107)
CO2 SERPL-SCNC: 18 MMOL/L — LOW (ref 22–31)
CREAT SERPL-MCNC: 1.31 MG/DL — HIGH (ref 0.5–1.3)
EGFR: 42 ML/MIN/1.73M2 — LOW
GAS PNL BLDA: SIGNIFICANT CHANGE UP
GAS PNL BLDA: SIGNIFICANT CHANGE UP
GLUCOSE SERPL-MCNC: 141 MG/DL — HIGH (ref 70–99)
HCT VFR BLD CALC: 32.4 % — LOW (ref 34.5–45)
HGB BLD-MCNC: 10.3 G/DL — LOW (ref 11.5–15.5)
MAGNESIUM SERPL-MCNC: 1.8 MG/DL — SIGNIFICANT CHANGE UP (ref 1.6–2.6)
MCHC RBC-ENTMCNC: 31.8 GM/DL — LOW (ref 32–36)
MCHC RBC-ENTMCNC: 32.1 PG — SIGNIFICANT CHANGE UP (ref 27–34)
MCV RBC AUTO: 100.9 FL — HIGH (ref 80–100)
NRBC # BLD: 0 /100 WBCS — SIGNIFICANT CHANGE UP (ref 0–0)
NRBC # FLD: 0 K/UL — SIGNIFICANT CHANGE UP (ref 0–0)
PHOSPHATE SERPL-MCNC: 5.2 MG/DL — HIGH (ref 2.5–4.5)
PLATELET # BLD AUTO: 166 K/UL — SIGNIFICANT CHANGE UP (ref 150–400)
POTASSIUM SERPL-MCNC: 5 MMOL/L — SIGNIFICANT CHANGE UP (ref 3.5–5.3)
POTASSIUM SERPL-SCNC: 5 MMOL/L — SIGNIFICANT CHANGE UP (ref 3.5–5.3)
RBC # BLD: 3.21 M/UL — LOW (ref 3.8–5.2)
RBC # FLD: 14.5 % — SIGNIFICANT CHANGE UP (ref 10.3–14.5)
SODIUM SERPL-SCNC: 134 MMOL/L — LOW (ref 135–145)
WBC # BLD: 7.68 K/UL — SIGNIFICANT CHANGE UP (ref 3.8–10.5)
WBC # FLD AUTO: 7.68 K/UL — SIGNIFICANT CHANGE UP (ref 3.8–10.5)

## 2023-07-12 PROCEDURE — 99222 1ST HOSP IP/OBS MODERATE 55: CPT | Mod: 57

## 2023-07-12 PROCEDURE — 34705 EVAC RPR A-BIILIAC NDGFT: CPT

## 2023-07-12 PROCEDURE — 34713 PERQ ACCESS & CLSR FEM ART: CPT | Mod: 50

## 2023-07-12 DEVICE — DRYSEAL FLEX INTRO SHEATH 12FR 33CM: Type: IMPLANTABLE DEVICE | Status: FUNCTIONAL

## 2023-07-12 DEVICE — IMPLANTABLE DEVICE: Type: IMPLANTABLE DEVICE | Status: FUNCTIONAL

## 2023-07-12 DEVICE — BLLN MUSTANG 10X40MMX75CM: Type: IMPLANTABLE DEVICE | Status: FUNCTIONAL

## 2023-07-12 DEVICE — GUIDEWIRE RADIFOCUS GLIDEWIRE STANDARD ANGLED TIP 0.035" X 260CM: Type: IMPLANTABLE DEVICE | Status: FUNCTIONAL

## 2023-07-12 DEVICE — CATH SIZING PIGTAIL 5FR X 70CM: Type: IMPLANTABLE DEVICE | Status: FUNCTIONAL

## 2023-07-12 DEVICE — SHEATH INTRODUCER TERUMO PINNACLE CORONARY 6FR X 10CM X 0.038" MINI WIRE: Type: IMPLANTABLE DEVICE | Status: FUNCTIONAL

## 2023-07-12 DEVICE — CATH ANGIO GLIDECATH ANGLE TAPER 5FR X 65CM: Type: IMPLANTABLE DEVICE | Status: FUNCTIONAL

## 2023-07-12 DEVICE — SHEATH INTRODUCER TERUMO PINNACLE PERIPHERAL 8FR X 10CM X 0.035" MINI WIRE: Type: IMPLANTABLE DEVICE | Status: FUNCTIONAL

## 2023-07-12 DEVICE — GUIDEWIRE LUNDERQUIST EXTRA-STIFF CURVED 0.035" X 260CM: Type: IMPLANTABLE DEVICE | Status: FUNCTIONAL

## 2023-07-12 DEVICE — GWIRE ANGL .035X180 STD: Type: IMPLANTABLE DEVICE | Status: FUNCTIONAL

## 2023-07-12 DEVICE — CATH BALLOON CODA 0.035" X 9FR X 120CM: Type: IMPLANTABLE DEVICE | Status: FUNCTIONAL

## 2023-07-12 DEVICE — SUT PERCLOSE PROGLIDE 6FR: Type: IMPLANTABLE DEVICE | Status: FUNCTIONAL

## 2023-07-12 DEVICE — GUIDEWIRE GLIDEWIRE ANGLED TIP 0.035" X 180CM STIFF: Type: IMPLANTABLE DEVICE | Status: FUNCTIONAL

## 2023-07-12 DEVICE — GWIRE VASC ENTRY MINISTICK MAX 4FRX10CM: Type: IMPLANTABLE DEVICE | Status: FUNCTIONAL

## 2023-07-12 DEVICE — CATH OMNI FLSH 0.035IN 5FRX65: Type: IMPLANTABLE DEVICE | Status: FUNCTIONAL

## 2023-07-12 DEVICE — SHEATH INTRODUCER TERUMO PINNACLE PERIPHERAL 11FR X 10CM X 0.035" MINI WIRE: Type: IMPLANTABLE DEVICE | Status: FUNCTIONAL

## 2023-07-12 DEVICE — SHEATH INTRO DRYSEAL FLEX 14FR 33CM: Type: IMPLANTABLE DEVICE | Status: FUNCTIONAL

## 2023-07-12 DEVICE — STENT VASC GRAFT ENDURANT II CONTRA LIMB 16X16X93MM: Type: IMPLANTABLE DEVICE | Status: FUNCTIONAL

## 2023-07-12 RX ORDER — HYDROMORPHONE HYDROCHLORIDE 2 MG/ML
0.5 INJECTION INTRAMUSCULAR; INTRAVENOUS; SUBCUTANEOUS
Refills: 0 | Status: DISCONTINUED | OUTPATIENT
Start: 2023-07-12 | End: 2023-07-12

## 2023-07-12 RX ORDER — HEPARIN SODIUM 5000 [USP'U]/ML
5000 INJECTION INTRAVENOUS; SUBCUTANEOUS EVERY 8 HOURS
Refills: 0 | Status: DISCONTINUED | OUTPATIENT
Start: 2023-07-13 | End: 2023-07-13

## 2023-07-12 RX ORDER — SUCRALFATE 1 G
1 TABLET ORAL ONCE
Refills: 0 | Status: COMPLETED | OUTPATIENT
Start: 2023-07-12 | End: 2023-07-12

## 2023-07-12 RX ORDER — ACETAMINOPHEN 500 MG
1000 TABLET ORAL EVERY 6 HOURS
Refills: 0 | Status: DISCONTINUED | OUTPATIENT
Start: 2023-07-12 | End: 2023-07-12

## 2023-07-12 RX ORDER — ALBUTEROL 90 UG/1
2 AEROSOL, METERED ORAL EVERY 6 HOURS
Refills: 0 | Status: DISCONTINUED | OUTPATIENT
Start: 2023-07-12 | End: 2023-07-13

## 2023-07-12 RX ORDER — ZOLPIDEM TARTRATE 10 MG/1
5 TABLET ORAL AT BEDTIME
Refills: 0 | Status: DISCONTINUED | OUTPATIENT
Start: 2023-07-12 | End: 2023-07-13

## 2023-07-12 RX ORDER — NITROFURANTOIN MACROCRYSTAL 50 MG
1 CAPSULE ORAL
Refills: 0 | DISCHARGE

## 2023-07-12 RX ORDER — SODIUM CHLORIDE 9 MG/ML
500 INJECTION, SOLUTION INTRAVENOUS ONCE
Refills: 0 | Status: COMPLETED | OUTPATIENT
Start: 2023-07-12 | End: 2023-07-12

## 2023-07-12 RX ORDER — BUDESONIDE AND FORMOTEROL FUMARATE DIHYDRATE 160; 4.5 UG/1; UG/1
2 AEROSOL RESPIRATORY (INHALATION)
Refills: 0 | Status: DISCONTINUED | OUTPATIENT
Start: 2023-07-12 | End: 2023-07-13

## 2023-07-12 RX ORDER — ZOLPIDEM TARTRATE 10 MG/1
1 TABLET ORAL
Refills: 0 | DISCHARGE

## 2023-07-12 RX ORDER — METOPROLOL TARTRATE 50 MG
1 TABLET ORAL
Refills: 0 | DISCHARGE

## 2023-07-12 RX ORDER — ATORVASTATIN CALCIUM 80 MG/1
80 TABLET, FILM COATED ORAL AT BEDTIME
Refills: 0 | Status: DISCONTINUED | OUTPATIENT
Start: 2023-07-12 | End: 2023-07-13

## 2023-07-12 RX ORDER — FLUTICASONE FUROATE AND VILANTEROL TRIFENATATE 100; 25 UG/1; UG/1
1 POWDER RESPIRATORY (INHALATION)
Qty: 0 | Refills: 0 | DISCHARGE

## 2023-07-12 RX ORDER — PANTOPRAZOLE SODIUM 20 MG/1
40 TABLET, DELAYED RELEASE ORAL
Refills: 0 | Status: DISCONTINUED | OUTPATIENT
Start: 2023-07-12 | End: 2023-07-13

## 2023-07-12 RX ORDER — CYPROHEPTADINE HYDROCHLORIDE 4 MG/1
1 TABLET ORAL
Refills: 0 | DISCHARGE

## 2023-07-12 RX ORDER — SODIUM CHLORIDE 9 MG/ML
1000 INJECTION, SOLUTION INTRAVENOUS
Refills: 0 | Status: DISCONTINUED | OUTPATIENT
Start: 2023-07-12 | End: 2023-07-12

## 2023-07-12 RX ORDER — ALPRAZOLAM 0.25 MG
1 TABLET ORAL
Refills: 0 | DISCHARGE

## 2023-07-12 RX ORDER — SACUBITRIL AND VALSARTAN 24; 26 MG/1; MG/1
1 TABLET, FILM COATED ORAL
Refills: 0 | DISCHARGE

## 2023-07-12 RX ORDER — ACETAMINOPHEN 500 MG
1000 TABLET ORAL EVERY 6 HOURS
Refills: 0 | Status: DISCONTINUED | OUTPATIENT
Start: 2023-07-12 | End: 2023-07-13

## 2023-07-12 RX ORDER — HYDROMORPHONE HYDROCHLORIDE 2 MG/ML
1 INJECTION INTRAMUSCULAR; INTRAVENOUS; SUBCUTANEOUS
Refills: 0 | Status: DISCONTINUED | OUTPATIENT
Start: 2023-07-12 | End: 2023-07-12

## 2023-07-12 RX ORDER — UMECLIDINIUM 62.5 UG/1
1 AEROSOL, POWDER ORAL
Qty: 0 | Refills: 0 | DISCHARGE

## 2023-07-12 RX ORDER — CLOPIDOGREL BISULFATE 75 MG/1
1 TABLET, FILM COATED ORAL
Refills: 0 | DISCHARGE

## 2023-07-12 RX ORDER — ALBUTEROL 90 UG/1
1 AEROSOL, METERED ORAL
Refills: 0 | DISCHARGE

## 2023-07-12 RX ORDER — ALPRAZOLAM 0.25 MG
0.25 TABLET ORAL ONCE
Refills: 0 | Status: DISCONTINUED | OUTPATIENT
Start: 2023-07-12 | End: 2023-07-13

## 2023-07-12 RX ORDER — ONDANSETRON 8 MG/1
4 TABLET, FILM COATED ORAL ONCE
Refills: 0 | Status: DISCONTINUED | OUTPATIENT
Start: 2023-07-12 | End: 2023-07-12

## 2023-07-12 RX ORDER — OXYCODONE HYDROCHLORIDE 5 MG/1
10 TABLET ORAL EVERY 4 HOURS
Refills: 0 | Status: DISCONTINUED | OUTPATIENT
Start: 2023-07-12 | End: 2023-07-13

## 2023-07-12 RX ORDER — ROSUVASTATIN CALCIUM 5 MG/1
1 TABLET ORAL
Qty: 0 | Refills: 0 | DISCHARGE

## 2023-07-12 RX ORDER — OXYCODONE HYDROCHLORIDE 5 MG/1
5 TABLET ORAL EVERY 4 HOURS
Refills: 0 | Status: DISCONTINUED | OUTPATIENT
Start: 2023-07-12 | End: 2023-07-13

## 2023-07-12 RX ORDER — FAMOTIDINE 10 MG/ML
5 INJECTION INTRAVENOUS
Refills: 0 | DISCHARGE

## 2023-07-12 RX ORDER — SODIUM CHLORIDE 9 MG/ML
1000 INJECTION, SOLUTION INTRAVENOUS
Refills: 0 | Status: DISCONTINUED | OUTPATIENT
Start: 2023-07-12 | End: 2023-07-13

## 2023-07-12 RX ADMIN — SODIUM CHLORIDE 75 MILLILITER(S): 9 INJECTION, SOLUTION INTRAVENOUS at 21:44

## 2023-07-12 RX ADMIN — Medication 1 GRAM(S): at 21:45

## 2023-07-12 RX ADMIN — ALBUTEROL 2 PUFF(S): 90 AEROSOL, METERED ORAL at 22:09

## 2023-07-12 RX ADMIN — SODIUM CHLORIDE 500 MILLILITER(S): 9 INJECTION, SOLUTION INTRAVENOUS at 14:03

## 2023-07-12 RX ADMIN — HYDROMORPHONE HYDROCHLORIDE 0.5 MILLIGRAM(S): 2 INJECTION INTRAMUSCULAR; INTRAVENOUS; SUBCUTANEOUS at 13:04

## 2023-07-12 RX ADMIN — BUDESONIDE AND FORMOTEROL FUMARATE DIHYDRATE 2 PUFF(S): 160; 4.5 AEROSOL RESPIRATORY (INHALATION) at 22:42

## 2023-07-12 RX ADMIN — SODIUM CHLORIDE 75 MILLILITER(S): 9 INJECTION, SOLUTION INTRAVENOUS at 13:05

## 2023-07-12 RX ADMIN — HYDROMORPHONE HYDROCHLORIDE 0.5 MILLIGRAM(S): 2 INJECTION INTRAMUSCULAR; INTRAVENOUS; SUBCUTANEOUS at 13:45

## 2023-07-12 RX ADMIN — Medication 1000 MILLIGRAM(S): at 21:44

## 2023-07-12 RX ADMIN — ATORVASTATIN CALCIUM 80 MILLIGRAM(S): 80 TABLET, FILM COATED ORAL at 22:08

## 2023-07-12 RX ADMIN — Medication 1000 MILLIGRAM(S): at 22:39

## 2023-07-12 NOTE — ASU PREOP CHECKLIST - 1.
Pt is Jehovah witness, blood refusal form in chart Pt is Jehovah witness, blood refusal form in chart. Surgeon and Anesthesia notified and aware. Pt is Jehovah witness. Agreeable to blood if life threatening. Surgeon and Anesthesia notified and aware. Pt is Jehovah witness. Agreeable to blood if life threatening. Surgeon and Anesthesia notified and aware. See H&P note from MD Yoo.

## 2023-07-12 NOTE — CHART NOTE - NSCHARTNOTEFT_GEN_A_CORE
Post Operative Note  Patient: SHAE HANEY 73y (1950) Female   MRN: [id number]  Location: [facility] 02  Visit: 07-12-23 Inpatient  Date: 07-12-23 @ 15:55    Procedure: S/P endovascular repair of infrarenal AAA    Subjective: Patient seen and examined post operatively. Reports pain as controlled. Denies nausea, vomiting, fever, chills, chest pain, SOB, cough.      Objective:  Vitals: T(F): 97.4 (07-12-23 @ 15:00), Max: 97.5 (07-12-23 @ 06:49)  HR: 63 (07-12-23 @ 15:30)  BP: 98/56 (07-12-23 @ 15:30) (98/56 - 123/59)  RR: 19 (07-12-23 @ 15:30)  SpO2: 99% (07-12-23 @ 15:30)  Vent Settings:     In:   07-12-23 @ 07:01  -  07-12-23 @ 15:55  --------------------------------------------------------  IN: 1045 mL      IV Fluids: lactated ringers. 1000 milliLiter(s) (75 mL/Hr) IV Continuous <Continuous>      Out:   07-12-23 @ 07:01  -  07-12-23 @ 15:55  --------------------------------------------------------  OUT: 130 mL      EBL:     Voided Urine:   07-12-23 @ 07:01  -  07-12-23 @ 15:55  --------------------------------------------------------  OUT: 130 mL      Physical Examination:  General: NAD, resting comfortably in bed  HEENT: Normocephalic atraumatic, + RIJ CVC  Respiratory: Nonlabored respirations, normal CW expansion.  Cardio: S1S2, regular rate and rhythm.  Abdomen: soft, nontender, nondistended  Vascular: extremities are warm and well perfused, doppler DP b/l    Assessment:  73 year old female with PMH infrarenal AAA, CVA, CAD s/p MI, stents x 2 2009/2011, ischemic cardiomyopathy, HFrEF 29% (last TTE 3/6/23), HTN, ventricular arrhythmia s/p ICD PPM 2010 2019, COPD, emphysema, rheumatoid arthritis, Nondenominational presents s/p endovascular repair of infrarenal AAA on 7/12    Plan  - Diet: clear liquid, advance as tolerated  - LR @75 cc/hour  - Pain control  - Restarted metoprolol and pantoprazole  - Hold plavix  - DC brunson at midnight, f/u TOV  - DVT prophylaxis  - Plan to dc RIJ CVC before transfer to surgical floors    Vascular surgery [id number]    Date/Time: 07-12-23 @ 15:55

## 2023-07-12 NOTE — BRIEF OPERATIVE NOTE - NSICDXBRIEFPROCEDURE_GEN_ALL_CORE_FT
PROCEDURES:  Repair, AAA, endovascular, using stent graft to include iliac and femoral artery if indicated 12-Jul-2023 12:48:04  Eldon Menjivar

## 2023-07-12 NOTE — BRIEF OPERATIVE NOTE - NSICDXBRIEFPREOP_GEN_ALL_CORE_FT
PRE-OP DIAGNOSIS:  Infrarenal abdominal aortic aneurysm (AAA) without rupture 12-Jul-2023 12:48:22  Eldon Menjivar

## 2023-07-12 NOTE — BRIEF OPERATIVE NOTE - NSICDXBRIEFPOSTOP_GEN_ALL_CORE_FT
POST-OP DIAGNOSIS:  Infrarenal abdominal aortic aneurysm (AAA) without rupture 12-Jul-2023 12:49:01  Eldon Menjivar

## 2023-07-13 ENCOUNTER — TRANSCRIPTION ENCOUNTER (OUTPATIENT)
Age: 73
End: 2023-07-13

## 2023-07-13 VITALS
SYSTOLIC BLOOD PRESSURE: 103 MMHG | DIASTOLIC BLOOD PRESSURE: 60 MMHG | OXYGEN SATURATION: 100 % | TEMPERATURE: 98 F | RESPIRATION RATE: 15 BRPM | HEART RATE: 70 BPM

## 2023-07-13 LAB
ANION GAP SERPL CALC-SCNC: 15 MMOL/L — HIGH (ref 7–14)
BUN SERPL-MCNC: 20 MG/DL — SIGNIFICANT CHANGE UP (ref 7–23)
CALCIUM SERPL-MCNC: 9.7 MG/DL — SIGNIFICANT CHANGE UP (ref 8.4–10.5)
CHLORIDE SERPL-SCNC: 103 MMOL/L — SIGNIFICANT CHANGE UP (ref 98–107)
CO2 SERPL-SCNC: 20 MMOL/L — LOW (ref 22–31)
CREAT SERPL-MCNC: 1.12 MG/DL — SIGNIFICANT CHANGE UP (ref 0.5–1.3)
EGFR: 51 ML/MIN/1.73M2 — LOW
GLUCOSE SERPL-MCNC: 158 MG/DL — HIGH (ref 70–99)
HCT VFR BLD CALC: 34.6 % — SIGNIFICANT CHANGE UP (ref 34.5–45)
HGB BLD-MCNC: 10.9 G/DL — LOW (ref 11.5–15.5)
MAGNESIUM SERPL-MCNC: 1.9 MG/DL — SIGNIFICANT CHANGE UP (ref 1.6–2.6)
MCHC RBC-ENTMCNC: 31.5 GM/DL — LOW (ref 32–36)
MCHC RBC-ENTMCNC: 31.9 PG — SIGNIFICANT CHANGE UP (ref 27–34)
MCV RBC AUTO: 101.2 FL — HIGH (ref 80–100)
NRBC # BLD: 0 /100 WBCS — SIGNIFICANT CHANGE UP (ref 0–0)
NRBC # FLD: 0 K/UL — SIGNIFICANT CHANGE UP (ref 0–0)
PHOSPHATE SERPL-MCNC: 3.2 MG/DL — SIGNIFICANT CHANGE UP (ref 2.5–4.5)
PLATELET # BLD AUTO: 176 K/UL — SIGNIFICANT CHANGE UP (ref 150–400)
POTASSIUM SERPL-MCNC: 4.5 MMOL/L — SIGNIFICANT CHANGE UP (ref 3.5–5.3)
POTASSIUM SERPL-SCNC: 4.5 MMOL/L — SIGNIFICANT CHANGE UP (ref 3.5–5.3)
RBC # BLD: 3.42 M/UL — LOW (ref 3.8–5.2)
RBC # FLD: 14.6 % — HIGH (ref 10.3–14.5)
SODIUM SERPL-SCNC: 138 MMOL/L — SIGNIFICANT CHANGE UP (ref 135–145)
WBC # BLD: 13.27 K/UL — HIGH (ref 3.8–10.5)
WBC # FLD AUTO: 13.27 K/UL — HIGH (ref 3.8–10.5)

## 2023-07-13 RX ORDER — OXYCODONE HYDROCHLORIDE 5 MG/1
1 TABLET ORAL
Refills: 0 | DISCHARGE

## 2023-07-13 RX ORDER — ACETAMINOPHEN 500 MG
2 TABLET ORAL
Qty: 0 | Refills: 0 | DISCHARGE
Start: 2023-07-13

## 2023-07-13 RX ORDER — CLOPIDOGREL BISULFATE 75 MG/1
75 TABLET, FILM COATED ORAL DAILY
Refills: 0 | Status: DISCONTINUED | OUTPATIENT
Start: 2023-07-13 | End: 2023-07-13

## 2023-07-13 RX ORDER — PANTOPRAZOLE SODIUM 20 MG/1
1 TABLET, DELAYED RELEASE ORAL
Qty: 0 | Refills: 0 | DISCHARGE
Start: 2023-07-13

## 2023-07-13 RX ADMIN — CLOPIDOGREL BISULFATE 75 MILLIGRAM(S): 75 TABLET, FILM COATED ORAL at 14:21

## 2023-07-13 RX ADMIN — SODIUM CHLORIDE 500 MILLILITER(S): 9 INJECTION, SOLUTION INTRAVENOUS at 00:02

## 2023-07-13 RX ADMIN — BUDESONIDE AND FORMOTEROL FUMARATE DIHYDRATE 2 PUFF(S): 160; 4.5 AEROSOL RESPIRATORY (INHALATION) at 09:40

## 2023-07-13 RX ADMIN — PANTOPRAZOLE SODIUM 40 MILLIGRAM(S): 20 TABLET, DELAYED RELEASE ORAL at 06:15

## 2023-07-13 RX ADMIN — Medication 1000 MILLIGRAM(S): at 14:18

## 2023-07-13 RX ADMIN — ZOLPIDEM TARTRATE 5 MILLIGRAM(S): 10 TABLET ORAL at 02:59

## 2023-07-13 RX ADMIN — Medication 1000 MILLIGRAM(S): at 02:59

## 2023-07-13 NOTE — PROVIDER CONTACT NOTE (OTHER) - BACKGROUND
pt preop for aortic aneurysm repair
74 y/o female  PMH of AAA, CVA, CAD s/p MI, stents x2  2009, 2011, ischemic cardiomyopathy, chronic systolic heart failure ventricular arrhythmia s/p ICD/PPM 2010, s/p AAA repair

## 2023-07-13 NOTE — PROGRESS NOTE ADULT - SUBJECTIVE AND OBJECTIVE BOX
[facility] Daily Progress Note  =====================================================    SUBJECTIVE: Patient seen and examined at bedside on AM rounds. Patient reports that they're feeling well. Denies fever, chills, N/V, chest pain, SOB    ALLERGIES:  lactose (Rash)  No Known Drug Allergies  latex (Unknown)      --------------------------------------------------------------------------------------    MEDICATIONS:    Neurologic Medications  acetaminophen     Tablet .. 1000 milliGRAM(s) Oral every 6 hours  ALPRAZolam 0.25 milliGRAM(s) Oral once PRN anxiety  oxyCODONE    IR 10 milliGRAM(s) Oral every 4 hours PRN Severe Pain (7 - 10)  oxyCODONE    IR 5 milliGRAM(s) Oral every 4 hours PRN Moderate Pain (4 - 6)  zolpidem 5 milliGRAM(s) Oral at bedtime PRN Insomnia    Respiratory Medications  albuterol    90 MICROgram(s) HFA Inhaler 2 Puff(s) Inhalation every 6 hours PRN Shortness of Breath and/or Wheezing  budesonide  80 MICROgram(s)/formoterol 4.5 MICROgram(s) Inhaler 2 Puff(s) Inhalation two times a day    Cardiovascular Medications    Gastrointestinal Medications  aluminum hydroxide/magnesium hydroxide/simethicone Suspension 30 milliLiter(s) Oral every 6 hours PRN Dyspepsia  dextrose 5% + sodium chloride 0.45%. 1000 milliLiter(s) IV Continuous <Continuous>  pantoprazole    Tablet 40 milliGRAM(s) Oral before breakfast    Genitourinary Medications    Hematologic/Oncologic Medications  heparin   Injectable 5000 Unit(s) SubCutaneous every 8 hours    Antimicrobial/Immunologic Medications    Endocrine/Metabolic Medications  atorvastatin 80 milliGRAM(s) Oral at bedtime    Topical/Other Medications    --------------------------------------------------------------------------------------    VITAL SIGNS:  T(C): 36.7 (07-13-23 @ 01:34), Max: 36.7 (07-13-23 @ 01:34)  HR: 60 (07-13-23 @ 01:34) (60 - 75)  BP: 102/59 (07-13-23 @ 01:34) (92/59 - 123/59)  RR: 17 (07-13-23 @ 01:34) (11 - 23)  SpO2: 100% (07-13-23 @ 01:34) (93% - 100%)  --------------------------------------------------------------------------------------    INS AND OUTS:    07-12-23 @ 07:01  -  07-13-23 @ 05:15  --------------------------------------------------------  IN: 2715 mL / OUT: 1430 mL / NET: 1285 mL      --------------------------------------------------------------------------------------      EXAM    General: NAD, resting in bed comfortably.  Cardiac: regular rate, warm and well perfused  Respiratory: Nonlabored respirations, normal cw expansion.  Abdomen: soft, nontender, nondistended.   Vascular:      --------------------------------------------------------------------------------------    LABS                          10.3   7.68  )-----------( 166      ( 12 Jul 2023 18:10 )             32.4     07-12    134<L>  |  102  |  26<H>  ----------------------------<  141<H>  5.0   |  18<L>  |  1.31<H>    Ca    9.3      12 Jul 2023 18:10  Phos  5.2     07-12  Mg     1.80     07-12        Urinalysis Basic - ( 12 Jul 2023 18:10 )    Color: x / Appearance: x / SG: x / pH: x  Gluc: 141 mg/dL / Ketone: x  / Bili: x / Urobili: x   Blood: x / Protein: x / Nitrite: x   Leuk Esterase: x / RBC: x / WBC x   Sq Epi: x / Non Sq Epi: x / Bacteria: x         [facility] Daily Progress Note  =====================================================    SUBJECTIVE: Patient seen and examined at bedside on AM rounds. Patient reports that they're feeling well. Denies fever, chills, N/V, chest pain, SOB    ALLERGIES:  lactose (Rash)  No Known Drug Allergies  latex (Unknown)      --------------------------------------------------------------------------------------    MEDICATIONS:    Neurologic Medications  acetaminophen     Tablet .. 1000 milliGRAM(s) Oral every 6 hours  ALPRAZolam 0.25 milliGRAM(s) Oral once PRN anxiety  oxyCODONE    IR 10 milliGRAM(s) Oral every 4 hours PRN Severe Pain (7 - 10)  oxyCODONE    IR 5 milliGRAM(s) Oral every 4 hours PRN Moderate Pain (4 - 6)  zolpidem 5 milliGRAM(s) Oral at bedtime PRN Insomnia    Respiratory Medications  albuterol    90 MICROgram(s) HFA Inhaler 2 Puff(s) Inhalation every 6 hours PRN Shortness of Breath and/or Wheezing  budesonide  80 MICROgram(s)/formoterol 4.5 MICROgram(s) Inhaler 2 Puff(s) Inhalation two times a day    Cardiovascular Medications    Gastrointestinal Medications  aluminum hydroxide/magnesium hydroxide/simethicone Suspension 30 milliLiter(s) Oral every 6 hours PRN Dyspepsia  dextrose 5% + sodium chloride 0.45%. 1000 milliLiter(s) IV Continuous <Continuous>  pantoprazole    Tablet 40 milliGRAM(s) Oral before breakfast    Genitourinary Medications    Hematologic/Oncologic Medications  heparin   Injectable 5000 Unit(s) SubCutaneous every 8 hours    Antimicrobial/Immunologic Medications    Endocrine/Metabolic Medications  atorvastatin 80 milliGRAM(s) Oral at bedtime    Topical/Other Medications    --------------------------------------------------------------------------------------    VITAL SIGNS:  T(C): 36.7 (07-13-23 @ 01:34), Max: 36.7 (07-13-23 @ 01:34)  HR: 60 (07-13-23 @ 01:34) (60 - 75)  BP: 102/59 (07-13-23 @ 01:34) (92/59 - 123/59)  RR: 17 (07-13-23 @ 01:34) (11 - 23)  SpO2: 100% (07-13-23 @ 01:34) (93% - 100%)  --------------------------------------------------------------------------------------    INS AND OUTS:    07-12-23 @ 07:01  -  07-13-23 @ 05:15  --------------------------------------------------------  IN: 2715 mL / OUT: 1430 mL / NET: 1285 mL      --------------------------------------------------------------------------------------      EXAM      Physical Examination:  General: NAD, resting comfortably in bed  HEENT: Normocephalic atraumatic, + RIJ CVC  Respiratory: Nonlabored respirations, normal CW expansion.  Cardio: S1S2, regular rate and rhythm.  Abdomen: soft, nontender, nondistended  Vascular: extremities are warm and well perfused, doppler DP b/l************      --------------------------------------------------------------------------------------    LABS                          10.3   7.68  )-----------( 166      ( 12 Jul 2023 18:10 )             32.4     07-12    134<L>  |  102  |  26<H>  ----------------------------<  141<H>  5.0   |  18<L>  |  1.31<H>    Ca    9.3      12 Jul 2023 18:10  Phos  5.2     07-12  Mg     1.80     07-12        Urinalysis Basic - ( 12 Jul 2023 18:10 )    Color: x / Appearance: x / SG: x / pH: x  Gluc: 141 mg/dL / Ketone: x  / Bili: x / Urobili: x   Blood: x / Protein: x / Nitrite: x   Leuk Esterase: x / RBC: x / WBC x   Sq Epi: x / Non Sq Epi: x / Bacteria: x         [facility] Daily Progress Note  =====================================================    SUBJECTIVE: Patient seen and examined at bedside on AM rounds. Patient reports that they're feeling well. Denies fever, chills, N/V, chest pain, SOB. Patient reports urine leaking out of the [place] on to her sheets.     ALLERGIES:  lactose (Rash)  No Known Drug Allergies  latex (Unknown)      --------------------------------------------------------------------------------------    MEDICATIONS:    Neurologic Medications  acetaminophen     Tablet .. 1000 milliGRAM(s) Oral every 6 hours  oxyCODONE    IR 10 milliGRAM(s) Oral every 4 hours PRN Severe Pain (7 - 10)  oxyCODONE    IR 5 milliGRAM(s) Oral every 4 hours PRN Moderate Pain (4 - 6)  zolpidem 5 milliGRAM(s) Oral at bedtime PRN Insomnia    Respiratory Medications  albuterol    90 MICROgram(s) HFA Inhaler 2 Puff(s) Inhalation every 6 hours PRN Shortness of Breath and/or Wheezing  budesonide  80 MICROgram(s)/formoterol 4.5 MICROgram(s) Inhaler 2 Puff(s) Inhalation two times a day    Cardiovascular Medications    Gastrointestinal Medications  aluminum hydroxide/magnesium hydroxide/simethicone Suspension 30 milliLiter(s) Oral every 6 hours PRN Dyspepsia  pantoprazole    Tablet 40 milliGRAM(s) Oral before breakfast    Genitourinary Medications    Hematologic/Oncologic Medications  heparin   Injectable 5000 Unit(s) SubCutaneous every 8 hours    Antimicrobial/Immunologic Medications    Endocrine/Metabolic Medications  atorvastatin 80 milliGRAM(s) Oral at bedtime    Topical/Other Medications    --------------------------------------------------------------------------------------    VITAL SIGNS:  T(C): 36.6 (07-13-23 @ 06:13), Max: 36.7 (07-13-23 @ 01:34)  HR: 68 (07-13-23 @ 06:13) (60 - 75)  BP: 103/57 (07-13-23 @ 06:13) (92/59 - 123/59)  RR: 17 (07-13-23 @ 06:13) (11 - 23)  SpO2: 98% (07-13-23 @ 06:13) (93% - 100%)  --------------------------------------------------------------------------------------    INS AND OUTS:    07-12-23 @ 07:01  -  07-13-23 @ 07:00  --------------------------------------------------------  IN: 2715 mL / OUT: 1430 mL / NET: 1285 mL      --------------------------------------------------------------------------------------      EXAM      Physical Examination:  General: NAD, resting comfortably in bed  HEENT: Normocephalic atraumatic, [facility] site c/d/i  Respiratory: Nonlabored respirations, normal CW expansion.  Cardio: S1S2, regular rate and rhythm.  Abdomen: soft, nontender, nondistended  Vascular: extremities are warm and well perfused, doppler DP b/l          LABS                          10.3   7.68  )-----------( 166      ( 12 Jul 2023 18:10 )             32.4     07-12    134<L>  |  102  |  26<H>  ----------------------------<  141<H>  5.0   |  18<L>  |  1.31<H>    Ca    9.3      12 Jul 2023 18:10  Phos  5.2     07-12  Mg     1.80     07-12        Urinalysis Basic - ( 12 Jul 2023 18:10 )    Color: x / Appearance: x / SG: x / pH: x  Gluc: 141 mg/dL / Ketone: x  / Bili: x / Urobili: x   Blood: x / Protein: x / Nitrite: x   Leuk Esterase: x / RBC: x / WBC x   Sq Epi: x / Non Sq Epi: x / Bacteria: x

## 2023-07-13 NOTE — PROVIDER CONTACT NOTE (OTHER) - NAME OF MD/NP/PA/DO NOTIFIED:
Dictation #1  MRN:[id number]  CSN:[id number]  This office note has been dictated.    
MD Yoo and MD Andersen
Blue Guerra

## 2023-07-13 NOTE — DIETITIAN INITIAL EVALUATION ADULT - PERTINENT MEDS FT
MEDICATIONS  (STANDING):  acetaminophen     Tablet .. 1000 milliGRAM(s) Oral every 6 hours  atorvastatin 80 milliGRAM(s) Oral at bedtime  budesonide  80 MICROgram(s)/formoterol 4.5 MICROgram(s) Inhaler 2 Puff(s) Inhalation two times a day  clopidogrel Tablet 75 milliGRAM(s) Oral daily  heparin   Injectable 5000 Unit(s) SubCutaneous every 8 hours  pantoprazole    Tablet 40 milliGRAM(s) Oral before breakfast    MEDICATIONS  (PRN):  albuterol    90 MICROgram(s) HFA Inhaler 2 Puff(s) Inhalation every 6 hours PRN Shortness of Breath and/or Wheezing  aluminum hydroxide/magnesium hydroxide/simethicone Suspension 30 milliLiter(s) Oral every 6 hours PRN Dyspepsia  oxyCODONE    IR 10 milliGRAM(s) Oral every 4 hours PRN Severe Pain (7 - 10)  oxyCODONE    IR 5 milliGRAM(s) Oral every 4 hours PRN Moderate Pain (4 - 6)  zolpidem 5 milliGRAM(s) Oral at bedtime PRN Insomnia

## 2023-07-13 NOTE — PROVIDER CONTACT NOTE (OTHER) - RECOMMENDATIONS
provider at bedside to assess
MD Andersen prefers to obtain IV and CT in OR. Situation escalated to ASU Manager Krysten and to Director Linda. As per management pt OK to go into OR without CT.

## 2023-07-13 NOTE — PATIENT PROFILE ADULT - BILL PAYMENT
- Rest.    - Drink plenty of fluids.  - Viral upper respiratory infections typically run their course in 10-14 days.     You have been diagnosed with Influenza.   You are contagious for 24 hours after you start the [place] or 24 hours after your last fever, whichever happens last.  Please drink plenty of fluids.  Please get plenty of rest.    Tamiflu prescription has been discussed and if prescribed, please take to completion unless you cannot tolerate the side effects.     - Tylenol or Ibuprofen as directed as needed for fever/pain. Avoid tylenol if you have a history of liver disease. Do not take ibuprofen if you have a history of GI bleeding, kidney disease, or if you take blood thinners.     - You can take over-the-counter claritin, zyrtec, allegra, or xyzal as directed. These are antihistamines that can help with runny nose, nasal congestion, sneezing, and helps to dry up post-nasal drip, which usually causes sore throat and cough.   - If you do NOT have high blood pressure, you may use a decongestant form (D)  of this medication (ie. Claritin- D, zyrtec-D, allegra-D) or if you do not take the D form, you can take sudafed (pseudoephedrine) over the counter, which is a decongestant. Do NOT take two decongestant (D) medications at the same time (such as mucinex-D and claritin-D or plain sudafed and claritin D)    - You can use Flonase (fluticasone) nasal spray as directed for sinus congestion and postnasal drip. This is a steroid nasal spray that works locally over time to decrease the inflammation in your nose/sinuses and help with allergic symptoms. This is not an quick- relief spray like afrin, but it works well if used daily.  Discontinue if you develop nose bleed  - use nasal saline prior to Flonase.  - Use Ocean Spray Nasal Saline 1-3 puffs each nostril every 2-3 hours then blow out onto tissue. This is to irrigate the nasal passage way to clear the sinus openings. Use until sinus problem resolved.    - you  Is This A New Presentation, Or A Follow-Up?: Rash can take plain Mucinex (guaifenesin) 1200 mg twice a day to help loosen mucous.     -warm salt water gargles can help with sore throat    - warm tea with honey can help with cough. Honey is a natural cough suppressant.    - Take the tessalon (benzonatate) as needed as prescribed for cough     - you can apply mupirocin ointment to affected area just inside your tragus.     - Follow up with your PCP or specialty clinic as directed in the next 1-2 weeks if not improved or as needed.  You can call [phone] to schedule an appointment with the appropriate provider.      - Go to the ER if you develop new or worsening symptoms.     - You must understand that you have received an Urgent Care treatment only and that you may be released before all of your medical problems are known or treated.   - You, the patient, will arrange for follow up care as instructed.   - If your condition worsens or fails to improve we recommend that you receive another evaluation at the ER immediately or contact your PCP to discuss your concerns or return here.          no

## 2023-07-13 NOTE — DIETITIAN INITIAL EVALUATION ADULT - PATIENT MEETS CRITERIA FOR MALNUTRITION
yes A-T Advancement Flap Text: The defect edges were debeveled with a #15 scalpel blade.  Given the location of the defect, shape of the defect and the proximity to free margins an A-T advancement flap was deemed most appropriate.  Using a sterile surgical marker, an appropriate advancement flap was drawn incorporating the defect and placing the expected incisions within the relaxed skin tension lines where possible.    The area thus outlined was incised deep to adipose tissue with a #15 scalpel blade.  The skin margins were undermined to an appropriate distance in all directions utilizing iris scissors.

## 2023-07-13 NOTE — DISCHARGE NOTE PROVIDER - NSDCFUSCHEDAPPT_GEN_ALL_CORE_FT
Blanka Colon  [facility]  HEARTFAIL [address]  Scheduled Appointment: 07/26/2023    [facility]  HEARTFAIL [address]  Scheduled Appointment: 08/14/2023    [facility]  ELECTROPH [address]  Scheduled Appointment: 09/12/2023    Claire Sanchez  [facility]  CARDIOLOGY [address]  Scheduled Appointment: 09/12/2023    Randall Lofton  [facility]  CARDIOLOGY [address]  Scheduled Appointment: 09/12/2023    Blanka Colon  [facility]  HEARTFAIL [address]  Scheduled Appointment: 10/04/2023

## 2023-07-13 NOTE — DIETITIAN INITIAL EVALUATION ADULT - OTHER INFO
73 year old female with PMH infrarenal AAA, CVA, CAD s/p MI, stents x 2 2009/2011, ischemic cardiomyopathy, HFrEF 29% (last TTE 3/6/23), HTN, ventricular arrhythmia s/p ICD PPM 2010 2019, COPD, emphysema, rheumatoid arthritis, [employer] presents s/p endovascular repair of infrarenal AAA on 7/12.     Patient endorses fair PO intake at present. Nutrition consult requested for MST Score 2 or Greater. Patient reported h/o reduced appetite for which she was receiving Periactin as advised by her internal medicine physician to improve appetite.  Patient reported usual body weight 150 pounds, but indicated that she lost 20 pounds since April due to diminished appetite.  Current weight 131.78 pounds / 59.9kg, indicating a weight loss of 18 pounds / 12.1% x 3 months, which is significant. Patient note amenable to oral supplement. Educated patient on importance of consuming adequate nutrition to deter further weight loss and to maintain overall nutritional status. Denies food allergies, but reported lactose intolerance. No reported chewing/swallowing difficulties. Currently denies nausea, vomiting, diarrhea.                                                    73 year old female with PMH infrarenal AAA, CVA, CAD s/p MI, stents x 2 2009/2011, ischemic cardiomyopathy, HFrEF 29% (last TTE 3/6/23), HTN, ventricular arrhythmia s/p ICD PPM 2010 2019, COPD, emphysema, rheumatoid arthritis, [employer] presents s/p endovascular repair of infrarenal AAA on 7/12.     Patient endorses fair PO intake at present. Nutrition consult requested for MST Score 2 or Greater. Patient reported h/o reduced appetite for which she was receiving Periactin as advised by her internal medicine physician to improve appetite.  Patient reported usual body weight 150 pounds, but indicated that she lost 20 pounds since April due to diminished appetite.  Current weight 131.78 pounds / 59.9kg, indicating a weight loss of 18 pounds / 12.1% x 3 months, which is significant. Patient not amenable to oral supplement i.e Ensure, but was amenable to accepting Orgain Vegan shake. Educated patient on importance of consuming adequate nutrition to deter further weight loss and to maintain overall nutritional status. Denies food allergies, but reported lactose intolerance. No reported chewing/swallowing difficulties. Currently denies nausea, vomiting, diarrhea.                                                    73 year old female with PMH infrarenal AAA, CVA, CAD s/p MI, stents x 2 2009/2011, ischemic cardiomyopathy, HFrEF 29% (last TTE 3/6/23), HTN, ventricular arrhythmia s/p ICD PPM 2010 2019, COPD, emphysema, rheumatoid arthritis, [employer] presents s/p endovascular repair of infrarenal AAA on 7/12.     Patient endorses fair PO intake at present. Nutrition consult requested for MST Score 2 or Greater. Patient reported h/o reduced appetite for which she was receiving Periactin as advised by her internal medicine physician to improve appetite.  Patient reported usual body weight 150 pounds, but indicated that she lost 20 pounds since April due to diminished appetite.  Current weight 131.78 pounds / 59.9kg, indicating a weight loss of 18 pounds / 12.1% x 3 months, which is significant. Patient not amenable to oral supplement i.e Ensure, but was amenable to accepting Orgain Vegan shake. Educated patient on importance of consuming adequate nutrition to deter further weight loss and to maintain overall nutritional status. Per chart, food allergies to cucumber and bananas; also reported lactose intolerance; chart reflective of same. No reported chewing/swallowing difficulties. Currently denies nausea, vomiting, diarrhea.                                                    73 year old female with PMH infrarenal AAA, CVA, CAD s/p MI, stents x 2 2009/2011, ischemic cardiomyopathy, HFrEF 29% (last TTE 3/6/23), HTN, ventricular arrhythmia s/p ICD PPM 2010 2019, COPD, emphysema, rheumatoid arthritis, [employer] presents s/p endovascular repair of infrarenal AAA on 7/12.     Patient endorses fair PO intake at present. Nutrition consult requested for MST Score 2 or Greater. Patient reported h/o reduced appetite for which she was receiving Periactin as advised by her internal medicine physician to improve appetite.  Patient reported usual body weight 150 pounds, but indicated that she lost 20 pounds since April due to diminished appetite.  Current weight 131.78 pounds / 59.9kg, indicating a weight loss of 18 pounds / 12.1% x 3 months, which is significant. Patient not amenable to oral supplement i.e Ensure, but was amenable to accepting Orgain Vegan shake. Educated patient on importance of consuming adequate nutrition to deter further weight loss and to maintain overall nutritional status. No food allergies per chart; reported lactose intolerance; chart reflective of same. No reported chewing/swallowing difficulties. Currently denies nausea, vomiting, diarrhea.

## 2023-07-13 NOTE — DISCHARGE NOTE PROVIDER - NSDCMRMEDTOKEN_GEN_ALL_CORE_FT
acetaminophen 500 mg oral tablet: 2 tab(s) orally every 6 hours  albuterol 1.25 mg/3 mL (0.042%) inhalation solution: 1 unit(s) by nebulizer 4 times a day as needed for  bronchospasm  ALPRAZolam 0.5 mg oral tablet: 1 tab(s) orally 3 times a day PRN  Breo Ellipta 200 mcg-25 mcg/inh inhalation powder: 1 puff(s) inhaled once a day  clopidogrel 75 mg oral tablet: 1 orally once a day  Crestor 20 mg oral tablet: 1 tab(s) orally once a day (at bedtime)  cyproheptadine 4 mg oral tablet: 1 orally once a day  Entresto 49 mg-51 mg oral tablet: 1 orally 2 times a day  famotidine 40 mg/5 mL oral suspension: 5 milliliter(s) orally once a day  Incruse Ellipta 62.5 mcg/inh inhalation powder: 1 puff(s) inhaled every 24 hours  metoprolol succinate 25 mg oral tablet, extended release: 1 orally 2 times a day  metoprolol succinate 50 mg oral capsule, extended release: 1 orally 2 times a day  nitrofurantoin macrocrystals 100 mg oral capsule: 1 orally 2 times a day  pantoprazole 40 mg oral delayed release tablet: 1 tab(s) orally once a day (before a meal)  Pro Air  mcg- 1 -2 puffs every 6 hours as needed:   spironolactone 25 mg oral tablet: 1 tab(s) orally once a day    sucralfate 1 g/10 mL oral suspension: 10 milliliter(s) orally 3 times a day  ventolin  MCG/ACT inhalation - inhale 1-2 puffs every 6 hours as needed:   vitamin B12 po daily:   vitamin C po daily:   vitamin D po daily:   zolpidem 10 mg oral tablet: 1 orally once a day (at bedtime)

## 2023-07-13 NOTE — DIETITIAN INITIAL EVALUATION ADULT - ADD RECOMMEND
1) Monitor weights, PO intake/diet tolerance, skin integrity, pertinent labs.   2) Please consistently document % PO intake in nursing flowsheets to assess adequacy of nutritional intake/monitor need for further nutritional intervention(s).   3) Provide Orgain Vegan Vanilla (220kcal/16gm pro/11 fl oz) daily to help optimize nutritional intake. 1) Monitor weights, PO intake/diet tolerance, skin integrity, pertinent labs.   2) Please consistently document % PO intake in nursing flowsheets to assess adequacy of nutritional intake/monitor need for further nutritional intervention(s).   3) Provide Orgain Vegan Vanilla (220kcal/16gm pro/11 fl oz) daily to help optimize nutritional intake.  4) Consider resuming Periactin as preciously prescribed for appetite stimulation if medically appropriate and/or if not contraindicated. 1) Monitor weights, PO intake/diet tolerance, skin integrity, pertinent labs.   2) Please consistently document % PO intake in nursing flowsheets to assess adequacy of nutritional intake/monitor need for further nutritional intervention(s).   3) Provide Orgain Vegan Vanilla (220kcal/16gm pro/11 fl oz) daily to help optimize nutritional intake.  4) Consider resuming Periactin as previously prescribed for appetite stimulation if medically appropriate and/or if not contraindicated.

## 2023-07-13 NOTE — DISCHARGE NOTE PROVIDER - NSDCCPTREATMENT_GEN_ALL_CORE_FT
PRINCIPAL PROCEDURE  Procedure: Repair, AAA, endovascular, using stent graft to include iliac and femoral artery if indicated  Findings and Treatment:

## 2023-07-13 NOTE — PHYSICAL THERAPY INITIAL EVALUATION ADULT - GENERAL OBSERVATIONS, REHAB EVAL
Pt encountered sitting at edge of bed in no distress. Blood pressure = 132/78, heart rate = 101 bpm, SpO2 = 100%.

## 2023-07-13 NOTE — PATIENT PROFILE ADULT - FUNCTIONAL ASSESSMENT - BASIC MOBILITY 6.
2-calculated by average/Not able to assess (calculate score using [facility] averaging method)

## 2023-07-13 NOTE — DISCHARGE NOTE PROVIDER - NSDCCPCAREPLAN_GEN_ALL_CORE_FT
PRINCIPAL DISCHARGE DIAGNOSIS  Diagnosis: History of abdominal aortic aneurysm (AAA)  Assessment and Plan of Treatment:

## 2023-07-13 NOTE — DISCHARGE NOTE NURSING/CASE MANAGEMENT/SOCIAL WORK - NSPROMEDSRETURNEDYESNO_GEN_A_NUR
patients med was put away from patient and returned upon discharge, patient educate don not taking medications she brought; patient verbalized understanding

## 2023-07-13 NOTE — DISCHARGE NOTE PROVIDER - CARE PROVIDER_API CALL
Bannazadeh, Mohsen  Vascular Surgery  [address], [address], [place] [address]  Phone: [phone]  Fax: [phone]  Follow Up Time: 1 week

## 2023-07-13 NOTE — DISCHARGE NOTE PROVIDER - NSDCFUADDINST_GEN_ALL_CORE_FT
WOUND CARE:  Please keep incisions clean and dry. Please do not Scrub or rub incisions. Do not use lotion or powder on incisions.   BATHING: You may shower and/or sponge bathe. You may use warm soapy water in the shower and rinse, pat dry.  ACTIVITY: No heavy lifting or straining. Otherwise, you may return to your usual level of physical activity. If you are taking narcotic pain medication DO NOT drive a car, operate machinery or make important decisions.  DIET: Return to your usual diet.  NOTIFY YOUR SURGEON IF YOU HAVE: any bleeding that does not stop, any pus draining from your wound(s), any fever (over 100.4 F) persistent nausea/vomiting, or if your pain is not controlled on your discharge pain medications, unable to urinate.  Please follow up with your primary care physician in one week regarding your hospitalization, bring copies of your discharge paperwork.  Please follow up with your surgeon, Dr. Yoo

## 2023-07-13 NOTE — PATIENT PROFILE ADULT - FALL HARM RISK - HARM RISK INTERVENTIONS

## 2023-07-13 NOTE — PHYSICAL THERAPY INITIAL EVALUATION ADULT - ADDITIONAL COMMENTS
Pt states she lives alone in a first floor apartment, no steps to negotiate. Prior to admission pt reports ambulating independently with a cane indoors, rolling walker outdoors, and was independent in ADLs.     Following evaluation, pt was left sitting at edge of bed in no distress, call bell in reach.

## 2023-07-13 NOTE — PROVIDER CONTACT NOTE (OTHER) - SITUATION
during assessment felt a small bump on patients left, upper arm, as per patient it was an old IV site, site is slightly ecchymotic
Unable to obtain CT in ASU. Attempted x3.

## 2023-07-13 NOTE — DIETITIAN INITIAL EVALUATION ADULT - PERTINENT LABORATORY DATA
07-13    138  |  103  |  20  ----------------------------<  158<H>  4.5   |  20<L>  |  1.12    Ca    9.7      13 Jul 2023 08:45  Phos  3.2     07-13  Mg     1.90     07-13    A1C with Estimated Average Glucose Result: 6.4 % (06-30-23 @ 18:45)

## 2023-07-13 NOTE — PROVIDER CONTACT NOTE (OTHER) - ASSESSMENT
Pt A&Ox4, VS stable. Pt states she is an extremely hard stick, needs ultrasound for IV.
small bump on patients left, upper arm, site is slightly ecchymotic  patient in no apparent distress  denies pain, denies SOB, denies chest pain

## 2023-07-13 NOTE — DISCHARGE NOTE PROVIDER - HOSPITAL COURSE
73 year old female with PMH infrarenal AAA, CVA, CAD s/p MI, stents x 2 2009/2011, ischemic cardiomyopathy, HFrEF 29% (last TTE 3/6/23), HTN, ventricular arrhythmia s/p ICD PPM 2010 2019, COPD, emphysema, rheumatoid arthritis, Yazdanism presents s/p endovascular repair of infrarenal AAA on 7/12. Patient tolerated operation well and there were no post-operative complications identified. Patient remained hemodynamically stable in the PACU and transferred to the surgical floor. Diet was restarted and advanced as tolerated. Pain control was transitioned from IV to PO pain meds. At this time, patient is currently ambulating, voiding, tolerating a regular diet. Pain well controlled on PO pain meds. Patient has been deemed stable for discharge home with follow up as an outpatient.

## 2023-07-20 ENCOUNTER — APPOINTMENT (OUTPATIENT)
Dept: VASCULAR SURGERY | Facility: CLINIC | Age: 73
End: 2023-07-20
Payer: MEDICARE

## 2023-07-20 VITALS
SYSTOLIC BLOOD PRESSURE: 93 MMHG | HEART RATE: 66 BPM | WEIGHT: 130 LBS | TEMPERATURE: 98.4 F | DIASTOLIC BLOOD PRESSURE: 65 MMHG | HEIGHT: 64 IN | BODY MASS INDEX: 22.2 KG/M2

## 2023-07-20 PROCEDURE — 93978 VASCULAR STUDY: CPT

## 2023-07-20 PROCEDURE — 99024 POSTOP FOLLOW-UP VISIT: CPT

## 2023-07-25 NOTE — ASSESSMENT
[FreeTextEntry1] : 73-year-old female status post endovascular repair of abdominal aortic aneurysm with Medtronic bifurcated graft\par \par Follow-up in 6 months with CT angiogram

## 2023-07-25 NOTE — PHYSICAL EXAM
[de-identified] : Alert oriented no acute distress [de-identified] : Head and neck within normal limit [de-identified] : Regular rate and rhythm [FreeTextEntry1] : Palpable bilateral radial and femoral pulses

## 2023-07-25 NOTE — HISTORY OF PRESENT ILLNESS
[FreeTextEntry1] : Patient is status post endovascular repair of abdominal aortic aneurysm with Medtronic bifurcated graft.  Doing well.  Complains of left groin lump.  Denies abdominal pain or back pain.

## 2023-07-25 NOTE — DATA REVIEWED
[FreeTextEntry1] : 7/20/2023 duplex examination revealed no evidence of left groin pseudoaneurysm\par No evidence of endoleak in the EVAR

## 2023-07-26 ENCOUNTER — APPOINTMENT (OUTPATIENT)
Dept: HEART FAILURE | Facility: CLINIC | Age: 73
End: 2023-07-26
Payer: MEDICARE

## 2023-07-26 VITALS
BODY MASS INDEX: 21.68 KG/M2 | HEART RATE: 79 BPM | DIASTOLIC BLOOD PRESSURE: 72 MMHG | WEIGHT: 127 LBS | SYSTOLIC BLOOD PRESSURE: 131 MMHG | OXYGEN SATURATION: 98 % | HEIGHT: 64 IN | TEMPERATURE: 97.5 F

## 2023-07-26 PROCEDURE — 99215 OFFICE O/P EST HI 40 MIN: CPT

## 2023-07-26 RX ORDER — METOPROLOL SUCCINATE 50 MG/1
50 TABLET, EXTENDED RELEASE ORAL
Qty: 60 | Refills: 5 | Status: DISCONTINUED | COMMUNITY
Start: 2023-01-17 | End: 2023-07-26

## 2023-08-03 ENCOUNTER — APPOINTMENT (OUTPATIENT)
Dept: RADIOLOGY | Facility: CLINIC | Age: 73
End: 2023-08-03
Payer: MEDICARE

## 2023-08-03 ENCOUNTER — OUTPATIENT (OUTPATIENT)
Dept: OUTPATIENT SERVICES | Facility: HOSPITAL | Age: 73
LOS: 1 days | End: 2023-08-03
Payer: MEDICARE

## 2023-08-03 DIAGNOSIS — Z00.8 ENCOUNTER FOR OTHER GENERAL EXAMINATION: ICD-10-CM

## 2023-08-03 DIAGNOSIS — N83.8 OTHER NONINFLAMMATORY DISORDERS OF OVARY, FALLOPIAN TUBE AND BROAD LIGAMENT: Chronic | ICD-10-CM

## 2023-08-03 DIAGNOSIS — Z95.5 PRESENCE OF CORONARY ANGIOPLASTY IMPLANT AND GRAFT: Chronic | ICD-10-CM

## 2023-08-03 DIAGNOSIS — Z95.810 PRESENCE OF AUTOMATIC (IMPLANTABLE) CARDIAC DEFIBRILLATOR: Chronic | ICD-10-CM

## 2023-08-03 DIAGNOSIS — Z87.09 PERSONAL HISTORY OF OTHER DISEASES OF THE RESPIRATORY SYSTEM: Chronic | ICD-10-CM

## 2023-08-03 PROCEDURE — 71046 X-RAY EXAM CHEST 2 VIEWS: CPT

## 2023-08-03 PROCEDURE — 71046 X-RAY EXAM CHEST 2 VIEWS: CPT | Mod: 26

## 2023-08-03 NOTE — PHYSICAL EXAM
[No Xanthelasma] : no xanthelasma [Normal Venous Pressure] : normal venous pressure [Normal Radial B/L] : normal radial B/L [No Rash] : no rash [Normal] : alert and oriented, normal memory [de-identified] : no perioral cyanosis, MMM [de-identified] : JVP 6 cm H2O, no HJR [de-identified] : warm peripherally

## 2023-08-03 NOTE — END OF VISIT
[FreeTextEntry3] : I, Dr. Colon, personally performed the evaluation and management (E/M) services for this established patient who presents today with (a) new problem(s)/exacerbation of (an) existing condition(s).  That E/M includes conducting the examination, assessing all new/exacerbated conditions, and establishing a new plan of care.  Today, my SORAIDA, [email or web address], was here to observe my evaluation and management services for this new problem/exacerbated condition to be followed going forward. [Time Spent: ___ minutes] : I have spent [unfilled] minutes of time on the encounter.

## 2023-08-03 NOTE — ED ADULT TRIAGE NOTE - BP NONINVASIVE SYSTOLIC (MM HG)
CALLED PATIENT AND WE SCHEDULED SURGERY, SCHEDULED POST OP AND PATIENT ALREADY KNEW WHAT I WAS GOING TO SAY ABOUT PRE OPERATIVE PROCEDURES AND TOLD ME SHE'S ALREADY HAD SURGERY WITH DR. HAM BEFORE. SHE TOLD ME IT WASN'T NECESSARY TO GO OVER IT. IF SHE HAS QUESTIONS SHE WILL GIVE US A CALL.  ALL QUESTIONS ANSWERED 93

## 2023-08-03 NOTE — HISTORY OF PRESENT ILLNESS
[FreeTextEntry1] : Ms. Sprague is a 73 year old woman with longstanding history of ICM (LVIDd  3.7 cm, LVEF 29% from prior improved 40-45%) s/p primary prevention dual chamber ICD (2010), CAD c/b MI s/p PCIs (2009 and 2011), AAA s/p EVAR 7/12/23, HTN, COPD, emphysema, CVA with residual R sided weakness, pre-DM (Ha1c 6.2%), anxiety, osteoarthritis and chronic pain on opioids who presents for routine follow-up of her cardiomyopathy.   Since she initiated care with our clinic in December 2022, has had recurrent hospitalizations for COPD exacerbation and ABD discomfort, most recently in March 2023. TTE at that time showed decline in LVEF along with segmental WMA, thus L/RHC was pursued revealing nonobstructive CAD, low filling pressure and preserved CO.   Since last seen in June, underwent successful endovascular repair of infrarenal AAA with Dr. Yoo on 7/12. Reports ongoing discomfort in L groin, travelling to L flank, palliated by Tylenol. Followed-up with Dr. Yoo last week, US of L groin with small hematoma. Otherwise, AT is stable. Walking within home without SOB and has not tried to go further. Able to complete ADLs comfortably. SBP generally 110s, BP in office not reflective of usual medications. She denies CP, SOB at rest, orthopnea, PND, LH/dizziness, abdominal discomfort, palpitations, and syncope. Her appetite is normal and her bowel and bladder habits are unchanged and normal for her. She has not had an ICD discharge. She has been limiting fluid and sodium in her diet and taking her medications as directed. She does not use NSAIDs. She has not been admitted to the hospital or seen in the ER for HF in the interim. Additionally, no further nausea or vomiting.

## 2023-08-03 NOTE — DISCUSSION/SUMMARY
[Patient] : the patient [FreeTextEntry1] : 73 year old AA woman with a longstanding ICM (LVIDd 3.7 cm, LVEF 29% from prior improved 40-45%) s/p dual chamber ICD, CAD s/p PCIs, CVA with residual R sided weakness and chronic pain on opioids who continues to do well from a HF perspective following recent EVAR. She is ACC/AHA Stage C, NYHA Class III (limited by arthritis) and euvolemic. I have recommended the followin. Chronic systolic HF - Stable and well compensated. Start Farxiga at reduced dose of 5 mg QD as she does not have a loop diuretic requirement. Increase Toprol XL to 100 mg BID (from 75 mg BID). Continue Entresto 49-51 mg BID and Spironolactone 25 mg QD. Encouraged to increase fluid intake, up to 2L daily. TTE in October. Previously referred to cardiac rehab at [facility], enrollment once cleared by Dr. Figueroa. Labs from  with Na 138, K 4.5, Cl 103, CO2 20, BUN 20, Cr 1.12 and pro-BNP from March was 702. Repeat labs in one week.   2. CAD/ICM - Underwent PCI in  and . No signs or symptoms of ischemia. Continue Plavix, Statin, BB, ARNI and MRA. Reportedly off ASA due to significant bruising.   3. Hypertension - Well controlled on current therapies.  4. AAA - Follows with Dr. Lofton and Dr. Figueroa. Underwent successful EVAR 23.   5. Recent gastroenteritis - On famotidine and sucralfate. Following GI, Dr. Paniagua and was told no current need for EGD/colonoscopy. Weight gradually uptrending and no further N/V.  6. COPD/emphysema - Followed by PulmDr. Randolph.   7. RHCM - Previously referred to geriatrics, Dr. Garcia's office.  8. Follow up with nurse practitioner in 4 weeks and return to Dr. Colon in October with same day TTE.   - - -

## 2023-08-03 NOTE — CARDIOLOGY SUMMARY
[de-identified] : 06/28/23 ECG: SR at 63 bpm, borderline low voltage in precordial leads, iRBBB, nonspecific Tw abnormality. Compared to prior 3/28/23, no significant change. 09/27/22 ECG: SR at 71 bpm, BETTE, negative Tw.  [de-identified] : \par 03/06/23 TTE: LVIDd 3.7 cm, LVEF 29% (segmental) with VTI 12 cm, concentric LVH (septum 0.9, PWT 1.2 cm), mild DD, normal RV size and function, normal LA, min MR, mild AS, mild-mod TR\par \par 01/11/22 TTE: LVIDd 5.5 cm, LVEF 40-45% (segmental), mod DD, normal RV size and function, LA/RA not well visualized, min MR, mod TR, est RVSP 48 mmHg, trace pericardial effusion\par \par 11/26/21 TTE: LVIDd 5.4 cm, LVEF 20% (regional variation), mild DD, normal RV size and function, normal biatria, min MR, mild-mod TR, est RVSP 37 mmHg\par \par 12/12/08 TTE: LVIDd 4.7 cm, mod segmental LVSD, normal RV size and function, mod MR, est RVSP 34 mmHg\par  [de-identified] : \par 03/10/23 LHC: LM mild atherosclerosis, oLAD 40% stenosis, LAD mild atherosclerosis, pCx 50% stenosis, 1st OM 60% ISR, RCA minor luminal irregularities\par \par 03/10/23 RHC: AO 95/60 (75), HR 93, RA 0, PA 25/9/14, PCWP 0, AO 98%, PA 70%, CO/CI (F) 4.83/2.96, SVR 1241 dsc, PVR 2.9 swanson\par \par 06/18/12 LHC: 30% pLAD, 45% OM1 at site of prior stent, otherwise minor luminal irregularities of LM and RCA\par

## 2023-08-03 NOTE — REASON FOR VISIT
[Cardiac Failure] : cardiac failure [FreeTextEntry1] : PATIENT INSTRUCTIONS:  1. INCREASE the METOPROLOL SUCCINATE to 100 mg taken TWICE A DAY  2. START FARXIGA 5 mg taken DAILY in the morning  3. Labs in one week   4. Continue remainder of medications as prescribed  5. Follow-up with the NP in 4 weeks and Dr. Colon in October with same day echocardiogram

## 2023-08-09 NOTE — ASU PATIENT PROFILE, ADULT - MEDICATION HERBAL REMEDIES, PROFILE
-- DO NOT REPLY / DO NOT REPLY ALL --  -- Message is from Engagement Center Operations (ECO) --    General Patient Message:     Patient calling in today very upset.  Patient spent over a half hour on the phone before she was connected to writer.  Patient needs to reschedule her colonoscopy.  Patient would like a call back ASAP to do so.       Caller Information       Type Contact Phone/Fax    08/09/2023 10:00 AM CDT Phone (Incoming) Patricia Moore (Self) [phone] (M)        Alternative phone number: no    Can a detailed message be left? Yes    Message Turnaround: [place]:    Refer to site's KB page for routing instructions    Please give this turnaround time to the caller:   \"You can expect to receive a response 2-3 business days after your provider's clinical team reviews the message\"               no

## 2023-08-24 ENCOUNTER — APPOINTMENT (OUTPATIENT)
Dept: HEART FAILURE | Facility: CLINIC | Age: 73
End: 2023-08-24
Payer: MEDICARE

## 2023-08-24 ENCOUNTER — APPOINTMENT (OUTPATIENT)
Dept: VASCULAR SURGERY | Facility: CLINIC | Age: 73
End: 2023-08-24
Payer: MEDICARE

## 2023-08-24 VITALS
WEIGHT: 126 LBS | HEART RATE: 72 BPM | DIASTOLIC BLOOD PRESSURE: 70 MMHG | BODY MASS INDEX: 21.51 KG/M2 | TEMPERATURE: 98.2 F | HEIGHT: 64 IN | OXYGEN SATURATION: 97 % | SYSTOLIC BLOOD PRESSURE: 105 MMHG

## 2023-08-24 VITALS
DIASTOLIC BLOOD PRESSURE: 67 MMHG | TEMPERATURE: 98.2 F | SYSTOLIC BLOOD PRESSURE: 97 MMHG | HEART RATE: 70 BPM | WEIGHT: 125 LBS | HEIGHT: 64 IN | BODY MASS INDEX: 21.34 KG/M2

## 2023-08-24 PROCEDURE — 99024 POSTOP FOLLOW-UP VISIT: CPT

## 2023-08-24 PROCEDURE — 93978 VASCULAR STUDY: CPT

## 2023-08-24 PROCEDURE — 99214 OFFICE O/P EST MOD 30 MIN: CPT

## 2023-08-24 NOTE — REASON FOR VISIT
Pt transported to [facility] and home on 1.5L NC. Pt had been 100%  on 1.5 L all day. This RN thought pt wears O2 at baseline, and never mentioned in report that pt was on 1.5L. Pt arrived at nursing home, Nursing facility does not have Oxygen at the facility per FORTUNATO gambino. This RN apologized did not know pt had not been on O2. This RN spoke with Kevan Maxwell notified RN to check spo2 of patient while room air before transport leaves. RN called back after 30mins stating that patients spo2 is running between 88-95% room air. RN concerned with not having a nurse after 11pm to watch the patient. This Rn notified nursing home that patient would need to be transferred to the ER if spo2 drops. Dr. Ernestina Hidalgo was notified and aware. Nursing supervisor Jeronimo Quinonez, and charge RN [facility]ABILITATION [facility] aware as well. [Cardiac Failure] : cardiac failure

## 2023-08-24 NOTE — PHYSICAL EXAM
[Abdomen Tenderness] : ~T ~M No abdominal tenderness [de-identified] : Alert oriented no acute distress [de-identified] : Head and neck within normal limit [de-identified] : Breathing comfortably on room air [de-identified] : Regular rate and rhythm [FreeTextEntry1] : Palpable bilateral radial and femoral pulses

## 2023-08-24 NOTE — DATA REVIEWED
[FreeTextEntry1] : 7/20/2023 duplex examination revealed no evidence of left groin pseudoaneurysm No evidence of endoleak in the EVAR  8/24/2023 duplex examination showed aneurysm 4.6 x 4.6, small type II endoleak

## 2023-08-24 NOTE — ASSESSMENT
[FreeTextEntry1] : 73-year-old female status post endovascular repair of abdominal aortic aneurysm with Medtronic bifurcated graft  Follow-up in 6 months with duplex

## 2023-08-31 NOTE — DISCUSSION/SUMMARY
[Patient] : the patient [FreeTextEntry1] : 73 year old AA woman with a longstanding ICM (LVIDd 3.7 cm, LVEF 29% from prior improved 40-45%) s/p dual chamber ICD, CAD s/p PCIs, CVA with residual R sided weakness and chronic pain on opioids who continues to do well from a HF perspective following recent EVAR. She is ACC/AHA Stage C, NYHA Class III (limited by arthritis) and euvolemic. I have recommended the followin. Chronic systolic HF - Stable and well compensated. Continue Farxiga of 5 mg QD as she did not have a loop diuretic requirement. Continue Toprol XL to 100 mg BID, Entresto 49-51 mg BID and Spironolactone 25 mg QD. Encouraged to increase fluid intake, up to 2L daily. TTE in October. Previously referred to cardiac rehab at [facility], stated being cleared by Dr. Figueroa. Will obtain recent labs from her PCP  2. CAD/ICM - Underwent PCI in  and . No signs or symptoms of ischemia. Continue Plavix, Statin, BB, ARNI and MRA. Reportedly off ASA due to significant bruising.   3. Hypertension - Well controlled on current therapies.  4. AAA - Follows with Dr. Lofton and Dr. Figueroa. Underwent successful EVAR 23.   5. Recent gastroenteritis - On famotidine and sucralfate. Following GI, Dr. Paniagua and was told no current need for EGD/colonoscopy. Weight gradually uptrending and no further N/V.  6. COPD/emphysema - Followed by Pulm, Dr. Randolph.   7. RHCM - Previously referred to geriatrics, Dr. Garcia's office.  8. Follow up Dr. Colon in October with same day TTE (scheduled)

## 2023-08-31 NOTE — HISTORY OF PRESENT ILLNESS
[FreeTextEntry1] : Ms. Sprague is a 73 year old woman with longstanding history of ICM (LVIDd  3.7 cm, LVEF 29% from prior improved 40-45%) s/p primary prevention dual chamber ICD (2010), CAD c/b MI s/p PCIs (2009 and 2011), AAA s/p EVAR 7/12/23, HTN, COPD, emphysema, CVA with residual R sided weakness, pre-DM (Ha1c 6.2%), anxiety, osteoarthritis and chronic pain on opioids who presents for routine follow-up of her cardiomyopathy.   Since she initiated care with our clinic in December 2022, has had recurrent hospitalizations for COPD exacerbation and ABD discomfort, most recently in March 2023. TTE at that time showed decline in LVEF along with segmental WMA, thus L/RHC was pursued revealing nonobstructive CAD, low filling pressure and preserved CO.  On 7/12/23 underwent successful endovascular repair of infrarenal AAA with Dr. Yoo .   Last seen July 26, at that visit Toprol was increased to 100mg bid and Farxiga 5mg, which she appears to have tolerated. Functionally she is walking within home and 1-2 blocks without SOB . Able to complete ADLs comfortably. SBP generally 90's to low 100s.  She denies CP, SOB at rest, orthopnea, PND, LH/dizziness, abdominal discomfort, palpitations, and syncope. Her appetite is normal, though feels it has decreased over time,  and her bowel and bladder habits are unchanged and normal for her. She has not had an ICD discharge. She has been limiting fluid and sodium in her diet and taking her medications as directed. She does not use NSAIDs. She has not been admitted to the hospital or seen in the ER for HF in the interim.

## 2023-08-31 NOTE — PHYSICAL EXAM
[No Xanthelasma] : no xanthelasma [Normal Venous Pressure] : normal venous pressure [Normal Radial B/L] : normal radial B/L [No Rash] : no rash [Normal] : alert and oriented, normal memory [de-identified] : JVP 6 cm H2O, no HJR [de-identified] : warm peripherally

## 2023-09-10 NOTE — REVIEW OF SYSTEMS
[Negative] : Heme/Lymph [SOB] : no shortness of breath [Dyspnea on exertion] : not dyspnea during exertion [Chest Discomfort] : no chest discomfort [Lower Ext Edema] : no extremity edema [Palpitations] : no palpitations [Leg Claudication] : no intermittent leg claudication [Orthopnea] : no orthopnea [PND] : no PND

## 2023-09-10 NOTE — DISCUSSION/SUMMARY
[FreeTextEntry1] : The patient is a 73-year-old female COPD, CAD, AAA, ICD, CKD s/p hospitalization who is feeling better #1 COPD- on breo, incruse, and ventolin #2 CAD- stent 2009 and 2011 [place] and [place], no angina, continue plavix and off ASA, initial ECHO EF=20% but repeat 1/2022 EF=40-45%, c/w spironolactone 25, Entresto, Toprol, Farxiga 5mg, f/u Dr. Colon  #3 CMP - Medtronic ICD interrogation negative, f/u Dr. Parker #4 HTN- c/w Entresto, Toprol, spironolactone #5 HLD- c/w rosuvastatin #6 AAA- s/p EVAR, f/u  Dr. Lofton  #7 Renal- normal renal evaluation #8 GI- ? Intestinal bleed, f/u GI #9 Derm- keloids, gets injections, encouraged to increase walking for exercise, received Moderna vaccines.

## 2023-09-12 ENCOUNTER — APPOINTMENT (OUTPATIENT)
Dept: ELECTROPHYSIOLOGY | Facility: CLINIC | Age: 73
End: 2023-09-12
Payer: MEDICARE

## 2023-09-12 ENCOUNTER — APPOINTMENT (OUTPATIENT)
Dept: CARDIOLOGY | Facility: CLINIC | Age: 73
End: 2023-09-12
Payer: MEDICARE

## 2023-09-12 ENCOUNTER — NON-APPOINTMENT (OUTPATIENT)
Age: 73
End: 2023-09-12

## 2023-09-12 VITALS
HEART RATE: 61 BPM | BODY MASS INDEX: 21.85 KG/M2 | DIASTOLIC BLOOD PRESSURE: 73 MMHG | OXYGEN SATURATION: 97 % | SYSTOLIC BLOOD PRESSURE: 121 MMHG | WEIGHT: 128 LBS | HEIGHT: 64 IN

## 2023-09-12 DIAGNOSIS — Z95.810 PRESENCE OF AUTOMATIC (IMPLANTABLE) CARDIAC DEFIBRILLATOR: ICD-10-CM

## 2023-09-12 PROCEDURE — 99214 OFFICE O/P EST MOD 30 MIN: CPT

## 2023-09-12 PROCEDURE — 93000 ELECTROCARDIOGRAM COMPLETE: CPT

## 2023-09-12 PROCEDURE — ZZZZZ: CPT

## 2023-10-04 ENCOUNTER — RX RENEWAL (OUTPATIENT)
Age: 73
End: 2023-10-04

## 2023-10-04 ENCOUNTER — OUTPATIENT (OUTPATIENT)
Dept: OUTPATIENT SERVICES | Facility: HOSPITAL | Age: 73
LOS: 1 days | End: 2023-10-04
Payer: MEDICARE

## 2023-10-04 ENCOUNTER — APPOINTMENT (OUTPATIENT)
Dept: HEART FAILURE | Facility: CLINIC | Age: 73
End: 2023-10-04
Payer: MEDICARE

## 2023-10-04 ENCOUNTER — APPOINTMENT (OUTPATIENT)
Dept: CV DIAGNOSITCS | Facility: HOSPITAL | Age: 73
End: 2023-10-04

## 2023-10-04 VITALS
WEIGHT: 128 LBS | HEIGHT: 64 IN | SYSTOLIC BLOOD PRESSURE: 110 MMHG | BODY MASS INDEX: 21.85 KG/M2 | HEART RATE: 60 BPM | TEMPERATURE: 98 F | DIASTOLIC BLOOD PRESSURE: 71 MMHG | OXYGEN SATURATION: 97 %

## 2023-10-04 DIAGNOSIS — J44.9 CHRONIC OBSTRUCTIVE PULMONARY DISEASE, UNSPECIFIED: ICD-10-CM

## 2023-10-04 DIAGNOSIS — Z95.810 PRESENCE OF AUTOMATIC (IMPLANTABLE) CARDIAC DEFIBRILLATOR: Chronic | ICD-10-CM

## 2023-10-04 DIAGNOSIS — I50.22 CHRONIC SYSTOLIC (CONGESTIVE) HEART FAILURE: ICD-10-CM

## 2023-10-04 DIAGNOSIS — Z95.5 PRESENCE OF CORONARY ANGIOPLASTY IMPLANT AND GRAFT: Chronic | ICD-10-CM

## 2023-10-04 DIAGNOSIS — N83.8 OTHER NONINFLAMMATORY DISORDERS OF OVARY, FALLOPIAN TUBE AND BROAD LIGAMENT: Chronic | ICD-10-CM

## 2023-10-04 DIAGNOSIS — Z87.09 PERSONAL HISTORY OF OTHER DISEASES OF THE RESPIRATORY SYSTEM: Chronic | ICD-10-CM

## 2023-10-04 PROCEDURE — C8929: CPT

## 2023-10-04 PROCEDURE — 99215 OFFICE O/P EST HI 40 MIN: CPT

## 2023-10-04 PROCEDURE — 93306 TTE W/DOPPLER COMPLETE: CPT | Mod: 26

## 2023-10-27 NOTE — H&P PST ADULT - PROBLEM SELECTOR PROBLEM 4
Urinary tract infection symptoms Doxycycline Pregnancy And Lactation Text: This medication is Pregnancy Category D and not consider safe during pregnancy. It is also excreted in breast milk but is considered safe for shorter treatment courses.

## 2023-11-14 ENCOUNTER — APPOINTMENT (OUTPATIENT)
Dept: HEART FAILURE | Facility: CLINIC | Age: 73
End: 2023-11-14
Payer: MEDICARE

## 2023-11-14 PROCEDURE — ZZZZZ: CPT

## 2023-12-06 ENCOUNTER — RX RENEWAL (OUTPATIENT)
Age: 73
End: 2023-12-06

## 2023-12-12 ENCOUNTER — APPOINTMENT (OUTPATIENT)
Dept: ELECTROPHYSIOLOGY | Facility: CLINIC | Age: 73
End: 2023-12-12
Payer: MEDICARE

## 2023-12-12 ENCOUNTER — NON-APPOINTMENT (OUTPATIENT)
Age: 73
End: 2023-12-12

## 2023-12-12 PROCEDURE — 93296 REM INTERROG EVL PM/IDS: CPT

## 2023-12-12 PROCEDURE — 93295 DEV INTERROG REMOTE 1/2/MLT: CPT

## 2023-12-13 ENCOUNTER — NON-APPOINTMENT (OUTPATIENT)
Age: 73
End: 2023-12-13

## 2023-12-15 ENCOUNTER — APPOINTMENT (OUTPATIENT)
Dept: HEART FAILURE | Facility: CLINIC | Age: 73
End: 2023-12-15

## 2024-01-26 ENCOUNTER — APPOINTMENT (OUTPATIENT)
Dept: HEART FAILURE | Facility: CLINIC | Age: 74
End: 2024-01-26
Payer: MEDICARE

## 2024-01-26 ENCOUNTER — RX RENEWAL (OUTPATIENT)
Age: 74
End: 2024-01-26

## 2024-01-26 VITALS
BODY MASS INDEX: 22.2 KG/M2 | HEART RATE: 65 BPM | TEMPERATURE: 97.3 F | WEIGHT: 130 LBS | OXYGEN SATURATION: 97 % | SYSTOLIC BLOOD PRESSURE: 141 MMHG | DIASTOLIC BLOOD PRESSURE: 84 MMHG | HEIGHT: 64 IN

## 2024-01-26 VITALS — SYSTOLIC BLOOD PRESSURE: 140 MMHG | DIASTOLIC BLOOD PRESSURE: 88 MMHG

## 2024-01-26 DIAGNOSIS — Z95.810 PRESENCE OF AUTOMATIC (IMPLANTABLE) CARDIAC DEFIBRILLATOR: ICD-10-CM

## 2024-01-26 DIAGNOSIS — I25.5 ISCHEMIC CARDIOMYOPATHY: ICD-10-CM

## 2024-01-26 PROCEDURE — 99214 OFFICE O/P EST MOD 30 MIN: CPT

## 2024-01-26 RX ORDER — ROSUVASTATIN CALCIUM 20 MG/1
20 TABLET, FILM COATED ORAL
Qty: 90 | Refills: 0 | Status: ACTIVE | COMMUNITY
Start: 2018-06-07 | End: 1900-01-01

## 2024-01-26 NOTE — HISTORY OF PRESENT ILLNESS
[FreeTextEntry1] : Ms. Sprague is a 73 year old woman with longstanding history of ICM (LVIDd  4.9cm, LVEF35% from prior improved 40-45%) s/p primary prevention dual chamber ICD (2010), CAD c/b MI s/p PCIs (2009 and 2011), AAA s/p EVAR 7/12/23, HTN, COPD, emphysema, CVA with residual R sided weakness, pre-DM (Ha1c 6.2%), anxiety, osteoarthritis and chronic pain on opioids who presents for routine follow-up of her cardiomyopathy. Previously followed by Dr. Colon.  Since she initiated care with our clinic in December 2022, has had recurrent hospitalizations but not for ADHF. Most recently admitted in March 2023 for COPD exacerbation and ABD discomfort. TTE did show decline of LVEF along with segmental WMA, thus L/RHC was pursued revealing nonobstructive CAD, low filling pressure and preserved CO. In July 2023, underwent successful endovascular repair of infrarenal AAA with Dr. Yoo .   No significant clinical change since last seen October 2023. AT is stable but remains limited and have not walked outside much due to the weather. She is able to walk around the house without any SOB, but has occasional FERRERA when she is walking quickly. Uses inhalers with some relief. Reports home SBP generally 110s, but reading today in clinic was 140/88. Her weight has been stable around 130lbs.   She denies CP, SOB at rest, orthopnea, PND, LH/dizziness, abdominal discomfort and syncope.  Her appetite is normal, and her bowel and bladder habits are unchanged and normal for her. She has not had an ICD discharge or hospitalized in the interim   Of note:  She was participating at [facility] cardiac rehab, but sessions were stopped as they did not accept her insurance.

## 2024-01-26 NOTE — ASSESSMENT
[FreeTextEntry1] : Ms. Sprague is a 73 year old woman with longstanding history of ICM (LVIDd  4.9cm, LVEF35% from prior improved 40-45%) s/p primary prevention dual chamber ICD (2010), CAD c/b MI s/p PCIs (2009 and 2011), AAA s/p EVAR 7/12/23, HTN, COPD, emphysema, CVA with residual R sided weakness, pre-DM (Ha1c 6.2%), anxiety, osteoarthritis and chronic pain on opioids who presents for routine follow-up of her cardiomyopathy. She is ACC/AHA Stage C, NYHA Class III, euvolemic, mildly hypertensive with room to escalate GDMTs. I have recommended the following:  #Chronic systolic HF -GDMT: continue with Farxiga 10mg QD, Toprol SC170cc QD, spironolactone 25mg QD. Will increase Entresto to 97/103mg BID (previously on mod dose).  -Diuretics - no requirements at this time as maintained euvolemia -Device: s/p dual chamber ICD (2010) -Labs from 8/31/23 with K 4.6, Cr 1.2 and pro- (from 700 in March). -Will obtain repeat labs today and in 3 weeks.  Pt is historically difficult with venipuncture and initially reluctant on repeated blood work, but educated on importance of repeated blood work to monitor renal/potassium levels with GDMTs.  -Will send out another referral for cardiac rehab today as patient is interested in continuing (previously denied at [facility] d/t [facility])  #CAD/ICM -Underwent PCI in 2009 and 2011 -No signs or symptoms of ischemia -Continue Plavix, Statin, BB, ARNI and MRA. -Reportedly off ASA due to significant bruising.  #Hypertension -mildly hypertensive in clinic today - escalated Entresto today -c/w GDMT as above  #AAA -Underwent successful EVAR 7/12/23. -Follows with Dr. Lofton and Dr. Figueroa   #COPD/emphysema -On inhalers -Followed by Pulm, Dr. Randolph.  #RHCM  -Followed by geriatrics, Dr. Garcia.  Follow up with Dr. Hsu in 3 months

## 2024-01-26 NOTE — PHYSICAL EXAM
[No Edema] : no edema [de-identified] : No perioral cyanosis, MMM [de-identified] : JVP 6 cm H2O, no HJR [de-identified] : warm peripherally

## 2024-01-26 NOTE — CARDIOLOGY SUMMARY
[de-identified] : 06/28/23 ECG: SR at 63 bpm, borderline low voltage in precordial leads, iRBBB, nonspecific Tw abnormality. Compared to prior 3/28/23, no significant change. 09/27/22 ECG: SR at 71 bpm, BETTE, negative Tw.  [de-identified] : 10/04/23 TTE: LVIDd 4.9 cm, LVEF 35% (regional), mild DD, normal RV size with reduced function (TAPSE 1.3 cm), normal biatrial size, trace MR, mod TR, est PASP 34 mmHg, trivial localized pericardial effusion adjacent to LV, IVC normal in size with normal inspiratory collapse.   03/06/23 TTE: LVIDd 3.7 cm, LVEF 29% (segmental) with VTI 12 cm, concentric LVH (septum 0.9, PWT 1.2 cm), mild DD, normal RV size and function, normal LA, min MR, mild AS, mild-mod TR  01/11/22 TTE: LVIDd 5.5 cm, LVEF 40-45% (segmental), mod DD, normal RV size and function, LA/RA not well visualized, min MR, mod TR, est RVSP 48 mmHg, trace pericardial effusion  11/26/21 TTE: LVIDd 5.4 cm, LVEF 20% (regional variation), mild DD, normal RV size and function, normal biatria, min MR, mild-mod TR, est RVSP 37 mmHg  12/12/08 TTE: LVIDd 4.7 cm, mod segmental LVSD, normal RV size and function, mod MR, est RVSP 34 mmHg  [de-identified] : \par  03/10/23 LHC: LM mild atherosclerosis, oLAD 40% stenosis, LAD mild atherosclerosis, pCx 50% stenosis, 1st OM 60% ISR, RCA minor luminal irregularities\par  \par  03/10/23 RHC: AO 95/60 (75), HR 93, RA 0, PA 25/9/14, PCWP 0, AO 98%, PA 70%, CO/CI (F) 4.83/2.96, SVR 1241 dsc, PVR 2.9 swanson\par  \par  06/18/12 LHC: 30% pLAD, 45% OM1 at site of prior stent, otherwise minor luminal irregularities of LM and RCA\par

## 2024-02-07 ENCOUNTER — NON-APPOINTMENT (OUTPATIENT)
Age: 74
End: 2024-02-07

## 2024-02-07 LAB
ANION GAP SERPL CALC-SCNC: 12 MMOL/L
BUN SERPL-MCNC: 29 MG/DL
CALCIUM SERPL-MCNC: 9.5 MG/DL
CHLORIDE SERPL-SCNC: 105 MMOL/L
CO2 SERPL-SCNC: 23 MMOL/L
CREAT SERPL-MCNC: 1.61 MG/DL
EGFR: 33 ML/MIN/1.73M2
GLUCOSE SERPL-MCNC: 88 MG/DL
MAGNESIUM SERPL-MCNC: 2.4 MG/DL
NT-PROBNP SERPL-MCNC: 975 PG/ML
POTASSIUM SERPL-SCNC: 5.4 MMOL/L
SODIUM SERPL-SCNC: 140 MMOL/L

## 2024-02-29 ENCOUNTER — APPOINTMENT (OUTPATIENT)
Dept: VASCULAR SURGERY | Facility: CLINIC | Age: 74
End: 2024-02-29
Payer: MEDICARE

## 2024-02-29 VITALS
HEIGHT: 64 IN | WEIGHT: 133.03 LBS | HEART RATE: 67 BPM | BODY MASS INDEX: 22.71 KG/M2 | SYSTOLIC BLOOD PRESSURE: 91 MMHG | TEMPERATURE: 97.9 F | DIASTOLIC BLOOD PRESSURE: 63 MMHG

## 2024-02-29 PROCEDURE — 93978 VASCULAR STUDY: CPT

## 2024-02-29 PROCEDURE — 99213 OFFICE O/P EST LOW 20 MIN: CPT

## 2024-02-29 NOTE — ASSESSMENT
[FreeTextEntry1] : 73-year-old female status post endovascular repair of abdominal aortic aneurysm with Medtronic bifurcated graft  Follow-up in 12 months with duplex

## 2024-02-29 NOTE — PHYSICAL EXAM
[Abdomen Tenderness] : ~T ~M No abdominal tenderness [de-identified] : Alert oriented no acute distress [de-identified] : Head and neck within normal limit [de-identified] : Breathing comfortably on room air [de-identified] : Regular rate and rhythm [FreeTextEntry1] : Palpable bilateral radial and femoral pulses

## 2024-02-29 NOTE — HISTORY OF PRESENT ILLNESS
[FreeTextEntry1] : Patient is status post endovascular repair of abdominal aortic aneurysm with Medtronic bifurcated graft.  Doing well.  Complains of left groin lump.  Denies abdominal pain or back pain.  2/29/24: Status post EVAR 12 July 2024, doing well.  No abdominal pain.

## 2024-02-29 NOTE — DATA REVIEWED
[FreeTextEntry1] : 7/20/2023 duplex examination revealed no evidence of left groin pseudoaneurysm No evidence of endoleak in the EVAR  8/24/2023 duplex examination showed aneurysm 4.6 x 4.6, small type II endoleak  2/29/2024 duplex examination showed aneurysm sac 4.6 cm with no evidence of endoleak

## 2024-03-04 ENCOUNTER — RX RENEWAL (OUTPATIENT)
Age: 74
End: 2024-03-04

## 2024-03-04 RX ORDER — CLOPIDOGREL BISULFATE 75 MG/1
75 TABLET, FILM COATED ORAL
Qty: 90 | Refills: 0 | Status: ACTIVE | COMMUNITY
Start: 2020-12-09 | End: 1900-01-01

## 2024-03-12 DIAGNOSIS — Z01.89 ENCOUNTER FOR OTHER SPECIFIED SPECIAL EXAMINATIONS: ICD-10-CM

## 2024-03-17 NOTE — PHYSICAL EXAM
[Well Developed] : well developed [Well Nourished] : well nourished [Normal Conjunctiva] : normal conjunctiva [No Acute Distress] : no acute distress [Normal Venous Pressure] : normal venous pressure [No Carotid Bruit] : no carotid bruit [Normal S1, S2] : normal S1, S2 [No Rub] : no rub [No Murmur] : no murmur [No Gallop] : no gallop [Clear Lung Fields] : clear lung fields [Good Air Entry] : good air entry [No Respiratory Distress] : no respiratory distress  [Non Tender] : non-tender [Soft] : abdomen soft [Normal Bowel Sounds] : normal bowel sounds [No Masses/organomegaly] : no masses/organomegaly [No Edema] : no edema [Normal Gait] : normal gait [No Cyanosis] : no cyanosis [No Varicosities] : no varicosities [No Clubbing] : no clubbing [No Rash] : no rash [No Skin Lesions] : no skin lesions [Moves all extremities] : moves all extremities [No Focal Deficits] : no focal deficits [Normal Speech] : normal speech [Alert and Oriented] : alert and oriented [Normal memory] : normal memory

## 2024-03-19 ENCOUNTER — APPOINTMENT (OUTPATIENT)
Dept: CARDIOLOGY | Facility: CLINIC | Age: 74
End: 2024-03-19
Payer: MEDICARE

## 2024-03-19 ENCOUNTER — LABORATORY RESULT (OUTPATIENT)
Age: 74
End: 2024-03-19

## 2024-03-19 ENCOUNTER — NON-APPOINTMENT (OUTPATIENT)
Age: 74
End: 2024-03-19

## 2024-03-19 ENCOUNTER — APPOINTMENT (OUTPATIENT)
Dept: ELECTROPHYSIOLOGY | Facility: CLINIC | Age: 74
End: 2024-03-19
Payer: MEDICARE

## 2024-03-19 VITALS
SYSTOLIC BLOOD PRESSURE: 130 MMHG | HEART RATE: 73 BPM | WEIGHT: 134 LBS | DIASTOLIC BLOOD PRESSURE: 83 MMHG | BODY MASS INDEX: 23 KG/M2

## 2024-03-19 DIAGNOSIS — R73.03 PREDIABETES.: ICD-10-CM

## 2024-03-19 DIAGNOSIS — I71.40 ABDOMINAL AORTIC ANEURYSM, WITHOUT RUPTURE, UNSPECIFIED: ICD-10-CM

## 2024-03-19 LAB
25(OH)D3 SERPL-MCNC: 56.5 NG/ML
ALBUMIN SERPL ELPH-MCNC: 5 G/DL
ALP BLD-CCNC: 106 U/L
ALT SERPL-CCNC: 20 U/L
ANION GAP SERPL CALC-SCNC: 15 MMOL/L
AST SERPL-CCNC: 23 U/L
BILIRUB SERPL-MCNC: 0.3 MG/DL
BUN SERPL-MCNC: 28 MG/DL
CALCIUM SERPL-MCNC: 10 MG/DL
CHLORIDE SERPL-SCNC: 100 MMOL/L
CHOLEST SERPL-MCNC: 240 MG/DL
CO2 SERPL-SCNC: 23 MMOL/L
CREAT SERPL-MCNC: 2.25 MG/DL
EGFR: 22 ML/MIN/1.73M2
ESTIMATED AVERAGE GLUCOSE: 128 MG/DL
GLUCOSE SERPL-MCNC: 77 MG/DL
HBA1C MFR BLD HPLC: 6.1 %
HCT VFR BLD CALC: 46.5 %
HDLC SERPL-MCNC: 68 MG/DL
HGB BLD-MCNC: 14.5 G/DL
LDLC SERPL CALC-MCNC: 147 MG/DL
MCHC RBC-ENTMCNC: 31.2 GM/DL
MCHC RBC-ENTMCNC: 32.3 PG
MCV RBC AUTO: 103.6 FL
NONHDLC SERPL-MCNC: 171 MG/DL
NT-PROBNP SERPL-MCNC: 873 PG/ML
PLATELET # BLD AUTO: 256 K/UL
POTASSIUM SERPL-SCNC: 5.2 MMOL/L
PROT SERPL-MCNC: 8.7 G/DL
RBC # BLD: 4.49 M/UL
RBC # FLD: 16.2 %
SODIUM SERPL-SCNC: 137 MMOL/L
TRIGL SERPL-MCNC: 135 MG/DL
TSH SERPL-ACNC: 4.96 UIU/ML
VIT B12 SERPL-MCNC: 607 PG/ML
WBC # FLD AUTO: 5.95 K/UL

## 2024-03-19 PROCEDURE — 93000 ELECTROCARDIOGRAM COMPLETE: CPT

## 2024-03-19 PROCEDURE — G2211 COMPLEX E/M VISIT ADD ON: CPT

## 2024-03-19 PROCEDURE — 99214 OFFICE O/P EST MOD 30 MIN: CPT

## 2024-03-19 PROCEDURE — 93000 ELECTROCARDIOGRAM COMPLETE: CPT | Mod: 59

## 2024-03-19 PROCEDURE — 93283 PRGRMG EVAL IMPLANTABLE DFB: CPT

## 2024-03-19 NOTE — DISCUSSION/SUMMARY
[FreeTextEntry1] : The patient is a 73-year-old female COPD, CAD, AAA, ICD, CKD s/p EVAR who is improving.  #1 COPD- on breo, incruse, and ventolin #2 CAD- stent 2009 and 2011 [place] and [place], no angina, c/w plavix and off ASA, initial ECHO EF=20% but repeat 1/2022 EF=40-45%, c/w spironolactone 25, Entresto 97/103, Toprol, Farxiga 10mg, f/u Dr. Hsu, awaiting stress before starting cardiac rehab, cough may be from entresto #3 CMP - Medtronic ICD interrogation negative today, f/u Dr. Parker #4 HTN- c/w Entresto, Toprol, spironolactone #5 HLD- c/w rosuvastatin, all las today #6 AAA- s/p EVAR, f/u Dr. Lofton  #7 Renal- normal renal evaluation #8 GI- f/u GI #9 Derm- keloids, gets injections, received Moderna vaccines. [EKG obtained to assist in diagnosis and management of assessed problem(s)] : EKG obtained to assist in diagnosis and management of assessed problem(s)

## 2024-03-19 NOTE — HISTORY OF PRESENT ILLNESS
[FreeTextEntry1] : Mary is feeling better and grateful to be alive. Chronic cough but pulmonary does not think it is from lungs. Sent for CXR. Awaiting stress test for cardiac rehab. Sees Dr. Hsu.

## 2024-03-19 NOTE — REVIEW OF SYSTEMS
[Negative] : Psychiatric [SOB] : no shortness of breath [Dyspnea on exertion] : not dyspnea during exertion [Chest Discomfort] : no chest discomfort [Lower Ext Edema] : no extremity edema [Leg Claudication] : no intermittent leg claudication [Palpitations] : no palpitations [Orthopnea] : no orthopnea [PND] : no PND

## 2024-03-27 ENCOUNTER — APPOINTMENT (OUTPATIENT)
Dept: CV DIAGNOSTICS | Facility: HOSPITAL | Age: 74
End: 2024-03-27

## 2024-03-27 ENCOUNTER — OUTPATIENT (OUTPATIENT)
Dept: OUTPATIENT SERVICES | Facility: HOSPITAL | Age: 74
LOS: 1 days | End: 2024-03-27
Payer: MEDICARE

## 2024-03-27 ENCOUNTER — RESULT REVIEW (OUTPATIENT)
Age: 74
End: 2024-03-27

## 2024-03-27 DIAGNOSIS — Z87.09 PERSONAL HISTORY OF OTHER DISEASES OF THE RESPIRATORY SYSTEM: Chronic | ICD-10-CM

## 2024-03-27 DIAGNOSIS — N83.8 OTHER NONINFLAMMATORY DISORDERS OF OVARY, FALLOPIAN TUBE AND BROAD LIGAMENT: Chronic | ICD-10-CM

## 2024-03-27 DIAGNOSIS — Z01.89 ENCOUNTER FOR OTHER SPECIFIED SPECIAL EXAMINATIONS: ICD-10-CM

## 2024-03-27 DIAGNOSIS — Z95.810 PRESENCE OF AUTOMATIC (IMPLANTABLE) CARDIAC DEFIBRILLATOR: Chronic | ICD-10-CM

## 2024-03-27 DIAGNOSIS — Z95.5 PRESENCE OF CORONARY ANGIOPLASTY IMPLANT AND GRAFT: Chronic | ICD-10-CM

## 2024-03-27 PROCEDURE — 78452 HT MUSCLE IMAGE SPECT MULT: CPT | Mod: 26,MC

## 2024-03-27 PROCEDURE — 93017 CV STRESS TEST TRACING ONLY: CPT

## 2024-03-27 PROCEDURE — 93016 CV STRESS TEST SUPVJ ONLY: CPT | Mod: MC

## 2024-03-27 PROCEDURE — A9500: CPT

## 2024-03-27 PROCEDURE — 78452 HT MUSCLE IMAGE SPECT MULT: CPT | Mod: MC

## 2024-03-27 PROCEDURE — 93018 CV STRESS TEST I&R ONLY: CPT | Mod: MC

## 2024-04-26 ENCOUNTER — APPOINTMENT (OUTPATIENT)
Dept: HEART FAILURE | Facility: CLINIC | Age: 74
End: 2024-04-26
Payer: MEDICARE

## 2024-04-26 VITALS
BODY MASS INDEX: 23.56 KG/M2 | HEART RATE: 70 BPM | WEIGHT: 138 LBS | OXYGEN SATURATION: 96 % | TEMPERATURE: 98 F | HEIGHT: 64 IN | SYSTOLIC BLOOD PRESSURE: 154 MMHG | DIASTOLIC BLOOD PRESSURE: 89 MMHG

## 2024-04-26 DIAGNOSIS — I25.10 ATHEROSCLEROTIC HEART DISEASE OF NATIVE CORONARY ARTERY W/OUT ANGINA PECTORIS: ICD-10-CM

## 2024-04-26 DIAGNOSIS — I10 ESSENTIAL (PRIMARY) HYPERTENSION: ICD-10-CM

## 2024-04-26 DIAGNOSIS — N18.30 CHRONIC KIDNEY DISEASE, STAGE 3 UNSPECIFIED: ICD-10-CM

## 2024-04-26 DIAGNOSIS — I50.22 CHRONIC SYSTOLIC (CONGESTIVE) HEART FAILURE: ICD-10-CM

## 2024-04-26 DIAGNOSIS — E78.5 HYPERLIPIDEMIA, UNSPECIFIED: ICD-10-CM

## 2024-04-26 PROCEDURE — G2211 COMPLEX E/M VISIT ADD ON: CPT

## 2024-04-26 PROCEDURE — 99214 OFFICE O/P EST MOD 30 MIN: CPT

## 2024-04-26 RX ORDER — SPIRONOLACTONE 25 MG/1
25 TABLET ORAL DAILY
Qty: 90 | Refills: 2 | Status: DISCONTINUED | COMMUNITY
Start: 2022-11-25 | End: 2024-04-26

## 2024-04-26 RX ORDER — DAPAGLIFLOZIN 10 MG/1
10 TABLET, FILM COATED ORAL DAILY
Qty: 3 | Refills: 2 | Status: ACTIVE | COMMUNITY
Start: 2023-07-26 | End: 1900-01-01

## 2024-04-26 RX ORDER — SACUBITRIL AND VALSARTAN 97; 103 MG/1; MG/1
97-103 TABLET, FILM COATED ORAL
Qty: 180 | Refills: 2 | Status: ACTIVE | COMMUNITY
Start: 2022-12-14 | End: 1900-01-01

## 2024-04-27 NOTE — PHYSICAL EXAM
[No Xanthelasma] : no xanthelasma [Normal Venous Pressure] : normal venous pressure [No Edema] : no edema [Normal Radial B/L] : normal radial B/L [No Rash] : no rash [Normal] : alert and oriented, normal memory [de-identified] : No perioral cyanosis, MMM [de-identified] : JVP 6 cm H2O, no HJR [de-identified] : warm peripherally

## 2024-04-27 NOTE — ASSESSMENT
[FreeTextEntry1] : Ms. Sprague is a 73 y/o F with longstanding ICM (LVIDd  4.9cm, LVEF35% from prior improved 40-45%) s/p primary prevention dual chamber ICD (2010), CAD c/b MI s/p PCIs (2009 and 2011), AAA s/p EVAR 7/12/23, HTN, COPD, emphysema, CVA with residual R sided weakness, pre-DM (Ha1c 6.1%), anxiety, osteoarthritis and chronic pain on opioids who presents for routine follow-up of her cardiomyopathy. She is ACC/AHA Stage C, NYHA Class II, euvolemic, with room to escalate GDMTs. I have recommended the following:  #Chronic systolic HF -GDMT: continue Entresto 97-103mg BID, Farxiga 10mg QD and Toprol DU117mh BID. Will trend renal chemistries and if acceptable will consider resuming MRA -Diuretics - no requirements at this time as maintained euvolemia -Device: s/p dual chamber ICD (2010) - Will repeat labs today  - Will repeat TTE in 3 months once she completes Cardiac rehab   #CAD/ICM -Underwent PCI in 2009 and 2011 -No signs or symptoms of ischemia -Continue Plavix, Statin, BB, ARNI and MRA. -Reportedly off ASA due to significant bruising.  #Hypertension -mildly hypertensive in clinic today - continue GDMT as above  #AAA -Underwent successful EVAR 7/12/23. - Recent US demonstrates patent Evar w/o leak.  -Follows with Dr. Lofton and Dr. Figueroa   #COPD/emphysema -On inhalers -Followed by Dr. Tomasa Bradford.  Follow up with Dr. Hsu in 4 months

## 2024-04-27 NOTE — CARDIOLOGY SUMMARY
[de-identified] : 06/28/23 ECG: SR at 63 bpm, borderline low voltage in precordial leads, iRBBB, nonspecific Tw abnormality. Compared to prior 3/28/23, no significant change. 09/27/22 ECG: SR at 71 bpm, BETTE, negative Tw.  [de-identified] : 10/04/23 TTE: LVIDd 4.9 cm, LVEF 35% (regional), mild DD, normal RV size with reduced function (TAPSE 1.3 cm), normal biatrial size, trace MR, mod TR, est PASP 34 mmHg, trivial localized pericardial effusion adjacent to LV, IVC normal in size with normal inspiratory collapse.   03/06/23 TTE: LVIDd 3.7 cm, LVEF 29% (segmental) with VTI 12 cm, concentric LVH (septum 0.9, PWT 1.2 cm), mild DD, normal RV size and function, normal LA, min MR, mild AS, mild-mod TR  01/11/22 TTE: LVIDd 5.5 cm, LVEF 40-45% (segmental), mod DD, normal RV size and function, LA/RA not well visualized, min MR, mod TR, est RVSP 48 mmHg, trace pericardial effusion  11/26/21 TTE: LVIDd 5.4 cm, LVEF 20% (regional variation), mild DD, normal RV size and function, normal biatria, min MR, mild-mod TR, est RVSP 37 mmHg  12/12/08 TTE: LVIDd 4.7 cm, mod segmental LVSD, normal RV size and function, mod MR, est RVSP 34 mmHg  [de-identified] : \par  03/10/23 LHC: LM mild atherosclerosis, oLAD 40% stenosis, LAD mild atherosclerosis, pCx 50% stenosis, 1st OM 60% ISR, RCA minor luminal irregularities\par  \par  03/10/23 RHC: AO 95/60 (75), HR 93, RA 0, PA 25/9/14, PCWP 0, AO 98%, PA 70%, CO/CI (F) 4.83/2.96, SVR 1241 dsc, PVR 2.9 swanson\par  \par  06/18/12 LHC: 30% pLAD, 45% OM1 at site of prior stent, otherwise minor luminal irregularities of LM and RCA\par

## 2024-04-27 NOTE — HISTORY OF PRESENT ILLNESS
[FreeTextEntry1] : Ms. Sprague is a 73 y/o F with ICM (LVIDd 4.9cm, LVEF 35% in Oct 2023 from prior improved 40-45%) s/p primary prevention dual chamber ICD (2010), CAD c/b MI s/p PCIs (2009 and 2011), AAA s/p EVAR 7/12/23, HTN, COPD, emphysema, CVA with residual R sided weakness, pre-DM (Ha1c 6.1%), anxiety, osteoarthritis and chronic pain on opioids who presents for routine follow-up of her cardiomyopathy. Previously followed by Dr. Colon.  Since she initiated care with our clinic in December 2022, she has had recurrent hospitalizations but not for ADHF. Most recently admitted in March 2023 for COPD exacerbation and ABD discomfort. TTE did show decline of LVEF along with segmental WMA, thus L/RHC was pursued revealing nonobstructive CAD, low filling pressure and preserved CO. In July 2023, underwent successful endovascular repair of infrarenal AAA with Dr. Yoo .   No significant clinical change since last seen October 2023 has maintained follow up with Dr. Sanchez as well. Since last visit she has started cardiac rehab at [facility] and performs 1 hour sessions and feels well. She does not perform distance walking due but reports an improving exercise tolerance. Her home SBP readings range 120's/70's and her HR ~60's. Her weight has up trended `8lbs since her last visit in January which she states is from increased caloric intake. Otherwise, she denies CP, SOB at rest, orthopnea, PND, LH/dizziness, abdominal discomfort and syncope. Her appetite is normal, and her bowel and bladder habits are unchanged and normal for her. She has not had an ICD discharge and has not been hospitalized in the interim

## 2024-04-30 ENCOUNTER — NON-APPOINTMENT (OUTPATIENT)
Age: 74
End: 2024-04-30

## 2024-04-30 LAB
ANION GAP SERPL CALC-SCNC: 13 MMOL/L
BUN SERPL-MCNC: 40 MG/DL
CALCIUM SERPL-MCNC: 9.4 MG/DL
CHLORIDE SERPL-SCNC: 105 MMOL/L
CO2 SERPL-SCNC: 18 MMOL/L
CREAT SERPL-MCNC: 1.68 MG/DL
EGFR: 31 ML/MIN/1.73M2
GLUCOSE SERPL-MCNC: 89 MG/DL
NT-PROBNP SERPL-MCNC: 818 PG/ML
POTASSIUM SERPL-SCNC: 4.9 MMOL/L
SODIUM SERPL-SCNC: 136 MMOL/L

## 2024-05-31 RX ORDER — METOPROLOL SUCCINATE 100 MG/1
100 TABLET, EXTENDED RELEASE ORAL
Qty: 180 | Refills: 2 | Status: ACTIVE | COMMUNITY
Start: 2023-06-07 | End: 1900-01-01

## 2024-06-13 NOTE — CONSULT NOTE ADULT - ASSESSMENT
Date of Service: 2024  Patient Name: Chung Fletcher  Patient Legal Name: Chung Fletcher  : 1957       Indication for Appointment:  Chung Fletcher was referred to the [facility] Ook653Haq)    LAB: [facility]    American College of Cardiology Foundation/American The patient is a 71 year old female with history of AAA, CVD, CAD s/p stent, ventricular arrythmia s/p ICD/PPM, CHF, COPD, IBD, RA presenting with abdominal pain, chest pain, nausea and vomiting.    1. abdominal pain, known stable infrarenal aneurysm   -US duplex abdomen/ pelvis to r/o vascular narrowing   -prn pain meds    2. chest pain  -as per cards    3. nausea/vomiting; resolved  -prn antiemetics     4. CAD s/p stent on Plavix    5. ESTEVAN on CKD stage 3  - as per nephro        Attending supervision statement: I have personally seen and examined the patient. I fully participated in the care of this patient. I have made amendments to the documentation where necessary, and agree with the history, physical exam, and plan as outlined by the ACP. The patient is a 71 year old female with history of AAA, CVD, CAD s/p stent, ventricular arrythmia s/p ICD/PPM, CHF, COPD, IBD, RA presenting with abdominal pain, chest pain, nausea and vomiting.    1. abdominal pain, known stable infrarenal aneurysm   -US duplex abdomen/ pelvis to r/o vascular narrowing   -prn pain meds    2. chest pain  -as per cards    3. nausea/vomiting; resolved  -prn antiemetics     4. CAD s/p stent on Plavix    5. ESTEVAN on CKD stage 3  - as per nephro      I had a prolonged conversation with the patient regarding the hospital course, differential diagnosis, results of diagnostic tests this far, and therapeutic modalities available. Plan of care discussed with the patient after the evaluation. Patient expresses a clear understanding of the plan of care.  125 minutes spent on the total encounter, of which more than fifty percent of the encounter was spent on counseling and/or coordinating care by the attending physician.  Advanced care planning forms were discussed. Code status including forceful chest compressions, defibrillation and intubation were discussed. The risks benefits and alternatives to pertinent gastrointestinal procedures and interventions were discussed in detail and all questions were answered. Duration: 15 Minutes.      Attending supervision statement: I have personally seen and examined the patient. I fully participated in the care of this patient. I have made amendments to the documentation where necessary, and agree with the history, physical exam, and plan as outlined by the ACP. The patient is a 71 year old female with history of AAA, CVD, CAD s/p stent, ventricular arrythmia s/p ICD/PPM, CHF, COPD, IBD, RA presenting with abdominal pain, chest pain, nausea and vomiting.    1. abdominal pain, known stable infrarenal aneurysm   -VA duplex abdomen/ pelvis to r/o vascular narrowing   -prn pain meds    2. chest pain  -as per cards    3. nausea/vomiting; resolved  -prn antiemetics     4. CAD s/p stent on Plavix    5. ESTEVAN on CKD stage 3  - as per nephro      I had a prolonged conversation with the patient regarding the hospital course, differential diagnosis, results of diagnostic tests this far, and therapeutic modalities available. Plan of care discussed with the patient after the evaluation. Patient expresses a clear understanding of the plan of care.  125 minutes spent on the total encounter, of which more than fifty percent of the encounter was spent on counseling and/or coordinating care by the attending physician.  Advanced care planning forms were discussed. Code status including forceful chest compressions, defibrillation and intubation were discussed. The risks benefits and alternatives to pertinent gastrointestinal procedures and interventions were discussed in detail and all questions were answered. Duration: 15 Minutes.      Attending supervision statement: I have personally seen and examined the patient. I fully participated in the care of this patient. I have made amendments to the documentation where necessary, and agree with the history, physical exam, and plan as outlined by the ACP. The patient is a 71 year old female with history of AAA, CVD, CAD s/p stent, ventricular arrythmia s/p ICD/PPM, CHF, COPD, IBD, RA presenting with abdominal pain, chest pain, nausea and vomiting.    1. abdominal pain, known stable infrarenal aneurysm   -VA duplex abdomen/ pelvis to r/o vascular narrowing   -prn pain meds    2. chest pain  -as per cards    3. nausea/vomiting; resolved  -prn antiemetics     4. CAD s/p stent on Plavix    5. ESTEVAN on CKD stage 3  - as per nephro      I had a prolonged conversation with the patient regarding the hospital course, differential diagnosis, results of diagnostic tests this far, and therapeutic modalities available. Plan of care discussed with the patient after the evaluation. Patient expresses a clear understanding of the plan of care.  125 minutes spent on the total encounter, of which more than fifty percent of the encounter was spent on counseling and/or coordinating care by the attending physician.  Advanced care planning forms were discussed. Code status including forceful chest compressions, defibrillation and intubation were discussed. The risks benefits and alternatives to pertinent gastrointestinal procedures and interventions were discussed in detail and all questions were answered. Duration: 15 Minutes. The patient is a 71 year old female with history of AAA, CVD, CAD s/p stent, ventricular arrythmia s/p ICD/PPM, CHF, COPD, IBD, RA presenting with abdominal pain, chest pain, nausea and vomiting.    1. abdominal pain, known stable infrarenal aneurysm. Ddx includes resolving viral gastroenteritis, less likely chronic mesenteric ischemia  -VA duplex abdomen/ pelvis to r/o vascular narrowing   -prn pain meds    2. chest pain  -as per cardiology    3. nausea/vomiting; resolved  -prn antiemetics     4. CAD s/p stent on Plavix    5. ESTEVAN on CKD stage 3  - as per nephro      I had a prolonged conversation with the patient regarding the hospital course, differential diagnosis, results of diagnostic tests this far, and therapeutic modalities available. Plan of care discussed with the patient after the evaluation. Patient expresses a clear understanding of the plan of care.  125 minutes spent on the total encounter, of which more than fifty percent of the encounter was spent on counseling and/or coordinating care by the attending physician.  Advanced care planning forms were discussed. Code status including forceful chest compressions, defibrillation and intubation were discussed. The risks benefits and alternatives to pertinent gastrointestinal procedures and interventions were discussed in detail and all questions were answered. Duration: 15 Minutes. - - -

## 2024-06-17 ENCOUNTER — NON-APPOINTMENT (OUTPATIENT)
Age: 74
End: 2024-06-17

## 2024-06-18 ENCOUNTER — APPOINTMENT (OUTPATIENT)
Dept: ELECTROPHYSIOLOGY | Facility: CLINIC | Age: 74
End: 2024-06-18
Payer: MEDICARE

## 2024-06-18 PROCEDURE — 93296 REM INTERROG EVL PM/IDS: CPT

## 2024-06-18 PROCEDURE — 93295 DEV INTERROG REMOTE 1/2/MLT: CPT

## 2024-07-01 ENCOUNTER — NON-APPOINTMENT (OUTPATIENT)
Age: 74
End: 2024-07-01

## 2024-07-26 ENCOUNTER — RESULT REVIEW (OUTPATIENT)
Age: 74
End: 2024-07-26

## 2024-07-26 ENCOUNTER — APPOINTMENT (OUTPATIENT)
Dept: CV DIAGNOSITCS | Facility: HOSPITAL | Age: 74
End: 2024-07-26

## 2024-07-29 ENCOUNTER — RX RENEWAL (OUTPATIENT)
Age: 74
End: 2024-07-29

## 2024-08-22 NOTE — H&P ADULT - TIME BILLING
PODIATRY NOTES FOR ESTABLISHED PATIENT    CHIEF COMPLAINT:   Chief Complaint   Patient presents with   • Office Visit     Routine foot care       SUBJECTIVE:  HISTORY OF PRESENT ILLNESS:  Diya Lal is a 89 year old female presents to clinic today adult daughter as guardian for foot health exam and chief complaint of long, painful, and dystrophic nails 1-5 bilateral especially when ambulating in shoe gear.  Patient also has a sore spot under her right ball the foot.  Due to the patient's medical condition, the patient can no longer trim the nails without risk.  Patient is no longer considered diabetic.    REVIEW OF SYSTEMS:  Constitutional: Negative for fever and chills.  Skin:Nails are thick and painful.   Neurologic: Feet were negative for any changes since last visit. Pt denies burning tingling pain.  Extremities:  Edema: none.  Tenderness: Present to toesnails 1-5   both feet but especially the great toenails.      HISTORIES:  I have reviewed the past medical history, family history, social history, medications and allergies listed in the medical record as well as the nursing notes for this encounter.    Exam:  Peripheral Vascular Exam: Shows patient has 0/4 palpable dorsalis pedis puls left foot and 0/4 dorsalis pedis pulse right foot, and 0/4 posterior tibial pulse left foot and 0/4 posterior tibial pulse right foot.   Absent hair growth to all toes.  Patient has delayed capillary refill time to greater than 3 seconds all toes    Skin:Skin is atrophic, shiny with mottled skin discoloration bilateral.     Nails 1 through 5 bilateral present  dystrophic with painful incurvated borders.  There is no erythema, no pus.    Nails great toes bilateral present darkly discolored, dystrophic with flaky crumbly texture, auto lysis and subungual debris, with painful incurvated edges consistent with onychomycosis.    Neurologic: WNL and unchanged since last visit.  Patient denies burning tingling numbness to her  feet.    Assessment:  1. Dermatophytosis of nail    2. Ingrowing nail    3. Nail dystrophy    4. Pain in toes of both feet    5. Generalized atherosclerosis       Orders Placed This Encounter   • DEBRIDEMENT OF NAILS, 6 OR MORE     Procedure:  The painful dystrophic nails 1 through 5 bilateral were all debrided using a sterile  to reduce dystrophy and to reduce elongation to patient's satisfaction.  The nail dystrophy was further debrided using a sterile nail .      Today's treatment is confirmed to be medically necessary due to patient's general atherosclerosis to prevent infection, and to reduce pain..    Return in about 3 months (around 11/22/2024) for management of painful dystrophic nails.    - Ordering, reviewing, and interpreting labs, testing, and imaging.  - Independently obtaining a review of systems and performing a physical exam  - Reviewing prior hospitalization records.  - Counselling and educating patient and family regarding interpretation of aforementioned items and plan of care.

## 2024-08-23 ENCOUNTER — APPOINTMENT (OUTPATIENT)
Dept: HEART FAILURE | Facility: CLINIC | Age: 74
End: 2024-08-23

## 2024-08-23 DIAGNOSIS — N18.30 CHRONIC KIDNEY DISEASE, STAGE 3 UNSPECIFIED: ICD-10-CM

## 2024-08-23 DIAGNOSIS — I10 ESSENTIAL (PRIMARY) HYPERTENSION: ICD-10-CM

## 2024-08-23 DIAGNOSIS — E78.5 HYPERLIPIDEMIA, UNSPECIFIED: ICD-10-CM

## 2024-08-23 DIAGNOSIS — I25.5 ISCHEMIC CARDIOMYOPATHY: ICD-10-CM

## 2024-08-23 DIAGNOSIS — I50.22 CHRONIC SYSTOLIC (CONGESTIVE) HEART FAILURE: ICD-10-CM

## 2024-08-23 DIAGNOSIS — I25.10 ATHEROSCLEROTIC HEART DISEASE OF NATIVE CORONARY ARTERY W/OUT ANGINA PECTORIS: ICD-10-CM

## 2024-08-23 PROCEDURE — 99443: CPT | Mod: 93

## 2024-08-23 NOTE — REASON FOR VISIT
[Cardiac Failure] : cardiac failure [Home] : at home, [unfilled] , at the time of the visit. [Medical Office: ([facility])___] : at the medical office located in  [Verbal consent obtained from patient] : the patient, [unfilled]

## 2024-08-23 NOTE — REASON FOR VISIT
[Cardiac Failure] : cardiac failure [Home] : at home, [unfilled] , at the time of the visit. [Medical Office: ([facility])___] : at the medical office located in  [Verbal consent obtained from patient] : the patient, [unfilled]

## 2024-08-23 NOTE — REASON FOR VISIT
[Cardiac Failure] : cardiac failure [Home] : at home, [unfilled] , at the time of the visit. [Medical Office: ([facility])___] : at the medical office located in  [Verbal consent obtained from patient] : the patient, [unfilled]

## 2024-08-30 NOTE — HISTORY OF PRESENT ILLNESS
[FreeTextEntry1] : Ms. Sprague is a 73 y/o F with ICM (LVIDd 4.9cm, LVEF 35% in Oct 2023 from prior improved 40-45%) s/p primary prevention dual chamber ICD (2010), CAD c/b MI s/p PCIs (2009 and 2011), AAA s/p EVAR 7/12/23, HTN, COPD, emphysema, CVA with residual R sided weakness, pre-DM (Ha1c 6.1%), anxiety, osteoarthritis and chronic pain on opioids who presents for routine follow-up of her cardiomyopathy via telehealth. Previously followed by Dr. Colon.  Since she initiated care with our clinic in December 2022, she has had recurrent hospitalizations but not for ADHF. Most recently admitted in March 2023 for COPD exacerbation and ABD discomfort. TTE did show decline of LVEF along with segmental WMA, thus L/RHC was pursued revealing nonobstructive CAD, low filling pressure and preserved CO. In July 2023, underwent successful endovascular repair of infrarenal AAA with Dr. Yoo .   Since last visit in April 2024, she remains in cardiac rehab and had a repeat TTE which revealed unchanged LVEF.   She tested positive for COVID this week and is currently being treated with IV Remdesivir and Augmentin for ?presumed PNA. Therefore, her visit has been transitioned to telehealth. She states she feels well from HF standpoint and is taking all medications as prescribed. Her home most recent home BP reading was stable at 106/63 and HR 72. She states her weight has been stable ranging as well and historically she has not required loop diuretics in the past. She otherwise denies any CP, SOB at rest, orthopnea, PND, LH/dizziness, abdominal discomfort, LE edema, palpitations, and syncope.

## 2024-08-30 NOTE — ASSESSMENT
[FreeTextEntry1] : Ms. Sprague is a 75 y/o F with longstanding ICM (LVIDd 4.9cm, LVEF35% from prior improved 40-45%) s/p primary prevention dual chamber ICD (2010), CAD c/b MI s/p PCIs (2009 and 2011), AAA s/p EVAR 7/12/23, HTN, COPD, emphysema, CVA with residual R sided weakness, pre-DM (Ha1c 6.1%), anxiety, osteoarthritis and chronic pain on opioids who presents for routine follow-up of her cardiomyopathy. She is ACC/AHA Stage C, currently being treated for COVID/PNA. She appears stable from a HF standpoint on current therapies. I have recommended the following:   #Chronic systolic HF Though repeat TTE reveals persistent LVD she appears stable and compensated. -GDMT: continue Entresto 97-103mg BID, Farxiga 10mg QD and Toprol QP563nf BID. Will trend renal chemistries and if acceptable will consider resuming MRA at subsequent visits -Diuretics - no requirements at this time as maintained euvolemia -Device: s/p dual chamber ICD (2010) - 4/26/24 K+ 4.9, BUN/cr 40/1.6, proBNP 818 (873); Will obtain repeat labs via Home draw. - Remains enrolled in Cardiac rehab   #CAD/ICM -Underwent PCI in 2009 and 2011 -No signs or symptoms of ischemia -Continue Plavix, Statin, BB, ARNI and MRA. -Reportedly off ASA due to significant bruising.  #Hypertension - continue GDMT as above  #AAA - Underwent successful EVAR 7/12/23. - Recent US demonstrates patent Evar w/o leak.  - Follows with Dr. Lofton and Dr. Figueroa   #COPD/emphysema -On inhalers -Followed by Dr. Tomasa Bradford.   Follow up with Dr. Hsu in 1-2 months

## 2024-08-30 NOTE — PHYSICAL EXAM
[No Xanthelasma] : no xanthelasma [Normal Venous Pressure] : normal venous pressure [No Edema] : no edema [Normal Radial B/L] : normal radial B/L [No Rash] : no rash [Normal] : alert and oriented, normal memory [de-identified] : No perioral cyanosis, MMM [de-identified] : JVP 6 cm H2O, no HJR [de-identified] : warm peripherally  [de-identified] : Unable to obtain PE for [employer]

## 2024-08-30 NOTE — PHYSICAL EXAM
[No Xanthelasma] : no xanthelasma [Normal Venous Pressure] : normal venous pressure [No Edema] : no edema [Normal Radial B/L] : normal radial B/L [No Rash] : no rash [Normal] : alert and oriented, normal memory [de-identified] : No perioral cyanosis, MMM [de-identified] : JVP 6 cm H2O, no HJR [de-identified] : warm peripherally  [de-identified] : Unable to obtain PE for [employer]

## 2024-08-30 NOTE — CARDIOLOGY SUMMARY
[de-identified] :  06/28/23 ECG: SR at 63 bpm, borderline low voltage in precordial leads, iRBBB, nonspecific Tw abnormality. Compared to prior 3/28/23, no significant change. 09/27/22 ECG: SR at 71 bpm, BETTE, negative Tw.  [de-identified] :  7/26/24 TTE: LVIDd 4.6, LVEF 37%, RWMA, nml RVsSF, TAPSE 1.2, nml  BiAtria, mod TR, PASP 35mmHg,   10/04/23 TTE: LVIDd 4.9 cm, LVEF 35% (regional), mild DD, normal RV size with reduced function (TAPSE 1.3 cm), normal biatrial size, trace MR, mod TR, est PASP 34 mmHg, trivial localized pericardial effusion adjacent to LV, IVC normal in size with normal inspiratory collapse.   03/06/23 TTE: LVIDd 3.7 cm, LVEF 29% (segmental) with VTI 12 cm, concentric LVH (septum 0.9, PWT 1.2 cm), mild DD, normal RV size and function, normal LA, min MR, mild AS, mild-mod TR  01/11/22 TTE: LVIDd 5.5 cm, LVEF 40-45% (segmental), mod DD, normal RV size and function, LA/RA not well visualized, min MR, mod TR, est RVSP 48 mmHg, trace pericardial effusion  11/26/21 TTE: LVIDd 5.4 cm, LVEF 20% (regional variation), mild DD, normal RV size and function, normal biatria, min MR, mild-mod TR, est RVSP 37 mmHg  12/12/08 TTE: LVIDd 4.7 cm, mod segmental LVSD, normal RV size and function, mod MR, est RVSP 34 mmHg  [de-identified] : 03/10/23 LHC: LM mild atherosclerosis, oLAD 40% stenosis, LAD mild atherosclerosis, pCx 50% stenosis, 1st OM 60% ISR, RCA minor luminal irregularities  03/10/23 RHC: AO 95/60 (75), HR 93, RA 0, PA 25/9/14, PCWP 0, AO 98%, PA 70%, CO/CI (F) 4.83/2.96, SVR 1241 dsc, PVR 2.9 swanson  06/18/12 LHC: 30% pLAD, 45% OM1 at site of prior stent, otherwise minor luminal irregularities of LM and RCA

## 2024-08-30 NOTE — PHYSICAL EXAM
[No Xanthelasma] : no xanthelasma [Normal Venous Pressure] : normal venous pressure [No Edema] : no edema [Normal Radial B/L] : normal radial B/L [No Rash] : no rash [Normal] : alert and oriented, normal memory [de-identified] : No perioral cyanosis, MMM [de-identified] : JVP 6 cm H2O, no HJR [de-identified] : warm peripherally  [de-identified] : Unable to obtain PE for [employer]

## 2024-08-30 NOTE — HISTORY OF PRESENT ILLNESS
[FreeTextEntry1] : Ms. Sprague is a 75 y/o F with ICM (LVIDd 4.9cm, LVEF 35% in Oct 2023 from prior improved 40-45%) s/p primary prevention dual chamber ICD (2010), CAD c/b MI s/p PCIs (2009 and 2011), AAA s/p EVAR 7/12/23, HTN, COPD, emphysema, CVA with residual R sided weakness, pre-DM (Ha1c 6.1%), anxiety, osteoarthritis and chronic pain on opioids who presents for routine follow-up of her cardiomyopathy via telehealth. Previously followed by Dr. Colon.  Since she initiated care with our clinic in December 2022, she has had recurrent hospitalizations but not for ADHF. Most recently admitted in March 2023 for COPD exacerbation and ABD discomfort. TTE did show decline of LVEF along with segmental WMA, thus L/RHC was pursued revealing nonobstructive CAD, low filling pressure and preserved CO. In July 2023, underwent successful endovascular repair of infrarenal AAA with Dr. Yoo .   Since last visit in April 2024, she remains in cardiac rehab and had a repeat TTE which revealed unchanged LVEF.   She tested positive for COVID this week and is currently being treated with IV Remdesivir and Augmentin for ?presumed PNA. Therefore, her visit has been transitioned to telehealth. She states she feels well from HF standpoint and is taking all medications as prescribed. Her home most recent home BP reading was stable at 106/63 and HR 72. She states her weight has been stable ranging as well and historically she has not required loop diuretics in the past. She otherwise denies any CP, SOB at rest, orthopnea, PND, LH/dizziness, abdominal discomfort, LE edema, palpitations, and syncope.

## 2024-08-30 NOTE — CARDIOLOGY SUMMARY
[de-identified] :  06/28/23 ECG: SR at 63 bpm, borderline low voltage in precordial leads, iRBBB, nonspecific Tw abnormality. Compared to prior 3/28/23, no significant change. 09/27/22 ECG: SR at 71 bpm, BETTE, negative Tw.  [de-identified] :  7/26/24 TTE: LVIDd 4.6, LVEF 37%, RWMA, nml RVsSF, TAPSE 1.2, nml  BiAtria, mod TR, PASP 35mmHg,   10/04/23 TTE: LVIDd 4.9 cm, LVEF 35% (regional), mild DD, normal RV size with reduced function (TAPSE 1.3 cm), normal biatrial size, trace MR, mod TR, est PASP 34 mmHg, trivial localized pericardial effusion adjacent to LV, IVC normal in size with normal inspiratory collapse.   03/06/23 TTE: LVIDd 3.7 cm, LVEF 29% (segmental) with VTI 12 cm, concentric LVH (septum 0.9, PWT 1.2 cm), mild DD, normal RV size and function, normal LA, min MR, mild AS, mild-mod TR  01/11/22 TTE: LVIDd 5.5 cm, LVEF 40-45% (segmental), mod DD, normal RV size and function, LA/RA not well visualized, min MR, mod TR, est RVSP 48 mmHg, trace pericardial effusion  11/26/21 TTE: LVIDd 5.4 cm, LVEF 20% (regional variation), mild DD, normal RV size and function, normal biatria, min MR, mild-mod TR, est RVSP 37 mmHg  12/12/08 TTE: LVIDd 4.7 cm, mod segmental LVSD, normal RV size and function, mod MR, est RVSP 34 mmHg  [de-identified] : 03/10/23 LHC: LM mild atherosclerosis, oLAD 40% stenosis, LAD mild atherosclerosis, pCx 50% stenosis, 1st OM 60% ISR, RCA minor luminal irregularities  03/10/23 RHC: AO 95/60 (75), HR 93, RA 0, PA 25/9/14, PCWP 0, AO 98%, PA 70%, CO/CI (F) 4.83/2.96, SVR 1241 dsc, PVR 2.9 swanson  06/18/12 LHC: 30% pLAD, 45% OM1 at site of prior stent, otherwise minor luminal irregularities of LM and RCA

## 2024-08-30 NOTE — CARDIOLOGY SUMMARY
[de-identified] :  06/28/23 ECG: SR at 63 bpm, borderline low voltage in precordial leads, iRBBB, nonspecific Tw abnormality. Compared to prior 3/28/23, no significant change. 09/27/22 ECG: SR at 71 bpm, BETTE, negative Tw.  [de-identified] :  7/26/24 TTE: LVIDd 4.6, LVEF 37%, RWMA, nml RVsSF, TAPSE 1.2, nml  BiAtria, mod TR, PASP 35mmHg,   10/04/23 TTE: LVIDd 4.9 cm, LVEF 35% (regional), mild DD, normal RV size with reduced function (TAPSE 1.3 cm), normal biatrial size, trace MR, mod TR, est PASP 34 mmHg, trivial localized pericardial effusion adjacent to LV, IVC normal in size with normal inspiratory collapse.   03/06/23 TTE: LVIDd 3.7 cm, LVEF 29% (segmental) with VTI 12 cm, concentric LVH (septum 0.9, PWT 1.2 cm), mild DD, normal RV size and function, normal LA, min MR, mild AS, mild-mod TR  01/11/22 TTE: LVIDd 5.5 cm, LVEF 40-45% (segmental), mod DD, normal RV size and function, LA/RA not well visualized, min MR, mod TR, est RVSP 48 mmHg, trace pericardial effusion  11/26/21 TTE: LVIDd 5.4 cm, LVEF 20% (regional variation), mild DD, normal RV size and function, normal biatria, min MR, mild-mod TR, est RVSP 37 mmHg  12/12/08 TTE: LVIDd 4.7 cm, mod segmental LVSD, normal RV size and function, mod MR, est RVSP 34 mmHg  [de-identified] : 03/10/23 LHC: LM mild atherosclerosis, oLAD 40% stenosis, LAD mild atherosclerosis, pCx 50% stenosis, 1st OM 60% ISR, RCA minor luminal irregularities  03/10/23 RHC: AO 95/60 (75), HR 93, RA 0, PA 25/9/14, PCWP 0, AO 98%, PA 70%, CO/CI (F) 4.83/2.96, SVR 1241 dsc, PVR 2.9 swanson  06/18/12 LHC: 30% pLAD, 45% OM1 at site of prior stent, otherwise minor luminal irregularities of LM and RCA

## 2024-08-30 NOTE — ASSESSMENT
[FreeTextEntry1] : Ms. Sprague is a 75 y/o F with longstanding ICM (LVIDd 4.9cm, LVEF35% from prior improved 40-45%) s/p primary prevention dual chamber ICD (2010), CAD c/b MI s/p PCIs (2009 and 2011), AAA s/p EVAR 7/12/23, HTN, COPD, emphysema, CVA with residual R sided weakness, pre-DM (Ha1c 6.1%), anxiety, osteoarthritis and chronic pain on opioids who presents for routine follow-up of her cardiomyopathy. She is ACC/AHA Stage C, currently being treated for COVID/PNA. She appears stable from a HF standpoint on current therapies. I have recommended the following:   #Chronic systolic HF Though repeat TTE reveals persistent LVD she appears stable and compensated. -GDMT: continue Entresto 97-103mg BID, Farxiga 10mg QD and Toprol CW403mu BID. Will trend renal chemistries and if acceptable will consider resuming MRA at subsequent visits -Diuretics - no requirements at this time as maintained euvolemia -Device: s/p dual chamber ICD (2010) - 4/26/24 K+ 4.9, BUN/cr 40/1.6, proBNP 818 (873); Will obtain repeat labs via Home draw. - Remains enrolled in Cardiac rehab   #CAD/ICM -Underwent PCI in 2009 and 2011 -No signs or symptoms of ischemia -Continue Plavix, Statin, BB, ARNI and MRA. -Reportedly off ASA due to significant bruising.  #Hypertension - continue GDMT as above  #AAA - Underwent successful EVAR 7/12/23. - Recent US demonstrates patent Evar w/o leak.  - Follows with Dr. Lofton and Dr. Figueroa   #COPD/emphysema -On inhalers -Followed by Dr. Tomasa Bradford.   Follow up with Dr. Hsu in 1-2 months

## 2024-08-30 NOTE — ASSESSMENT
[FreeTextEntry1] : Ms. Sprague is a 75 y/o F with longstanding ICM (LVIDd 4.9cm, LVEF35% from prior improved 40-45%) s/p primary prevention dual chamber ICD (2010), CAD c/b MI s/p PCIs (2009 and 2011), AAA s/p EVAR 7/12/23, HTN, COPD, emphysema, CVA with residual R sided weakness, pre-DM (Ha1c 6.1%), anxiety, osteoarthritis and chronic pain on opioids who presents for routine follow-up of her cardiomyopathy. She is ACC/AHA Stage C, currently being treated for COVID/PNA. She appears stable from a HF standpoint on current therapies. I have recommended the following:   #Chronic systolic HF Though repeat TTE reveals persistent LVD she appears stable and compensated. -GDMT: continue Entresto 97-103mg BID, Farxiga 10mg QD and Toprol [id number] BID. Will trend renal chemistries and if acceptable will consider resuming MRA at subsequent visits -Diuretics - no requirements at this time as maintained euvolemia -Device: s/p dual chamber ICD (2010) - 4/26/24 K+ 4.9, BUN/cr 40/1.6, proBNP 818 (873); Will obtain repeat labs via Home draw. - Remains enrolled in Cardiac rehab   #CAD/ICM -Underwent PCI in 2009 and 2011 -No signs or symptoms of ischemia -Continue Plavix, Statin, BB, ARNI and MRA. -Reportedly off ASA due to significant bruising.  #Hypertension - continue GDMT as above  #AAA - Underwent successful EVAR 7/12/23. - Recent US demonstrates patent Evar w/o leak.  - Follows with Dr. Lofton and Dr. Figueroa   #COPD/emphysema -On inhalers -Followed by Dr. Tomasa Bradford.   Follow up with Dr. Hsu in 1-2 months

## 2024-09-30 NOTE — ED ADULT NURSE NOTE - NSFALLRSKASSESASSIST_ED_ALL_ED
Call Center TCM Note      Flowsheet Row Responses   [facility] patient discharged from? [facility]   Does the patient have one of the following disease processes/diagnoses(primary or secondary)? Other   TCM attempt successful? Yes   Call start time 1400   Call end time 1401   [facility]   Meds reviewed with patient/caregiver? Yes   Is the patient having any side effects they believe may be caused by any medication additions or changes? No   Does the patient have all medications ordered at discharge? Yes   Is the patient taking all medications as directed (includes completed medication regime)? Yes   Comments HOSP DC FU appt 10/3/24 1115 am   Does the patient have an appointment with their PCP within 7-14 days of discharge? Yes   Has home health visited the patient within 72 hours of discharge? N/A   Psychosocial issues? No   Did the patient receive a copy of their discharge instructions? Yes   Nursing interventions Reviewed instructions with patient   What is the patient's perception of their health status since discharge? Improving   Is the patient/caregiver able to teach back signs and symptoms related to disease process for when to call PCP? Yes   Is the patient/caregiver able to teach back signs and symptoms related to disease process for when to call 911? Yes   Is the patient/caregiver able to teach back the hierarchy of who to call/visit for symptoms/problems? PCP, Specialist, Home health nurse, Urgent Care, ED, 911 Yes   TCM call completed? Yes   Wrap up additional comments Pt reports she is doing better. Pt has cardio appt tomorrow.   Call end time 1401            Anu Isaacs RN    9/30/2024, 14:01 EDT        
no

## 2024-10-07 ENCOUNTER — APPOINTMENT (OUTPATIENT)
Dept: ELECTROPHYSIOLOGY | Facility: CLINIC | Age: 74
End: 2024-10-07

## 2024-10-07 ENCOUNTER — NON-APPOINTMENT (OUTPATIENT)
Age: 74
End: 2024-10-07

## 2024-10-07 ENCOUNTER — APPOINTMENT (OUTPATIENT)
Dept: ELECTROPHYSIOLOGY | Facility: CLINIC | Age: 74
End: 2024-10-07
Payer: MEDICARE

## 2024-10-07 VITALS
HEART RATE: 66 BPM | SYSTOLIC BLOOD PRESSURE: 119 MMHG | BODY MASS INDEX: 23.69 KG/M2 | WEIGHT: 138 LBS | OXYGEN SATURATION: 100 % | DIASTOLIC BLOOD PRESSURE: 81 MMHG

## 2024-10-07 PROCEDURE — 93283 PRGRMG EVAL IMPLANTABLE DFB: CPT

## 2024-10-07 PROCEDURE — 93000 ELECTROCARDIOGRAM COMPLETE: CPT | Mod: 59

## 2024-10-16 NOTE — H&P ADULT - NSHPPHYSICALEXAM_GEN_ALL_CORE
Pt received from OR suite - accompanied by procedure Rn and CRNA   Pt  - emerging from anesthesia - VSS   Respirations clear / unlabored  Pt without chest pain / NO PONV  Hear rate regular   Resting comfortably     900         ML of Lactated ringers infused on arrival to pacu   PHYSICAL EXAM:   GENERAL: Alert. Not confused. No acute distress. Not thin. Not cachectic. Not obese.  HEAD:  Atraumatic. Normocephalic.  EYES: EOMI. PERRLA. Normal conjunctiva/sclera.  ENT: Neck supple. No JVD. Moist oral mucosa. Not edentulous. No thrush.  LYMPH: Normal supraclavicular/cervical lymph nodes.   CARDIAC: Not tachy, Not gricelda. Regular rhythm. Not irregularly irregular. S1. S2.   LUNG/CHEST: CTAB. BS equal bilaterally. No wheezes. No rales. No rhonchi.  ABDOMEN: Soft. +diffuse tenderness. No distension. No fluid wave. Normal bowel sounds.  BACK: No midline/vertebral tenderness. No flank tenderness.  VASCULAR: +2 b/l radial or ulnar pulses. Palpable DP pulses.  EXTREMITIES:  No clubbing. No cyanosis. No edema. Moving all 4.  NEUROLOGY: A&Ox3. Non-focal exam. Cranial nerves intact. Tangential speech. Sensation intact.  PSYCH: Normal behavior. Peculiar affect.  SKIN: No jaundice. No erythema. No rash/lesion.  Vascular Access:     ICU Vital Signs Last 24 Hrs  T(C): 36.6 (27 Jun 2021 05:53), Max: 36.6 (27 Jun 2021 05:53)  T(F): 97.8 (27 Jun 2021 05:53), Max: 97.8 (27 Jun 2021 05:53)  HR: 70 (27 Jun 2021 05:53) (60 - 71)  BP: 111/72 (27 Jun 2021 05:53) (97/67 - 147/77)  BP(mean): 89 (27 Jun 2021 01:55) (89 - 98)  ABP: --  ABP(mean): --  RR: 18 (27 Jun 2021 05:53) (18 - 20)  SpO2: 99% (27 Jun 2021 05:53) (97% - 100%)      I&O's Summary

## 2024-10-22 ENCOUNTER — RX RENEWAL (OUTPATIENT)
Age: 74
End: 2024-10-22

## 2024-10-24 ENCOUNTER — RX RENEWAL (OUTPATIENT)
Age: 74
End: 2024-10-24

## 2024-10-30 ENCOUNTER — RX RENEWAL (OUTPATIENT)
Age: 74
End: 2024-10-30

## 2024-11-14 ENCOUNTER — NON-APPOINTMENT (OUTPATIENT)
Age: 74
End: 2024-11-14

## 2024-11-14 ENCOUNTER — APPOINTMENT (OUTPATIENT)
Dept: CARDIOLOGY | Facility: CLINIC | Age: 74
End: 2024-11-14
Payer: MEDICARE

## 2024-11-14 VITALS
WEIGHT: 138 LBS | SYSTOLIC BLOOD PRESSURE: 105 MMHG | OXYGEN SATURATION: 98 % | HEART RATE: 69 BPM | DIASTOLIC BLOOD PRESSURE: 69 MMHG | BODY MASS INDEX: 23.69 KG/M2

## 2024-11-14 DIAGNOSIS — E78.5 HYPERLIPIDEMIA, UNSPECIFIED: ICD-10-CM

## 2024-11-14 DIAGNOSIS — I71.40 ABDOMINAL AORTIC ANEURYSM, WITHOUT RUPTURE, UNSPECIFIED: ICD-10-CM

## 2024-11-14 DIAGNOSIS — I25.10 ATHEROSCLEROTIC HEART DISEASE OF NATIVE CORONARY ARTERY W/OUT ANGINA PECTORIS: ICD-10-CM

## 2024-11-14 DIAGNOSIS — Z95.810 PRESENCE OF AUTOMATIC (IMPLANTABLE) CARDIAC DEFIBRILLATOR: ICD-10-CM

## 2024-11-14 DIAGNOSIS — I10 ESSENTIAL (PRIMARY) HYPERTENSION: ICD-10-CM

## 2024-11-14 PROCEDURE — 93000 ELECTROCARDIOGRAM COMPLETE: CPT

## 2024-11-14 PROCEDURE — 99214 OFFICE O/P EST MOD 30 MIN: CPT

## 2024-11-14 PROCEDURE — G2211 COMPLEX E/M VISIT ADD ON: CPT

## 2024-12-02 ENCOUNTER — APPOINTMENT (OUTPATIENT)
Dept: NEPHROLOGY | Facility: CLINIC | Age: 74
End: 2024-12-02
Payer: MEDICARE

## 2024-12-02 ENCOUNTER — LABORATORY RESULT (OUTPATIENT)
Age: 74
End: 2024-12-02

## 2024-12-02 VITALS
HEIGHT: 64 IN | DIASTOLIC BLOOD PRESSURE: 74 MMHG | TEMPERATURE: 97.2 F | BODY MASS INDEX: 23.56 KG/M2 | SYSTOLIC BLOOD PRESSURE: 112 MMHG | OXYGEN SATURATION: 95 % | WEIGHT: 138 LBS | HEART RATE: 78 BPM

## 2024-12-02 DIAGNOSIS — N17.9 ACUTE KIDNEY FAILURE, UNSPECIFIED: ICD-10-CM

## 2024-12-02 DIAGNOSIS — Z83.3 FAMILY HISTORY OF DIABETES MELLITUS: ICD-10-CM

## 2024-12-02 DIAGNOSIS — N18.4 CHRONIC KIDNEY DISEASE, STAGE 4 (SEVERE): ICD-10-CM

## 2024-12-02 DIAGNOSIS — N39.0 URINARY TRACT INFECTION, SITE NOT SPECIFIED: ICD-10-CM

## 2024-12-02 DIAGNOSIS — I10 ESSENTIAL (PRIMARY) HYPERTENSION: ICD-10-CM

## 2024-12-02 PROCEDURE — 99203 OFFICE O/P NEW LOW 30 MIN: CPT

## 2024-12-06 ENCOUNTER — APPOINTMENT (OUTPATIENT)
Dept: ULTRASOUND IMAGING | Facility: CLINIC | Age: 74
End: 2024-12-06

## 2024-12-06 LAB
ALBUMIN SERPL ELPH-MCNC: 4.1 G/DL
ANA PAT FLD IF-IMP: ABNORMAL
ANA SER IF-ACNC: ABNORMAL
ANION GAP SERPL CALC-SCNC: 13 MMOL/L
APPEARANCE: CLEAR
BACTERIA UR CULT: NORMAL
BACTERIA: NEGATIVE /HPF
BASOPHILS # BLD AUTO: 0.06 K/UL
BASOPHILS NFR BLD AUTO: 0.9 %
BILIRUBIN URINE: NEGATIVE
BLOOD URINE: NEGATIVE
BUN SERPL-MCNC: 28 MG/DL
C3 SERPL-MCNC: 158 MG/DL
CALCIUM SERPL-MCNC: 10.5 MG/DL
CALCIUM SERPL-MCNC: 10.5 MG/DL
CAST: 3 /LPF
CHLORIDE SERPL-SCNC: 104 MMOL/L
CO2 SERPL-SCNC: 20 MMOL/L
COLOR: YELLOW
CREAT SERPL-MCNC: 1.28 MG/DL
CREAT SPEC-SCNC: 50 MG/DL
CREAT/PROT UR: 0.2 RATIO
DEPRECATED KAPPA LC FREE/LAMBDA SER: 1.66 RATIO
DSDNA AB SER-ACNC: 1 IU/ML
EGFR: 43 ML/MIN/1.73M2
EOSINOPHIL # BLD AUTO: 0.12 K/UL
EOSINOPHIL NFR BLD AUTO: 1.9 %
EPITHELIAL CELLS: 1 /HPF
GLUCOSE QUALITATIVE U: NEGATIVE MG/DL
GLUCOSE SERPL-MCNC: 93 MG/DL
HBV SURFACE AB SER QL: ABNORMAL
HBV SURFACE AG SER QL: NONREACTIVE
HCT VFR BLD CALC: 44.5 %
HCV AB SER QL: NONREACTIVE
HCV S/CO RATIO: 0.13 S/CO
HGB BLD-MCNC: 14.4 G/DL
IGA 24H UR QL IFE: NORMAL
IGA SER QL IEP: 368 MG/DL
IGG SER QL IEP: 1380 MG/DL
IGM SER QL IEP: 145 MG/DL
IMM GRANULOCYTES NFR BLD AUTO: 0.5 %
KAPPA LC CSF-MCNC: 2.35 MG/DL
KAPPA LC SERPL-MCNC: 3.91 MG/DL
KETONES URINE: NEGATIVE MG/DL
LEUKOCYTE ESTERASE URINE: ABNORMAL
LYMPHOCYTES # BLD AUTO: 1.86 K/UL
LYMPHOCYTES NFR BLD AUTO: 28.8 %
M PROTEIN SPEC IFE-MCNC: NORMAL
MAN DIFF?: NORMAL
MCHC RBC-ENTMCNC: 32.4 G/DL
MCHC RBC-ENTMCNC: 32.5 PG
MCV RBC AUTO: 100.5 FL
MICROSCOPIC-UA: NORMAL
MONOCYTES # BLD AUTO: 0.59 K/UL
MONOCYTES NFR BLD AUTO: 9.1 %
NEUTROPHILS # BLD AUTO: 3.8 K/UL
NEUTROPHILS NFR BLD AUTO: 58.8 %
NITRITE URINE: NEGATIVE
PARATHYROID HORMONE INTACT: 24 PG/ML
PH URINE: 6
PHOSPHATE SERPL-MCNC: 3.4 MG/DL
PLATELET # BLD AUTO: 216 K/UL
POTASSIUM SERPL-SCNC: 4.9 MMOL/L
PROT UR-MCNC: 9 MG/DL
PROTEIN URINE: NORMAL MG/DL
RBC # BLD: 4.43 M/UL
RBC # FLD: 15 %
RED BLOOD CELLS URINE: 0 /HPF
SODIUM SERPL-SCNC: 136 MMOL/L
SPECIFIC GRAVITY URINE: 1.01
UROBILINOGEN URINE: 0.2 MG/DL
WBC # FLD AUTO: 6.46 K/UL
WHITE BLOOD CELLS URINE: 2 /HPF

## 2025-01-03 ENCOUNTER — APPOINTMENT (OUTPATIENT)
Dept: HEART FAILURE | Facility: CLINIC | Age: 75
End: 2025-01-03

## 2025-01-03 VITALS
HEART RATE: 74 BPM | OXYGEN SATURATION: 97 % | WEIGHT: 135 LBS | DIASTOLIC BLOOD PRESSURE: 72 MMHG | HEIGHT: 64 IN | SYSTOLIC BLOOD PRESSURE: 109 MMHG | TEMPERATURE: 97.5 F | BODY MASS INDEX: 23.05 KG/M2

## 2025-01-03 DIAGNOSIS — I50.22 CHRONIC SYSTOLIC (CONGESTIVE) HEART FAILURE: ICD-10-CM

## 2025-01-03 DIAGNOSIS — N18.4 CHRONIC KIDNEY DISEASE, STAGE 4 (SEVERE): ICD-10-CM

## 2025-01-03 DIAGNOSIS — I25.10 ATHEROSCLEROTIC HEART DISEASE OF NATIVE CORONARY ARTERY W/OUT ANGINA PECTORIS: ICD-10-CM

## 2025-01-03 PROCEDURE — G2211 COMPLEX E/M VISIT ADD ON: CPT

## 2025-01-03 PROCEDURE — 99214 OFFICE O/P EST MOD 30 MIN: CPT

## 2025-01-06 ENCOUNTER — APPOINTMENT (OUTPATIENT)
Dept: ELECTROPHYSIOLOGY | Facility: CLINIC | Age: 75
End: 2025-01-06
Payer: MEDICARE

## 2025-01-06 ENCOUNTER — NON-APPOINTMENT (OUTPATIENT)
Age: 75
End: 2025-01-06

## 2025-01-06 PROCEDURE — 93295 DEV INTERROG REMOTE 1/2/MLT: CPT

## 2025-01-06 PROCEDURE — 93296 REM INTERROG EVL PM/IDS: CPT

## 2025-02-04 ENCOUNTER — RX RENEWAL (OUTPATIENT)
Age: 75
End: 2025-02-04

## 2025-02-13 ENCOUNTER — APPOINTMENT (OUTPATIENT)
Dept: VASCULAR SURGERY | Facility: CLINIC | Age: 75
End: 2025-02-13

## 2025-02-13 ENCOUNTER — APPOINTMENT (OUTPATIENT)
Dept: VASCULAR SURGERY | Facility: CLINIC | Age: 75
End: 2025-02-13
Payer: MEDICARE

## 2025-02-13 VITALS
TEMPERATURE: 97.5 F | BODY MASS INDEX: 23.05 KG/M2 | WEIGHT: 135 LBS | HEIGHT: 64 IN | HEART RATE: 83 BPM | DIASTOLIC BLOOD PRESSURE: 76 MMHG | SYSTOLIC BLOOD PRESSURE: 113 MMHG

## 2025-02-13 DIAGNOSIS — I71.40 ABDOMINAL AORTIC ANEURYSM, WITHOUT RUPTURE, UNSPECIFIED: ICD-10-CM

## 2025-02-13 PROCEDURE — 93978 VASCULAR STUDY: CPT

## 2025-02-13 PROCEDURE — 99214 OFFICE O/P EST MOD 30 MIN: CPT

## 2025-02-13 RX ORDER — ASPIRIN ENTERIC COATED TABLETS 81 MG 81 MG/1
81 TABLET, DELAYED RELEASE ORAL
Qty: 90 | Refills: 3 | Status: ACTIVE | COMMUNITY
Start: 2025-02-13 | End: 1900-01-01

## 2025-03-03 ENCOUNTER — NON-APPOINTMENT (OUTPATIENT)
Age: 75
End: 2025-03-03

## 2025-03-04 ENCOUNTER — APPOINTMENT (OUTPATIENT)
Dept: VASCULAR SURGERY | Facility: CLINIC | Age: 75
End: 2025-03-04
Payer: MEDICARE

## 2025-03-04 VITALS
DIASTOLIC BLOOD PRESSURE: 57 MMHG | BODY MASS INDEX: 23.56 KG/M2 | HEIGHT: 64 IN | HEART RATE: 76 BPM | SYSTOLIC BLOOD PRESSURE: 83 MMHG | TEMPERATURE: 97.7 F | WEIGHT: 138 LBS

## 2025-03-04 PROCEDURE — 99214 OFFICE O/P EST MOD 30 MIN: CPT

## 2025-03-05 ENCOUNTER — APPOINTMENT (OUTPATIENT)
Dept: CARDIOLOGY | Facility: CLINIC | Age: 75
End: 2025-03-05
Payer: MEDICARE

## 2025-03-05 ENCOUNTER — NON-APPOINTMENT (OUTPATIENT)
Age: 75
End: 2025-03-05

## 2025-03-05 VITALS
OXYGEN SATURATION: 97 % | SYSTOLIC BLOOD PRESSURE: 112 MMHG | HEART RATE: 73 BPM | DIASTOLIC BLOOD PRESSURE: 75 MMHG | WEIGHT: 138 LBS | BODY MASS INDEX: 23.56 KG/M2 | HEIGHT: 64 IN

## 2025-03-05 DIAGNOSIS — I71.40 ABDOMINAL AORTIC ANEURYSM, WITHOUT RUPTURE, UNSPECIFIED: ICD-10-CM

## 2025-03-05 PROCEDURE — 99214 OFFICE O/P EST MOD 30 MIN: CPT

## 2025-03-05 PROCEDURE — G2211 COMPLEX E/M VISIT ADD ON: CPT

## 2025-03-06 ENCOUNTER — APPOINTMENT (OUTPATIENT)
Dept: VASCULAR SURGERY | Facility: CLINIC | Age: 75
End: 2025-03-06

## 2025-03-12 DIAGNOSIS — B37.0 CANDIDAL STOMATITIS: ICD-10-CM

## 2025-03-12 RX ORDER — NYSTATIN 100000 [USP'U]/ML
100000 SUSPENSION ORAL 3 TIMES DAILY
Qty: 1 | Refills: 3 | Status: ACTIVE | COMMUNITY
Start: 2025-03-12 | End: 1900-01-01

## 2025-03-13 ENCOUNTER — APPOINTMENT (OUTPATIENT)
Dept: VASCULAR SURGERY | Facility: CLINIC | Age: 75
End: 2025-03-13

## 2025-03-18 ENCOUNTER — APPOINTMENT (OUTPATIENT)
Dept: CARDIOLOGY | Facility: CLINIC | Age: 75
End: 2025-03-18

## 2025-03-27 ENCOUNTER — APPOINTMENT (OUTPATIENT)
Dept: VASCULAR SURGERY | Facility: CLINIC | Age: 75
End: 2025-03-27

## 2025-04-01 ENCOUNTER — APPOINTMENT (OUTPATIENT)
Dept: VASCULAR SURGERY | Facility: CLINIC | Age: 75
End: 2025-04-01
Payer: MEDICARE

## 2025-04-01 VITALS
SYSTOLIC BLOOD PRESSURE: 113 MMHG | DIASTOLIC BLOOD PRESSURE: 78 MMHG | TEMPERATURE: 97.8 F | BODY MASS INDEX: 22.71 KG/M2 | HEIGHT: 64 IN | WEIGHT: 133 LBS | HEART RATE: 67 BPM

## 2025-04-01 DIAGNOSIS — I70.219 ATHEROSCLEROSIS OF NATIVE ARTERIES OF EXTREMITIES WITH INTERMITTENT CLAUDICATION, UNSPECIFIED EXTREMITY: ICD-10-CM

## 2025-04-01 DIAGNOSIS — I71.40 ABDOMINAL AORTIC ANEURYSM, WITHOUT RUPTURE, UNSPECIFIED: ICD-10-CM

## 2025-04-01 PROCEDURE — 99214 OFFICE O/P EST MOD 30 MIN: CPT

## 2025-04-09 ENCOUNTER — APPOINTMENT (OUTPATIENT)
Dept: ELECTROPHYSIOLOGY | Facility: CLINIC | Age: 75
End: 2025-04-09

## 2025-04-09 ENCOUNTER — NON-APPOINTMENT (OUTPATIENT)
Age: 75
End: 2025-04-09

## 2025-04-09 VITALS
WEIGHT: 133 LBS | OXYGEN SATURATION: 98 % | BODY MASS INDEX: 22.71 KG/M2 | DIASTOLIC BLOOD PRESSURE: 84 MMHG | SYSTOLIC BLOOD PRESSURE: 132 MMHG | HEART RATE: 60 BPM | HEIGHT: 64 IN

## 2025-04-09 PROCEDURE — 93283 PRGRMG EVAL IMPLANTABLE DFB: CPT

## 2025-04-09 PROCEDURE — 93000 ELECTROCARDIOGRAM COMPLETE: CPT | Mod: 59

## 2025-04-10 ENCOUNTER — APPOINTMENT (OUTPATIENT)
Dept: SURGERY | Facility: CLINIC | Age: 75
End: 2025-04-10
Payer: MEDICARE

## 2025-04-10 VITALS
TEMPERATURE: 98 F | HEART RATE: 87 BPM | BODY MASS INDEX: 22.71 KG/M2 | HEIGHT: 64 IN | WEIGHT: 133 LBS | SYSTOLIC BLOOD PRESSURE: 129 MMHG | DIASTOLIC BLOOD PRESSURE: 81 MMHG

## 2025-04-10 PROBLEM — I70.219 EXTREMITY ATHEROSCLEROSIS WITH INTERMITTENT CLAUDICATION: Status: ACTIVE | Noted: 2025-04-10

## 2025-04-10 PROCEDURE — 99203 OFFICE O/P NEW LOW 30 MIN: CPT

## 2025-04-29 ENCOUNTER — RX RENEWAL (OUTPATIENT)
Age: 75
End: 2025-04-29

## 2025-05-16 NOTE — REASON FOR VISIT
After obtaining consent, and per orders of EZIO Thompson, injection of Decadron 8mg given by Michaela Tanner RN. Patient instructed to remain in clinic for 20 minutes afterwards, and to report any adverse reaction to me immediately. No s/s of adverse reactions noted. Pt tolerated well.   [Follow-Up: _____] : a [unfilled] follow-up visit [FreeTextEntry1] : Keloid excision and wound closure of chest and left shoulder 1/10/2019. Pt is doing well; here for steroid injections.

## 2025-05-29 ENCOUNTER — RX RENEWAL (OUTPATIENT)
Age: 75
End: 2025-05-29

## 2025-06-12 ENCOUNTER — APPOINTMENT (OUTPATIENT)
Dept: VASCULAR SURGERY | Facility: CLINIC | Age: 75
End: 2025-06-12

## 2025-07-09 ENCOUNTER — APPOINTMENT (OUTPATIENT)
Dept: ELECTROPHYSIOLOGY | Facility: CLINIC | Age: 75
End: 2025-07-09

## 2025-07-09 PROCEDURE — 93296 REM INTERROG EVL PM/IDS: CPT

## 2025-07-09 PROCEDURE — 93295 DEV INTERROG REMOTE 1/2/MLT: CPT

## 2025-07-10 ENCOUNTER — APPOINTMENT (OUTPATIENT)
Dept: CARDIOLOGY | Facility: CLINIC | Age: 75
End: 2025-07-10
Payer: MEDICARE

## 2025-07-10 VITALS
WEIGHT: 134 LBS | DIASTOLIC BLOOD PRESSURE: 77 MMHG | OXYGEN SATURATION: 98 % | HEART RATE: 67 BPM | BODY MASS INDEX: 23 KG/M2 | SYSTOLIC BLOOD PRESSURE: 131 MMHG

## 2025-07-10 PROCEDURE — G2211 COMPLEX E/M VISIT ADD ON: CPT

## 2025-07-10 PROCEDURE — 99214 OFFICE O/P EST MOD 30 MIN: CPT

## 2025-07-10 PROCEDURE — 93000 ELECTROCARDIOGRAM COMPLETE: CPT

## 2025-07-15 ENCOUNTER — APPOINTMENT (OUTPATIENT)
Dept: HEART FAILURE | Facility: CLINIC | Age: 75
End: 2025-07-15

## 2025-08-07 ENCOUNTER — APPOINTMENT (OUTPATIENT)
Dept: VASCULAR SURGERY | Facility: CLINIC | Age: 75
End: 2025-08-07

## 2025-08-15 ENCOUNTER — RX RENEWAL (OUTPATIENT)
Age: 75
End: 2025-08-15

## (undated) DEVICE — TUBING HIGH POWER CONTRAST INJ

## (undated) DEVICE — PACK CV SPLIT PACK II

## (undated) DEVICE — VISITEC 4X4

## (undated) DEVICE — DRAPE TOWEL BLUE 17" X 24"

## (undated) DEVICE — SYR LUER LOK 20CC

## (undated) DEVICE — PROTECTOR HEEL / ELBOW FLUFFY

## (undated) DEVICE — STAPLER SKIN MULTI DIRECTION W35

## (undated) DEVICE — FLOW SWITCH

## (undated) DEVICE — POSITIONER STRAP ARMBOARD VELCRO TS-30

## (undated) DEVICE — DRSG CURITY GAUZE SPONGE 4 X 4" 12-PLY

## (undated) DEVICE — ELCTR BOVIE PENCIL BLADE 10FT

## (undated) DEVICE — OLCOTT TORQUE DEVICE

## (undated) DEVICE — SUT PROLENE 6-0 30" C-1

## (undated) DEVICE — SOL IRR BAG NS 0.9% 1000ML

## (undated) DEVICE — SYR LUER LOK 10CC

## (undated) DEVICE — SET DIL ENDOVASC

## (undated) DEVICE — INFLATOR ENCORE 26

## (undated) DEVICE — DRSG TEGADERM 4X4.75"

## (undated) DEVICE — NDL ENTRY PERC MCKNIGHT 18G

## (undated) DEVICE — PACK PERIPHERAL VASC

## (undated) DEVICE — LINE INJECTION HIGH PRESSURE 72IN

## (undated) DEVICE — DRAPE BACK TABLE COVER 65X90"

## (undated) DEVICE — SUT SILK 2-0 18" TIES

## (undated) DEVICE — GLV 7.5 PROTEXIS (CREAM) MICRO

## (undated) DEVICE — PACK DOUBLE BASIN CUSTOM

## (undated) DEVICE — DRSG DERMABOND 0.7ML

## (undated) DEVICE — SUT VICRYL 3-0 27" SH UNDYED

## (undated) DEVICE — DRAPE FEMORAL ANGIOGRAPHY W TROUGH

## (undated) DEVICE — VENODYNE/SCD SLEEVE CALF MEDIUM

## (undated) DEVICE — SUCTION YANKAUER NO CONTROL VENT

## (undated) DEVICE — WARMING BLANKET FULL UNDERBODY

## (undated) DEVICE — SUT MONOCRYL 4-0 27" PS-2 UNDYED

## (undated) DEVICE — TORQUE DEVICE FOR GUIDEWIRE 0.0100.038"

## (undated) DEVICE — Device